# Patient Record
Sex: MALE | Race: WHITE | Employment: OTHER | URBAN - METROPOLITAN AREA
[De-identification: names, ages, dates, MRNs, and addresses within clinical notes are randomized per-mention and may not be internally consistent; named-entity substitution may affect disease eponyms.]

---

## 2017-03-08 ENCOUNTER — HOSPITAL ENCOUNTER (OUTPATIENT)
Dept: NUCLEAR MEDICINE | Facility: CLINIC | Age: 72
Setting detail: NUCLEAR MEDICINE
Discharge: HOME | End: 2017-03-08
Attending: INTERNAL MEDICINE | Admitting: INTERNAL MEDICINE
Payer: MEDICARE

## 2017-03-08 ENCOUNTER — HOSPITAL ENCOUNTER (OUTPATIENT)
Dept: CARDIOLOGY | Facility: CLINIC | Age: 72
Discharge: HOME | End: 2017-03-08
Attending: INTERNAL MEDICINE | Admitting: INTERNAL MEDICINE
Payer: MEDICARE

## 2017-03-08 DIAGNOSIS — I25.719 CORONARY ARTERY DISEASE INVOLVING AUTOLOGOUS VEIN CORONARY BYPASS GRAFT WITH ANGINA PECTORIS (H): ICD-10-CM

## 2017-03-08 PROCEDURE — 78452 HT MUSCLE IMAGE SPECT MULT: CPT

## 2017-03-08 PROCEDURE — 93306 TTE W/DOPPLER COMPLETE: CPT | Mod: 26 | Performed by: INTERNAL MEDICINE

## 2017-03-08 PROCEDURE — A9502 TC99M TETROFOSMIN: HCPCS | Performed by: INTERNAL MEDICINE

## 2017-03-08 PROCEDURE — 93018 CV STRESS TEST I&R ONLY: CPT | Performed by: INTERNAL MEDICINE

## 2017-03-08 PROCEDURE — 93016 CV STRESS TEST SUPVJ ONLY: CPT | Performed by: INTERNAL MEDICINE

## 2017-03-08 PROCEDURE — 78452 HT MUSCLE IMAGE SPECT MULT: CPT | Mod: 26 | Performed by: INTERNAL MEDICINE

## 2017-03-08 PROCEDURE — 34300033 ZZH RX 343: Performed by: INTERNAL MEDICINE

## 2017-03-08 PROCEDURE — 93017 CV STRESS TEST TRACING ONLY: CPT

## 2017-03-08 RX ADMIN — TETROFOSMIN 11 MCI.: 0.23 INJECTION, POWDER, LYOPHILIZED, FOR SOLUTION INTRAVENOUS at 09:10

## 2017-03-08 RX ADMIN — TETROFOSMIN 32.8 MCI.: 0.23 INJECTION, POWDER, LYOPHILIZED, FOR SOLUTION INTRAVENOUS at 10:40

## 2017-03-20 ENCOUNTER — OFFICE VISIT (OUTPATIENT)
Dept: CARDIOLOGY | Facility: CLINIC | Age: 72
End: 2017-03-20
Payer: COMMERCIAL

## 2017-03-20 VITALS
SYSTOLIC BLOOD PRESSURE: 117 MMHG | WEIGHT: 207 LBS | OXYGEN SATURATION: 96 % | DIASTOLIC BLOOD PRESSURE: 73 MMHG | BODY MASS INDEX: 31.02 KG/M2 | HEART RATE: 71 BPM

## 2017-03-20 DIAGNOSIS — I25.118 CORONARY ARTERY DISEASE OF NATIVE ARTERY OF NATIVE HEART WITH STABLE ANGINA PECTORIS (H): Primary | ICD-10-CM

## 2017-03-20 DIAGNOSIS — E78.5 HYPERLIPIDEMIA LDL GOAL <70: ICD-10-CM

## 2017-03-20 DIAGNOSIS — R07.89 CHEST PRESSURE: ICD-10-CM

## 2017-03-20 PROCEDURE — 99214 OFFICE O/P EST MOD 30 MIN: CPT | Performed by: NURSE PRACTITIONER

## 2017-03-20 RX ORDER — ISOSORBIDE MONONITRATE 30 MG/1
30 TABLET, EXTENDED RELEASE ORAL DAILY
Qty: 30 TABLET | Refills: 11 | Status: SHIPPED | OUTPATIENT
Start: 2017-03-20 | End: 2017-05-11

## 2017-03-20 RX ORDER — ROSUVASTATIN CALCIUM 40 MG/1
40 TABLET, COATED ORAL EVERY EVENING
Qty: 90 TABLET | Refills: 3 | Status: SHIPPED | OUTPATIENT
Start: 2017-03-20 | End: 2017-08-30

## 2017-03-20 NOTE — MR AVS SNAPSHOT
After Visit Summary   3/20/2017    Derrick Morrison    MRN: [id number]           Patient Information     Date Of Birth          1945        Visit Information        Provider Department      3/20/2017 2:20 PM Janeth Campos APRN CNP [facility] PHYSICIAN HEART AT [facility]        Today's Diagnoses     Coronary artery disease of native artery of native heart with stable angina pectoris (H)    -  1    Hyperlipidemia LDL goal <70          Care Instructions    Thank you for your [facility] visit today. Your provider has recommended the following:  Medication Changes:  INCREASE rosuvastatin to 40 mg daily as previously discussed   START Imdur 30 mg daily take in the AM   Recommendations:  Call if you have any side effects or low blood pressure after starting Imdur  Follow-up:  See Dr. Lan for cardiology follow up in 1 month.  Call [phone] two months prior to request date to schedule any future appointments.  Reminder:  1. Please bring in all current medications, over the counter supplements and vitamin bottles to your next appointment.               [facility] HEART [facility]~[address]~[place], [place]~[address]  Questions about your visit today?   Call your Cardiology Clinic RN's-Sara Rowe and/or Conchita Bhakta at [phone].          Follow-ups after your visit        Additional Services     Follow-Up with Cardiologist                 Future tests that were ordered for you today     Open Future Orders        Priority Expected Expires Ordered    Follow-Up with Cardiologist Routine 5/1/2017 3/20/2019 3/20/2017            Who to contact     If you have questions or need follow up information about today's clinic visit or your schedule please contact [facility] PHYSICIAN HEART AT [facility] directly at [phone].  Normal or non-critical lab and imaging results will be communicated to you by  MyChart, letter or phone within 4 business days after the clinic has received the results. If you do not hear from us within 7 days, please contact the clinic through [facility] or phone. If you have a critical or abnormal lab result, we will notify you by phone as soon as possible.  Submit refill requests through [facility] or call your pharmacy and they will forward the refill request to us. Please allow 3 business days for your refill to be completed.          Additional Information About Your Visit        [facility] Information     [facility] gives you secure access to your electronic health record. If you see a primary care provider, you can also send messages to your care team and make appointments. If you have questions, please call your primary care clinic.  If you do not have a primary care provider, please call [phone] and they will assist you.        Care EveryWhere ID     This is your Care EveryWhere ID. This could be used by other organizations to access your [facility] medical records  [id number]        Your Vitals Were     Pulse Pulse Oximetry BMI (Body Mass Index)             71 96% 31.02 kg/m2          Blood Pressure from Last 3 Encounters:   03/20/17 117/73   12/29/16 115/82   10/11/16 125/71    Weight from Last 3 Encounters:   03/20/17 93.9 kg (207 lb)   12/29/16 92.5 kg (204 lb)   09/28/16 91.2 kg (201 lb)                 Today's Medication Changes          These changes are accurate as of: 3/20/17  3:01 PM.  If you have any questions, ask your nurse or doctor.               Start taking these medicines.        Dose/Directions    isosorbide mononitrate 30 MG 24 hr tablet   Commonly known as:  IMDUR   Used for:  Coronary artery disease of native artery of native heart with stable angina pectoris (H)   Started by:  Janeth Campos APRN CNP        Dose:  30 mg   Take 1 tablet (30 mg) by mouth daily   Quantity:  30 tablet   Refills:  11            Where to get your medicines      These medications  were sent to [facility] - [place], [place] - [address]  [address], [facility] [address]     Phone:  [phone]     isosorbide mononitrate 30 MG 24 hr tablet    rosuvastatin 40 MG tablet                Primary Care Provider Office Phone # Fax #    Kaiden Zapata [phone]       [facility] [address]  [facility] [address]        Thank you!     Thank you for choosing [facility] PHYSICIAN HEART AT [facility]  for your care. Our goal is always to provide you with excellent care. Hearing back from our patients is one way we can continue to improve our services. Please take a few minutes to complete the written survey that you may receive in the mail after your visit with us. Thank you!             Your Updated Medication List - Protect others around you: Learn how to safely use, store and throw away your medicines at www.disposemymeds.org.          This list is accurate as of: 3/20/17  3:01 PM.  Always use your most recent med list.                   Brand Name Dispense Instructions for use    ASPIRIN NOT PRESCRIBED    INTENTIONAL    0 each    1 each continuous prn Antiplatelet medication not prescribed intentionally due to plavix       clopidogrel 75 MG tablet    PLAVIX    90 tablet    Take 1 tablet (75 mg) by mouth daily       isosorbide mononitrate 30 MG 24 hr tablet    IMDUR    30 tablet    Take 1 tablet (30 mg) by mouth daily       metoprolol 25 MG 24 hr tablet    TOPROL-XL    90 tablet    Take 1 tablet (25 mg) by mouth daily       nitroglycerin 0.4 MG sublingual tablet    NITROSTAT    25 tablet    Place 1 tablet (0.4 mg) under the tongue every 5 minutes as needed for chest pain       rosuvastatin 40 MG tablet    CRESTOR    90 tablet    Take 1 tablet (40 mg) by mouth every evening

## 2017-03-20 NOTE — PROGRESS NOTES
"HPI and Plan:   See dictation    Orders Placed This Encounter   Procedures     Lipid Profile     ALT     Follow-Up with Cardiologist       Orders Placed This Encounter   Medications     rosuvastatin (CRESTOR) 40 MG tablet     Sig: Take 1 tablet (40 mg) by mouth every evening     Dispense:  90 tablet     Refill:  3     isosorbide mononitrate (IMDUR) 30 MG 24 hr tablet     Sig: Take 1 tablet (30 mg) by mouth daily     Dispense:  30 tablet     Refill:  11       Medications Discontinued During This Encounter   Medication Reason     rosuvastatin (CRESTOR) 40 MG tablet Reorder         Encounter Diagnoses   Name Primary?     Hyperlipidemia LDL goal <70      Coronary artery disease of native artery of native heart with stable angina pectoris (H) Yes     Chest pressure        CURRENT MEDICATIONS:  Current Outpatient Prescriptions   Medication Sig Dispense Refill     rosuvastatin (CRESTOR) 40 MG tablet Take 1 tablet (40 mg) by mouth every evening 90 tablet 3     isosorbide mononitrate (IMDUR) 30 MG 24 hr tablet Take 1 tablet (30 mg) by mouth daily 30 tablet 11     clopidogrel (PLAVIX) 75 MG tablet Take 1 tablet (75 mg) by mouth daily 90 tablet 3     metoprolol (TOPROL-XL) 25 MG 24 hr tablet Take 1 tablet (25 mg) by mouth daily 90 tablet 3     nitroglycerin (NITROSTAT) 0.4 MG SL tablet Place 1 tablet (0.4 mg) under the tongue every 5 minutes as needed for chest pain 25 tablet 0     ASPIRIN NOT PRESCRIBED, INTENTIONAL, 1 each continuous prn Antiplatelet medication not prescribed intentionally due to plavix 0 each 0     [DISCONTINUED] rosuvastatin (CRESTOR) 40 MG tablet Take 1 tablet (40 mg) by mouth every evening 90 tablet 3       ALLERGIES   No Known Allergies    PAST MEDICAL HISTORY:  Past Medical History   Diagnosis Date     Actinic keratosis      Basal cell carcinoma      BPH w urinary obs/LUTS 10/10/2011     flomax in past, \"quit working\".  Avodart in past.  Tapered off.        CAD (coronary artery disease) 10/10/2011     " "-8/16/2006 s/p GERALDINE to mid RCA, s/p GERALDINE to mid LAD in [place] -10/4/2007 s/p GERALDINE to pRCA and GERALDINE to dRCA in [place]  -11/17/11 Unstable angina, s/p GERALDINE to prox LAD (jailed small diagonal)       Hyperlipidaemia      Palpitations 8/7/2013     occasional, improved on low dose beta blocker      screening 10/10/2011     2007 colonoscopy \"all clean\" in [place].  He is sure about this.   AAA ultrasound screen 3/07 normal also.  (leg and neck done then too)      Skin cancer      had mohs on L temple       PAST SURGICAL HISTORY:  No past surgical history on file.    FAMILY HISTORY:  Family History   Problem Relation Age of Onset     Coronary Artery Disease Mother        SOCIAL HISTORY:  Social History     Social History     Marital status:      Spouse name: N/A     Number of children: N/A     Years of education: N/A     Occupational History      Retired     Social History Main Topics     Smoking status: Never Smoker     Smokeless tobacco: Never Used     Alcohol use Yes      Comment: occasionally     Drug use: No     Sexual activity: Yes     Partners: Female     Other Topics Concern     Parent/Sibling W/ Cabg, Mi Or Angioplasty Before 65f 55m? No     Social History Narrative       Review of Systems:  Skin:  Negative       Eyes:  Positive for glasses    ENT:  Positive for hearing loss    Respiratory:  Negative for shortness of breath     Cardiovascular:  Negative for;chest pain;palpitations;edema;syncope or near-syncope;fatigue;lightheadedness;dizziness      Gastroenterology: Positive for heartburn    Genitourinary:  Negative      Musculoskeletal:  Positive for   left side neck and back muscles swell up occassional and causes some chest discomfort  Neurologic:  Positive for numbness or tingling of feet;memory problems    Psychiatric:  Negative      Heme/Lymph/Imm:  Negative      Endocrine:  Negative        Physical Exam:  Vitals: /73  Pulse 71  Wt 93.9 kg (207 lb)  SpO2 96%  BMI 31.02 " kg/m2    Constitutional:  cooperative, alert and oriented, well developed, well nourished, in no acute distress        Skin:  warm and dry to the touch        Head:  normocephalic        Eyes:  sclera white        ENT:  no pallor or cyanosis        Neck:  carotid pulses are full and equal bilaterally        Chest:  clear to auscultation          Cardiac: regular rhythm, normal S1/S2, no S3 or S4, apical impulse not displaced, no murmurs, gallops or rubs                  Abdomen:  abdomen soft        Vascular: pulses full and equal                                        Extremities and Back:  no deformities, clubbing, cyanosis, erythema observed;no edema              Neurological:  affect appropriate, oriented to time, person and place;no gross motor deficits              CC  No referring provider defined for this encounter.

## 2017-03-20 NOTE — LETTER
3/20/2017    Kaiden Zapata MD  [facility]   [address]  [place] [address]    RE: Derrick Morrison       Dear Colleague,    I had the pleasure of seeing Derrick Morrison in the [facility].    HPI and Plan:   See dictation    Orders Placed This Encounter   Procedures     Lipid Profile     ALT     Follow-Up with Cardiologist       Orders Placed This Encounter   Medications     rosuvastatin (CRESTOR) 40 MG tablet     Sig: Take 1 tablet (40 mg) by mouth every evening     Dispense:  90 tablet     Refill:  3     isosorbide mononitrate (IMDUR) 30 MG 24 hr tablet     Sig: Take 1 tablet (30 mg) by mouth daily     Dispense:  30 tablet     Refill:  11       Medications Discontinued During This Encounter   Medication Reason     rosuvastatin (CRESTOR) 40 MG tablet Reorder         Encounter Diagnoses   Name Primary?     Hyperlipidemia LDL goal <70      Coronary artery disease of native artery of native heart with stable angina pectoris (H) Yes     Chest pressure        CURRENT MEDICATIONS:  Current Outpatient Prescriptions   Medication Sig Dispense Refill     rosuvastatin (CRESTOR) 40 MG tablet Take 1 tablet (40 mg) by mouth every evening 90 tablet 3     isosorbide mononitrate (IMDUR) 30 MG 24 hr tablet Take 1 tablet (30 mg) by mouth daily 30 tablet 11     clopidogrel (PLAVIX) 75 MG tablet Take 1 tablet (75 mg) by mouth daily 90 tablet 3     metoprolol (TOPROL-XL) 25 MG 24 hr tablet Take 1 tablet (25 mg) by mouth daily 90 tablet 3     nitroglycerin (NITROSTAT) 0.4 MG SL tablet Place 1 tablet (0.4 mg) under the tongue every 5 minutes as needed for chest pain 25 tablet 0     ASPIRIN NOT PRESCRIBED, INTENTIONAL, 1 each continuous prn Antiplatelet medication not prescribed intentionally due to plavix 0 each 0     [DISCONTINUED] rosuvastatin (CRESTOR) 40 MG tablet Take 1 tablet (40 mg) by mouth every evening 90 tablet 3       ALLERGIES   No Known Allergies    PAST MEDICAL  "HISTORY:  Past Medical History   Diagnosis Date     Actinic keratosis      Basal cell carcinoma      BPH w urinary obs/LUTS 10/10/2011     flomax in past, \"quit working\".  Avodart in past.  Tapered off.        CAD (coronary artery disease) 10/10/2011     -8/16/2006 s/p GERALDINE to mid RCA, s/p GERALDINE to mid LAD in [place] -10/4/2007 s/p GERALDINE to pRCA and GERALDINE to dRCA in [place]  -11/17/11 Unstable angina, s/p GERALDINE to prox LAD (jailed small diagonal)       Hyperlipidaemia      Palpitations 8/7/2013     occasional, improved on low dose beta blocker      screening 10/10/2011     2007 colonoscopy \"all clean\" in [place].  He is sure about this.   AAA ultrasound screen 3/07 normal also.  (leg and neck done then too)      Skin cancer      had mohs on L temple       PAST SURGICAL HISTORY:  No past surgical history on file.    FAMILY HISTORY:  Family History   Problem Relation Age of Onset     Coronary Artery Disease Mother        SOCIAL HISTORY:  Social History     Social History     Marital status:      Spouse name: N/A     Number of children: N/A     Years of education: N/A     Occupational History      Retired     Social History Main Topics     Smoking status: Never Smoker     Smokeless tobacco: Never Used     Alcohol use Yes      Comment: occasionally     Drug use: No     Sexual activity: Yes     Partners: Female     Other Topics Concern     Parent/Sibling W/ Cabg, Mi Or Angioplasty Before 65f 55m? No     Social History Narrative       Review of Systems:  Skin:  Negative       Eyes:  Positive for glasses    ENT:  Positive for hearing loss    Respiratory:  Negative for shortness of breath     Cardiovascular:  Negative for;chest pain;palpitations;edema;syncope or near-syncope;fatigue;lightheadedness;dizziness      Gastroenterology: Positive for heartburn    Genitourinary:  Negative      Musculoskeletal:  Positive for   left side neck and back muscles swell up occassional and causes some chest " discomfort  Neurologic:  Positive for numbness or tingling of feet;memory problems    Psychiatric:  Negative      Heme/Lymph/Imm:  Negative      Endocrine:  Negative        Physical Exam:  Vitals: /73  Pulse 71  Wt 93.9 kg (207 lb)  SpO2 96%  BMI 31.02 kg/m2    Constitutional:  cooperative, alert and oriented, well developed, well nourished, in no acute distress        Skin:  warm and dry to the touch        Head:  normocephalic        Eyes:  sclera white        ENT:  no pallor or cyanosis        Neck:  carotid pulses are full and equal bilaterally        Chest:  clear to auscultation          Cardiac: regular rhythm, normal S1/S2, no S3 or S4, apical impulse not displaced, no murmurs, gallops or rubs                  Abdomen:  abdomen soft        Vascular: pulses full and equal                                        Extremities and Back:  no deformities, clubbing, cyanosis, erythema observed;no edema              Neurological:  affect appropriate, oriented to time, person and place;no gross motor deficits              CC  No referring provider defined for this encounter.                Cardiology Clinic Progress Note  Derrick Morrison MRN# [id number]   YOB: 1945 Age: 71 year old     Reason For Visit:  stress test and echocardiogram follow up    Primary Cardiologist:   Dr. Lan          History of Presenting Illness:    Derrick Morrison is a pleasant 71 year old patient with a past cardiac history significant for CAD with GERALDINE to the proximal LAD for in-stent restenosis 2011 with previous stents to the LAD and RCA, and hyperlipidemia.  Most recent exercise stress test 2014 showed fixed defects with no evidence of ischemia.  Pt was last seen by Dr. Lna on 12/29/2016 with complaints of palpitations improved with metoprolol and occasional dizziness two to three times a week lasting a few minutes.  Exercise nuclear stress test and echocardiogram were recommended along with increasing rosuvastatin to  40 mg daily.    Pt presents today for stress test and echocardiogram follow-up.  Echocardiogram 3/8/2017 showed normal LVEF, grade 1 diastolic dysfunction, no wall motion abnormalities, normal RV size and function, no significant valvular abnormalities.  Exercise nuclear stress test 3/8/2017 showed mild fixed defects in the basal inferior and inferolateral consistent with soft tissue artifact, no ischemia noted, and no significant changes since 2014.  He completed seven minutes on the Miguel protocol reaching 84% of max predicted heart rate.  His test was terminated due to chest and jaw pain and no EKG abnormalities were noted.    Since he was last seen, he has not had any further episodes of dizziness.  He did have an episode of chest pressure during his exercise nuclear stress test rating 3 out of 10.  This did resolve with rest.  He has been able to walk a half mile without any symptoms.  He has not needed to take any sublingual nitroglycerin.  In discussing his symptoms of jaw pain, he states that this is not new and he has had this ever since his angiogram in 2011.  This has never gone away.  He states that if the coronary angiogram is recommended, he would not proceed with it at this time anyway.  He is open to starting Imdur and reassessing in a month.  He states the discomfort he experienced is not anywhere near the severity when he has previously underwent angiograms.  He has not increased his rosuvastatin to 40 mg yet, as he was away on vacation.  He will be leaving again to go on vacation tomorrow until the beginning of April.  He will start Imdur and increased dose of rosuvastatin after he returns.  Patient reports no shortness of breath, PND, orthopnea, presyncope, syncope, edema, heart racing, or palpitations.    Current Cardiac Medications   Rosuvastatin 20 mg daily  Plavix 75 mg daily  Metoprolol XL 25 mg daily  Nitroglycerin p.r.n.                     Assessment and Plan:     Plan  1.  Increase  atorvastatin to 40 mg daily as previously discussed in December  2.  Start Imdur 30 mg daily  3.  Call the clinic if you have any significant headache or hypotension after starting Imdur  4.  Follow-up with Dr. Lan at the beginning of May, with lipid profile and ALT prior      1. CAD    GERALDINE to the proximal LAD for ISR in 2011 with previous stents to the LAD and RCA    C/o jaw pain which is chronic and stable and new chest pressure during stress test     Continue statin, beta blocker, plavix       2. Chest pressure    Experienced chest pressure three out of 10 during exercise nuclear stress test    Has not had any other episodes and has been able to walk half a mile without any symptoms    C/o jaw pain since his original angiogram in 2011. This has never gone away and is stable.       3. Hyperlipidemia    Last LDL 77 8/2016    Rosuvastatin previously increased to 40 mg daily in December, but patient has continued on 20 mg daily    He will start rosuvastatin 40 mg daily when he returns from vacation beginning of April         Thank you for allowing me to participate in this delightful patient's care.      This note was completed in part using Dragon voice recognition software. Although reviewed after completion, some word and grammatical errors may occur.    MARIA T Cm, CNP         Data:   All laboratory data reviewed    Thank you for allowing me to participate in the care of your patient.    Sincerely,     MARIA T Cm CNP     [facility]

## 2017-03-20 NOTE — PROGRESS NOTES
Cardiology Clinic Progress Note  Derrick Morrison MRN# [id number]   YOB: 1945 Age: 71 year old     Reason For Visit:  stress test and echocardiogram follow up    Primary Cardiologist:   Dr. Lan          History of Presenting Illness:    Derrick Morrison is a pleasant 71 year old patient with a past cardiac history significant for CAD with GERALDINE to the proximal LAD for in-stent restenosis 2011 with previous stents to the LAD and RCA, and hyperlipidemia.  Most recent exercise stress test 2014 showed fixed defects with no evidence of ischemia.  Pt was last seen by Dr. Lan on 12/29/2016 with complaints of palpitations improved with metoprolol and occasional dizziness two to three times a week lasting a few minutes.  Exercise nuclear stress test and echocardiogram were recommended along with increasing rosuvastatin to 40 mg daily.    Pt presents today for stress test and echocardiogram follow-up.  Echocardiogram 3/8/2017 showed normal LVEF, grade 1 diastolic dysfunction, no wall motion abnormalities, normal RV size and function, no significant valvular abnormalities.  Exercise nuclear stress test 3/8/2017 showed mild fixed defects in the basal inferior and inferolateral consistent with soft tissue artifact, no ischemia noted, and no significant changes since 2014.  He completed seven minutes on the Miguel protocol reaching 84% of max predicted heart rate.  His test was terminated due to chest and jaw pain and no EKG abnormalities were noted.    Since he was last seen, he has not had any further episodes of dizziness.  He did have an episode of chest pressure during his exercise nuclear stress test rating 3 out of 10.  This did resolve with rest.  He has been able to walk a half mile without any symptoms.  He has not needed to take any sublingual nitroglycerin.  In discussing his symptoms of jaw pain, he states that this is not new and he has had this ever since his angiogram in 2011.  This has never gone  away.  He states that if the coronary angiogram is recommended, he would not proceed with it at this time anyway.  He is open to starting Imdur and reassessing in a month.  He states the discomfort he experienced is not anywhere near the severity when he has previously underwent angiograms.  He has not increased his rosuvastatin to 40 mg yet, as he was away on vacation.  He will be leaving again to go on vacation tomorrow until the beginning of April.  He will start Imdur and increased dose of rosuvastatin after he returns.  Patient reports no shortness of breath, PND, orthopnea, presyncope, syncope, edema, heart racing, or palpitations.    Current Cardiac Medications   Rosuvastatin 20 mg daily  Plavix 75 mg daily  Metoprolol XL 25 mg daily  Nitroglycerin p.r.n.                     Assessment and Plan:     Plan  1.  Increase atorvastatin to 40 mg daily as previously discussed in December  2.  Start Imdur 30 mg daily  3.  Call the clinic if you have any significant headache or hypotension after starting Imdur  4.  Follow-up with Dr. Lan at the beginning of May, with lipid profile and ALT prior      1. CAD    GERALDINE to the proximal LAD for ISR in 2011 with previous stents to the LAD and RCA    C/o jaw pain which is chronic and stable and new chest pressure during stress test     Continue statin, beta blocker, plavix       2. Chest pressure    Experienced chest pressure three out of 10 during exercise nuclear stress test    Has not had any other episodes and has been able to walk half a mile without any symptoms    C/o jaw pain since his original angiogram in 2011. This has never gone away and is stable.       3. Hyperlipidemia    Last LDL 77 8/2016    Rosuvastatin previously increased to 40 mg daily in December, but patient has continued on 20 mg daily    He will start rosuvastatin 40 mg daily when he returns from vacation beginning of April         Thank you for allowing me to participate in this delightful patient's  care.      This note was completed in part using Dragon voice recognition software. Although reviewed after completion, some word and grammatical errors may occur.    MARIA T Cm, CNP           Data:   All laboratory data reviewed

## 2017-03-20 NOTE — PATIENT INSTRUCTIONS
Thank you for your [facility] visit today. Your provider has recommended the following:  Medication Changes:  INCREASE rosuvastatin to 40 mg daily as previously discussed   START Imdur 30 mg daily take in the AM   Recommendations:  Call if you have any side effects or low blood pressure after starting Imdur  Follow-up:  See Dr. Lan for cardiology follow up in 1 month.  Call [phone] two months prior to request date to schedule any future appointments.  Reminder:  1. Please bring in all current medications, over the counter supplements and vitamin bottles to your next appointment.               [facility] HE[facility]  [facility]~[address]~[place], [place]~[address]  Questions about your visit today?   Call your Cardiology Clinic RN's-Sara Rowe and/or Conchita Bhakta at [phone].

## 2017-04-03 ENCOUNTER — OFFICE VISIT (OUTPATIENT)
Dept: FAMILY MEDICINE | Facility: CLINIC | Age: 72
End: 2017-04-03
Payer: COMMERCIAL

## 2017-04-03 VITALS
SYSTOLIC BLOOD PRESSURE: 110 MMHG | TEMPERATURE: 98.1 F | BODY MASS INDEX: 30.36 KG/M2 | HEIGHT: 69 IN | WEIGHT: 205 LBS | RESPIRATION RATE: 16 BRPM | DIASTOLIC BLOOD PRESSURE: 78 MMHG | OXYGEN SATURATION: 97 % | HEART RATE: 80 BPM

## 2017-04-03 DIAGNOSIS — J06.9 UPPER RESPIRATORY TRACT INFECTION, UNSPECIFIED TYPE: Primary | ICD-10-CM

## 2017-04-03 PROCEDURE — 99213 OFFICE O/P EST LOW 20 MIN: CPT | Performed by: FAMILY MEDICINE

## 2017-04-03 RX ORDER — NITROGLYCERIN 0.4 MG/1
0.4 TABLET SUBLINGUAL EVERY 5 MIN PRN
Qty: 25 TABLET | Refills: 0 | Status: SHIPPED | OUTPATIENT
Start: 2017-04-03 | End: 2018-09-13

## 2017-04-03 NOTE — NURSING NOTE
"Chief Complaint   Patient presents with     URI     x 4 days       Initial /78 (BP Location: Right arm, Cuff Size: Adult Large)  Pulse 80  Temp 98.1  F (36.7  C) (Tympanic)  Resp 16  Ht 5' 8.5\" (1.74 m)  Wt 205 lb (93 kg)  SpO2 97%  BMI 30.72 kg/m2 Estimated body mass index is 30.72 kg/(m^2) as calculated from the following:    Height as of this encounter: 5' 8.5\" (1.74 m).    Weight as of this encounter: 205 lb (93 kg).  Medication Reconciliation: complete    Health Maintenance that is potentially due pending provider review:  Colonoscopy/FIT    Pt will schedule  appt.    Michelle Cancino CMA    "

## 2017-04-03 NOTE — PROGRESS NOTES
"  SUBJECTIVE:                                                    Derrick Morrison is a 71 year old male who presents to clinic today for the following health issues:       RESPIRATORY SYMPTOMS      Duration: 4 days    Description  nasal congestion, rhinorrhea, sore throat, cough and ear pain bilateral    Severity: moderate    Accompanying signs and symptoms: None    History (predisposing factors):  none    Precipitating or alleviating factors: None    Therapies tried and outcome:  none       S: Derrick Morrison is a 71 year old male with with cough, ST, fatigue, ear pain.  A few days.  Came back from [employer] with these sx.      No gi/gu issues    No rash, no fever    Problem list, med list, additional histories reviewed and updated, as indicated.      O:/78 (BP Location: Right arm, Cuff Size: Adult Large)  Pulse 80  Temp 98.1  F (36.7  C) (Tympanic)  Resp 16  Ht 5' 8.5\" (1.74 m)  Wt 205 lb (93 kg)  SpO2 97%  BMI 30.72 kg/m2  GEN: Alert and oriented, in no acute distress  CV: RRR, no murmur  RESP: lungs clear bilaterally, good effort  EXT: no edema or lesions noted in lower extremities  ABD: nontender.  No distention or mass appreciated.  ENT: oropharynx clear, no exudate or palate/tonsil asymmetry  Ears fine    A: uri, likely viral    P: supportive cares.  F/u if worsening.         "

## 2017-04-03 NOTE — MR AVS SNAPSHOT
After Visit Summary   4/3/2017    Derrick Morrison    MRN: [id number]           Patient Information     Date Of Birth          1945        Visit Information        Provider Department      4/3/2017 1:40 PM Kaiden Zapata MD [facility]        Today's Diagnoses     Coronary artery disease involving native coronary artery of native heart without angina pectoris           Follow-ups after your visit        Your next 10 appointments already scheduled     May 10, 2017  7:30 AM CDT   LAB with  LAB   [facility] ([facility])    [address] [facility] [id number] [phone]           Patient must bring picture ID.  Patient should be prepared to give a urine specimen  Please do not eat 10-12 hours before your appointment if you are coming in fasting for labs on lipids, cholesterol, or glucose (sugar).  Pregnant women should follow their Care Team instructions. Water with medications is okay. Do not drink coffee or other fluids.   If you have concerns about taking  your medications, please ask at office or if scheduling via Kaliki, send a message by clicking on Secure Messaging, Message Your Care Team.            May 11, 2017  3:30 PM CDT   Return Visit with Chidi Lan MD   [facility] PHYSICIAN HEART AT [facility] ([facility])    [address] [address] [phone]              Who to contact     If you have questions or need follow up information about today's clinic visit or your schedule please contact [facility] directly at [phone].  Normal or non-critical lab and imaging results will be communicated to you by MyChart, letter or phone within 4 business days after the clinic has received the results. If you do not hear from us within 7 days, please contact the clinic through MyChart or phone. If you have a critical or abnormal lab result, we will notify you by phone as  "soon as possible.  Submit refill requests through [employer] or call your pharmacy and they will forward the refill request to us. Please allow 3 business days for your refill to be completed.          Additional Information About Your Visit        [employer] Information     [employer] gives you secure access to your electronic health record. If you see a primary care provider, you can also send messages to your care team and make appointments. If you have questions, please call your primary care clinic.  If you do not have a primary care provider, please call [phone] and they will assist you.        Care EveryWhere ID     This is your Care EveryWhere ID. This could be used by other organizations to access your [facility] medical records  [id number]        Your Vitals Were     Pulse Temperature Respirations Height Pulse Oximetry BMI (Body Mass Index)    80 98.1  F (36.7  C) (Tympanic) 16 5' 8.5\" (1.74 m) 97% 30.72 kg/m2       Blood Pressure from Last 3 Encounters:   04/03/17 110/78   03/20/17 117/73   12/29/16 115/82    Weight from Last 3 Encounters:   04/03/17 205 lb (93 kg)   03/20/17 207 lb (93.9 kg)   12/29/16 204 lb (92.5 kg)              Today, you had the following     No orders found for display         Where to get your medicines      These medications were sent to [facility] [address] [address]     Phone:  [phone]     nitroglycerin 0.4 MG sublingual tablet          Primary Care Provider Office Phone # Fax #    Kaiden Zapata [phone]       [facility] [address] [facility] [address]        Thank you!     Thank you for choosing [facility]  for your care. Our goal is always to provide you with excellent care. Hearing back from our patients is one way we can continue to improve our services. Please take a few minutes to complete the written survey that you may receive in the mail " after your visit with us. Thank you!             Your Updated Medication List - Protect others around you: Learn how to safely use, store and throw away your medicines at www.disposemymeds.org.          This list is accurate as of: 4/3/17  2:15 PM.  Always use your most recent med list.                   Brand Name Dispense Instructions for use    ASPIRIN NOT PRESCRIBED    INTENTIONAL    0 each    1 each continuous prn Antiplatelet medication not prescribed intentionally due to plavix       clopidogrel 75 MG tablet    PLAVIX    90 tablet    Take 1 tablet (75 mg) by mouth daily       isosorbide mononitrate 30 MG 24 hr tablet    IMDUR    30 tablet    Take 1 tablet (30 mg) by mouth daily       metoprolol 25 MG 24 hr tablet    TOPROL-XL    90 tablet    Take 1 tablet (25 mg) by mouth daily       nitroglycerin 0.4 MG sublingual tablet    NITROSTAT    25 tablet    Place 1 tablet (0.4 mg) under the tongue every 5 minutes as needed for chest pain       rosuvastatin 40 MG tablet    CRESTOR    90 tablet    Take 1 tablet (40 mg) by mouth every evening

## 2017-04-06 ENCOUNTER — TELEPHONE (OUTPATIENT)
Dept: CARDIOLOGY | Facility: CLINIC | Age: 72
End: 2017-04-06

## 2017-04-06 NOTE — TELEPHONE ENCOUNTER
"Pt calls this RN asking why Imdur is recommended for him. I reviewed Janeth Andres's last dictation. Pt with c/o jaw pain since 2011 and recent stress test stopped 2/2 cp/jaw pain. He states he has \"learned to live with it\" and if it happens, he just stops what he is doing and it goes away. If it happens after he eats, he just lies down and rests and it goes away. I explained the action of Imdur and expected side effect of HA when first starting this medication. I encouraged him to treat his HA with OTC analgesic. He states he was told by cardiologist, Dr Rosales, to use \"naproxyn\" for pain/HA. I discussed NSAID use with Plavix will increase bleed time and recommended acetaminophen as an alternative. Encouraged pt to call us if HA persists despite analgesic tx. States understanding. Does have revisit with MD Riky pending 5/11/17 with fasting labs prior. Pt will discuss medication efficacy with MD at that time. Rosemary Rowe, RN Cardiology at [facility] April 6, 2017, 8:50 AM  "

## 2017-05-10 DIAGNOSIS — E78.5 HYPERLIPIDEMIA LDL GOAL <70: ICD-10-CM

## 2017-05-10 LAB
ALT SERPL W P-5'-P-CCNC: 43 U/L (ref 0–70)
CHOLEST SERPL-MCNC: 120 MG/DL
HDLC SERPL-MCNC: 34 MG/DL
LDLC SERPL CALC-MCNC: 70 MG/DL
NONHDLC SERPL-MCNC: 86 MG/DL
TRIGL SERPL-MCNC: 81 MG/DL

## 2017-05-10 PROCEDURE — 80061 LIPID PANEL: CPT | Performed by: NURSE PRACTITIONER

## 2017-05-10 PROCEDURE — 36415 COLL VENOUS BLD VENIPUNCTURE: CPT | Performed by: NURSE PRACTITIONER

## 2017-05-10 PROCEDURE — 84460 ALANINE AMINO (ALT) (SGPT): CPT | Performed by: NURSE PRACTITIONER

## 2017-05-11 ENCOUNTER — OFFICE VISIT (OUTPATIENT)
Dept: CARDIOLOGY | Facility: CLINIC | Age: 72
End: 2017-05-11
Attending: NURSE PRACTITIONER
Payer: COMMERCIAL

## 2017-05-11 VITALS
OXYGEN SATURATION: 96 % | HEART RATE: 78 BPM | SYSTOLIC BLOOD PRESSURE: 136 MMHG | BODY MASS INDEX: 30.72 KG/M2 | WEIGHT: 205 LBS | DIASTOLIC BLOOD PRESSURE: 76 MMHG

## 2017-05-11 DIAGNOSIS — Z13.6 SCREENING FOR ABDOMINAL AORTIC ANEURYSM: Primary | ICD-10-CM

## 2017-05-11 DIAGNOSIS — I25.118 CORONARY ARTERY DISEASE OF NATIVE ARTERY OF NATIVE HEART WITH STABLE ANGINA PECTORIS (H): ICD-10-CM

## 2017-05-11 PROCEDURE — 99214 OFFICE O/P EST MOD 30 MIN: CPT | Performed by: INTERNAL MEDICINE

## 2017-05-11 NOTE — MR AVS SNAPSHOT
After Visit Summary   5/11/2017    Derrick Morrison    MRN: [id number]           Patient Information     Date Of Birth          1945        Visit Information        Provider Department      5/11/2017 3:30 PM Chidi Lan MD [facility] PHYSICIAN HEART AT [facility]        Today's Diagnoses     Screening for abdominal aortic aneurysm    -  1    Coronary artery disease of native artery of native heart with stable angina pectoris (H)           Follow-ups after your visit        Additional Services     Follow-Up with Cardiologist                 Future tests that were ordered for you today     Open Future Orders        Priority Expected Expires Ordered    Echocardiogram Routine 5/11/2018 6/15/2018 5/11/2017    Follow-Up with Cardiologist Routine 5/11/2018 9/23/2018 5/11/2017    US Abdomen Limited Routine 5/25/2017 5/11/2018 5/11/2017            Who to contact     If you have questions or need follow up information about today's clinic visit or your schedule please contact [facility] PHYSICIAN HEART AT [facility] directly at [phone].  Normal or non-critical lab and imaging results will be communicated to you by [employer], letter or phone within 4 business days after the clinic has received the results. If you do not hear from us within 7 days, please contact the clinic through [employer] or phone. If you have a critical or abnormal lab result, we will notify you by phone as soon as possible.  Submit refill requests through [employer] or call your pharmacy and they will forward the refill request to us. Please allow 3 business days for your refill to be completed.          Additional Information About Your Visit        [employer] Information     [employer] gives you secure access to your electronic health record. If you see a primary care provider, you can also send messages to your care team and make appointments. If you have questions, please call your primary care  clinic.  If you do not have a primary care provider, please call [phone] and they will assist you.        Care EveryWhere ID     This is your Care EveryWhere ID. This could be used by other organizations to access your [facility] medical records  [id number]        Your Vitals Were     Pulse Pulse Oximetry BMI (Body Mass Index)             78 96% 30.72 kg/m2          Blood Pressure from Last 3 Encounters:   05/11/17 136/76   04/03/17 110/78   03/20/17 117/73    Weight from Last 3 Encounters:   05/11/17 93 kg (205 lb)   04/03/17 93 kg (205 lb)   03/20/17 93.9 kg (207 lb)              We Performed the Following     Follow-Up with Cardiologist          Today's Medication Changes          These changes are accurate as of: 5/11/17  3:57 PM.  If you have any questions, ask your nurse or doctor.               Stop taking these medicines if you haven't already. Please contact your care team if you have questions.     isosorbide mononitrate 30 MG 24 hr tablet   Commonly known as:  IMDUR   Stopped by:  Chidi Lan MD                    Primary Care Provider Office Phone # Fax #    Kaiden Zapata [phone]       [facility] [address] [facility] [address]        Thank you!     Thank you for choosing [facility] PHYSICIAN HEART AT [facility]  for your care. Our goal is always to provide you with excellent care. Hearing back from our patients is one way we can continue to improve our services. Please take a few minutes to complete the written survey that you may receive in the mail after your visit with us. Thank you!             Your Updated Medication List - Protect others around you: Learn how to safely use, store and throw away your medicines at www.disposemymeds.org.          This list is accurate as of: 5/11/17  3:57 PM.  Always use your most recent med list.                   Brand Name Dispense Instructions for use    ASPIRIN NOT PRESCRIBED     INTENTIONAL    0 each    1 each continuous prn Antiplatelet medication not prescribed intentionally due to plavix       clopidogrel 75 MG tablet    PLAVIX    90 tablet    Take 1 tablet (75 mg) by mouth daily       metoprolol 25 MG 24 hr tablet    TOPROL-XL    90 tablet    Take 1 tablet (25 mg) by mouth daily       nitroglycerin 0.4 MG sublingual tablet    NITROSTAT    25 tablet    Place 1 tablet (0.4 mg) under the tongue every 5 minutes as needed for chest pain       RHINOCORT ALLERGY 32 MCG/ACT spray   Generic drug:  budesonide      Spray 1 spray into both nostrils daily       rosuvastatin 40 MG tablet    CRESTOR    90 tablet    Take 1 tablet (40 mg) by mouth every evening

## 2017-05-11 NOTE — PROGRESS NOTES
"HPI and Plan:   See dictation    Orders Placed This Encounter   Procedures     US Abdomen Limited     Follow-Up with Cardiologist     Echocardiogram       Orders Placed This Encounter   Medications     budesonide (RHINOCORT ALLERGY) 32 MCG/ACT spray     Sig: Spray 1 spray into both nostrils daily       Medications Discontinued During This Encounter   Medication Reason     isosorbide mononitrate (IMDUR) 30 MG 24 hr tablet          Encounter Diagnoses   Name Primary?     Coronary artery disease of native artery of native heart with stable angina pectoris (H)      Screening for abdominal aortic aneurysm Yes       CURRENT MEDICATIONS:  Current Outpatient Prescriptions   Medication Sig Dispense Refill     budesonide (RHINOCORT ALLERGY) 32 MCG/ACT spray Spray 1 spray into both nostrils daily       nitroglycerin (NITROSTAT) 0.4 MG sublingual tablet Place 1 tablet (0.4 mg) under the tongue every 5 minutes as needed for chest pain 25 tablet 0     rosuvastatin (CRESTOR) 40 MG tablet Take 1 tablet (40 mg) by mouth every evening 90 tablet 3     clopidogrel (PLAVIX) 75 MG tablet Take 1 tablet (75 mg) by mouth daily 90 tablet 3     metoprolol (TOPROL-XL) 25 MG 24 hr tablet Take 1 tablet (25 mg) by mouth daily 90 tablet 3     ASPIRIN NOT PRESCRIBED, INTENTIONAL, 1 each continuous prn Antiplatelet medication not prescribed intentionally due to plavix 0 each 0       ALLERGIES   No Known Allergies    PAST MEDICAL HISTORY:  Past Medical History:   Diagnosis Date     Actinic keratosis      Basal cell carcinoma      BPH w urinary obs/LUTS 10/10/2011    flomax in past, \"quit working\".  Avodart in past.  Tapered off.        CAD (coronary artery disease) 10/10/2011    -8/16/2006 s/p GERALDINE to mid RCA, s/p GERALDINE to mid LAD in [place] -10/4/2007 s/p GERALDINE to pRCA and GERALDINE to dRCA in [place]  -11/17/11 Unstable angina, s/p GERALDINE to prox LAD (jailed small diagonal)       Hyperlipidaemia      Palpitations 8/7/2013    occasional, improved on " "low dose beta blocker      screening 10/10/2011    2007 colonoscopy \"all clean\" in [place].  He is sure about this.   AAA ultrasound screen 3/07 normal also.  (leg and neck done then too)      Skin cancer     had mohs on L temple       PAST SURGICAL HISTORY:  No past surgical history on file.    FAMILY HISTORY:  Family History   Problem Relation Age of Onset     Coronary Artery Disease Mother        SOCIAL HISTORY:  Social History     Social History     Marital status:      Spouse name: N/A     Number of children: N/A     Years of education: N/A     Occupational History      Retired     Social History Main Topics     Smoking status: Never Smoker     Smokeless tobacco: Never Used     Alcohol use Yes      Comment: occasionally     Drug use: No     Sexual activity: Yes     Partners: Female     Other Topics Concern     Parent/Sibling W/ Cabg, Mi Or Angioplasty Before 65f 55m? No     Social History Narrative       Review of Systems:  Skin:  Negative       Eyes:  Positive for glasses    ENT:  Positive for hearing loss    Respiratory:  Negative for shortness of breath     Cardiovascular:  Negative for;chest pain;palpitations;edema;syncope or near-syncope;fatigue;lightheadedness;dizziness      Gastroenterology: Positive for heartburn    Genitourinary:  Negative      Musculoskeletal:  Positive for   left side neck and back muscles swell up occassional and causes some chest discomfort  Neurologic:  Positive for numbness or tingling of feet;memory problems    Psychiatric:  Negative      Heme/Lymph/Imm:  Negative      Endocrine:  Negative        Physical Exam:  Vitals: /76  Pulse 78  Wt 93 kg (205 lb)  SpO2 96%  BMI 30.72 kg/m2    Constitutional:  cooperative, alert and oriented, well developed, well nourished, in no acute distress        Skin:  warm and dry to the touch        Head:  normocephalic        Eyes:  sclera white        ENT:  no pallor or cyanosis        Neck:  carotid pulses are full and " equal bilaterally        Chest:  clear to auscultation          Cardiac: regular rhythm, normal S1/S2, no S3 or S4, apical impulse not displaced, no murmurs, gallops or rubs                  Abdomen:  abdomen soft        Vascular: pulses full and equal                                        Extremities and Back:  no deformities, clubbing, cyanosis, erythema observed;no edema              Neurological:  affect appropriate, oriented to time, person and place;no gross motor deficits              CC  MARIA T Workman CNP   PHYSICIANS HEART AT [facility]  [address]  [place] [id number]

## 2017-05-11 NOTE — LETTER
5/11/2017    Kaiden Zapata MD  [facility]   [address]  [facility] [address]    RE: Derrick Morrison       Dear Colleague,    I had the pleasure of seeing Derrick Morrison in the [facility].    I had the pleasure seeing Mr. Morrison in followup at the [facility] today.  He is a very pleasant 71-year-old gentleman who I last saw in 12/2016.  Mr. Morrison has a history of coronary artery disease and is status post drug-eluting stent placement to the LAD for in-stent restenosis in 11/2011.  He previously has undergone stenting to the LAD and the right coronary artery.  His left ventricular systolic function has remained preserved.      At the time I saw him last year, he was complaining of occasional dizziness as well as generalized fatigue.  I ordered a stress nuclear perfusion study for further evaluation.  This was performed on 03/08/2017 and demonstrated mild fixed defects involving the basal inferior and inferolateral walls consistent with soft-tissue attenuation artifact.  No ischemia was identified on this study.  He did have normal left ventricular systolic function.  Of note, he did experience 5/10 chest and jaw pain during the test.  He was subsequently seen by Janeth Campos, one of our nurse practitioners, and was started on isosorbide mononitrate 30 mg daily.      Since then, he has done well and denies any chest discomfort or shortness of breath.  He actually returned from a cruise recently and was walking regularly without any difficulty.  He denies any syncope or presyncope.  He has been taking all of his medications as prescribed with the exception of Imdur, which he felt was causing some mental cloudiness.      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      PHYSICAL EXAMINATION:   GENERAL:  Alert and oriented.   VITAL SIGNS:  Blood pressure is 136/76 with a pulse of 78.  I  rechecked his blood pressure and confirmed these findings.   NECK:  Revealed no jugular venous distention or carotid bruits.   CHEST:  Clear to auscultation.   CARDIOVASCULAR:  Revealed a regular rate and rhythm, no murmurs appreciated.   ABDOMEN:  Soft, nontender.   EXTREMITIES:  Demonstrated no edema.      LABORATORY DATA:  Lipids on 05/10/2017 demonstrated total cholesterol 120, HDL 34, LDL of 70 and triglycerides of 81.     Outpatient Encounter Prescriptions as of 5/11/2017   Medication Sig Dispense Refill     budesonide (RHINOCORT ALLERGY) 32 MCG/ACT spray Spray 1 spray into both nostrils daily       nitroglycerin (NITROSTAT) 0.4 MG sublingual tablet Place 1 tablet (0.4 mg) under the tongue every 5 minutes as needed for chest pain 25 tablet 0     rosuvastatin (CRESTOR) 40 MG tablet Take 1 tablet (40 mg) by mouth every evening 90 tablet 3     clopidogrel (PLAVIX) 75 MG tablet Take 1 tablet (75 mg) by mouth daily 90 tablet 3     metoprolol (TOPROL-XL) 25 MG 24 hr tablet Take 1 tablet (25 mg) by mouth daily 90 tablet 3     ASPIRIN NOT PRESCRIBED, INTENTIONAL, 1 each continuous prn Antiplatelet medication not prescribed intentionally due to plavix 0 each 0     [DISCONTINUED] isosorbide mononitrate (IMDUR) 30 MG 24 hr tablet Take 1 tablet (30 mg) by mouth daily 30 tablet 11     No facility-administered encounter medications on file as of 5/11/2017.       IMPRESSION:   1.  Coronary artery disease, status post multiple PCIs in the past, the most recent being a drug-eluting stent placed to the proximal LAD in 2011.  Recent nuclear stress test demonstrated no evidence of ischemia.   2.  Dyslipidemia.   3.  Hypertension.      Mr. Morrison appears to be doing well overall from a cardiac standpoint.  There is no evidence of angina or congestive heart failure.  Given the fact that he is not experiencing any anginal symptoms, I think the decision to discontinue isosorbide mononitrate is reasonable.  Having said that, I asked  him to keep track of his blood pressures over the next few weeks.  If his systolic blood pressure is consistently above 120 mmHg, then I have asked him to increase his metoprolol to 25 mg in the morning and 12.5 at night.      It was a pleasure seeing him in followup today.  Assuming his clinical status remains stable, I will plan on followup in about 1 year.     Sincerely,    Chidi Lan MD     [facility]

## 2017-05-12 NOTE — PROGRESS NOTES
HISTORY OF PRESENT ILLNESS:  I had the pleasure seeing Mr. Morrison in followup at the [facility] today.  He is a very pleasant 71-year-old gentleman who I last saw in 12/2016.  Mr. Morrison has a history of coronary artery disease and is status post drug-eluting stent placement to the LAD for in-stent restenosis in 11/2011.  He previously has undergone stenting to the LAD and the right coronary artery.  His left ventricular systolic function has remained preserved.      At the time I saw him last year, he was complaining of occasional dizziness as well as generalized fatigue.  I ordered a stress nuclear perfusion study for further evaluation.  This was performed on 03/08/2017 and demonstrated mild fixed defects involving the basal inferior and inferolateral walls consistent with soft-tissue attenuation artifact.  No ischemia was identified on this study.  He did have normal left ventricular systolic function.  Of note, he did experience 5/10 chest and jaw pain during the test.  He was subsequently seen by Janeth Campos, one of our nurse practitioners, and was started on isosorbide mononitrate 30 mg daily.      Since then, he has done well and denies any chest discomfort or shortness of breath.  He actually returned from a cruise recently and was walking regularly without any difficulty.  He denies any syncope or presyncope.  He has been taking all of his medications as prescribed with the exception of Imdur, which he felt was causing some mental cloudiness.      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      PHYSICAL EXAMINATION:   GENERAL:  Alert and oriented.   VITAL SIGNS:  Blood pressure is 136/76 with a pulse of 78.  I rechecked his blood pressure and confirmed these findings.   NECK:  Revealed no jugular venous distention or carotid bruits.   CHEST:  Clear to auscultation.   CARDIOVASCULAR:  Revealed a regular rate and rhythm, no murmurs  appreciated.   ABDOMEN:  Soft, nontender.   EXTREMITIES:  Demonstrated no edema.      LABORATORY DATA:  Lipids on 05/10/2017 demonstrated total cholesterol 120, HDL 34, LDL of 70 and triglycerides of 81.      IMPRESSION:   1.  Coronary artery disease, status post multiple PCIs in the past, the most recent being a drug-eluting stent placed to the proximal LAD in .  Recent nuclear stress test demonstrated no evidence of ischemia.   2.  Dyslipidemia.   3.  Hypertension.      Mr. Lu appears to be doing well overall from a cardiac standpoint.  There is no evidence of angina or congestive heart failure.  Given the fact that he is not experiencing any anginal symptoms, I think the decision to discontinue isosorbide mononitrate is reasonable.  Having said that, I asked him to keep track of his blood pressures over the next few weeks.  If his systolic blood pressure is consistently above 120 mmHg, then I have asked him to increase his metoprolol to 25 mg in the morning and 12.5 at night.      It was a pleasure seeing him in followup today.  Assuming his clinical status remains stable, I will plan on followup in about 1 year.         RUT MCDONNELL MD             D: 2017 15:55   T: 2017 14:19   MT: SCOTT      Name:     MAIKOL LU   MRN:      [id number]        Account:      [id number]   :      1945           Service Date: 2017      Document: [id number]

## 2017-08-30 ENCOUNTER — OFFICE VISIT (OUTPATIENT)
Dept: FAMILY MEDICINE | Facility: CLINIC | Age: 72
End: 2017-08-30
Payer: COMMERCIAL

## 2017-08-30 VITALS
DIASTOLIC BLOOD PRESSURE: 82 MMHG | HEART RATE: 60 BPM | TEMPERATURE: 96.9 F | BODY MASS INDEX: 29.77 KG/M2 | RESPIRATION RATE: 16 BRPM | WEIGHT: 201 LBS | HEIGHT: 69 IN | SYSTOLIC BLOOD PRESSURE: 120 MMHG

## 2017-08-30 DIAGNOSIS — I25.10 CORONARY ARTERY DISEASE INVOLVING NATIVE HEART WITHOUT ANGINA PECTORIS, UNSPECIFIED VESSEL OR LESION TYPE: ICD-10-CM

## 2017-08-30 DIAGNOSIS — E78.5 HYPERLIPIDEMIA LDL GOAL <70: ICD-10-CM

## 2017-08-30 DIAGNOSIS — Z23 NEED FOR PROPHYLACTIC VACCINATION AND INOCULATION AGAINST INFLUENZA: ICD-10-CM

## 2017-08-30 DIAGNOSIS — Z12.11 SPECIAL SCREENING FOR MALIGNANT NEOPLASMS, COLON: ICD-10-CM

## 2017-08-30 DIAGNOSIS — I10 ESSENTIAL HYPERTENSION: ICD-10-CM

## 2017-08-30 DIAGNOSIS — Z12.5 SCREENING FOR PROSTATE CANCER: ICD-10-CM

## 2017-08-30 DIAGNOSIS — Z00.00 MEDICARE ANNUAL WELLNESS VISIT, SUBSEQUENT: Primary | ICD-10-CM

## 2017-08-30 LAB
ANION GAP SERPL CALCULATED.3IONS-SCNC: 3 MMOL/L (ref 3–14)
BUN SERPL-MCNC: 14 MG/DL (ref 7–30)
CALCIUM SERPL-MCNC: 9.6 MG/DL (ref 8.5–10.1)
CHLORIDE SERPL-SCNC: 104 MMOL/L (ref 94–109)
CO2 SERPL-SCNC: 28 MMOL/L (ref 20–32)
CREAT SERPL-MCNC: 0.91 MG/DL (ref 0.66–1.25)
GFR SERPL CREATININE-BSD FRML MDRD: 82 ML/MIN/1.7M2
GLUCOSE SERPL-MCNC: 82 MG/DL (ref 70–99)
POTASSIUM SERPL-SCNC: 4.3 MMOL/L (ref 3.4–5.3)
SODIUM SERPL-SCNC: 135 MMOL/L (ref 133–144)

## 2017-08-30 PROCEDURE — 80048 BASIC METABOLIC PNL TOTAL CA: CPT | Performed by: FAMILY MEDICINE

## 2017-08-30 PROCEDURE — 99397 PER PM REEVAL EST PAT 65+ YR: CPT | Mod: 25 | Performed by: FAMILY MEDICINE

## 2017-08-30 PROCEDURE — 36415 COLL VENOUS BLD VENIPUNCTURE: CPT | Performed by: FAMILY MEDICINE

## 2017-08-30 PROCEDURE — G0008 ADMIN INFLUENZA VIRUS VAC: HCPCS | Performed by: FAMILY MEDICINE

## 2017-08-30 PROCEDURE — 90662 IIV NO PRSV INCREASED AG IM: CPT | Performed by: FAMILY MEDICINE

## 2017-08-30 RX ORDER — ROSUVASTATIN CALCIUM 40 MG/1
40 TABLET, COATED ORAL EVERY EVENING
Qty: 90 TABLET | Refills: 3 | Status: SHIPPED | OUTPATIENT
Start: 2017-08-30 | End: 2018-09-13

## 2017-08-30 RX ORDER — METOPROLOL SUCCINATE 25 MG/1
25 TABLET, EXTENDED RELEASE ORAL DAILY
Qty: 90 TABLET | Refills: 3 | Status: SHIPPED | OUTPATIENT
Start: 2017-08-30 | End: 2017-11-30

## 2017-08-30 RX ORDER — CLOPIDOGREL BISULFATE 75 MG/1
75 TABLET ORAL DAILY
Qty: 90 TABLET | Refills: 3 | Status: SHIPPED | OUTPATIENT
Start: 2017-08-30 | End: 2018-09-03

## 2017-08-30 NOTE — PROGRESS NOTES
SUBJECTIVE:   Derrick Morrison is a 71 year old male who presents for Preventive Visit.    Are you in the first 12 months of your Medicare coverage?  No    Physical   Annual:     Getting at least 3 servings of Calcium per day::  Yes    Bi-annual eye exam::  Yes    Dental care twice a year::  Yes    Sleep apnea or symptoms of sleep apnea::  None    Diet::  Regular (no restrictions)    Frequency of exercise::  None    Taking medications regularly::  Yes    Medication side effects::  None    Additional concerns today::  YES      COGNITIVE SCREEN  1) Repeat 3 items (Banana, Sunrise, Chair)    2) Clock draw: NORMAL  3) 3 item recall: Recalls 2 objects   Results: NORMAL clock, 1-2 items recalled: COGNITIVE IMPAIRMENT LESS LIKELY    Mini-CogTM Copyright S Jud. Licensed by the author for use in [facility] Granular; reprinted with permission (snehal@[facility]). All rights reserved.          Reviewed and updated as needed this visit by clinical staffTobacco  Allergies  Med Hx  Surg Hx  Fam Hx  Soc Hx        Reviewed and updated as needed this visit by Provider        Social History   Substance Use Topics     Smoking status: Never Smoker     Smokeless tobacco: Never Used     Alcohol use Yes      Comment: occasionally       The patient does not drink >3 drinks per day nor >7 drinks per week.          CAD: stable.  No cp or sob.  plavix and crestor and low dose metoprolol  Htn: stable on low dose beta  Blocker  Hyperlipemia: statin, fills  Palpitations: controlled on low dose beta      Today's PHQ-2 Score: PHQ-2 ( 1999 Pfizer) 8/27/2017   Q1: Little interest or pleasure in doing things 0   Q2: Feeling down, depressed or hopeless 0   PHQ-2 Score 0   Q1: Little interest or pleasure in doing things Not at all   Q2: Feeling down, depressed or hopeless Not at all   PHQ-2 Score 0       Do you feel safe in your environment - Yes    Do you have a Health Care Directive?: Yes: Advance Directive has been received and  "scanned.    Current providers sharing in care for this patient include:   Patient Care Team:  Kaiden Zapata MD as PCP - General (Family Practice)      Hearing impairment: No    Ability to successfully perform activities of daily living: Yes, no assistance needed     Fall risk:         Home safety:  none identified      The following health maintenance items are reviewed in Epic and correct as of today:Health Maintenance   Topic Date Due     AORTIC ANEURYSM SCREENING (SYSTEM ASSIGNED)  10/15/2011     COLON CANCER SCREEN (SYSTEM ASSIGNED)  07/12/2016     FALL RISK ASSESSMENT  08/29/2017     INFLUENZA VACCINE (SYSTEM ASSIGNED)  09/01/2017     ADVANCE DIRECTIVE PLANNING Q5 YRS  08/14/2020     LIPID SCREEN Q5 YR MALE (SYSTEM ASSIGNED)  05/10/2022     TETANUS IMMUNIZATION (SYSTEM ASSIGNED)  08/24/2025     PNEUMOCOCCAL  Completed     HEPATITIS C SCREENING  Completed             ROS:  Gen: no unexpected wt loss or fevers.    ENT: vision ok, no hearing changes, no lumps noted in neck or mouth  CV: no chest pain or SOB, no palpitations  RESP: No wheezing or pleuritic pain no cough  GI: no nausea or vomiting, no abd pain.  No blood in stool.   : no dysuria or hematuria. No urinary retention.  No lesions on genitals noted.    MUSC: moving all extremities, no edema  ENDO: no excessive thirst or urination.    NEURO: no difficulty speaking, no focal weakness or changes in sensation.  No balance troubles.   PSYCH: mood stable,  no significant problems with anxiety  SKIN: no worrisome rashes or lesions      OBJECTIVE:   /82  Pulse 60  Temp 96.9  F (36.1  C) (Tympanic)  Resp 16  Ht 5' 8.5\" (1.74 m)  Wt 201 lb (91.2 kg)  BMI 30.12 kg/m2 Estimated body mass index is 30.12 kg/(m^2) as calculated from the following:    Height as of this encounter: 5' 8.5\" (1.74 m).    Weight as of this encounter: 201 lb (91.2 kg).  EXAM:   Gen: AOx3, no acute distress  PSYCH: Affect WNL, logical thought and coherent speech   EYES: normal " "lids, conjunctiva.  Pupils normal. No periorbital swelling.  Neck:supple without lymphadenopathy or mass.    Throat: oroparynx clear, no exudate or tonsilar/palate asymmetry  Ears: normal TMs bilaterally.   CV: RRR, no murmur  Lungs: Clear bilaterally with good effort  Abd: nontender with no mass or organomegaly  Ext: no edema, moving all extremities  Neuro: gait normal, cranial nerves 2-12 grossly intact, symmetric strength, no sensory deficits noted  Skin: no rash or suspicious lesions noted          ASSESSMENT / PLAN:   Wellness visit   CAD: stable.  No cp or sob.  plavix and crestor and low dose metoprolol  Htn: stable on low dose beta  Blocker  Hyperlipemia: statin, fills  Palpitations: controlled on low dose beta    Fills.  Labs.  Wants to do FIT test, not colonoscopy.  OK with no PSA, 72y/o, he agrees harms of screening outweigh benefits.     End of Life Planning:  Patient currently has an advanced directive: not addressed    COUNSELING:  Reviewed preventive health counseling, as reflected in patient instructions       Regular exercise       Healthy diet/nutrition    BP Screening:   Last 3 BP Readings:    BP Readings from Last 3 Encounters:   08/30/17 120/82   05/11/17 136/76   04/03/17 110/78       The following was recommended to the patient:  Re-screen BP within a year and recommended lifestyle modifications    Estimated body mass index is 30.12 kg/(m^2) as calculated from the following:    Height as of this encounter: 5' 8.5\" (1.74 m).    Weight as of this encounter: 201 lb (91.2 kg).  Weight management plan: diet/exercise   reports that he has never smoked. He has never used smokeless tobacco.        Appropriate preventive services were discussed with this patient, including applicable screening as appropriate for cardiovascular disease, diabetes, osteopenia/osteoporosis, and glaucoma.  As appropriate for age/gender, discussed screening for colorectal cancer, prostate cancer, breast cancer, and cervical " cancer. Checklist reviewing preventive services available has been given to the patient.    Reviewed patients plan of care and provided an AVS. The Basic Care Plan (routine screening as documented in Health Maintenance) for Derrick meets the Care Plan requirement. This Care Plan has been established and reviewed with the Patient.    Kaiden Zapata MD  [facility] for HPI/ROS submitted by the patient on 8/27/2017   PHQ-2 Score: 0

## 2017-08-30 NOTE — LETTER
September 1, 2017      Derrick Morrison  [address] [address]        Dear ,    We are writing to inform you of your test results.    Your test results fall within the expected range(s) or remain unchanged from previous results.  Please continue with current treatment plan.    Resulted Orders   Basic metabolic panel  (Ca, Cl, CO2, Creat, Gluc, K, Na, BUN)   Result Value Ref Range    Sodium 135 133 - 144 mmol/L    Potassium 4.3 3.4 - 5.3 mmol/L    Chloride 104 94 - 109 mmol/L    Carbon Dioxide 28 20 - 32 mmol/L    Anion Gap 3 3 - 14 mmol/L    Glucose 82 70 - 99 mg/dL    Urea Nitrogen 14 7 - 30 mg/dL    Creatinine 0.91 0.66 - 1.25 mg/dL    GFR Estimate 82 >60 mL/min/1.7m2      Comment:      Non  GFR Calc    GFR Estimate If Black >90 >60 mL/min/1.7m2      Comment:       GFR Calc    Calcium 9.6 8.5 - 10.1 mg/dL       If you have any questions or concerns, please call the clinic at the number listed above.       Sincerely,        Kaiden Zapata MD

## 2017-08-30 NOTE — MR AVS SNAPSHOT
After Visit Summary   8/30/2017    Derrick Morrison    MRN: [id number]           Patient Information     Date Of Birth          1945        Visit Information        Provider Department      8/30/2017 8:20 AM Kaiden Zapata MD [facility]        Today's Diagnoses     Hyperlipidemia LDL goal <70    -  1    Coronary artery disease involving native heart without angina pectoris, unspecified vessel or lesion type        Essential hypertension        Medicare annual wellness visit, subsequent        Special screening for malignant neoplasms, colon        Need for prophylactic vaccination and inoculation against influenza        Screening for prostate cancer           Follow-ups after your visit        Future tests that were ordered for you today     Open Future Orders        Priority Expected Expires Ordered    Fecal colorectal cancer screen (FIT) Routine 9/20/2017 11/22/2017 8/30/2017            Who to contact     If you have questions or need follow up information about today's clinic visit or your schedule please contact [facility] directly at [phone].  Normal or non-critical lab and imaging results will be communicated to you by MyChart, letter or phone within 4 business days after the clinic has received the results. If you do not hear from us within 7 days, please contact the clinic through [employer]hart or phone. If you have a critical or abnormal lab result, we will notify you by phone as soon as possible.  Submit refill requests through [employer] or call your pharmacy and they will forward the refill request to us. Please allow 3 business days for your refill to be completed.          Additional Information About Your Visit        MyChart Information     [employer] gives you secure access to your electronic health record. If you see a primary care provider, you can also send messages to your care team and make appointments. If you have questions, please call your  "primary care clinic.  If you do not have a primary care provider, please call [phone] and they will assist you.        Care EveryWhere ID     This is your Care EveryWhere ID. This could be used by other organizations to access your [facility] medical records  [id number]        Your Vitals Were     Pulse Temperature Respirations Height BMI (Body Mass Index)       60 96.9  F (36.1  C) (Tympanic) 16 5' 8.5\" (1.74 m) 30.12 kg/m2        Blood Pressure from Last 3 Encounters:   08/30/17 120/82   05/11/17 136/76   04/03/17 110/78    Weight from Last 3 Encounters:   08/30/17 201 lb (91.2 kg)   05/11/17 205 lb (93 kg)   04/03/17 205 lb (93 kg)              We Performed the Following     Basic metabolic panel  (Ca, Cl, CO2, Creat, Gluc, K, Na, BUN)     FLU VACCINE, INCREASED ANTIGEN, PRESV FREE, AGE 65+ [99508]     Vaccine Administration, Initial [34822]          Where to get your medicines      These medications were sent to [facility] [address] [address]     Phone:  [phone]     clopidogrel 75 MG tablet    metoprolol 25 MG 24 hr tablet    rosuvastatin 40 MG tablet          Primary Care Provider Office Phone # Fax #    Kaiden Zapata [phone]       [address] [place] [address]        Equal Access to Services     JAN MACHADO : Hadii sharon vazquez hadasho Soomaali, waaxda luqadaha, qaybta kaalmada bay nguyen. So [facility] [phone].    ATENCIÓN: Si habla español, tiene a villar disposición servicios gratuitos de asistencia lingüística. Llame al [phone].    We comply with applicable federal civil rights laws and [place] laws. We do not discriminate on the basis of race, color, national origin, age, disability sex, sexual orientation or gender identity.            Thank you!     Thank you for choosing [facility]  for your care. Our goal is always to provide you with " excellent care. Hearing back from our patients is one way we can continue to improve our services. Please take a few minutes to complete the written survey that you may receive in the mail after your visit with us. Thank you!             Your Updated Medication List - Protect others around you: Learn how to safely use, store and throw away your medicines at www.disposemymeds.org.          This list is accurate as of: 8/30/17 10:42 AM.  Always use your most recent med list.                   Brand Name Dispense Instructions for use Diagnosis    ASPIRIN NOT PRESCRIBED    INTENTIONAL    0 each    1 each continuous prn Antiplatelet medication not prescribed intentionally due to plavix    CAD (coronary artery disease)       clopidogrel 75 MG tablet    PLAVIX    90 tablet    Take 1 tablet (75 mg) by mouth daily    Coronary artery disease involving native heart without angina pectoris, unspecified vessel or lesion type       metoprolol 25 MG 24 hr tablet    TOPROL-XL    90 tablet    Take 1 tablet (25 mg) by mouth daily    Coronary artery disease involving native heart without angina pectoris, unspecified vessel or lesion type       nitroGLYcerin 0.4 MG sublingual tablet    NITROSTAT    25 tablet    Place 1 tablet (0.4 mg) under the tongue every 5 minutes as needed for chest pain        RHINOCORT ALLERGY 32 MCG/ACT spray   Generic drug:  budesonide      Spray 1 spray into both nostrils daily        rosuvastatin 40 MG tablet    CRESTOR    90 tablet    Take 1 tablet (40 mg) by mouth every evening    Hyperlipidemia LDL goal <70

## 2017-09-13 PROCEDURE — G0328 FECAL BLOOD SCRN IMMUNOASSAY: HCPCS | Performed by: FAMILY MEDICINE

## 2017-09-14 DIAGNOSIS — Z12.11 SPECIAL SCREENING FOR MALIGNANT NEOPLASMS, COLON: ICD-10-CM

## 2017-09-14 DIAGNOSIS — Z00.00 MEDICARE ANNUAL WELLNESS VISIT, SUBSEQUENT: ICD-10-CM

## 2017-09-14 LAB — HEMOCCULT STL QL IA: NEGATIVE

## 2017-11-23 ENCOUNTER — MYC MEDICAL ADVICE (OUTPATIENT)
Dept: FAMILY MEDICINE | Facility: CLINIC | Age: 72
End: 2017-11-23

## 2017-11-23 ENCOUNTER — HOSPITAL ENCOUNTER (EMERGENCY)
Facility: CLINIC | Age: 72
Discharge: HOME | End: 2017-11-23
Attending: EMERGENCY MEDICINE | Admitting: EMERGENCY MEDICINE
Payer: MEDICARE

## 2017-11-23 VITALS
SYSTOLIC BLOOD PRESSURE: 119 MMHG | DIASTOLIC BLOOD PRESSURE: 71 MMHG | OXYGEN SATURATION: 94 % | RESPIRATION RATE: 15 BRPM

## 2017-11-23 DIAGNOSIS — R00.2 PALPITATIONS: ICD-10-CM

## 2017-11-23 DIAGNOSIS — R07.9 CHEST PAIN, UNSPECIFIED TYPE: ICD-10-CM

## 2017-11-23 DIAGNOSIS — R07.89 OTHER CHEST PAIN: ICD-10-CM

## 2017-11-23 DIAGNOSIS — I49.3 PVC'S (PREMATURE VENTRICULAR CONTRACTIONS): ICD-10-CM

## 2017-11-23 LAB
ALBUMIN SERPL-MCNC: 3.9 G/DL (ref 3.4–5)
ALP SERPL-CCNC: 69 U/L (ref 40–150)
ALT SERPL W P-5'-P-CCNC: 45 U/L (ref 0–70)
ANION GAP SERPL CALCULATED.3IONS-SCNC: 8 MMOL/L (ref 3–14)
AST SERPL W P-5'-P-CCNC: 27 U/L (ref 0–45)
BASOPHILS # BLD AUTO: 0 10E9/L (ref 0–0.2)
BASOPHILS NFR BLD AUTO: 0.3 %
BILIRUB SERPL-MCNC: 0.3 MG/DL (ref 0.2–1.3)
BUN SERPL-MCNC: 19 MG/DL (ref 7–30)
CALCIUM SERPL-MCNC: 9.2 MG/DL (ref 8.5–10.1)
CHLORIDE SERPL-SCNC: 104 MMOL/L (ref 94–109)
CO2 SERPL-SCNC: 28 MMOL/L (ref 20–32)
CREAT SERPL-MCNC: 1.08 MG/DL (ref 0.66–1.25)
DIFFERENTIAL METHOD BLD: NORMAL
EOSINOPHIL # BLD AUTO: 0.1 10E9/L (ref 0–0.7)
EOSINOPHIL NFR BLD AUTO: 1.8 %
ERYTHROCYTE [DISTWIDTH] IN BLOOD BY AUTOMATED COUNT: 12.9 % (ref 10–15)
GFR SERPL CREATININE-BSD FRML MDRD: 67 ML/MIN/1.7M2
GLUCOSE SERPL-MCNC: 73 MG/DL (ref 70–99)
HCT VFR BLD AUTO: 48.3 % (ref 40–53)
HGB BLD-MCNC: 16.3 G/DL (ref 13.3–17.7)
IMM GRANULOCYTES # BLD: 0 10E9/L (ref 0–0.4)
IMM GRANULOCYTES NFR BLD: 0 %
LYMPHOCYTES # BLD AUTO: 2 10E9/L (ref 0.8–5.3)
LYMPHOCYTES NFR BLD AUTO: 29.8 %
MCH RBC QN AUTO: 29 PG (ref 26.5–33)
MCHC RBC AUTO-ENTMCNC: 33.7 G/DL (ref 31.5–36.5)
MCV RBC AUTO: 86 FL (ref 78–100)
MONOCYTES # BLD AUTO: 0.7 10E9/L (ref 0–1.3)
MONOCYTES NFR BLD AUTO: 10.9 %
NEUTROPHILS # BLD AUTO: 3.8 10E9/L (ref 1.6–8.3)
NEUTROPHILS NFR BLD AUTO: 57.2 %
PLATELET # BLD AUTO: 210 10E9/L (ref 150–450)
POTASSIUM SERPL-SCNC: 4 MMOL/L (ref 3.4–5.3)
PROT SERPL-MCNC: 8 G/DL (ref 6.8–8.8)
RBC # BLD AUTO: 5.63 10E12/L (ref 4.4–5.9)
SODIUM SERPL-SCNC: 140 MMOL/L (ref 133–144)
TROPONIN I SERPL-MCNC: <0.015 UG/L (ref 0–0.04)
WBC # BLD AUTO: 6.6 10E9/L (ref 4–11)

## 2017-11-23 PROCEDURE — 99284 EMERGENCY DEPT VISIT MOD MDM: CPT | Mod: 25 | Performed by: EMERGENCY MEDICINE

## 2017-11-23 PROCEDURE — 80053 COMPREHEN METABOLIC PANEL: CPT | Performed by: EMERGENCY MEDICINE

## 2017-11-23 PROCEDURE — 93005 ELECTROCARDIOGRAM TRACING: CPT

## 2017-11-23 PROCEDURE — 84484 ASSAY OF TROPONIN QUANT: CPT | Performed by: EMERGENCY MEDICINE

## 2017-11-23 PROCEDURE — 85025 COMPLETE CBC W/AUTO DIFF WBC: CPT | Performed by: EMERGENCY MEDICINE

## 2017-11-23 PROCEDURE — 93010 ELECTROCARDIOGRAM REPORT: CPT | Mod: Z6 | Performed by: EMERGENCY MEDICINE

## 2017-11-23 PROCEDURE — 99284 EMERGENCY DEPT VISIT MOD MDM: CPT

## 2017-11-23 ASSESSMENT — ENCOUNTER SYMPTOMS
EYES NEGATIVE: 1
NEUROLOGICAL NEGATIVE: 1
CONSTITUTIONAL NEGATIVE: 1
ENDOCRINE NEGATIVE: 1
HEMATOLOGIC/LYMPHATIC NEGATIVE: 1
ALLERGIC/IMMUNOLOGIC NEGATIVE: 1
CHEST TIGHTNESS: 1
PSYCHIATRIC NEGATIVE: 1
MUSCULOSKELETAL NEGATIVE: 1
GASTROINTESTINAL NEGATIVE: 1

## 2017-11-23 NOTE — ED AVS SNAPSHOT
[facility] Emergency Department    [address] [facility] [address]    Phone:  [phone]    Fax:  [phone]                                       Derrikc Morrison   MRN: [id number]    Department:  [facility] Emergency Department   Date of Visit:  11/23/2017           Patient Information     Date Of Birth          1945        Your diagnoses for this visit were:     Palpitations     PVC's (premature ventricular contractions)     Chest pain, unspecified type        You were seen by Deondre Groves MD.      Follow-up Information     Follow up with Jonas Espinoza MD.    Specialty:  Cardiology    Why:   I spoke with Dr. Espinoza today about  heerr case.  His team willl call you for a follow-up appointment    Contact information:    [address] [place] [id number] [phone]          Follow up with [facility].    Specialty:  EMERGENCY MEDICINE    Why:  If symptoms worsen, As needed-including if you develop persistent pain or discomfort or persistent episodes of palpitations.  If you also episode an episode of passing out    Contact information:    [address] [facility] [address] [phone]    Additional information:    The medical center is located at   [address] [facility] [address] (between [address] in [place], [place] north   of [place]).      Discharge References/Attachments     PREMATURE VENTRICULAR CONTRACTIONS (PVCS), UNDERSTANDING  (ENGLISH)    HEART PALPITATIONS, UNDERSTANDING (ENGLISH)      Future Appointments        Provider Department Dept Phone Center    11/29/2017 9:40 AM Vandana Reid PA-C [facility] [phone] [facility]      24 Hour Appointment Hotline       To make an appointment at any [facility], call [phone] ([phone]). If you don't have a family doctor or clinic, we will help you find one. [facility] are conveniently located to serve the needs of you and  your family.             Review of your medicines      Our records show that you are taking the medicines listed below. If these are incorrect, please call your family doctor or clinic.        Dose / Directions Last dose taken    ASPIRIN NOT PRESCRIBED   Commonly known as:  INTENTIONAL   Dose:  1 each   Quantity:  0 each        1 each continuous prn Antiplatelet medication not prescribed intentionally due to plavix   Refills:  0        clopidogrel 75 MG tablet   Commonly known as:  PLAVIX   Dose:  75 mg   Quantity:  90 tablet        Take 1 tablet (75 mg) by mouth daily   Refills:  3        metoprolol 25 MG 24 hr tablet   Commonly known as:  TOPROL-XL   Dose:  25 mg   Quantity:  90 tablet        Take 1 tablet (25 mg) by mouth daily   Refills:  3        nitroGLYcerin 0.4 MG sublingual tablet   Commonly known as:  NITROSTAT   Dose:  0.4 mg   Quantity:  25 tablet        Place 1 tablet (0.4 mg) under the tongue every 5 minutes as needed for chest pain   Refills:  0        RHINOCORT ALLERGY 32 MCG/ACT spray   Dose:  1 spray   Generic drug:  budesonide        Spray 1 spray into both nostrils daily   Refills:  0        rosuvastatin 40 MG tablet   Commonly known as:  CRESTOR   Dose:  40 mg   Quantity:  90 tablet        Take 1 tablet (40 mg) by mouth every evening   Refills:  3                Procedures and tests performed during your visit     CBC with platelets differential    Comprehensive metabolic panel    EKG 12 lead    Troponin I      Orders Needing Specimen Collection     None      Pending Results     No orders found from 11/21/2017 to 11/24/2017.            Pending Culture Results     No orders found from 11/21/2017 to 11/24/2017.            Pending Results Instructions     If you had any lab results that were not finalized at the time of your Discharge, you can call the ED Lab Result RN at [phone]. You will be contacted by this team for any positive Lab results or changes in treatment. The nurses are available  7 days a week from 10A to 6:30P.  You can leave a message 24 hours per day and they will return your call.        Test Results From Your Hospital Stay        11/23/2017  7:06 PM      Component Results     Component Value Ref Range & Units Status    Troponin I ES <0.015 0.000 - 0.045 ug/L Final    The 99th percentile for upper reference range is 0.045 ug/L.  Troponin values   in the range of 0.045 - 0.120 ug/L may be associated with risks of adverse   clinical events.           11/23/2017  7:06 PM      Component Results     Component Value Ref Range & Units Status    Sodium 140 133 - 144 mmol/L Final    Potassium 4.0 3.4 - 5.3 mmol/L Final    Chloride 104 94 - 109 mmol/L Final    Carbon Dioxide 28 20 - 32 mmol/L Final    Anion Gap 8 3 - 14 mmol/L Final    Glucose 73 70 - 99 mg/dL Final    Urea Nitrogen 19 7 - 30 mg/dL Final    Creatinine 1.08 0.66 - 1.25 mg/dL Final    GFR Estimate 67 >60 mL/min/1.7m2 Final    Non  GFR Calc    GFR Estimate If Black 82 >60 mL/min/1.7m2 Final    African American GFR Calc    Calcium 9.2 8.5 - 10.1 mg/dL Final    Bilirubin Total 0.3 0.2 - 1.3 mg/dL Final    Albumin 3.9 3.4 - 5.0 g/dL Final    Protein Total 8.0 6.8 - 8.8 g/dL Final    Alkaline Phosphatase 69 40 - 150 U/L Final    ALT 45 0 - 70 U/L Final    AST 27 0 - 45 U/L Final         11/23/2017  6:51 PM      Component Results     Component Value Ref Range & Units Status    WBC 6.6 4.0 - 11.0 10e9/L Final    RBC Count 5.63 4.4 - 5.9 10e12/L Final    Hemoglobin 16.3 13.3 - 17.7 g/dL Final    Hematocrit 48.3 40.0 - 53.0 % Final    MCV 86 78 - 100 fl Final    MCH 29.0 26.5 - 33.0 pg Final    MCHC 33.7 31.5 - 36.5 g/dL Final    RDW 12.9 10.0 - 15.0 % Final    Platelet Count 210 150 - 450 10e9/L Final    Diff Method Automated Method  Final    % Neutrophils 57.2 % Final    % Lymphocytes 29.8 % Final    % Monocytes 10.9 % Final    % Eosinophils 1.8 % Final    % Basophils 0.3 % Final    % Immature Granulocytes 0.0 % Final     Absolute Neutrophil 3.8 1.6 - 8.3 10e9/L Final    Absolute Lymphocytes 2.0 0.8 - 5.3 10e9/L Final    Absolute Monocytes 0.7 0.0 - 1.3 10e9/L Final    Absolute Eosinophils 0.1 0.0 - 0.7 10e9/L Final    Absolute Basophils 0.0 0.0 - 0.2 10e9/L Final    Abs Immature Granulocytes 0.0 0 - 0.4 10e9/L Final                Thank you for choosing [facility]       Thank you for choosing [facility] for your care. Our goal is always to provide you with excellent care. Hearing back from our patients is one way we can continue to improve our services. Please take a few minutes to complete the written survey that you may receive in the mail after you visit with us. Thank you!        [employer] Information     [employer] gives you secure access to your electronic health record. If you see a primary care provider, you can also send messages to your care team and make appointments. If you have questions, please call your primary care clinic.  If you do not have a primary care provider, please call [phone] and they will assist you.        Care EveryWhere ID     This is your Care EveryWhere ID. This could be used by other organizations to access your [facility] medical records  [id number]        Equal Access to Services     IRINEO MACHADO : Gustavo Ta, wakoryda becka, qaybta kaalmada patrick, bay martinez. So [facility] [phone].    ATENCIÓN: Si habla español, tiene a villar disposición servicios gratuitos de asistencia lingüística. Llame al [phone].    We comply with applicable federal civil rights laws and [place] laws. We do not discriminate on the basis of race, color, national origin, age, disability, sex, sexual orientation, or gender identity.            After Visit Summary       This is your record. Keep this with you and show to your community pharmacist(s) and doctor(s) at your next visit.

## 2017-11-23 NOTE — ED AVS SNAPSHOT
[facility] Emergency Department    [address] [address]    Phone:  [phone]    Fax:  [phone]                                       Derrick Morrison   MRN: [id number]    Department:  [facility] Emergency Department   Date of Visit:  11/23/2017           After Visit Summary Signature Page     I have received my discharge instructions, and my questions have been answered. I have discussed any challenges I see with this plan with the nurse or doctor.    ..........................................................................................................................................  Patient/Patient Representative Signature      ..........................................................................................................................................  Patient Representative Print Name and Relationship to Patient    ..................................................               ................................................  Date                                            Time    ..........................................................................................................................................  Reviewed by Signature/Title    ...................................................              ..............................................  Date                                                            Time

## 2017-11-24 NOTE — ED NOTES
"Pt states that he has extra heart beats feel like little sharp pains, pt calls them \"bumps\".  Pt has pain in right shoulder, does not radiate to neck/jaw.  Pt states that he came in today because frequency of extra beats was more than usual.  Pt has \"7 heart stents.\" MD at bedside.   "

## 2017-11-24 NOTE — ED PROVIDER NOTES
History     Chief Complaint   Patient presents with     Palpitations     pt has been having intermittent episodes since yesterday at 1730.  pt has hx of palpitations.  pt currently on plavix.      HPI  Derrick Morrison is a 72 year old male who presents for palpitations and intermittent episodes of left sided sharp chest discomfort. Patient reports he has 7 stents (PCI- last done 2011 at [facility]).  Patient reports he has had intermittent episodes of palpitations since his last coronary angiogram.  Over the last 24 hours he's had increasing frequency of palpitations and episode of sharp twinges of pain.  Reports pain is a sharp twinge that last less than a few seconds and resolves on its own.  He is currently on Crestor, Plavix and metoprolol.  His been compliant with his metoprolol taking 25 mg a day.  He did take an extra dose of metoprolol yesterday.  He had been seen by Dr. Lan, but has not seen cardiology for a few months.  He is followed by Dr. Kaiden Martinez.  He reports no cough he also reports some exertional fatigue or shortness of breath, lower extremity swelling and no chest pressure or heaviness or tightness.  He was concerned because he's had twinges of pain with increasing frequency of palpitations which is different from his norm and presents to the emergency department for further care.,     Problem List:    Patient Active Problem List    Diagnosis Date Noted     Gastroesophageal reflux disease without esophagitis 08/24/2015     Priority: Medium     Counseling regarding advanced directives 08/14/2015     Priority: Medium     Advance Care Planning 8/14/2015: ACP Review and Resources Provided:  Reviewed chart for advance care plan.  Derrick Morrison has no plan or code status on file however states presence of ACP document. Copy requested.  Added by Alix Cotton           Subjective tinnitus 05/01/2015     Priority: Medium     Sensorineural hearing loss, bilateral 05/01/2015     Priority: Medium  "    Screening for prostate cancer 08/21/2014     Priority: Medium     OK with USPSTF recommendations against screening.  See 8/2014 note for details.  Discussed in detail at physical in 2017 again, still OK with no screening.  (now 70 y/o)       HTN (hypertension) 08/21/2014     Priority: Medium     Palpitations 08/07/2013     Priority: Medium     occasional, improved on low dose beta blocker       Hyperlipidemia LDL goal <70 11/16/2011     Priority: Medium     crestor       CAD (coronary artery disease) 10/10/2011     Priority: Medium     -8/16/2006 s/p GERALDINE to mid RCA, s/p GERALDINE to mid LAD in [place]  -10/4/2007 s/p GERALDINE to pRCA and GERALDINE to dRCA in [place]   -11/17/11 Unstable angina, s/p GERALDINE to prox LAD (jailed small diagonal)        Hypertrophy of prostate with urinary obstruction 10/10/2011     Priority: Medium     flomax in past, \"quit working\".  Avodart in past.  Tapered off.   Update 10/14: wanted to restart flomax, stopped it, didn't think it helped.             screening 10/10/2011     Priority: Medium     2007 colonoscopy \"all clean\" in [place].  He is sure about this.    AAA ultrasound screen 3/07 normal also.  (leg and neck done then too)          Past Medical History:    Past Medical History:   Diagnosis Date     Actinic keratosis      Basal cell carcinoma      BPH w urinary obs/LUTS 10/10/2011     CAD (coronary artery disease) 10/10/2011     Hyperlipidaemia      Palpitations 8/7/2013     screening 10/10/2011     Skin cancer        Past Surgical History:    No past surgical history on file.    Family History:    Family History   Problem Relation Age of Onset     Coronary Artery Disease Mother        Social History:  Marital Status:   [2]  Social History   Substance Use Topics     Smoking status: Never Smoker     Smokeless tobacco: Never Used     Alcohol use Yes      Comment: occasionally        Medications:      clopidogrel (PLAVIX) 75 MG tablet   metoprolol (TOPROL-XL) 25 MG 24 " hr tablet   rosuvastatin (CRESTOR) 40 MG tablet   budesonide (RHINOCORT ALLERGY) 32 MCG/ACT spray   nitroglycerin (NITROSTAT) 0.4 MG sublingual tablet   ASPIRIN NOT PRESCRIBED, INTENTIONAL,         Review of Systems   Constitutional: Negative.    HENT: Negative.    Eyes: Negative.    Respiratory: Positive for chest tightness.    Cardiovascular: Positive for chest pain.   Gastrointestinal: Negative.    Endocrine: Negative.    Genitourinary: Negative.    Musculoskeletal: Negative.    Skin: Negative.    Allergic/Immunologic: Negative.    Neurological: Negative.    Hematological: Negative.    Psychiatric/Behavioral: Negative.        Physical Exam   BP: 154/67  Heart Rate: 67  Resp: 16  SpO2: 97 %      Physical Exam   Constitutional: He is oriented to person, place, and time. He appears well-developed and well-nourished. No distress.   HENT:   Head: Normocephalic and atraumatic.   Eyes: Conjunctivae and EOM are normal. Pupils are equal, round, and reactive to light. Right eye exhibits no discharge. Left eye exhibits no discharge. No scleral icterus.   Neck: Normal range of motion. Neck supple. No JVD present. No tracheal deviation present. No thyromegaly present.   Cardiovascular: Normal rate and regular rhythm.  Exam reveals no gallop and no friction rub.    No murmur heard.  Pulmonary/Chest: Effort normal and breath sounds normal. No stridor. No respiratory distress. He has no wheezes. He has no rales. He exhibits no tenderness.   Abdominal: Soft. Bowel sounds are normal.   Lymphadenopathy:     He has no cervical adenopathy.   Neurological: He is alert and oriented to person, place, and time.   Skin: No rash noted. He is not diaphoretic. No erythema. No pallor.   Psychiatric: He has a normal mood and affect. His behavior is normal. Judgment and thought content normal.       ED Course     ED Course     Procedures               EKG Interpretation:      Interpreted by Deondre Groves  Time reviewed:18:30  Symptoms  at time of EKG: Palpitations and chest discomfort.   Rhythm: normal sinus   Rate: Normal  Axis: Normal  Ectopy: none  Conduction: normal  ST Segments/ T Waves: Non-specific ST-T wave changes  Q Waves: nonspecific  Comparison to prior: Unchanged from 12/29/16    Clinical Impression: no acute changes          Critical Care time:  none               ED medications: none    ED labs and imaging:  Results for orders placed or performed during the hospital encounter of 11/23/17 (from the past 24 hour(s))   Troponin I   Result Value Ref Range    Troponin I ES <0.015 0.000 - 0.045 ug/L   Comprehensive metabolic panel   Result Value Ref Range    Sodium 140 133 - 144 mmol/L    Potassium 4.0 3.4 - 5.3 mmol/L    Chloride 104 94 - 109 mmol/L    Carbon Dioxide 28 20 - 32 mmol/L    Anion Gap 8 3 - 14 mmol/L    Glucose 73 70 - 99 mg/dL    Urea Nitrogen 19 7 - 30 mg/dL    Creatinine 1.08 0.66 - 1.25 mg/dL    GFR Estimate 67 >60 mL/min/1.7m2    GFR Estimate If Black 82 >60 mL/min/1.7m2    Calcium 9.2 8.5 - 10.1 mg/dL    Bilirubin Total 0.3 0.2 - 1.3 mg/dL    Albumin 3.9 3.4 - 5.0 g/dL    Protein Total 8.0 6.8 - 8.8 g/dL    Alkaline Phosphatase 69 40 - 150 U/L    ALT 45 0 - 70 U/L    AST 27 0 - 45 U/L   CBC with platelets differential   Result Value Ref Range    WBC 6.6 4.0 - 11.0 10e9/L    RBC Count 5.63 4.4 - 5.9 10e12/L    Hemoglobin 16.3 13.3 - 17.7 g/dL    Hematocrit 48.3 40.0 - 53.0 %    MCV 86 78 - 100 fl    MCH 29.0 26.5 - 33.0 pg    MCHC 33.7 31.5 - 36.5 g/dL    RDW 12.9 10.0 - 15.0 %    Platelet Count 210 150 - 450 10e9/L    Diff Method Automated Method     % Neutrophils 57.2 %    % Lymphocytes 29.8 %    % Monocytes 10.9 %    % Eosinophils 1.8 %    % Basophils 0.3 %    % Immature Granulocytes 0.0 %    Absolute Neutrophil 3.8 1.6 - 8.3 10e9/L    Absolute Lymphocytes 2.0 0.8 - 5.3 10e9/L    Absolute Monocytes 0.7 0.0 - 1.3 10e9/L    Absolute Eosinophils 0.1 0.0 - 0.7 10e9/L    Absolute Basophils 0.0 0.0 - 0.2 10e9/L    Abs  Immature Granulocytes 0.0 0 - 0.4 10e9/L         ED Vitals:  Vitals:    11/23/17 1845 11/23/17 1900 11/23/17 1915 11/23/17 1930   BP: 117/65 122/65 119/69 119/71   Resp: 12 15  15   SpO2: 95% 94%  94%        Prior or recent diagnostic imaging and work-up:    Echocardiogram(TTE- 3/8/2017)    Interpretation Summary     All chambers are normal in structure and function  All Valves are normal  Normal adult cardiac echo  Compared to the last eche done in 11/11 there have been no significant  changes.  Assessments & Plan (with Medical Decision Making)   Clinical impression: 72-year-old male who presented for evaluation for palpitations and intermittent episodes of sharp twinge of pain or discomfort.  Prior history of palpitations with Holter monitoring.  History of coronary disease with PCI.  Indwelling 7 stents.  Last angiogram was in 2011 at [facility].  No lower extremity swelling, no  exertional dyspnea or PND.  No productive cough.  No chest pressure or heaviness.  Arrive due to concern for increasing frequency of palpitations with chest tightness.  On my exam he is in no acute distress.  Normal cardiac exam, normal lung exam.  GCS 15.  in sinus rhythm on telemetry.  Arrival EKG in the ED shows normal sinus rhythm without acute ischemia with nonspecific T-wave changes but no acute change from comparison from 12/29/2016.       ED.course and Plan:  I reviewed his medical record including his recent cardiac evaluation and assessment.  Patient underwent an echocardiogram last in March 2017 which did not show any acute abnormality.  Please see detailed interpretation and reported above.  His future order for an echo from May 2017 which has not been completed we discussed that patient may be due for another angiogram since last Center comes in 2011.  He was monitored on telemetry and labs were obtained.  Patient reports has been compliant with his Crestor Plavix and metoprolol.  He took a total 50 mg yesterday and  took his regular dose of 25 mg today.     Telemetry monitoring patient was noted to have unifocal PVCs that was nonsustained and patient admits that these are the palpitations he has a resultant chest pain and discomfort.  He has normal labs.  His arrival troponins within normal limits.  I reviewed his history and his symptoms given his underlying history of coronary disease with 7 stents and last angiogram in 2011 with cardiology [facility].  I spoke with Dr NORAH Espinoza- on-call cardiologist at 7.10PM.  We discussed that his symptoms may be related to residual ischemia from his underlying coronary disease however no indication for emergent angiogram at this time.  His PVCs may be managed with increase in his dose of metoprolol from 25 mg a day to 50 mg a day.  Dr. Espinoza graciously agreed to have the team call the patient to schedule an outpatient follow-up including consideration of stress testing versus other follow-up care.  Consultation was reviewed with the patient and his wife.  Patient is encouraged to consider increasing his metoprolol from 25 mg a day to 50 mg a day.  We discussed reasons to return to the ED for care including syncope, exertional chest pain or pressure, persistent or prolonged palpitations or any other new or worrisome symptoms.  The patient expressed understanding.         Disclaimer: This note consists of symbols derived from keyboarding, dictation and/or voice recognition software. As a result, there may be errors in the script that have gone undetected. Please consider this when interpreting information found in this chart.  I have reviewed the nursing notes.    I have reviewed the findings, diagnosis, plan and need for follow up with the patient.       New Prescriptions    No medications on file       Final diagnoses:   Palpitations   PVC's (premature ventricular contractions)   Chest pain, unspecified type       11/23/2017   [facility] EMERGENCY DEPARTMENT     [place]  Deondre Garcia MD  11/23/17 2010

## 2017-11-24 NOTE — ED NOTES
Discharge instructions reviewed in detail.  Pt and wife verbalized understanding.  No further questions or concerns.

## 2017-11-29 ENCOUNTER — OFFICE VISIT (OUTPATIENT)
Dept: DERMATOLOGY | Facility: CLINIC | Age: 72
End: 2017-11-29
Payer: COMMERCIAL

## 2017-11-29 ENCOUNTER — TELEPHONE (OUTPATIENT)
Dept: DERMATOLOGY | Facility: CLINIC | Age: 72
End: 2017-11-29

## 2017-11-29 VITALS — SYSTOLIC BLOOD PRESSURE: 128 MMHG | DIASTOLIC BLOOD PRESSURE: 72 MMHG | OXYGEN SATURATION: 95 % | HEART RATE: 57 BPM

## 2017-11-29 DIAGNOSIS — L91.8 INFLAMED SKIN TAG: ICD-10-CM

## 2017-11-29 DIAGNOSIS — C44.612 BASAL CELL CARCINOMA OF RIGHT SHOULDER: Primary | ICD-10-CM

## 2017-11-29 DIAGNOSIS — D18.01 CHERRY ANGIOMA: ICD-10-CM

## 2017-11-29 DIAGNOSIS — L57.0 AK (ACTINIC KERATOSIS): ICD-10-CM

## 2017-11-29 DIAGNOSIS — L82.1 SEBORRHEIC KERATOSIS: ICD-10-CM

## 2017-11-29 DIAGNOSIS — D48.5 NEOPLASM OF UNCERTAIN BEHAVIOR OF SKIN: Primary | ICD-10-CM

## 2017-11-29 DIAGNOSIS — L81.4 LENTIGO: ICD-10-CM

## 2017-11-29 DIAGNOSIS — Z85.828 HISTORY OF BASAL CELL CANCER: ICD-10-CM

## 2017-11-29 DIAGNOSIS — L73.8 SEBACEOUS HYPERPLASIA: ICD-10-CM

## 2017-11-29 PROCEDURE — 11100 HC DESTRUCT PREMALIGNANT LESION, 2-14: CPT | Mod: 59 | Performed by: PHYSICIAN ASSISTANT

## 2017-11-29 PROCEDURE — 11200 RMVL SKIN TAGS UP TO&INC 15: CPT | Mod: 59 | Performed by: PHYSICIAN ASSISTANT

## 2017-11-29 PROCEDURE — 99213 OFFICE O/P EST LOW 20 MIN: CPT | Mod: 25 | Performed by: PHYSICIAN ASSISTANT

## 2017-11-29 PROCEDURE — 17000 DESTRUCT PREMALG LESION: CPT | Mod: 51 | Performed by: PHYSICIAN ASSISTANT

## 2017-11-29 PROCEDURE — 88331 PATH CONSLTJ SURG 1 BLK 1SPC: CPT | Performed by: DERMATOLOGY

## 2017-11-29 NOTE — LETTER
[facility]  [address] [address]  Phone: [phone]    November 29, 2017    Derrick Morrison                                                                                                                 [address] [address]            Dear Mr. Morrison,    You are scheduled for Mohs Surgery on Wednesday March 7 th at 8:00 am.    Please check in at Dermatology Clinic.   (2nd Floor, last  Clinic on right up staircase or elevator -past OB/GYN clinic)    You don't need to arrive more than 5-10 minutes prior to your appointment time.     Be sure to eat a good breakfast and bathe and wash your hair prior to Surgery.    If you are taking any anti-coagulants that are prescribed by your Doctor (such as Coumadin/warfarin, Plavix, Aspirin, Ibuprofen), please continue taking them.     However, If you are taking anti-coagulants over the counter without  a Doctor's order for a Medical condition, please discontinue them 10 days prior to Surgery.      Please wear loose comfortable clothing as it could possibly be 4-6 hours until your surgery is completed depending upon how many layers of tissue need to be removed.     Wi-fi access is available.     Thank you,      Og Canales MD/ Holley Toth RN

## 2017-11-29 NOTE — MR AVS SNAPSHOT
After Visit Summary   11/29/2017    Derrick Morrison    MRN: [id number]           Patient Information     Date Of Birth          1945        Visit Information        Provider Department      11/29/2017 9:40 AM Vandana Frausto PA-C [facility]        Care Instructions          Wound Care Instructions     FOR SUPERFICIAL WOUNDS     [facility] [phone]    [facility] [phone]  Right posterior shoulder (Biopsy)                       AFTER 24 HOURS YOU SHOULD REMOVE THE BANDAGE AND BEGIN DAILY DRESSING CHANGES AS FOLLOWS:     1) Remove Dressing.     2) Clean and dry the area with tap water using a Q-tip or sterile gauze pad.     3) Apply Vaseline, Aquaphor, Polysporin ointment or Bacitracin ointment over entire wound.  Do NOT use Neosporin ointment.     4) Cover the wound with a band-aid, or a sterile non-stick gauze pad and micropore paper tape      REPEAT THESE INSTRUCTIONS AT LEAST ONCE A DAY UNTIL THE WOUND HAS COMPLETELY HEALED.    It is an old wives tale that a wound heals better when it is exposed to air and allowed to dry out. The wound will heal faster with a better cosmetic result if it is kept moist with ointment and covered with a bandage.    **Do not let the wound dry out.**      Supplies Needed:      *Cotton tipped applicators (Q-tips)    *Polysporin Ointment or Bacitracin Ointment (NOT NEOSPORIN)    *Band-aids or non-stick gauze pads and micropore paper tape.      PATIENT INFORMATION:    During the healing process you will notice a number of changes. All wounds develop a small halo of redness surrounding the wound.  This means healing is occurring. Severe itching with extensive redness usually indicates sensitivity to the ointment or bandage tape used to dress the wound.  You should call our office if this develops.      Swelling  and/or discoloration around your surgical site is common, particularly when performed around the  eye.    All wounds normally drain.  The larger the wound the more drainage there will be.  After 7-10 days, you will notice the wound beginning to shrink and new skin will begin to grow.  The wound is healed when you can see skin has formed over the entire area.  A healed wound has a healthy, shiny look to the surface and is red to dark pink in color to normalize.  Wounds may take approximately 4-6 weeks to heal.  Larger wounds may take 6-8 weeks.  After the wound is healed you may discontinue dressing changes.    You may experience a sensation of tightness as your wound heals. This is normal and will gradually subside.    Your healed wound may be sensitive to temperature changes. This sensitivity improves with time, but if you re having a lot of discomfort, try to avoid temperature extremes.    Patients frequently experience itching after their wound appears to have healed because of the continue healing under the skin.  Plain Vaseline will help relieve the itching.        POSSIBLE COMPLICATIONS    BLEEDIN. Leave the bandage in place.  2. Use tightly rolled up gauze or a cloth to apply direct pressure over the bandage for 30  minutes.  3. Reapply pressure for an additional 30 minutes if necessary  4. Use additional gauze and tape to maintain pressure once the bleeding has stopped.    WOUND CARE INSTRUCTIONS   FOR CRYOSURGERY   This area treated with liquid nitrogen will form a blister. You do not need to bandage the area until after the blister forms and breaks (which may be a few days). When the blister breaks, begin daily dressing changes as follows:   1) Clean and dry the area with tap water using clean Q-tip or sterile gauze pad.   2) Apply Polysporin ointment or Bacitracin ointment over entire wound. Do NOT use Neosporin ointment.   3) Cover the wound with a band-aid or sterile non-stick gauze pad and micropore paper tape.   REPEAT THESE INSTRUCTIONS AT LEAST ONCE A DAY UNTIL THE WOUND HAS COMPLETELY  HEALED.   It is an old wives tale that a wound heals better when it is exposed to air and allowed to dry out. The wound will heal faster with a better cosmetic result if it is kept moist with ointment and covered with a bandage.   Do not let the wound dry out.   IMPORTANT INFORMATION ON REVERSE SIDE   Supplies Needed:   *Cotton tipped applicators (Q-tips)   *Polysporin ointment or Bacitracin ointment (NOT NEOSPORIN)   *Band-aids, or non stick gauze pads and micropore paper tape   PATIENT INFORMATION   During the healing process you will notice a number of changes. All wounds develop a small halo of redness surrounding the wound. This means healing is occurring. Severe itching with extensive redness usually indicates sensitivity to the ointment or bandage tape used to dress the wound. You should call our office if this develops.   Swelling and/or discoloration around your surgical site is common, particularly when performed around the eye.   All wounds normally drain. The larger the wound the more drainage there will be. After 7-10 days, you will notice the wound beginning to shrink and new skin will begin to grow. The wound is healed when you can see skin has formed over the entire area. A healed wound has a healthy, shiny look to the surface and is red to dark pink in color to normalize. Wounds may take approximately 4-6 weeks to heal. Larger wounds may take 6-8 weeks. After the wound is healed you may discontinue dressing changes.   You may experience a sensation of tightness as your wound heals. This is normal and will gradually subside.   Your healed wound may be sensitive to temperature changes. This sensitivity improves with time, but if you re having a lot of discomfort, try to avoid temperature extremes.   Patients frequently experience itching after their wound appears to have healed because of the continue healing under the skin. Plain Vaseline will help relieve the itching.                   Follow-ups  after your visit        Your next 10 appointments already scheduled     Nov 30, 2017 11:30 AM CST   Return Visit with MARIA T Workman CNP   [facility] ([facility])    [address] [address] [phone]              Who to contact     If you have questions or need follow up information about today's clinic visit or your schedule please contact [facility] directly at [phone].  Normal or non-critical lab and imaging results will be communicated to you by NetMinderhart, letter or phone within 4 business days after the clinic has received the results. If you do not hear from us within 7 days, please contact the clinic through VirtuOzt or phone. If you have a critical or abnormal lab result, we will notify you by phone as soon as possible.  Submit refill requests through PlusFourSix or call your pharmacy and they will forward the refill request to us. Please allow 3 business days for your refill to be completed.          Additional Information About Your Visit        NetMinderhart Information     PlusFourSix gives you secure access to your electronic health record. If you see a primary care provider, you can also send messages to your care team and make appointments. If you have questions, please call your primary care clinic.  If you do not have a primary care provider, please call [phone] and they will assist you.        Care EveryWhere ID     This is your Care EveryWhere ID. This could be used by other organizations to access your [place] medical records  [id number]        Your Vitals Were     Pulse Pulse Oximetry                57 95%           Blood Pressure from Last 3 Encounters:   11/29/17 128/72   11/23/17 119/71   08/30/17 120/82    Weight from Last 3 Encounters:   08/30/17 91.2 kg (201 lb)   05/11/17 93 kg (205 lb)   04/03/17 93 kg (205 lb)              Today, you had the following     No orders found for display        Primary Care Provider Office Phone # Fax #    Kaiden Zapata [phone]       [address] [place] [id number]        Equal Access to Services     IRINEO MACHADO : Gustavo sharon vazquez kayla Ta, wakoryda luqadaha, qaybta kaalmada patrick, bay carcamo lamarkchristiano martinez. So [facility] [phone].    ATENCIÓN: Si habla español, tiene a villar disposición servicios gratuitos de asistencia lingüística. Llame al [phone].    We comply with applicable federal civil rights laws and [place] laws. We do not discriminate on the basis of race, color, national origin, age, disability, sex, sexual orientation, or gender identity.            Thank you!     Thank you for choosing [facility]  for your care. Our goal is always to provide you with excellent care. Hearing back from our patients is one way we can continue to improve our services. Please take a few minutes to complete the written survey that you may receive in the mail after your visit with us. Thank you!             Your Updated Medication List - Protect others around you: Learn how to safely use, store and throw away your medicines at www.disposemymeds.org.          This list is accurate as of: 11/29/17 10:21 AM.  Always use your most recent med list.                   Brand Name Dispense Instructions for use Diagnosis    ASPIRIN NOT PRESCRIBED    INTENTIONAL    0 each    1 each continuous prn Antiplatelet medication not prescribed intentionally due to plavix    CAD (coronary artery disease)       clopidogrel 75 MG tablet    PLAVIX    90 tablet    Take 1 tablet (75 mg) by mouth daily    Coronary artery disease involving native heart without angina pectoris, unspecified vessel or lesion type       metoprolol 25 MG 24 hr tablet    TOPROL-XL    90 tablet    Take 1 tablet (25 mg) by mouth daily    Coronary artery disease involving native heart without angina pectoris, unspecified vessel or lesion type       nitroGLYcerin  0.4 MG sublingual tablet    NITROSTAT    25 tablet    Place 1 tablet (0.4 mg) under the tongue every 5 minutes as needed for chest pain        RHINOCORT ALLERGY 32 MCG/ACT spray   Generic drug:  budesonide      Spray 1 spray into both nostrils daily        rosuvastatin 40 MG tablet    CRESTOR    90 tablet    Take 1 tablet (40 mg) by mouth every evening    Hyperlipidemia LDL goal <70

## 2017-11-29 NOTE — TELEPHONE ENCOUNTER
----- Message from Og Canales MD sent at 11/29/2017 10:43 AM CST -----  r shoulder basal cell carcinoma schedule excision

## 2017-11-29 NOTE — PROGRESS NOTES
R shouldeR:Orthokeratosis of epidermis with a proliferation of nests of basaloid cells, with peripheral palisading and a haphazard arrangement in the center extending into the dermis, forming nodules.  The tumor cells have hyperchromatic nuclei. Poor cytoplasm and intercellular bridging.        r shoulder basal cell carcinoma schedule excision

## 2017-11-29 NOTE — PATIENT INSTRUCTIONS
Wound Care Instructions     FOR SUPERFICIAL WOUNDS     [facility] [phone]    [facility] [phone]  Right posterior shoulder (Biopsy)                       AFTER 24 HOURS YOU SHOULD REMOVE THE BANDAGE AND BEGIN DAILY DRESSING CHANGES AS FOLLOWS:     1) Remove Dressing.     2) Clean and dry the area with tap water using a Q-tip or sterile gauze pad.     3) Apply Vaseline, Aquaphor, Polysporin ointment or Bacitracin ointment over entire wound.  Do NOT use Neosporin ointment.     4) Cover the wound with a band-aid, or a sterile non-stick gauze pad and micropore paper tape      REPEAT THESE INSTRUCTIONS AT LEAST ONCE A DAY UNTIL THE WOUND HAS COMPLETELY HEALED.    It is an old wives tale that a wound heals better when it is exposed to air and allowed to dry out. The wound will heal faster with a better cosmetic result if it is kept moist with ointment and covered with a bandage.    **Do not let the wound dry out.**      Supplies Needed:      *Cotton tipped applicators (Q-tips)    *Polysporin Ointment or Bacitracin Ointment (NOT NEOSPORIN)    *Band-aids or non-stick gauze pads and micropore paper tape.      PATIENT INFORMATION:    During the healing process you will notice a number of changes. All wounds develop a small halo of redness surrounding the wound.  This means healing is occurring. Severe itching with extensive redness usually indicates sensitivity to the ointment or bandage tape used to dress the wound.  You should call our office if this develops.      Swelling  and/or discoloration around your surgical site is common, particularly when performed around the eye.    All wounds normally drain.  The larger the wound the more drainage there will be.  After 7-10 days, you will notice the wound beginning to shrink and new skin will begin to grow.  The wound is healed when you can see skin has formed over the entire area.  A healed wound has a healthy, shiny look to the  surface and is red to dark pink in color to normalize.  Wounds may take approximately 4-6 weeks to heal.  Larger wounds may take 6-8 weeks.  After the wound is healed you may discontinue dressing changes.    You may experience a sensation of tightness as your wound heals. This is normal and will gradually subside.    Your healed wound may be sensitive to temperature changes. This sensitivity improves with time, but if you re having a lot of discomfort, try to avoid temperature extremes.    Patients frequently experience itching after their wound appears to have healed because of the continue healing under the skin.  Plain Vaseline will help relieve the itching.        POSSIBLE COMPLICATIONS    BLEEDIN. Leave the bandage in place.  2. Use tightly rolled up gauze or a cloth to apply direct pressure over the bandage for 30  minutes.  3. Reapply pressure for an additional 30 minutes if necessary  4. Use additional gauze and tape to maintain pressure once the bleeding has stopped.    WOUND CARE INSTRUCTIONS   FOR CRYOSURGERY   This area treated with liquid nitrogen will form a blister. You do not need to bandage the area until after the blister forms and breaks (which may be a few days). When the blister breaks, begin daily dressing changes as follows:   1) Clean and dry the area with tap water using clean Q-tip or sterile gauze pad.   2) Apply Polysporin ointment or Bacitracin ointment over entire wound. Do NOT use Neosporin ointment.   3) Cover the wound with a band-aid or sterile non-stick gauze pad and micropore paper tape.   REPEAT THESE INSTRUCTIONS AT LEAST ONCE A DAY UNTIL THE WOUND HAS COMPLETELY HEALED.   It is an old wives tale that a wound heals better when it is exposed to air and allowed to dry out. The wound will heal faster with a better cosmetic result if it is kept moist with ointment and covered with a bandage.   Do not let the wound dry out.   IMPORTANT INFORMATION ON REVERSE SIDE   Supplies  Needed:   *Cotton tipped applicators (Q-tips)   *Polysporin ointment or Bacitracin ointment (NOT NEOSPORIN)   *Band-aids, or non stick gauze pads and micropore paper tape   PATIENT INFORMATION   During the healing process you will notice a number of changes. All wounds develop a small halo of redness surrounding the wound. This means healing is occurring. Severe itching with extensive redness usually indicates sensitivity to the ointment or bandage tape used to dress the wound. You should call our office if this develops.   Swelling and/or discoloration around your surgical site is common, particularly when performed around the eye.   All wounds normally drain. The larger the wound the more drainage there will be. After 7-10 days, you will notice the wound beginning to shrink and new skin will begin to grow. The wound is healed when you can see skin has formed over the entire area. A healed wound has a healthy, shiny look to the surface and is red to dark pink in color to normalize. Wounds may take approximately 4-6 weeks to heal. Larger wounds may take 6-8 weeks. After the wound is healed you may discontinue dressing changes.   You may experience a sensation of tightness as your wound heals. This is normal and will gradually subside.   Your healed wound may be sensitive to temperature changes. This sensitivity improves with time, but if you re having a lot of discomfort, try to avoid temperature extremes.   Patients frequently experience itching after their wound appears to have healed because of the continue healing under the skin. Plain Vaseline will help relieve the itching.

## 2017-11-29 NOTE — NURSING NOTE
"Initial /72  Pulse 57  SpO2 95% Estimated body mass index is 30.12 kg/(m^2) as calculated from the following:    Height as of 8/30/17: 1.74 m (5' 8.5\").    Weight as of 8/30/17: 91.2 kg (201 lb). .    Haven Chan LPN    "

## 2017-11-29 NOTE — LETTER
"    11/29/2017         RE: Derrick Morrison  [address] [address]        Dear Colleague,    Thank you for referring your patient, Derrick Morrison, to the [facility]. Please see a copy of my visit note below.    Derrick Morrison is a 72 year old year old male patient here today for skin check. He has had a history of BCC on back. Patient has no other skin complaints today.  Remainder of the HPI, Meds, PMH, Allergies, FH, and SH was reviewed in chart.    Pertinent Hx:   History of BCC on back  Past Medical History:   Diagnosis Date     Actinic keratosis      Basal cell carcinoma      BPH w urinary obs/LUTS 10/10/2011    flomax in past, \"quit working\".  Avodart in past.  Tapered off.        CAD (coronary artery disease) 10/10/2011    -8/16/2006 s/p GERALDINE to mid RCA, s/p GERALDINE to mid LAD in [place] -10/4/2007 s/p GERALDINE to pRCA and GERALDINE to dRCA in [place]  -11/17/11 Unstable angina, s/p GERALDINE to prox LAD (jailed small diagonal)       Hyperlipidaemia      Palpitations 8/7/2013    occasional, improved on low dose beta blocker      screening 10/10/2011    2007 colonoscopy \"all clean\" in [place].  He is sure about this.   AAA ultrasound screen 3/07 normal also.  (leg and neck done then too)      Skin cancer     had mohs on L temple       History reviewed. No pertinent surgical history.     Family History   Problem Relation Age of Onset     Coronary Artery Disease Mother        Social History     Social History     Marital status:      Spouse name: N/A     Number of children: N/A     Years of education: N/A     Occupational History      Retired     Social History Main Topics     Smoking status: Never Smoker     Smokeless tobacco: Never Used     Alcohol use Yes      Comment: occasionally     Drug use: No     Sexual activity: Yes     Partners: Female     Other Topics Concern     Parent/Sibling W/ Cabg, Mi Or Angioplasty Before 65f 55m? No     Social History Narrative "       Outpatient Encounter Prescriptions as of 11/29/2017   Medication Sig Dispense Refill     clopidogrel (PLAVIX) 75 MG tablet Take 1 tablet (75 mg) by mouth daily 90 tablet 3     rosuvastatin (CRESTOR) 40 MG tablet Take 1 tablet (40 mg) by mouth every evening 90 tablet 3     [DISCONTINUED] metoprolol (TOPROL-XL) 25 MG 24 hr tablet Take 1 tablet (25 mg) by mouth daily (Patient taking differently: Take 25 mg by mouth daily ) 90 tablet 3     budesonide (RHINOCORT ALLERGY) 32 MCG/ACT spray Spray 1 spray into both nostrils daily       nitroglycerin (NITROSTAT) 0.4 MG sublingual tablet Place 1 tablet (0.4 mg) under the tongue every 5 minutes as needed for chest pain 25 tablet 0     ASPIRIN NOT PRESCRIBED, INTENTIONAL, 1 each continuous prn Antiplatelet medication not prescribed intentionally due to plavix (Patient not taking: Reported on 11/30/2017) 0 each 0     No facility-administered encounter medications on file as of 11/29/2017.              Review Of Systems  Skin: As above  Eyes: negative  Ears/Nose/Throat: negative  Respiratory: No shortness of breath, dyspnea on exertion, cough, or hemoptysis  Cardiovascular: negative  Gastrointestinal: negative  Genitourinary: negative  Musculoskeletal: negative  Neurologic: negative  Psychiatric: negative  Hematologic/Lymphatic/Immunologic: negative  Endocrine: negative      O:   NAD, WDWN, Alert & Oriented, Mood & Affect wnl, Vitals stable   Here today alone   /72  Pulse 57  SpO2 95%   General appearance normal   Vitals stable   Alert, oriented and in no acute distress      0.3 cm pink pearly papule on right posterior shoulder    Brown stuck on papules, brown macules on torso and extremities   Yellow lobulated papule on face  Red papules on torso   Well healed scar on back      The remainder of the detailed exam was unremarkable; the following areas were examined:  scalp/hair, conjunctiva/lids, face, neck, lips/teeth, oral mucosa/gingiva, chest, back, abdomen,  buttocks, digits/nails, RUE, LUE, RLE, LLE.      Eyes: Conjunctivae/lids:Normal     ENT: Lips, buccal mucosa, tongue: normal    MSK:Normal    Cardiovascular: peripheral edema none    Pulm: Breathing Normal    Lymph Nodes: No Head and Neck Lymphadenopathy     Neuro/Psych: Orientation:Normal; Mood/Affect:Normal  A/P:  1. R/O BCC on right posterior shoulder  TANGENTIAL BIOPSY IN HOUSE:  After consent, anesthesia with LEC and prep, tangential excision performed.  No complications and routine wound care.    2. History of BCC on back  No evidence of recurrence.   3. Seborrheic keratosis, lentigo, cherry angioma, sebaceous hyperplasia.  BENIGN LESIONS DISCUSSED WITH PATIENT:  I discussed the specifics of tumor, prognosis, and genetics of benign lesions.  I explained that treatment of these lesions would be purely cosmetic and not medically neccessary.  I discussed with patient different removal options including excision, cautery and /or laser.      Nature and genetics of benign skin lesions dicussed with patient.  Signs and Symptoms of skin cancer discussed with patient.  ABCDEs of melanoma reviewed with patient.  Patient encouraged to perform monthly skin exams.  UV precautions reviewed with patient.  Skin care regimen reviewed with patient: Eliminate harsh soaps, i.e. Dial, zest, irsih spring; Mild soaps such as Cetaphil or Dove sensitive skin, avoid hot or cold showers, aggressive use of emollients including vanicream, cetaphil or cerave discussed with patient.    Risks of non-melanoma skin cancer discussed with patient   Return to clinic pending biopsy results.       Again, thank you for allowing me to participate in the care of your patient.        Sincerely,        Vandana Reid PA-C

## 2017-11-29 NOTE — MR AVS SNAPSHOT
After Visit Summary   11/29/2017    Derrick Morrison    MRN: [id number]           Patient Information     Date Of Birth          1945        Visit Information        Provider Department      11/29/2017 10:30 AM Og Canales MD [facility]        Today's Diagnoses     Basal cell carcinoma of right shoulder    -  1       Follow-ups after your visit        Your next 10 appointments already scheduled     Nov 30, 2017 11:30 AM CST   Return Visit with MARIA T Workman CNP   [facility] ([facility])    [address] [id number] [phone]              Who to contact     If you have questions or need follow up information about today's clinic visit or your schedule please contact [facility] directly at [phone].  Normal or non-critical lab and imaging results will be communicated to you by MyChart, letter or phone within 4 business days after the clinic has received the results. If you do not hear from us within 7 days, please contact the clinic through MyChart or phone. If you have a critical or abnormal lab result, we will notify you by phone as soon as possible.  Submit refill requests through [employer] or call your pharmacy and they will forward the refill request to us. Please allow 3 business days for your refill to be completed.          Additional Information About Your Visit        MyChart Information     [employer] gives you secure access to your electronic health record. If you see a primary care provider, you can also send messages to your care team and make appointments. If you have questions, please call your primary care clinic.  If you do not have a primary care provider, please call [phone] and they will assist you.        Care EveryWhere ID     This is your Care EveryWhere ID. This could be used by other organizations to access your [facility]  records  [id number]         Blood Pressure from Last 3 Encounters:   11/29/17 128/72   11/23/17 119/71   08/30/17 120/82    Weight from Last 3 Encounters:   08/30/17 91.2 kg (201 lb)   05/11/17 93 kg (205 lb)   04/03/17 93 kg (205 lb)              We Performed the Following     CL FROZEN SECTION FIRST SPEC        Primary Care Provider Office Phone # Fax #    Kaiden Zapata [phone] [address] [facility] [address]        Equal Access to Services     [facility]: Hadii aad ku hadasho Soomaali, waaxda luqadaha, qaybta kaalmada adeegyada, bay jonas adetracy bauer . So [facility] [phone].    ATENCIÓN: Si habla español, tiene a villar disposición servicios gratuitos de asistencia lingüística. [facility] [phone].    We comply with applicable federal civil rights laws and [place] laws. We do not discriminate on the basis of race, color, national origin, age, disability, sex, sexual orientation, or gender identity.            Thank you!     Thank you for choosing [facility]  for your care. Our goal is always to provide you with excellent care. Hearing back from our patients is one way we can continue to improve our services. Please take a few minutes to complete the written survey that you may receive in the mail after your visit with us. Thank you!             Your Updated Medication List - Protect others around you: Learn how to safely use, store and throw away your medicines at www.disposemymeds.org.          This list is accurate as of: 11/29/17 10:43 AM.  Always use your most recent med list.                   Brand Name Dispense Instructions for use Diagnosis    ASPIRIN NOT PRESCRIBED    INTENTIONAL    0 each    1 each continuous prn Antiplatelet medication not prescribed intentionally due to plavix    CAD (coronary artery disease)       clopidogrel 75 MG tablet    PLAVIX    90 tablet    Take 1 tablet (75 mg) by mouth daily    Coronary artery  disease involving native heart without angina pectoris, unspecified vessel or lesion type       metoprolol 25 MG 24 hr tablet    TOPROL-XL    90 tablet    Take 1 tablet (25 mg) by mouth daily    Coronary artery disease involving native heart without angina pectoris, unspecified vessel or lesion type       nitroGLYcerin 0.4 MG sublingual tablet    NITROSTAT    25 tablet    Place 1 tablet (0.4 mg) under the tongue every 5 minutes as needed for chest pain        RHINOCORT ALLERGY 32 MCG/ACT spray   Generic drug:  budesonide      Spray 1 spray into both nostrils daily        rosuvastatin 40 MG tablet    CRESTOR    90 tablet    Take 1 tablet (40 mg) by mouth every evening    Hyperlipidemia LDL goal <70

## 2017-11-29 NOTE — LETTER
11/29/2017         RE: Derrick Morrison  [address] [facility] [address]        Dear Colleague,    Thank you for referring your patient, Derrick Morrison, to the [facility]. Please see a copy of my visit note below.    R shouldeR:Orthokeratosis of epidermis with a proliferation of nests of basaloid cells, with peripheral palisading and a haphazard arrangement in the center extending into the dermis, forming nodules.  The tumor cells have hyperchromatic nuclei. Poor cytoplasm and intercellular bridging.        r shoulder basal cell carcinoma schedule excision     Again, thank you for allowing me to participate in the care of your patient.        Sincerely,        Og Canales MD

## 2017-11-29 NOTE — TELEPHONE ENCOUNTER
I spoke to Derrick today and explained that he was found to have a skin cancer called Basal cell carcinoma. Dr Canales has recommended a MOHS surgery. He says that he is familiar with Mohs surgery. He is transferred to be scheduled. Sonia HADLEY RN

## 2017-11-30 ENCOUNTER — OFFICE VISIT (OUTPATIENT)
Dept: CARDIOLOGY | Facility: CLINIC | Age: 72
End: 2017-11-30
Payer: COMMERCIAL

## 2017-11-30 VITALS
OXYGEN SATURATION: 95 % | SYSTOLIC BLOOD PRESSURE: 115 MMHG | WEIGHT: 201 LBS | DIASTOLIC BLOOD PRESSURE: 73 MMHG | HEART RATE: 57 BPM | BODY MASS INDEX: 30.12 KG/M2

## 2017-11-30 DIAGNOSIS — E78.5 HYPERLIPIDEMIA LDL GOAL <70: ICD-10-CM

## 2017-11-30 DIAGNOSIS — R00.2 PALPITATIONS: Primary | ICD-10-CM

## 2017-11-30 DIAGNOSIS — I25.10 CORONARY ARTERY DISEASE INVOLVING NATIVE CORONARY ARTERY OF NATIVE HEART WITHOUT ANGINA PECTORIS: ICD-10-CM

## 2017-11-30 DIAGNOSIS — I49.3 PVC'S (PREMATURE VENTRICULAR CONTRACTIONS): ICD-10-CM

## 2017-11-30 DIAGNOSIS — I25.10 CORONARY ARTERY DISEASE INVOLVING NATIVE HEART WITHOUT ANGINA PECTORIS, UNSPECIFIED VESSEL OR LESION TYPE: ICD-10-CM

## 2017-11-30 PROCEDURE — 99214 OFFICE O/P EST MOD 30 MIN: CPT | Mod: 24 | Performed by: NURSE PRACTITIONER

## 2017-11-30 RX ORDER — METOPROLOL SUCCINATE 25 MG/1
25 TABLET, EXTENDED RELEASE ORAL 2 TIMES DAILY
Qty: 180 TABLET | Refills: 3 | Status: SHIPPED | OUTPATIENT
Start: 2017-11-30 | End: 2018-09-13

## 2017-11-30 RX ORDER — METOPROLOL SUCCINATE 25 MG/1
25 TABLET, EXTENDED RELEASE ORAL DAILY
Qty: 90 TABLET | Refills: 3 | Status: SHIPPED | OUTPATIENT
Start: 2017-11-30 | End: 2017-11-30

## 2017-11-30 NOTE — LETTER
"11/30/2017    Kaiden Zapata MD  [address]  [facility] [address]      RE: Derrick Morrison       Dear Colleague,    I had the pleasure of seeing Derrick Morrison in the [facility].    Cardiology Clinic Progress Note  Derrick Morrison MRN# [id number]   YOB: 1945 Age: 71 year old     Reason For Visit:  ED follow-up palpitations    Primary Cardiologist:   Dr. Lan          History of Presenting Illness:    Derrick Morrison is a pleasant 71 year old patient with a past cardiac history significant for CAD with GERALDINE to the proximal LAD for in-stent restenosis 2011 with previous stents to the LAD and RCA, palpitations improved after starting beta blocker, and hyperlipidemia.      Patient was last seen by Dr. Lan on 5/11/2017.  It was noted that the patient previously had complaints of chest and jaw pain with no ischemia found on stress testing 3/2017.  He was previously started on Imdur.  He then had side effects of \"mental cloudiness\" while being on Imdur and stopped this medication.  On follow-up with Dr. Lan he had no further anginal symptoms and had been doing well.    Since then, he was seen in the ED on 11/23/2017 for intermittent palpitations with sharp chest pain twinges which correlated with PVCs on telemetry.  His EKG showed nonspecific T-wave changes but no acute ischemic changes and troponin was negative.  It was recommended that he increase his metoprolol to 50 mg daily for palpitations.    Pt presents today for ED follow-up of palpitations.  He has increased his metoprolol to 25 mg twice a day without any side effects.  His blood pressure today is 115/73 and his heart rate is 57.  He has continued to feel palpitations which he describes as a shock, throughout the day on a daily basis.  He does report that the feeling has been dulled after increasing the metoprolol.  On exam today he does not currently have any early beats of his pulse.  Since he has " continued with a significant amount of PVCs, I recommended a 24-hour Holter monitor to assess the PVC burden.  The patient will be leaving to [facility] in January for two months we will plan for him to see Dr. Lan when he returns.  Patient states he has been working on decreasing his caffeine intake.  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  Patient reports no CP, shortness of breath, PND, orthopnea, presyncope, syncope, edema, heart racing.      Current Cardiac Medications   Rosuvastatin 40 mg daily  Plavix 75 mg daily  Metoprolol XL 25 mg b.i.d.  Nitroglycerin p.r.n.                     Assessment and Plan:     Plan  1.   Continue current medications  2.  24-hour Holter monitor to assess PVC burden, we will call with these results  3.  Follow-up with Dr. Lan in [place]       1. CAD    GERALDINE to the proximal LAD for ISR in 2011 with previous stents to the LAD and RCA    Stress test 3/2017 with no ischemia or infarct    C/o jaw pain which is chronic and stable, unchanged since angiogram in 2011    Continue statin, beta blocker, plavix       2. Palpitations    Intermittent palpitations with sharp chest pain twinges which correlated with PVCs on telemetry, dulled after increasing BB      History of palpitations improved after starting beta blocker    Continue metoprolol    May consider repeating stress test to see if PVCs are due to progressive CAD      3. Hyperlipidemia    Last LDL 70 5/2017    Continue rosuvastatin 40 mg daily        Thank you for allowing me to participate in this delightful patient's care.      This note was completed in part using Dragon voice recognition software. Although reviewed after completion, some word and grammatical errors may occur.    Janeth Campos, APRN, CNP           Data:   All laboratory data reviewed      HPI and Plan:   See dictation    Orders  Placed This Encounter   Procedures     Follow-Up with Cardiologist     Holter Monitor 24 hour - Adult       Orders Placed This Encounter   Medications     DISCONTD: metoprolol (TOPROL-XL) 25 MG 24 hr tablet     Sig: Take 1 tablet (25 mg) by mouth daily     Dispense:  90 tablet     Refill:  3     metoprolol (TOPROL-XL) 25 MG 24 hr tablet     Sig: Take 1 tablet (25 mg) by mouth 2 times daily     Dispense:  180 tablet     Refill:  3     New sig, Pt will call when he needs filled       Medications Discontinued During This Encounter   Medication Reason     metoprolol (TOPROL-XL) 25 MG 24 hr tablet Reorder     metoprolol (TOPROL-XL) 25 MG 24 hr tablet Reorder         Encounter Diagnoses   Name Primary?     Coronary artery disease involving native coronary artery of native heart without angina pectoris      Palpitations Yes     PVC's (premature ventricular contractions)      Hyperlipidemia LDL goal <70      Coronary artery disease involving native heart without angina pectoris, unspecified vessel or lesion type        CURRENT MEDICATIONS:  Current Outpatient Prescriptions   Medication Sig Dispense Refill     metoprolol (TOPROL-XL) 25 MG 24 hr tablet Take 1 tablet (25 mg) by mouth 2 times daily 180 tablet 3     clopidogrel (PLAVIX) 75 MG tablet Take 1 tablet (75 mg) by mouth daily 90 tablet 3     rosuvastatin (CRESTOR) 40 MG tablet Take 1 tablet (40 mg) by mouth every evening 90 tablet 3     budesonide (RHINOCORT ALLERGY) 32 MCG/ACT spray Spray 1 spray into both nostrils daily       nitroglycerin (NITROSTAT) 0.4 MG sublingual tablet Place 1 tablet (0.4 mg) under the tongue every 5 minutes as needed for chest pain 25 tablet 0     [DISCONTINUED] metoprolol (TOPROL-XL) 25 MG 24 hr tablet Take 1 tablet (25 mg) by mouth daily 90 tablet 3     [DISCONTINUED] metoprolol (TOPROL-XL) 25 MG 24 hr tablet Take 1 tablet (25 mg) by mouth daily (Patient taking differently: Take 25 mg by mouth daily ) 90 tablet 3     ASPIRIN NOT PRESCRIBED,  "INTENTIONAL, 1 each continuous prn Antiplatelet medication not prescribed intentionally due to plavix (Patient not taking: Reported on 11/30/2017) 0 each 0       ALLERGIES   No Known Allergies    PAST MEDICAL HISTORY:  Past Medical History:   Diagnosis Date     Actinic keratosis      Basal cell carcinoma      BPH w urinary obs/LUTS 10/10/2011    flomax in past, \"quit working\".  Avodart in past.  Tapered off.        CAD (coronary artery disease) 10/10/2011    -8/16/2006 s/p GERALDINE to mid RCA, s/p GERALDINE to mid LAD in [place] -10/4/2007 s/p GERALDINE to pRCA and GERALDINE to dRCA in [place]  -11/17/11 Unstable angina, s/p GERALDINE to prox LAD (jailed small diagonal)       Hyperlipidaemia      Palpitations 8/7/2013    occasional, improved on low dose beta blocker      screening 10/10/2011    2007 colonoscopy \"all clean\" in [place].  He is sure about this.   AAA ultrasound screen 3/07 normal also.  (leg and neck done then too)      Skin cancer     had mohs on L temple       PAST SURGICAL HISTORY:  No past surgical history on file.    FAMILY HISTORY:  Family History   Problem Relation Age of Onset     Coronary Artery Disease Mother        SOCIAL HISTORY:  Social History     Social History     Marital status:      Spouse name: N/A     Number of children: N/A     Years of education: N/A     Occupational History      Retired     Social History Main Topics     Smoking status: Never Smoker     Smokeless tobacco: Never Used     Alcohol use Yes      Comment: occasionally     Drug use: No     Sexual activity: Yes     Partners: Female     Other Topics Concern     Parent/Sibling W/ Cabg, Mi Or Angioplasty Before 65f 55m? No     Social History Narrative       Review of Systems:  Skin:  Negative       Eyes:  Positive for glasses    ENT:  Positive for hearing loss    Respiratory:  Negative       Cardiovascular:  Negative Positive for;palpitations    Gastroenterology: Positive for heartburn    Genitourinary:  Negative    "   Musculoskeletal:  Positive for muscular weakness    Neurologic:  Positive for numbness or tingling of feet;memory problems    Psychiatric:  Negative      Heme/Lymph/Imm:  Negative      Endocrine:  Negative        Physical Exam:  Vitals: /73 (BP Location: Right arm, Patient Position: Sitting, Cuff Size: Adult Regular)  Pulse 57  Wt 91.2 kg (201 lb)  SpO2 95%  BMI 30.12 kg/m2    Constitutional:  cooperative, alert and oriented, well developed, well nourished, in no acute distress        Skin:  warm and dry to the touch          Head:  normocephalic        Eyes:  sclera white        Lymph:      ENT:  no pallor or cyanosis        Neck:  carotid pulses are full and equal bilaterally        Respiratory:  clear to auscultation         Cardiac: regular rhythm, normal S1/S2, no S3 or S4, apical impulse not displaced, no murmurs, gallops or rubs                pulses full and equal                                        GI:  abdomen soft        Extremities and Muscular Skeletal:  no deformities, clubbing, cyanosis, erythema observed;no edema              Neurological:  affect appropriate        Psych:  Alert and Oriented x 3      Thank you for allowing me to participate in the care of your patient.    Sincerely,     MARIA T Vyas [facility]

## 2017-11-30 NOTE — MR AVS SNAPSHOT
After Visit Summary   11/30/2017    Derrick Morrison    MRN: [id number]           Patient Information     Date Of Birth          1945        Visit Information        Provider Department      11/30/2017 10:00 AM Janeth Campos APRN CNP [facility]        Today's Diagnoses     Palpitations    -  1    Coronary artery disease involving native coronary artery of native heart without angina pectoris        PVC's (premature ventricular contractions)        Hyperlipidemia LDL goal <70        Coronary artery disease involving native heart without angina pectoris, unspecified vessel or lesion type          Care Instructions    Thank you for your [facility] visit today. Your provider has recommended the following:  Medication Changes:  No changes   Recommendations:  1. We will call with heart monitor results   Follow-up:  See Dr. Lan for cardiology follow up in March.  Call [phone] two months prior to request date to schedule any future appointments.  Reminder:  1. Please bring in all current medications, over the counter supplements and vitamin bottles to your next appointment.               [facility]~[address]~[place], [place]~[address]  Questions about your visit today?   Call your Cardiology Clinic RN's-Sara Rowe and/or Conchita Bhakta at [phone].            Follow-ups after your visit        Your next 10 appointments already scheduled     Mar 07, 2018  8:00 AM CST   MOHS with Og Canales MD   [facility] ([facility]    [address] [id number] [phone]              Future tests that were ordered for you today     Open Future Orders        Priority Expected Expires Ordered    Holter Monitor 24 hour - Adult Routine 12/7/2017 11/30/2018 11/30/2017            Who to contact     If you have questions or need  follow up information about today's clinic visit or your schedule please contact [facility] directly at [phone].  Normal or non-critical lab and imaging results will be communicated to you by MyChart, letter or phone within 4 business days after the clinic has received the results. If you do not hear from us within 7 days, please contact the clinic through "[employer] or phone. If you have a critical or abnormal lab result, we will notify you by phone as soon as possible.  Submit refill requests through [employer] or call your pharmacy and they will forward the refill request to us. Please allow 3 business days for your refill to be completed.          Additional Information About Your Visit        "[employer] Information     [employer] gives you secure access to your electronic health record. If you see a primary care provider, you can also send messages to your care team and make appointments. If you have questions, please call your primary care clinic.  If you do not have a primary care provider, please call [phone] and they will assist you.        Care EveryWhere ID     This is your Care EveryWhere ID. This could be used by other organizations to access your [place] medical records  [id number]        Your Vitals Were     Pulse Pulse Oximetry BMI (Body Mass Index)             57 95% 30.12 kg/m2          Blood Pressure from Last 3 Encounters:   11/30/17 115/73   11/29/17 128/72   11/23/17 119/71    Weight from Last 3 Encounters:   11/30/17 91.2 kg (201 lb)   08/30/17 91.2 kg (201 lb)   05/11/17 93 kg (205 lb)                 Today's Medication Changes          These changes are accurate as of: 11/30/17 10:10 AM.  If you have any questions, ask your nurse or doctor.               Start taking these medicines.        Dose/Directions    metoprolol 25 MG 24 hr tablet   Commonly known as:  TOPROL-XL   Used for:  Coronary artery disease involving native heart without angina  pectoris, unspecified vessel or lesion type   Started by:  Janeth Campos APRN CNP        Dose:  25 mg   Take 1 tablet (25 mg) by mouth 2 times daily   Quantity:  180 tablet   Refills:  3            Where to get your medicines      These medications were sent to [facility] [place] - [address]  [facility] [address] [facility] [address]     Phone:  [phone]     metoprolol 25 MG 24 hr tablet                Primary Care Provider Office Phone # Fax #    Kaiden Zapata [phone]       [address]  [facility] [address]        Equal Access to Services     : Hadii aad ku hadasho Soelzaali, waaxda luqadaha, qaybta kaalmada adeegyada, bay keller haymarleny bauer . So [facility] [phone].    ATENCIÓN: Si habla español, tiene a villar disposición servicios gratuitos de asistencia lingüística. Llame al [phone].    We comply with applicable federal civil rights laws and [place] laws. We do not discriminate on the basis of race, color, national origin, age, disability, sex, sexual orientation, or gender identity.            Thank you!     Thank you for choosing [facility]  for your care. Our goal is always to provide you with excellent care. Hearing back from our patients is one way we can continue to improve our services. Please take a few minutes to complete the written survey that you may receive in the mail after your visit with us. Thank you!             Your Updated Medication List - Protect others around you: Learn how to safely use, store and throw away your medicines at www.disposemymeds.org.          This list is accurate as of: 11/30/17 10:10 AM.  Always use your most recent med list.                   Brand Name Dispense Instructions for use Diagnosis    ASPIRIN NOT PRESCRIBED    INTENTIONAL    0 each    1 each continuous prn Antiplatelet medication not prescribed intentionally due to  plavix    CAD (coronary artery disease)       clopidogrel 75 MG tablet    PLAVIX    90 tablet    Take 1 tablet (75 mg) by mouth daily    Coronary artery disease involving native heart without angina pectoris, unspecified vessel or lesion type       metoprolol 25 MG 24 hr tablet    TOPROL-XL    180 tablet    Take 1 tablet (25 mg) by mouth 2 times daily    Coronary artery disease involving native heart without angina pectoris, unspecified vessel or lesion type       nitroGLYcerin 0.4 MG sublingual tablet    NITROSTAT    25 tablet    Place 1 tablet (0.4 mg) under the tongue every 5 minutes as needed for chest pain        RHINOCORT ALLERGY 32 MCG/ACT spray   Generic drug:  budesonide      Spray 1 spray into both nostrils daily        rosuvastatin 40 MG tablet    CRESTOR    90 tablet    Take 1 tablet (40 mg) by mouth every evening    Hyperlipidemia LDL goal <70

## 2017-11-30 NOTE — PROGRESS NOTES
"Cardiology Clinic Progress Note  Derrick Morrison MRN# [id number]   YOB: 1945 Age: 71 year old     Reason For Visit:  ED follow-up palpitations    Primary Cardiologist:   Dr. Lan          History of Presenting Illness:    Derrick Morrison is a pleasant 71 year old patient with a past cardiac history significant for CAD with GERALDINE to the proximal LAD for in-stent restenosis 2011 with previous stents to the LAD and RCA, palpitations improved after starting beta blocker, and hyperlipidemia.      Patient was last seen by Dr. Lan on 5/11/2017.  It was noted that the patient previously had complaints of chest and jaw pain with no ischemia found on stress testing 3/2017.  He was previously started on Imdur.  He then had side effects of \"mental cloudiness\" while being on Imdur and stopped this medication.  On follow-up with Dr. Lan he had no further anginal symptoms and had been doing well.    Since then, he was seen in the ED on 11/23/2017 for intermittent palpitations with sharp chest pain twinges which correlated with PVCs on telemetry.  His EKG showed nonspecific T-wave changes but no acute ischemic changes and troponin was negative.  It was recommended that he increase his metoprolol to 50 mg daily for palpitations.    Pt presents today for ED follow-up of palpitations.  He has increased his metoprolol to 25 mg twice a day without any side effects.  His blood pressure today is 115/73 and his heart rate is 57.  He has continued to feel palpitations which he describes as a shock, throughout the day on a daily basis.  He does report that the feeling has been dulled after increasing the metoprolol.  On exam today he does not currently have any early beats of his pulse.  Since he has continued with a significant amount of PVCs, I recommended a 24-hour Holter monitor to assess the PVC burden.  [place] we will plan for him to see Dr. Lan when he returns.  " Patient states he has been working on decreasing his caffeine intake.  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  Patient reports no CP, shortness of breath, PND, orthopnea, presyncope, syncope, edema, heart racing.      Current Cardiac Medications   Rosuvastatin 40 mg daily  Plavix 75 mg daily  Metoprolol XL 25 mg b.i.d.  Nitroglycerin p.r.n.                     Assessment and Plan:     Plan  1.   Continue current medications  2.  24-hour Holter monitor to assess PVC burden, we will call with these results  3.  Follow-up with Dr. Lan in March when [place] from [place]       1. CAD    GERALDINE to the proximal LAD for ISR in 2011 with previous stents to the LAD and RCA    Stress test 3/2017 with no ischemia or infarct    C/o jaw pain which is chronic and stable, unchanged since angiogram in 2011    Continue statin, beta blocker, plavix       2. Palpitations    Intermittent palpitations with sharp chest pain twinges which correlated with PVCs on telemetry, dulled after increasing BB      History of palpitations improved after starting beta blocker    Continue metoprolol    May consider repeating stress test to see if PVCs are due to progressive CAD      3. Hyperlipidemia    Last LDL 70 5/2017    Continue rosuvastatin 40 mg daily        Thank you for allowing me to participate in this delightful patient's care.      This note was completed in part using Dragon voice recognition software. Although reviewed after completion, some word and grammatical errors may occur.    Janeth Campos, APRN, CNP           Data:   All laboratory data reviewed

## 2017-11-30 NOTE — PATIENT INSTRUCTIONS
Thank you for your [facility] visit today. Your provider has recommended the following:  Medication Changes:  No changes   Recommendations:  1. We will call with heart monitor results   Follow-up:  See Dr. Lan for cardiology follow up in March.  Call [phone] two months prior to request date to schedule any future appointments.  Reminder:  1. Please bring in all current medications, over the counter supplements and vitamin bottles to your next appointment.               [facility]  [facility]~[address]~[place], [place]~[address]  Questions about your visit today?   Call your Cardiology Clinic RN's-Sara Rowe and/or Conchita Bhakta at [phone].

## 2017-11-30 NOTE — PROGRESS NOTES
HPI and Plan:   See dictation    Orders Placed This Encounter   Procedures     Follow-Up with Cardiologist     Holter Monitor 24 hour - Adult       Orders Placed This Encounter   Medications     DISCONTD: metoprolol (TOPROL-XL) 25 MG 24 hr tablet     Sig: Take 1 tablet (25 mg) by mouth daily     Dispense:  90 tablet     Refill:  3     metoprolol (TOPROL-XL) 25 MG 24 hr tablet     Sig: Take 1 tablet (25 mg) by mouth 2 times daily     Dispense:  180 tablet     Refill:  3     New sig, Pt will call when he needs filled       Medications Discontinued During This Encounter   Medication Reason     metoprolol (TOPROL-XL) 25 MG 24 hr tablet Reorder     metoprolol (TOPROL-XL) 25 MG 24 hr tablet Reorder         Encounter Diagnoses   Name Primary?     Coronary artery disease involving native coronary artery of native heart without angina pectoris      Palpitations Yes     PVC's (premature ventricular contractions)      Hyperlipidemia LDL goal <70      Coronary artery disease involving native heart without angina pectoris, unspecified vessel or lesion type        CURRENT MEDICATIONS:  Current Outpatient Prescriptions   Medication Sig Dispense Refill     metoprolol (TOPROL-XL) 25 MG 24 hr tablet Take 1 tablet (25 mg) by mouth 2 times daily 180 tablet 3     clopidogrel (PLAVIX) 75 MG tablet Take 1 tablet (75 mg) by mouth daily 90 tablet 3     rosuvastatin (CRESTOR) 40 MG tablet Take 1 tablet (40 mg) by mouth every evening 90 tablet 3     budesonide (RHINOCORT ALLERGY) 32 MCG/ACT spray Spray 1 spray into both nostrils daily       nitroglycerin (NITROSTAT) 0.4 MG sublingual tablet Place 1 tablet (0.4 mg) under the tongue every 5 minutes as needed for chest pain 25 tablet 0     [DISCONTINUED] metoprolol (TOPROL-XL) 25 MG 24 hr tablet Take 1 tablet (25 mg) by mouth daily 90 tablet 3     [DISCONTINUED] metoprolol (TOPROL-XL) 25 MG 24 hr tablet Take 1 tablet (25 mg) by mouth daily (Patient taking differently: Take 25 mg by mouth daily )  "90 tablet 3     ASPIRIN NOT PRESCRIBED, INTENTIONAL, 1 each continuous prn Antiplatelet medication not prescribed intentionally due to plavix (Patient not taking: Reported on 11/30/2017) 0 each 0       ALLERGIES   No Known Allergies    PAST MEDICAL HISTORY:  Past Medical History:   Diagnosis Date     Actinic keratosis      Basal cell carcinoma      BPH w urinary obs/LUTS 10/10/2011    flomax in past, \"quit working\".  Avodart in past.  Tapered off.        CAD (coronary artery disease) 10/10/2011    -8/16/2006 s/p GERALDINE to mid RCA, s/p GERALDINE to mid LAD in [place] -10/4/2007 s/p GERALDINE to pRCA and GERALDINE to dRCA in [place]  -11/17/11 Unstable angina, s/p GERALDINE to prox LAD (jailed small diagonal)       Hyperlipidaemia      Palpitations 8/7/2013    occasional, improved on low dose beta blocker      screening 10/10/2011    2007 colonoscopy \"all clean\" in [place].  He is sure about this.   AAA ultrasound screen 3/07 normal also.  (leg and neck done then too)      Skin cancer     had mohs on L temple       PAST SURGICAL HISTORY:  No past surgical history on file.    FAMILY HISTORY:  Family History   Problem Relation Age of Onset     Coronary Artery Disease Mother        SOCIAL HISTORY:  Social History     Social History     Marital status:      Spouse name: N/A     Number of children: N/A     Years of education: N/A     Occupational History      Retired     Social History Main Topics     Smoking status: Never Smoker     Smokeless tobacco: Never Used     Alcohol use Yes      Comment: occasionally     Drug use: No     Sexual activity: Yes     Partners: Female     Other Topics Concern     Parent/Sibling W/ Cabg, Mi Or Angioplasty Before 65f 55m? No     Social History Narrative       Review of Systems:  Skin:  Negative       Eyes:  Positive for glasses    ENT:  Positive for hearing loss    Respiratory:  Negative       Cardiovascular:  Negative Positive for;palpitations    Gastroenterology: Positive for heartburn "    Genitourinary:  Negative      Musculoskeletal:  Positive for muscular weakness    Neurologic:  Positive for numbness or tingling of feet;memory problems    Psychiatric:  Negative      Heme/Lymph/Imm:  Negative      Endocrine:  Negative        Physical Exam:  Vitals: /73 (BP Location: Right arm, Patient Position: Sitting, Cuff Size: Adult Regular)  Pulse 57  Wt 91.2 kg (201 lb)  SpO2 95%  BMI 30.12 kg/m2    Constitutional:  cooperative, alert and oriented, well developed, well nourished, in no acute distress        Skin:  warm and dry to the touch          Head:  normocephalic        Eyes:  sclera white        Lymph:      ENT:  no pallor or cyanosis        Neck:  carotid pulses are full and equal bilaterally        Respiratory:  clear to auscultation         Cardiac: regular rhythm, normal S1/S2, no S3 or S4, apical impulse not displaced, no murmurs, gallops or rubs                pulses full and equal                                        GI:  abdomen soft        Extremities and Muscular Skeletal:  no deformities, clubbing, cyanosis, erythema observed;no edema              Neurological:  affect appropriate        Psych:  Alert and Oriented x 3        CC  No referring provider defined for this encounter.

## 2017-12-04 NOTE — PROGRESS NOTES
"Derrick Morrison is a 72 year old year old male patient here today for skin check. He has had a history of BCC on back. He notes that he has a few irritated skin tags on axilla.  Patient has no other skin complaints today.  Remainder of the HPI, Meds, PMH, Allergies, FH, and SH was reviewed in chart.    Pertinent Hx:   History of BCC on back  Past Medical History:   Diagnosis Date     Actinic keratosis      Basal cell carcinoma      BPH w urinary obs/LUTS 10/10/2011    flomax in past, \"quit working\".  Avodart in past.  Tapered off.        CAD (coronary artery disease) 10/10/2011    -8/16/2006 s/p GERALDINE to mid RCA, s/p GERALDINE to mid LAD in [place] -10/4/2007 s/p GERALDINE to pRCA and GERALDINE to dRCA in [place]  -11/17/11 Unstable angina, s/p GERALDINE to prox LAD (jailed small diagonal)       Hyperlipidaemia      Palpitations 8/7/2013    occasional, improved on low dose beta blocker      screening 10/10/2011    2007 colonoscopy \"all clean\" in [place].  He is sure about this.   AAA ultrasound screen 3/07 normal also.  (leg and neck done then too)      Skin cancer     had mohs on L temple       History reviewed. No pertinent surgical history.     Family History   Problem Relation Age of Onset     Coronary Artery Disease Mother        Social History     Social History     Marital status:      Spouse name: N/A     Number of children: N/A     Years of education: N/A     Occupational History      Retired     Social History Main Topics     Smoking status: Never Smoker     Smokeless tobacco: Never Used     Alcohol use Yes      Comment: occasionally     Drug use: No     Sexual activity: Yes     Partners: Female     Other Topics Concern     Parent/Sibling W/ Cabg, Mi Or Angioplasty Before 65f 55m? No     Social History Narrative       Outpatient Encounter Prescriptions as of 11/29/2017   Medication Sig Dispense Refill     clopidogrel (PLAVIX) 75 MG tablet Take 1 tablet (75 mg) by mouth daily 90 tablet 3     rosuvastatin " (CRESTOR) 40 MG tablet Take 1 tablet (40 mg) by mouth every evening 90 tablet 3     [DISCONTINUED] metoprolol (TOPROL-XL) 25 MG 24 hr tablet Take 1 tablet (25 mg) by mouth daily (Patient taking differently: Take 25 mg by mouth daily ) 90 tablet 3     budesonide (RHINOCORT ALLERGY) 32 MCG/ACT spray Spray 1 spray into both nostrils daily       nitroglycerin (NITROSTAT) 0.4 MG sublingual tablet Place 1 tablet (0.4 mg) under the tongue every 5 minutes as needed for chest pain 25 tablet 0     ASPIRIN NOT PRESCRIBED, INTENTIONAL, 1 each continuous prn Antiplatelet medication not prescribed intentionally due to plavix (Patient not taking: Reported on 11/30/2017) 0 each 0     No facility-administered encounter medications on file as of 11/29/2017.              Review Of Systems  Skin: As above  Eyes: negative  Ears/Nose/Throat: negative  Respiratory: No shortness of breath, dyspnea on exertion, cough, or hemoptysis  Cardiovascular: negative  Gastrointestinal: negative  Genitourinary: negative  Musculoskeletal: negative  Neurologic: negative  Psychiatric: negative  Hematologic/Lymphatic/Immunologic: negative  Endocrine: negative      O:   NAD, WDWN, Alert & Oriented, Mood & Affect wnl, Vitals stable   Here today alone   /72  Pulse 57  SpO2 95%   General appearance normal   Vitals stable   Alert, oriented and in no acute distress      0.3 cm pink pearly papule on right posterior shoulder    Brown stuck on papules, brown macules on torso and extremities   Yellow lobulated papule on face  Red papules on torso   Well healed scar on back   Flesh colored pedunculated papules on axilla x 5  Pink gritty papule on left cheek, mid forehead, left temple    The remainder of the detailed exam was unremarkable; the following areas were examined:  scalp/hair, conjunctiva/lids, face, neck, lips/teeth, oral mucosa/gingiva, chest, back, abdomen, buttocks, digits/nails, RUE, LUE, RLE, LLE.      Eyes: Conjunctivae/lids:Normal     ENT:  Lips, buccal mucosa, tongue: normal    MSK:Normal    Cardiovascular: peripheral edema none    Pulm: Breathing Normal    Lymph Nodes: No Head and Neck Lymphadenopathy     Neuro/Psych: Orientation:Normal; Mood/Affect:Normal  A/P:  1. R/O BCC on right posterior shoulder  TANGENTIAL BIOPSY IN HOUSE:  After consent, anesthesia with LEC and prep, tangential excision performed.  No complications and routine wound care.    2. History of BCC on back  No evidence of recurrence.   3. Inflamed skin tags on  1 on left axilla, 4 on right axilla   After consent, tangential excision performe  No complications and routine wound care.   4. Actinic Keratoses on left cheek, mid forehead, left temple x 3  LN2:  Treated with LN2 for 5s for 1-2 cycles. Warned risks of blistering, pain, pigment change, scarring, and incomplete resolution.  Advised patient to return if lesions do not completely resolve.  Wound care sheet given.  5. Seborrheic keratosis, lentigo, cherry angioma, sebaceous hyperplasia.  BENIGN LESIONS DISCUSSED WITH PATIENT:  I discussed the specifics of tumor, prognosis, and genetics of benign lesions.  I explained that treatment of these lesions would be purely cosmetic and not medically neccessary.  I discussed with patient different removal options including excision, cautery and /or laser.      Nature and genetics of benign skin lesions dicussed with patient.  Signs and Symptoms of skin cancer discussed with patient.  ABCDEs of melanoma reviewed with patient.  Patient encouraged to perform monthly skin exams.  UV precautions reviewed with patient.  Skin care regimen reviewed with patient: Eliminate harsh soaps, i.e. Dial, zest, irsih spring; Mild soaps such as Cetaphil or Dove sensitive skin, avoid hot or cold showers, aggressive use of emollients including vanicream, cetaphil or cerave discussed with patient.    Risks of non-melanoma skin cancer discussed with patient   Return to clinic pending biopsy results.

## 2017-12-06 ENCOUNTER — HOSPITAL ENCOUNTER (OUTPATIENT)
Dept: CARDIOLOGY | Facility: CLINIC | Age: 72
Discharge: HOME | End: 2017-12-06
Attending: NURSE PRACTITIONER | Admitting: NURSE PRACTITIONER
Payer: MEDICARE

## 2017-12-06 DIAGNOSIS — R00.2 PALPITATIONS: ICD-10-CM

## 2017-12-06 DIAGNOSIS — I49.3 PVC'S (PREMATURE VENTRICULAR CONTRACTIONS): ICD-10-CM

## 2017-12-06 PROCEDURE — 93225 XTRNL ECG REC<48 HRS REC: CPT

## 2017-12-06 PROCEDURE — 93227 XTRNL ECG REC<48 HR R&I: CPT | Performed by: INTERNAL MEDICINE

## 2017-12-15 ENCOUNTER — OFFICE VISIT (OUTPATIENT)
Dept: FAMILY MEDICINE | Facility: CLINIC | Age: 72
End: 2017-12-15
Payer: COMMERCIAL

## 2017-12-15 ENCOUNTER — TELEPHONE (OUTPATIENT)
Dept: FAMILY MEDICINE | Facility: CLINIC | Age: 72
End: 2017-12-15

## 2017-12-15 VITALS
OXYGEN SATURATION: 95 % | BODY MASS INDEX: 29.64 KG/M2 | TEMPERATURE: 97.6 F | WEIGHT: 197.8 LBS | DIASTOLIC BLOOD PRESSURE: 68 MMHG | RESPIRATION RATE: 18 BRPM | SYSTOLIC BLOOD PRESSURE: 126 MMHG | HEART RATE: 72 BPM

## 2017-12-15 DIAGNOSIS — H61.23 BILATERAL IMPACTED CERUMEN: ICD-10-CM

## 2017-12-15 DIAGNOSIS — J20.8 ACUTE BRONCHITIS DUE TO OTHER SPECIFIED ORGANISMS: Primary | ICD-10-CM

## 2017-12-15 PROCEDURE — 69209 REMOVE IMPACTED EAR WAX UNI: CPT | Mod: 50 | Performed by: NURSE PRACTITIONER

## 2017-12-15 PROCEDURE — 99213 OFFICE O/P EST LOW 20 MIN: CPT | Mod: 25 | Performed by: NURSE PRACTITIONER

## 2017-12-15 RX ORDER — AZITHROMYCIN 250 MG/1
TABLET, FILM COATED ORAL
Qty: 6 TABLET | Refills: 0 | Status: SHIPPED | OUTPATIENT
Start: 2017-12-15 | End: 2018-04-17

## 2017-12-15 NOTE — TELEPHONE ENCOUNTER
Patient had discomfort with ear irrigation today. Patient still has was in ears. Please ask patient if he would like to use Debrox at home and come in for ear irrigation nurse only appointment in future or if he would like a referral to ENT to have wax suctioned. Per Jackie Still RNC NP

## 2017-12-15 NOTE — TELEPHONE ENCOUNTER
Pt notified. Appointment made. He will use Debrox over the weekend. States at this time his ear is better. Xin Smith RN

## 2017-12-15 NOTE — MR AVS SNAPSHOT
After Visit Summary   12/15/2017    Derrick Morrison    MRN: [id number]           Patient Information     Date Of Birth          1945        Visit Information        Provider Department      12/15/2017 10:20 AM Rafaela Still APRN CNP [facility]        Today's Diagnoses     Acute bronchitis due to other specified organisms    -  1      Care Instructions    Complete full course of antibiotics even if you start to feel better    Increase your fluids and rest and eat a well balanced diet.    Would be good to humidify the air in your home, especially in the bedroom.     Saline nasal spray can be used several times a day to moisten the passage ways.          Follow-ups after your visit        Your next 10 appointments already scheduled     Mar 07, 2018  8:00 AM CST   [facility] with [facility] Canales MD   [facility]    [address] [address]   [phone]              Who to contact     If you have questions or need follow up information about today's clinic visit or your schedule please contact [facility] directly at [phone].  Normal or non-critical lab and imaging results will be communicated to you by [employer], letter or phone within 4 business days after the clinic has received the results. If you do not hear from us within 7 days, please contact the clinic through [employer] or phone. If you have a critical or abnormal lab result, we will notify you by phone as soon as possible.  Submit refill requests through [employer] or call your pharmacy and they will forward the refill request to us. Please allow 3 business days for your refill to be completed.          Additional Information About Your Visit        [employer] Information     [employer] gives you secure access to your electronic health record. If you see a primary care provider, you can also send messages to your care team and make appointments.  If you have questions, please call your primary care clinic.  If you do not have a primary care provider, please call [phone] and they will assist you.        Care EveryWhere ID     This is your Care EveryWhere ID. This could be used by other organizations to access your [facility] medical records  [id number]        Your Vitals Were     Pulse Temperature Respirations Pulse Oximetry BMI (Body Mass Index)       72 97.6  F (36.4  C) (Tympanic) 18 95% 29.64 kg/m2        Blood Pressure from Last 3 Encounters:   12/15/17 126/68   11/30/17 115/73   11/29/17 128/72    Weight from Last 3 Encounters:   12/15/17 197 lb 12.8 oz (89.7 kg)   11/30/17 201 lb (91.2 kg)   08/30/17 201 lb (91.2 kg)              Today, you had the following     No orders found for display       Primary Care Provider Office Phone # Fax #    Kaiden Zapata [phone]       [address] [place] [address]        Equal Access to Services     [facility]: Hadii aad ku hadasho Soomaali, waaxda luqadaha, qaybta kaalmada adeegyada, bay bauer . So [facility] [phone].    ATENCIÓN: Si habla español, tiene a villar disposición servicios gratuitos de asistencia lingüística. Llame al [phone].    We comply with applicable federal civil rights laws and [place] laws. We do not discriminate on the basis of race, color, national origin, age, disability, sex, sexual orientation, or gender identity.            Thank you!     Thank you for choosing [facility]  for your care. Our goal is always to provide you with excellent care. Hearing back from our patients is one way we can continue to improve our services. Please take a few minutes to complete the written survey that you may receive in the mail after your visit with us. Thank you!             Your Updated Medication List - Protect others around you: Learn how to safely use, store and throw away your medicines at  www.disposemymeds.org.          This list is accurate as of: 12/15/17 10:54 AM.  Always use your most recent med list.                   Brand Name Dispense Instructions for use Diagnosis    ASPIRIN NOT PRESCRIBED    INTENTIONAL    0 each    1 each continuous prn Antiplatelet medication not prescribed intentionally due to plavix    CAD (coronary artery disease)       clopidogrel 75 MG tablet    PLAVIX    90 tablet    Take 1 tablet (75 mg) by mouth daily    Coronary artery disease involving native heart without angina pectoris, unspecified vessel or lesion type       metoprolol 25 MG 24 hr tablet    TOPROL-XL    180 tablet    Take 1 tablet (25 mg) by mouth 2 times daily    Coronary artery disease involving native heart without angina pectoris, unspecified vessel or lesion type       nitroGLYcerin 0.4 MG sublingual tablet    NITROSTAT    25 tablet    Place 1 tablet (0.4 mg) under the tongue every 5 minutes as needed for chest pain        RHINOCORT ALLERGY 32 MCG/ACT spray   Generic drug:  budesonide      Spray 1 spray into both nostrils daily        rosuvastatin 40 MG tablet    CRESTOR    90 tablet    Take 1 tablet (40 mg) by mouth every evening    Hyperlipidemia LDL goal <70

## 2017-12-15 NOTE — PATIENT INSTRUCTIONS
Complete full course of antibiotics even if you start to feel better    Increase your fluids and rest and eat a well balanced diet.    Would be good to humidify the air in your home, especially in the bedroom.     Saline nasal spray can be used several times a day to moisten the passage ways.

## 2017-12-15 NOTE — PROGRESS NOTES
SUBJECTIVE:   Derrick Morrison is a 72 year old male who presents to clinic today for the following health issues:      RESPIRATORY SYMPTOMS      Duration: 9 days    Description  sore throat, facial pain/pressure, post nasal drainage, cough and coughing out colored chunky sputum    Severity: moderate    Accompanying signs and symptoms: slightl y worse symptoms    History (predisposing factors):  none    Precipitating or alleviating factors: None    Therapies tried and outcome:  oral decongestant guaifenesin tussin, decongestant was some help    Patient holding Rhinocort while taking OTC decongestants    Low grade fever, bad cough diane night. Felt a bit better.  Pressure behind ears  Occasionally produces yellow green sputum.   Clear rhinorrhea  No fever    Cerumen impaction bilateral ears  Ears fullness.  He has had issues with cerumen impaction in his required irrigations in the past.    Problem list and histories reviewed & adjusted, as indicated.  Additional history: as documented    Patient Active Problem List   Diagnosis     CAD (coronary artery disease)     Hypertrophy of prostate with urinary obstruction     screening     Hyperlipidemia LDL goal <70     Palpitations     Screening for prostate cancer     HTN (hypertension)     Subjective tinnitus     Sensorineural hearing loss, bilateral     Counseling regarding advanced directives     Gastroesophageal reflux disease without esophagitis     History reviewed. No pertinent surgical history.    Social History   Substance Use Topics     Smoking status: Never Smoker     Smokeless tobacco: Never Used     Alcohol use Yes      Comment: occasionally     Family History   Problem Relation Age of Onset     Coronary Artery Disease Mother              Reviewed and updated as needed this visit by clinical staffTobacco  Allergies  Meds  Problems  Med Hx  Surg Hx  Fam Hx  Soc Hx        Reviewed and updated as needed this visit by Provider  Allergies  Meds  Problems          ROS:  Constitutional, HEENT, cardiovascular, pulmonary, gi and gu systems are negative, except as otherwise noted.      OBJECTIVE:   /68  Pulse 72  Temp 97.6  F (36.4  C) (Tympanic)  Resp 18  Wt 197 lb 12.8 oz (89.7 kg)  SpO2 95%  BMI 29.64 kg/m2  Body mass index is 29.64 kg/(m^2).  GENERAL: healthy, alert and no distress  HENT: normal cephalic/atraumatic, nose and mouth without ulcers or lesions, oropharynx clear, oral mucous membranes moist and Ear exam showing wax occlusion in the both ear.The patients ear(s) were irrigated using a elephant ear wash with hydrogen peroxide and tap water with a mild amount of wax extracted.  Patient tolerated procedure with moderate difficulty and the ear wash discontinued due to patients discomfort  NECK: no adenopathy, no asymmetry, masses, or scars and thyroid normal to palpation  RESP: lungs clear to auscultation - no rales, rhonchi or wheezes and bronchial breath sounds.  Harsh productive cough noted  CV: regular rate and rhythm, normal S1 S2, no S3 or S4, no murmur, click or rub, no peripheral edema and peripheral pulses strong  MS: no gross musculoskeletal defects noted, no edema    Diagnostic Test Results:  none     ASSESSMENT/PLAN:     ASSESSMENT:  1. Acute bronchitis due to other specified organisms    2. Impacted cerumen        PLAN:  Orders Placed This Encounter     REMOVE IMPACTED CERUMEN     azithromycin (ZITHROMAX) 250 MG tablet       Patient Instructions   Complete full course of antibiotics even if you start to feel better    Increase your fluids and rest and eat a well balanced diet.    Would be good to humidify the air in your home, especially in the bedroom.     Saline nasal spray can be used several times a day to moisten the passage ways.      Rafaela Still, NP, APRN CNP  [facility][note 3]  Bronchial breath sounds

## 2017-12-15 NOTE — NURSING NOTE
"Chief Complaint   Patient presents with     URI       Initial There were no vitals taken for this visit. Estimated body mass index is 30.12 kg/(m^2) as calculated from the following:    Height as of 8/30/17: 5' 8.5\" (1.74 m).    Weight as of 11/30/17: 201 lb (91.2 kg).  Medication Reconciliation: complete    Health Maintenance that is potentially due pending provider review:  Colonoscopy/FIT    Will defer to primary care provider.    Is there anyone who you would like to be able to receive your results? No  If yes have patient fill out MARIANNE    "

## 2017-12-18 ENCOUNTER — ALLIED HEALTH/NURSE VISIT (OUTPATIENT)
Dept: FAMILY MEDICINE | Facility: CLINIC | Age: 72
End: 2017-12-18
Payer: COMMERCIAL

## 2017-12-18 DIAGNOSIS — H61.23 IMPACTED CERUMEN OF BOTH EARS: Primary | ICD-10-CM

## 2017-12-18 PROCEDURE — 99207 ZZC NO CHARGE NURSE ONLY: CPT

## 2017-12-18 NOTE — NURSING NOTE
Patient is here for an ear flush per Jackie Still NP after seeing her in office last week. Patient had bilateral cerumen impaction, visualized by myself and Xin Smith RN. Ears were bilaterally irrigated with warm water and hydrogen peroxide until clear. Patient tolerated procedure well and voiced no further concerns.  [place] CMA ([facility])

## 2017-12-18 NOTE — MR AVS SNAPSHOT
After Visit Summary   12/18/2017    eDrrick Morrison    MRN: [id number]           Patient Information     Date Of Birth          1945        Visit Information        Provider Department      12/18/2017 11:00 AM [facility]        Today's Diagnoses     Impacted cerumen of both ears    -  1       Follow-ups after your visit        Your next 10 appointments already scheduled     Mar 07, 2018  8:00 AM CST   [facility] with Og Canales MD   [facility]    [address] [address] [phone]              Who to contact     If you have questions or need follow up information about today's clinic visit or your schedule please contact [facility] directly at [phone].  Normal or non-critical lab and imaging results will be communicated to you by MyChart, letter or phone within 4 business days after the clinic has received the results. If you do not hear from us within 7 days, please contact the clinic through MyChart or phone. If you have a critical or abnormal lab result, we will notify you by phone as soon as possible.  Submit refill requests through [employer] or call your pharmacy and they will forward the refill request to us. Please allow 3 business days for your refill to be completed.          Additional Information About Your Visit        MyChart Information     [employer] gives you secure access to your electronic health record. If you see a primary care provider, you can also send messages to your care team and make appointments. If you have questions, please call your primary care clinic.  If you do not have a primary care provider, please call [phone] and they will assist you.        Care EveryWhere ID     This is your Care EveryWhere ID. This could be used by other organizations to access your [facility] medical records  [id number]         Blood Pressure from Last 3  Encounters:   12/15/17 126/68   11/30/17 115/73   11/29/17 128/72    Weight from Last 3 Encounters:   12/15/17 197 lb 12.8 oz (89.7 kg)   11/30/17 201 lb (91.2 kg)   08/30/17 201 lb (91.2 kg)              Today, you had the following     No orders found for display       Primary Care Provider Office Phone # Fax #    Kaiden Zapata [phone]       [address] [place] [id number]        Equal Access to Services     [facility]: Hadii aad ku hadasho Soomaali, waaxda luqadaha, qaybta kaalmada adeegyada, bay bauer . So [facility] [phone].    ATENCIÓN: Si habla español, tiene a villar disposición servicios gratuitos de asistencia lingüística. Llame al [phone].    We comply with applicable federal civil rights laws and [place] laws. We do not discriminate on the basis of race, color, national origin, age, disability, sex, sexual orientation, or gender identity.            Thank you!     Thank you for choosing [facility]  for your care. Our goal is always to provide you with excellent care. Hearing back from our patients is one way we can continue to improve our services. Please take a few minutes to complete the written survey that you may receive in the mail after your visit with us. Thank you!             Your Updated Medication List - Protect others around you: Learn how to safely use, store and throw away your medicines at www.disposemymeds.org.          This list is accurate as of: 12/18/17 11:34 AM.  Always use your most recent med list.                   Brand Name Dispense Instructions for use Diagnosis    ASPIRIN NOT PRESCRIBED    INTENTIONAL    0 each    1 each continuous prn Antiplatelet medication not prescribed intentionally due to plavix    CAD (coronary artery disease)       azithromycin 250 MG tablet    ZITHROMAX    6 tablet    Two tablets first day, then one tablet daily for four days.    Acute bronchitis due to other  specified organisms       clopidogrel 75 MG tablet    PLAVIX    90 tablet    Take 1 tablet (75 mg) by mouth daily    Coronary artery disease involving native heart without angina pectoris, unspecified vessel or lesion type       metoprolol 25 MG 24 hr tablet    TOPROL-XL    180 tablet    Take 1 tablet (25 mg) by mouth 2 times daily    Coronary artery disease involving native heart without angina pectoris, unspecified vessel or lesion type       nitroGLYcerin 0.4 MG sublingual tablet    NITROSTAT    25 tablet    Place 1 tablet (0.4 mg) under the tongue every 5 minutes as needed for chest pain        RHINOCORT ALLERGY 32 MCG/ACT spray   Generic drug:  budesonide      Spray 1 spray into both nostrils daily        rosuvastatin 40 MG tablet    CRESTOR    90 tablet    Take 1 tablet (40 mg) by mouth every evening    Hyperlipidemia LDL goal <70

## 2018-03-07 ENCOUNTER — OFFICE VISIT (OUTPATIENT)
Dept: DERMATOLOGY | Facility: CLINIC | Age: 73
End: 2018-03-07
Payer: COMMERCIAL

## 2018-03-07 VITALS
WEIGHT: 200 LBS | HEART RATE: 64 BPM | DIASTOLIC BLOOD PRESSURE: 77 MMHG | SYSTOLIC BLOOD PRESSURE: 149 MMHG | BODY MASS INDEX: 29.97 KG/M2

## 2018-03-07 DIAGNOSIS — C44.612 BASAL CELL CARCINOMA OF RIGHT SHOULDER: Primary | ICD-10-CM

## 2018-03-07 PROCEDURE — 11602 EXC TR-EXT MAL+MARG 1.1-2 CM: CPT | Performed by: DERMATOLOGY

## 2018-03-07 PROCEDURE — 88331 PATH CONSLTJ SURG 1 BLK 1SPC: CPT | Performed by: DERMATOLOGY

## 2018-03-07 NOTE — PROGRESS NOTES
"Derrick Morrison is a 72 year old year old male patient here today for evaluation and managment of basal cell carcinoma on right post shoulder.  Associated symptoms: none.  Patient has no other skin complaints today.  Remainder of the HPI, Meds, PMH, Allergies, FH, and SH was reviewed in chart.      Past Medical History:   Diagnosis Date     Actinic keratosis      Basal cell carcinoma      BPH w urinary obs/LUTS 10/10/2011    flomax in past, \"quit working\".  Avodart in past.  Tapered off.        CAD (coronary artery disease) 10/10/2011    -8/16/2006 s/p GERALDINE to mid RCA, s/p GERALDINE to mid LAD in [place] -10/4/2007 s/p GERALDINE to pRCA and GERALDINE to dRCA in [place]  -11/17/11 Unstable angina, s/p GERALDINE to prox LAD (jailed small diagonal)       Hyperlipidaemia      Palpitations 8/7/2013    occasional, improved on low dose beta blocker      screening 10/10/2011    2007 colonoscopy \"all clean\" in [place].  He is sure about this.   AAA ultrasound screen 3/07 normal also.  (leg and neck done then too)      Skin cancer     had mohs on L temple       No past surgical history on file.     Family History   Problem Relation Age of Onset     Coronary Artery Disease Mother        Social History     Social History     Marital status:      Spouse name: N/A     Number of children: N/A     Years of education: N/A     Occupational History      Retired     Social History Main Topics     Smoking status: Never Smoker     Smokeless tobacco: Never Used     Alcohol use Yes      Comment: occasionally     Drug use: No     Sexual activity: Yes     Partners: Female     Other Topics Concern     Parent/Sibling W/ Cabg, Mi Or Angioplasty Before 65f 55m? No     Social History Narrative       Outpatient Encounter Prescriptions as of 3/7/2018   Medication Sig Dispense Refill     azithromycin (ZITHROMAX) 250 MG tablet Two tablets first day, then one tablet daily for four days. 6 tablet 0     metoprolol (TOPROL-XL) 25 MG 24 hr tablet Take " 1 tablet (25 mg) by mouth 2 times daily 180 tablet 3     clopidogrel (PLAVIX) 75 MG tablet Take 1 tablet (75 mg) by mouth daily 90 tablet 3     rosuvastatin (CRESTOR) 40 MG tablet Take 1 tablet (40 mg) by mouth every evening 90 tablet 3     budesonide (RHINOCORT ALLERGY) 32 MCG/ACT spray Spray 1 spray into both nostrils daily       nitroglycerin (NITROSTAT) 0.4 MG sublingual tablet Place 1 tablet (0.4 mg) under the tongue every 5 minutes as needed for chest pain 25 tablet 0     ASPIRIN NOT PRESCRIBED, INTENTIONAL, 1 each continuous prn Antiplatelet medication not prescribed intentionally due to plavix 0 each 0     No facility-administered encounter medications on file as of 3/7/2018.              Review Of Systems  Skin: As above  Eyes: negative  Ears/Nose/Throat: negative  Respiratory: No shortness of breath, dyspnea on exertion, cough, or hemoptysis  Cardiovascular: negative  Gastrointestinal: negative  Genitourinary: negative  Musculoskeletal: negative  Neurologic: negative  Psychiatric: negative  Hematologic/Lymphatic/Immunologic: negative  Endocrine: negative      O:   NAD, WDWN, Alert & Oriented, Mood & Affect wnl, Vitals stable   Here today alone   /77  Pulse 64  Wt 90.7 kg (200 lb)  BMI 29.97 kg/m2   General appearance normal   Vitals stable   Alert, oriented and in no acute distress     R post shoulder 6mm red scaly papule       Eyes: Conjunctivae/lids:Normal     ENT: Lips, buccal mucosa, tongue: normal    MSK:Normal    Cardiovascular: peripheral edema none    Pulm: Breathing Normal    Neuro/Psych: Orientation:Normal; Mood/Affect:Normal    MICRO:  r post shoulder:Unremarkable epidermis with parallel bundles of cellular collagen within the superficial dermis.  No concerning areas for malignancy.     A/P:  1. R post shoulder basal cell carcinoma   EXCISION OF BASAL CELL CARCINOMA, Margins confirmed with FROZEN SECTIONS AND Second intent: After thorough discussion of PGACAC, consent obtained,  anesthesia and prep, the margins of the lesion were identified and an elliptical incision was made encompassing the lesion with 4mm margin. The incisions were made through the skin and down to and including the superficial dermis.  The lesion was removed en bloc and submitted for frozen section pathologic review. Clear margins obtained (1.1cm).    REPAIR SECOND INTENT: We discussed the options for wound management in full with the patient including risks/benefits/possible outcomes. Decision made to allow the wound to heal by second intention. EBL minimal; complications none; wound care routine.  The patient was discharged in good condition and will return in one month or prn for wound evaluation.       BENIGN LESIONS DISCUSSED WITH PATIENT:  I discussed the specifics of tumor, prognosis, and genetics of benign lesions.  I explained that treatment of these lesions would be purely cosmetic and not medically neccessary.  I discussed with patient different removal options including excision, cautery and /or laser.      Nature and genetics of benign skin lesions dicussed with patient.  Signs and Symptoms of skin cancer discussed with patient.  Patient encouraged to perform monthly skin exams.  UV precautions reviewed with patient.  Skin care regimen reviewed with patient: Eliminate harsh soaps, i.e. Dial, zest, irsih spring; Mild soaps such as Cetaphil or Dove sensitive skin, avoid hot or cold showers, aggressive use of emollients including vanicream, cetaphil or cerave discussed with patient.    Risks of non-melanoma skin cancer discussed with patient   Return to clinic 6 months

## 2018-03-07 NOTE — PATIENT INSTRUCTIONS
Open Wound Care     for ___right shoulder___        ? No strenuous activity for 48 hours    ? Take Tylenol as needed for discomfort.                                                .         ? Do not drink alcoholic beverages for 48 hours.    ? Keep the pressure bandage in place for 24 hours. If the bandage becomes blood tinged or loose, reinforce it with gauze and tape.        (Refer to the reverse side of this page for management of bleeding).    ? Remove bandage in 24 hours and begin wound care as follows:     1. Clean area with tap water using a Q tip or gauze pad, (shower / bathe normally)  2. Dry wound with Q tip or gauze pad  3. Apply Aquaphor, Vaseline, Polysporin or Bacitracin Ointment with a Q tip    Do NOT use Neosporin Ointment *  4. Cover the wound with a band-aid or nonstick gauze pad and paper tape.  5. Repeat wound care once a day until wound is completely healed.    It is an old wives tale that a wound heals better when it is exposed to air and allowed to dry out. The wound will heal faster with a better cosmetic result if it is kept moist with ointment and covered with a bandage.  Do not let the wound dry out.      Supplies Needed:                Qtips or gauze pads                Polysporin or Bacitracin Ointment                Bandaids or nonstick gauze pads and paper tape    Wound care kits and brown paper tape are available for purchase at   the pharmacy.    BLEEDIN. Use tightly rolled up gauze or cloth to apply direct pressure over the bandage for 20   minutes.  2. Reapply pressure for an additional 20 minutes if necessary  3. Call the office or go to the nearest emergency room if pressure fails to stop the bleeding.  4. Use additional gauze and tape to maintain pressure once the bleeding has stopped.  5. Begin wound care 24 hours after surgery as directed.                  WOUND HEALING    1. One week after surgery a pink / red halo will form around the outside of the wound.   This is  new skin.  2. The center of the wound will appear yellowish white and produce some drainage.  3. The pink halo will slowly migrate in toward the center of the wound until the wound is covered with new shiny pink skin.  4. There will be no more drainage when the wound is completely healed.  5. It will take six months to one year for the redness to fade.  6. The scar may be itchy, tight and sensitive to extreme temperatures for a year after the surgery.  7. Massaging the area several times a day for several minutes after the wound is completely healed will help the scar soften and normalize faster. Begin massage only after healing is complete.      In case of emergency call: Dr Canales: [phone]     [facility]: [phone]    [facility]: [phone]

## 2018-03-07 NOTE — LETTER
"    3/7/2018         RE: Derrick Morrison  [address] [address]        Dear Colleague,    Thank you for referring your patient, Derrick Morrison, to the [facility]. Please see a copy of my visit note below.    Derrick Morrison is a 72 year old year old male patient here today for evaluation and managment of basal cell carcinoma on right post shoulder.  Associated symptoms: none.  Patient has no other skin complaints today.  Remainder of the HPI, Meds, PMH, Allergies, FH, and SH was reviewed in chart.      Past Medical History:   Diagnosis Date     Actinic keratosis      Basal cell carcinoma      BPH w urinary obs/LUTS 10/10/2011    flomax in past, \"quit working\".  Avodart in past.  Tapered off.        CAD (coronary artery disease) 10/10/2011    -8/16/2006 s/p GERALDINE to mid RCA, s/p GERALDINE to mid LAD in [place] -10/4/2007 s/p GERALDINE to pRCA and GERALDINE to dRCA in [place]  -11/17/11 Unstable angina, s/p GERALDINE to prox LAD (jailed small diagonal)       Hyperlipidaemia      Palpitations 8/7/2013    occasional, improved on low dose beta blocker      screening 10/10/2011    2007 colonoscopy \"all clean\" in [place].  He is sure about this.   AAA ultrasound screen 3/07 normal also.  (leg and neck done then too)      Skin cancer     had mohs on L temple       No past surgical history on file.     Family History   Problem Relation Age of Onset     Coronary Artery Disease Mother        Social History     Social History     Marital status:      Spouse name: N/A     Number of children: N/A     Years of education: N/A     Occupational History      Retired     Social History Main Topics     Smoking status: Never Smoker     Smokeless tobacco: Never Used     Alcohol use Yes      Comment: occasionally     Drug use: No     Sexual activity: Yes     Partners: Female     Other Topics Concern     Parent/Sibling W/ Cabg, Mi Or Angioplasty Before 65f 55m? No     Social History Narrative "       Outpatient Encounter Prescriptions as of 3/7/2018   Medication Sig Dispense Refill     azithromycin (ZITHROMAX) 250 MG tablet Two tablets first day, then one tablet daily for four days. 6 tablet 0     metoprolol (TOPROL-XL) 25 MG 24 hr tablet Take 1 tablet (25 mg) by mouth 2 times daily 180 tablet 3     clopidogrel (PLAVIX) 75 MG tablet Take 1 tablet (75 mg) by mouth daily 90 tablet 3     rosuvastatin (CRESTOR) 40 MG tablet Take 1 tablet (40 mg) by mouth every evening 90 tablet 3     budesonide (RHINOCORT ALLERGY) 32 MCG/ACT spray Spray 1 spray into both nostrils daily       nitroglycerin (NITROSTAT) 0.4 MG sublingual tablet Place 1 tablet (0.4 mg) under the tongue every 5 minutes as needed for chest pain 25 tablet 0     ASPIRIN NOT PRESCRIBED, INTENTIONAL, 1 each continuous prn Antiplatelet medication not prescribed intentionally due to plavix 0 each 0     No facility-administered encounter medications on file as of 3/7/2018.              Review Of Systems  Skin: As above  Eyes: negative  Ears/Nose/Throat: negative  Respiratory: No shortness of breath, dyspnea on exertion, cough, or hemoptysis  Cardiovascular: negative  Gastrointestinal: negative  Genitourinary: negative  Musculoskeletal: negative  Neurologic: negative  Psychiatric: negative  Hematologic/Lymphatic/Immunologic: negative  Endocrine: negative      O:   NAD, WDWN, Alert & Oriented, Mood & Affect wnl, Vitals stable   Here today alone   /77  Pulse 64  Wt 90.7 kg (200 lb)  BMI 29.97 kg/m2   General appearance normal   Vitals stable   Alert, oriented and in no acute distress     R post shoulder 6mm red scaly papule       Eyes: Conjunctivae/lids:Normal     ENT: Lips, buccal mucosa, tongue: normal    MSK:Normal    Cardiovascular: peripheral edema none    Pulm: Breathing Normal    Neuro/Psych: Orientation:Normal; Mood/Affect:Normal    MICRO:  r post shoulder:Unremarkable epidermis with parallel bundles of cellular collagen within the  superficial dermis.  No concerning areas for malignancy.     A/P:  1. R post shoulder basal cell carcinoma   EXCISION OF BASAL CELL CARCINOMA, Margins confirmed with FROZEN SECTIONS AND Second intent: After thorough discussion of PGACAC, consent obtained, anesthesia and prep, the margins of the lesion were identified and an elliptical incision was made encompassing the lesion with 4mm margin. The incisions were made through the skin and down to and including the superficial dermis.  The lesion was removed en bloc and submitted for frozen section pathologic review. Clear margins obtained (1.1cm).    REPAIR SECOND INTENT: We discussed the options for wound management in full with the patient including risks/benefits/possible outcomes. Decision made to allow the wound to heal by second intention. EBL minimal; complications none; wound care routine.  The patient was discharged in good condition and will return in one month or prn for wound evaluation.       BENIGN LESIONS DISCUSSED WITH PATIENT:  I discussed the specifics of tumor, prognosis, and genetics of benign lesions.  I explained that treatment of these lesions would be purely cosmetic and not medically neccessary.  I discussed with patient different removal options including excision, cautery and /or laser.      Nature and genetics of benign skin lesions dicussed with patient.  Signs and Symptoms of skin cancer discussed with patient.  Patient encouraged to perform monthly skin exams.  UV precautions reviewed with patient.  Skin care regimen reviewed with patient: Eliminate harsh soaps, i.e. Dial, zest, irsih spring; Mild soaps such as Cetaphil or Dove sensitive skin, avoid hot or cold showers, aggressive use of emollients including vanicream, cetaphil or cerave discussed with patient.    Risks of non-melanoma skin cancer discussed with patient   Return to clinic 6 months      Again, thank you for allowing me to participate in the care of your patient.         Sincerely,        Og Canales MD

## 2018-03-07 NOTE — MR AVS SNAPSHOT
After Visit Summary   3/7/2018    Derrick Morrison    MRN: [id number]           Patient Information     Date Of Birth          1945        Visit Information        Provider Department      3/7/2018 8:00 AM Og Canales MD [facility]        Today's Diagnoses     Basal cell carcinoma of right shoulder    -  1      Care Instructions    Open Wound Care     for ___right shoulder___        ? No strenuous activity for 48 hours    ? Take Tylenol as needed for discomfort.                                                .         ? Do not drink alcoholic beverages for 48 hours.    ? Keep the pressure bandage in place for 24 hours. If the bandage becomes blood tinged or loose, reinforce it with gauze and tape.        (Refer to the reverse side of this page for management of bleeding).    ? Remove bandage in 24 hours and begin wound care as follows:     1. Clean area with tap water using a Q tip or gauze pad, (shower / bathe normally)  2. Dry wound with Q tip or gauze pad  3. Apply Aquaphor, Vaseline, Polysporin or Bacitracin Ointment with a Q tip    Do NOT use Neosporin Ointment *  4. Cover the wound with a band-aid or nonstick gauze pad and paper tape.  5. Repeat wound care once a day until wound is completely healed.    It is an old wives tale that a wound heals better when it is exposed to air and allowed to dry out. The wound will heal faster with a better cosmetic result if it is kept moist with ointment and covered with a bandage.  Do not let the wound dry out.      Supplies Needed:                Qtips or gauze pads                Polysporin or Bacitracin Ointment                Bandaids or nonstick gauze pads and paper tape    Wound care kits and brown paper tape are available for purchase at   the pharmacy.    BLEEDIN. Use tightly rolled up gauze or cloth to apply direct pressure over the bandage for 20   minutes.  2. Reapply pressure for an additional 20 minutes if  necessary  3. Call the office or go to the nearest emergency room if pressure fails to stop the bleeding.  4. Use additional gauze and tape to maintain pressure once the bleeding has stopped.  5. Begin wound care 24 hours after surgery as directed.                  WOUND HEALING    1. One week after surgery a pink / red halo will form around the outside of the wound.   This is new skin.  2. The center of the wound will appear yellowish white and produce some drainage.  3. The pink halo will slowly migrate in toward the center of the wound until the wound is covered with new shiny pink skin.  4. There will be no more drainage when the wound is completely healed.  5. It will take six months to one year for the redness to fade.  6. The scar may be itchy, tight and sensitive to extreme temperatures for a year after the surgery.  7. Massaging the area several times a day for several minutes after the wound is completely healed will help the scar soften and normalize faster. Begin massage only after healing is complete.      In case of emergency call: Dr Canales: [phone]     [facility]: [phone]    [facility]: [phone]            Follow-ups after your visit        Your next 10 appointments already scheduled     2018  2:30 PM CDT   Return Visit with Chidi Lan MD   [facility]    [address] [address] [phone]              Who to contact     If you have questions or need follow up information about today's clinic visit or your schedule please contact [facility] directly at [phone].  Normal or non-critical lab and imaging results will be communicated to you by MyChart, letter or phone within 4 business days after the clinic has received the results. If you do not hear from us within 7 days, please contact the clinic through MyChart or phone. If you have a critical or  abnormal lab result, we will notify you by phone as soon as possible.  Submit refill requests through "[employer] or call your pharmacy and they will forward the refill request to us. Please allow 3 business days for your refill to be completed.          Additional Information About Your Visit        [employer] Information     "[employer] gives you secure access to your electronic health record. If you see a primary care provider, you can also send messages to your care team and make appointments. If you have questions, please call your primary care clinic.  If you do not have a primary care provider, please call [phone] and they will assist you.        Care EveryWhere ID     This is your Care EveryWhere ID. This could be used by other organizations to access your [place] medical records  [id number]        Your Vitals Were     Pulse BMI (Body Mass Index)                64 29.97 kg/m2           Blood Pressure from Last 3 Encounters:   03/07/18 149/77   12/15/17 126/68   11/30/17 115/73    Weight from Last 3 Encounters:   03/07/18 90.7 kg (200 lb)   12/15/17 89.7 kg (197 lb 12.8 oz)   11/30/17 91.2 kg (201 lb)              We Performed the Following     68063 - EXC MALIG SKIN LESION TRUNK/ARM/LEG 1.1-2.0 CM     CL FROZEN SECTION FIRST SPEC        Primary Care Provider Office Phone # Fax #    Kaiden Zapata [phone]       [address] [facility] [address]        Equal Access to Services     [facility] : Hadii aad ku hadasho Soelzaali, waaxda luqadaha, qaybta kaalmada adeegyada, bay martinez. So [facility] [phone].    ATENCIÓN: Si habla español, tiene a villar disposición servicios gratuitos de asistencia lingüística. Llame al [phone].    We comply with applicable federal civil rights laws and [place] laws. We do not discriminate on the basis of race, color, national origin, age, disability, sex, sexual orientation, or gender identity.            Thank you!      Thank you for choosing [facility]  for your care. Our goal is always to provide you with excellent care. Hearing back from our patients is one way we can continue to improve our services. Please take a few minutes to complete the written survey that you may receive in the mail after your visit with us. Thank you!             Your Updated Medication List - Protect others around you: Learn how to safely use, store and throw away your medicines at www.disposemymeds.org.          This list is accurate as of 3/7/18  8:54 AM.  Always use your most recent med list.                   Brand Name Dispense Instructions for use Diagnosis    ASPIRIN NOT PRESCRIBED    INTENTIONAL    0 each    1 each continuous prn Antiplatelet medication not prescribed intentionally due to plavix    CAD (coronary artery disease)       azithromycin 250 MG tablet    ZITHROMAX    6 tablet    Two tablets first day, then one tablet daily for four days.    Acute bronchitis due to other specified organisms       clopidogrel 75 MG tablet    PLAVIX    90 tablet    Take 1 tablet (75 mg) by mouth daily    Coronary artery disease involving native heart without angina pectoris, unspecified vessel or lesion type       metoprolol succinate 25 MG 24 hr tablet    TOPROL-XL    180 tablet    Take 1 tablet (25 mg) by mouth 2 times daily    Coronary artery disease involving native heart without angina pectoris, unspecified vessel or lesion type       nitroGLYcerin 0.4 MG sublingual tablet    NITROSTAT    25 tablet    Place 1 tablet (0.4 mg) under the tongue every 5 minutes as needed for chest pain        RHINOCORT ALLERGY 32 MCG/ACT spray   Generic drug:  budesonide      Spray 1 spray into both nostrils daily        rosuvastatin 40 MG tablet    CRESTOR    90 tablet    Take 1 tablet (40 mg) by mouth every evening    Hyperlipidemia LDL goal <70

## 2018-03-07 NOTE — NURSING NOTE
"Initial /77  Pulse 64  Wt 90.7 kg (200 lb)  BMI 29.97 kg/m2 Estimated body mass index is 29.97 kg/(m^2) as calculated from the following:    Height as of 8/30/17: 1.74 m (5' 8.5\").    Weight as of this encounter: 90.7 kg (200 lb). .      "

## 2018-04-13 ENCOUNTER — HOSPITAL ENCOUNTER (OUTPATIENT)
Dept: CARDIOLOGY | Facility: CLINIC | Age: 73
Discharge: HOME | End: 2018-04-13
Attending: INTERNAL MEDICINE | Admitting: INTERNAL MEDICINE
Payer: MEDICARE

## 2018-04-13 DIAGNOSIS — I25.118 CORONARY ARTERY DISEASE OF NATIVE ARTERY OF NATIVE HEART WITH STABLE ANGINA PECTORIS (H): ICD-10-CM

## 2018-04-13 PROCEDURE — 93306 TTE W/DOPPLER COMPLETE: CPT | Mod: 26 | Performed by: INTERNAL MEDICINE

## 2018-04-13 PROCEDURE — 93306 TTE W/DOPPLER COMPLETE: CPT

## 2018-04-17 ENCOUNTER — OFFICE VISIT (OUTPATIENT)
Dept: AUDIOLOGY | Facility: CLINIC | Age: 73
End: 2018-04-17
Payer: COMMERCIAL

## 2018-04-17 ENCOUNTER — OFFICE VISIT (OUTPATIENT)
Dept: CARDIOLOGY | Facility: CLINIC | Age: 73
End: 2018-04-17
Attending: NURSE PRACTITIONER
Payer: COMMERCIAL

## 2018-04-17 VITALS
OXYGEN SATURATION: 94 % | DIASTOLIC BLOOD PRESSURE: 72 MMHG | WEIGHT: 204 LBS | HEART RATE: 70 BPM | SYSTOLIC BLOOD PRESSURE: 115 MMHG | BODY MASS INDEX: 30.57 KG/M2

## 2018-04-17 DIAGNOSIS — H90.3 SENSORINEURAL HEARING LOSS, BILATERAL: Primary | ICD-10-CM

## 2018-04-17 DIAGNOSIS — R00.2 PALPITATIONS: ICD-10-CM

## 2018-04-17 DIAGNOSIS — I25.719 CORONARY ARTERY DISEASE INVOLVING AUTOLOGOUS VEIN CORONARY BYPASS GRAFT WITH ANGINA PECTORIS (H): ICD-10-CM

## 2018-04-17 DIAGNOSIS — I49.3 PVC'S (PREMATURE VENTRICULAR CONTRACTIONS): ICD-10-CM

## 2018-04-17 PROCEDURE — 99207 ZZC NO CHARGE LOS: CPT | Performed by: AUDIOLOGIST

## 2018-04-17 PROCEDURE — V5299 HEARING SERVICE: HCPCS | Performed by: AUDIOLOGIST

## 2018-04-17 PROCEDURE — 99213 OFFICE O/P EST LOW 20 MIN: CPT | Performed by: INTERNAL MEDICINE

## 2018-04-17 NOTE — LETTER
"4/17/2018    Kaiden Zapata MD  [address]  [facility] [address]    RE: Derrick Morrison       Dear Colleague,    I had the pleasure of seeing Derrick Morrison in the [facility].    HPI and Plan:   See dictation    Orders Placed This Encounter   Procedures     NM Exercise stress test (nuc card)     US abdominal aorta limited     Lipid Profile       No orders of the defined types were placed in this encounter.      Medications Discontinued During This Encounter   Medication Reason     azithromycin (ZITHROMAX) 250 MG tablet      budesonide (RHINOCORT ALLERGY) 32 MCG/ACT spray          Encounter Diagnoses   Name Primary?     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)      Palpitations      PVC's (premature ventricular contractions)        CURRENT MEDICATIONS:  Current Outpatient Prescriptions   Medication Sig Dispense Refill     metoprolol (TOPROL-XL) 25 MG 24 hr tablet Take 1 tablet (25 mg) by mouth 2 times daily 180 tablet 3     clopidogrel (PLAVIX) 75 MG tablet Take 1 tablet (75 mg) by mouth daily 90 tablet 3     rosuvastatin (CRESTOR) 40 MG tablet Take 1 tablet (40 mg) by mouth every evening 90 tablet 3     nitroglycerin (NITROSTAT) 0.4 MG sublingual tablet Place 1 tablet (0.4 mg) under the tongue every 5 minutes as needed for chest pain 25 tablet 0     ASPIRIN NOT PRESCRIBED, INTENTIONAL, 1 each continuous prn Antiplatelet medication not prescribed intentionally due to plavix 0 each 0       ALLERGIES   No Known Allergies    PAST MEDICAL HISTORY:  Past Medical History:   Diagnosis Date     Actinic keratosis      Basal cell carcinoma      BPH w urinary obs/LUTS 10/10/2011    flomax in past, \"quit working\".  Avodart in past.  Tapered off.        CAD (coronary artery disease) 10/10/2011    -8/16/2006 s/p GERALDINE to mid RCA, s/p GERALDINE to mid LAD in [place] -10/4/2007 s/p GERALDINE to pRCA and GERALDINE to dRCA in [place]  " "-11/17/11 Unstable angina, s/p GERALDINE to prox LAD (jailed small diagonal)       Hyperlipidaemia      Palpitations 8/7/2013    occasional, improved on low dose beta blocker      screening 10/10/2011    2007 colonoscopy \"all clean\" in [place].  He is sure about this.   AAA ultrasound screen 3/07 normal also.  (leg and neck done then too)      Skin cancer     had mohs on L temple       PAST SURGICAL HISTORY:  No past surgical history on file.    FAMILY HISTORY:  Family History   Problem Relation Age of Onset     Coronary Artery Disease Mother        SOCIAL HISTORY:  Social History     Social History     Marital status:      Spouse name: N/A     Number of children: N/A     Years of education: N/A     Occupational History      Retired     Social History Main Topics     Smoking status: Never Smoker     Smokeless tobacco: Never Used     Alcohol use Yes      Comment: occasionally     Drug use: No     Sexual activity: Yes     Partners: Female     Other Topics Concern     Parent/Sibling W/ Cabg, Mi Or Angioplasty Before 65f 55m? No     Social History Narrative       Review of Systems:  Skin:  Negative       Eyes:  Positive for glasses    ENT:  Positive for hearing loss wears hearing aids  Respiratory:  Negative for shortness of breath;cough     Cardiovascular:  Negative for;palpitations;chest pain;edema;syncope or near-syncope;fatigue lightheadedness;dizziness;Positive for    Gastroenterology: Positive for heartburn    Genitourinary:  Negative      Musculoskeletal:  Positive for muscular weakness left side neck and back muscles swell up occassional and causes some chest discomfort  Neurologic:  Positive for numbness or tingling of feet;memory problems    Psychiatric:  Negative      Heme/Lymph/Imm:  Negative      Endocrine:  Negative        Physical Exam:  Vitals: /72  Pulse 70  Wt 92.5 kg (204 lb)  SpO2 94%  BMI 30.57 kg/m2    Constitutional:  cooperative, alert and oriented, well developed, well " nourished, in no acute distress        Skin:  warm and dry to the touch          Head:  normocephalic        Eyes:  sclera white        Lymph:      ENT:  no pallor or cyanosis        Neck:  carotid pulses are full and equal bilaterally        Respiratory:  clear to auscultation         Cardiac: regular rhythm, normal S1/S2, no S3 or S4, apical impulse not displaced, no murmurs, gallops or rubs                pulses full and equal                                        GI:  abdomen soft        Extremities and Muscular Skeletal:  no deformities, clubbing, cyanosis, erythema observed;no edema              Neurological:  affect appropriate        Psych:  Alert and Oriented x 3        CC  MARIA T Workman CNP  [address]  [place] [address]                Thank you for allowing me to participate in the care of your patient.      Sincerely,     Chidi Lan MD     [facility]    cc:   MARIA T Workman CNP  [address]  [place] [address]

## 2018-04-17 NOTE — PROGRESS NOTES
"HPI and Plan:   See dictation    Orders Placed This Encounter   Procedures     NM Exercise stress test (nuc card)     US abdominal aorta limited     Lipid Profile       No orders of the defined types were placed in this encounter.      Medications Discontinued During This Encounter   Medication Reason     azithromycin (ZITHROMAX) 250 MG tablet      budesonide (RHINOCORT ALLERGY) 32 MCG/ACT spray          Encounter Diagnoses   Name Primary?     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)      Palpitations      PVC's (premature ventricular contractions)        CURRENT MEDICATIONS:  Current Outpatient Prescriptions   Medication Sig Dispense Refill     metoprolol (TOPROL-XL) 25 MG 24 hr tablet Take 1 tablet (25 mg) by mouth 2 times daily 180 tablet 3     clopidogrel (PLAVIX) 75 MG tablet Take 1 tablet (75 mg) by mouth daily 90 tablet 3     rosuvastatin (CRESTOR) 40 MG tablet Take 1 tablet (40 mg) by mouth every evening 90 tablet 3     nitroglycerin (NITROSTAT) 0.4 MG sublingual tablet Place 1 tablet (0.4 mg) under the tongue every 5 minutes as needed for chest pain 25 tablet 0     ASPIRIN NOT PRESCRIBED, INTENTIONAL, 1 each continuous prn Antiplatelet medication not prescribed intentionally due to plavix 0 each 0       ALLERGIES   No Known Allergies    PAST MEDICAL HISTORY:  Past Medical History:   Diagnosis Date     Actinic keratosis      Basal cell carcinoma      BPH w urinary obs/LUTS 10/10/2011    flomax in past, \"quit working\".  Avodart in past.  Tapered off.        CAD (coronary artery disease) 10/10/2011    -8/16/2006 s/p GERALDINE to mid RCA, s/p GERALDINE to mid LAD in [place] -10/4/2007 s/p GERALDINE to pRCA and GERALDINE to dRCA in [place]  -11/17/11 Unstable angina, s/p GERALDINE to prox LAD (jailed small diagonal)       Hyperlipidaemia      Palpitations 8/7/2013    occasional, improved on low dose beta blocker      screening 10/10/2011    2007 colonoscopy \"all clean\" in " [place].  He is sure about this.   AAA ultrasound screen 3/07 normal also.  (leg and neck done then too)      Skin cancer     had mohs on L temple       PAST SURGICAL HISTORY:  No past surgical history on file.    FAMILY HISTORY:  Family History   Problem Relation Age of Onset     Coronary Artery Disease Mother        SOCIAL HISTORY:  Social History     Social History     Marital status:      Spouse name: N/A     Number of children: N/A     Years of education: N/A     Occupational History      Retired     Social History Main Topics     Smoking status: Never Smoker     Smokeless tobacco: Never Used     Alcohol use Yes      Comment: occasionally     Drug use: No     Sexual activity: Yes     Partners: Female     Other Topics Concern     Parent/Sibling W/ Cabg, Mi Or Angioplasty Before 65f 55m? No     Social History Narrative       Review of Systems:  Skin:  Negative       Eyes:  Positive for glasses    ENT:  Positive for hearing loss wears hearing aids  Respiratory:  Negative for shortness of breath;cough     Cardiovascular:  Negative for;palpitations;chest pain;edema;syncope or near-syncope;fatigue lightheadedness;dizziness;Positive for    Gastroenterology: Positive for heartburn    Genitourinary:  Negative      Musculoskeletal:  Positive for muscular weakness left side neck and back muscles swell up occassional and causes some chest discomfort  Neurologic:  Positive for numbness or tingling of feet;memory problems    Psychiatric:  Negative      Heme/Lymph/Imm:  Negative      Endocrine:  Negative        Physical Exam:  Vitals: /72  Pulse 70  Wt 92.5 kg (204 lb)  SpO2 94%  BMI 30.57 kg/m2    Constitutional:  cooperative, alert and oriented, well developed, well nourished, in no acute distress        Skin:  warm and dry to the touch          Head:  normocephalic        Eyes:  sclera white        Lymph:      ENT:  no pallor or cyanosis        Neck:  carotid pulses are full and equal bilaterally         Respiratory:  clear to auscultation         Cardiac: regular rhythm, normal S1/S2, no S3 or S4, apical impulse not displaced, no murmurs, gallops or rubs                pulses full and equal                                        GI:  abdomen soft        Extremities and Muscular Skeletal:  no deformities, clubbing, cyanosis, erythema observed;no edema              Neurological:  affect appropriate        Psych:  Alert and Oriented x 3        CC  Janeth Campos, MARIA T CNP  [address]  [place] [address]

## 2018-04-17 NOTE — MR AVS SNAPSHOT
After Visit Summary   4/17/2018    Derrick Morrison    MRN: [id number]           Patient Information     Date Of Birth          1945        Visit Information        Provider Department      4/17/2018 1:30 PM Blanca Santos AuD [facility]        Today's Diagnoses     Sensorineural hearing loss, bilateral    -  1       Follow-ups after your visit        Your next 10 appointments already scheduled     Apr 17, 2018  2:30 PM CDT   Return Visit with Chidi Lan MD   [facility] ([facility])    [address] [id number] [phone]              Who to contact     If you have questions or need follow up information about today's clinic visit or your schedule please contact [facility] directly at [phone].  Normal or non-critical lab and imaging results will be communicated to you by MyChart, letter or phone within 4 business days after the clinic has received the results. If you do not hear from us within 7 days, please contact the clinic through MyChart or phone. If you have a critical or abnormal lab result, we will notify you by phone as soon as possible.  Submit refill requests through [employer] or call your pharmacy and they will forward the refill request to us. Please allow 3 business days for your refill to be completed.          Additional Information About Your Visit        MyChart Information     [employer] gives you secure access to your electronic health record. If you see a primary care provider, you can also send messages to your care team and make appointments. If you have questions, please call your primary care clinic.  If you do not have a primary care provider, please call [phone] and they will assist you.        Care EveryWhere ID     This is your Care EveryWhere ID. This could be used by other organizations to access your [place] medical records  [id number]         Blood  Pressure from Last 3 Encounters:   03/07/18 149/77   12/15/17 126/68   11/30/17 115/73    Weight from Last 3 Encounters:   03/07/18 200 lb (90.7 kg)   12/15/17 197 lb 12.8 oz (89.7 kg)   11/30/17 201 lb (91.2 kg)              We Performed the Following     HEARING AID CHECK/NO CHARGE        Primary Care Provider Office Phone # Fax #    Kaiden Zapata [phone] [address] [place] [address]        Equal Access to Services     : Hadii aad ku hadasho Soomaali, waaxda luqadaha, qaybta kaalmada adeegyada, bay bauer . So [facility] [phone].    ATENCIÓN: Si habla español, tiene a villar disposición servicios gratuitos de asistencia lingüística. [facility] [phone].    We comply with applicable federal civil rights laws and [place] laws. We do not discriminate on the basis of race, color, national origin, age, disability, sex, sexual orientation, or gender identity.            Thank you!     Thank you for choosing [facility]  for your care. Our goal is always to provide you with excellent care. Hearing back from our patients is one way we can continue to improve our services. Please take a few minutes to complete the written survey that you may receive in the mail after your visit with us. Thank you!             Your Updated Medication List - Protect others around you: Learn how to safely use, store and throw away your medicines at www.disposemymeds.org.          This list is accurate as of 4/17/18  2:03 PM.  Always use your most recent med list.                   Brand Name Dispense Instructions for use Diagnosis    ASPIRIN NOT PRESCRIBED    INTENTIONAL    0 each    1 each continuous prn Antiplatelet medication not prescribed intentionally due to plavix    CAD (coronary artery disease)       azithromycin 250 MG tablet    ZITHROMAX    6 tablet    Two tablets first day, then one tablet daily for four days.    Acute bronchitis due  to other specified organisms       clopidogrel 75 MG tablet    PLAVIX    90 tablet    Take 1 tablet (75 mg) by mouth daily    Coronary artery disease involving native heart without angina pectoris, unspecified vessel or lesion type       metoprolol succinate 25 MG 24 hr tablet    TOPROL-XL    180 tablet    Take 1 tablet (25 mg) by mouth 2 times daily    Coronary artery disease involving native heart without angina pectoris, unspecified vessel or lesion type       nitroGLYcerin 0.4 MG sublingual tablet    NITROSTAT    25 tablet    Place 1 tablet (0.4 mg) under the tongue every 5 minutes as needed for chest pain        RHINOCORT ALLERGY 32 MCG/ACT spray   Generic drug:  budesonide      Spray 1 spray into both nostrils daily        rosuvastatin 40 MG tablet    CRESTOR    90 tablet    Take 1 tablet (40 mg) by mouth every evening    Hyperlipidemia LDL goal <70

## 2018-04-17 NOTE — MR AVS SNAPSHOT
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 Also, tell your doctor about any allergies you may have. Wear comfortable clothes and leave your valuables at home.  Adults: No eating or drinking for 8 hours before the exam. You may take medicine with a small sip of water.  Children: - Children 6+ years: No food or drink for 6 hours before exam. - Children 1-5 years: No food or drink for 4 hours before exam. - Infants, breast-fed: may have breast milk up to 2 hours before exam. - Infants, formula: may have bottle until 4 hours before exam.  Please call the Imaging Department at your exam site with any questions.            Apr 23, 2018  1:00 PM CDT   (Arrive by 12:45 PM)   NM MPI TREADMILL with WYNM2, WY STRESS TEST   [facility] ([facility])    [address] [address]   [phone]           For a ONE day exam: Allow 3-4 hours for test. For a TWO day exam: Allow  minutes PER day for test.  On the day of your resting scan: Please stop eating 3 hours before the test. You may drink water or juice.  On the day  of your stress test: Stop all caffeine 12 hours before the test. This includes coffee, tea, soda pop, chocolate and certain medicines (such as Anacin, Excedrin and NoDoz). Also avoid decaf coffee and tea, as these contain small amounts of caffeine.  Stop eating 3 hours before the test. You may drink water.  You may need to stop some medicines before the test. Follow your doctor s orders. - If you take a beta blocker: Do not take your beta-blocker on the day before your test, unless specifically told to by your doctor. And do not take it on the day of your test. Bring it with you to take after the test. - If you take Aggrenox or dipyridamole (Persantine, Permole), stop taking it 48 hours before your test. - If you take Viagra, Cialis or Levitra, stop taking it 48 hours before your test. - If you take theophylline or aminophylline, stop taking it 12 hours before your test.  Do not take nitrates on the day of your test. Do not wear your Nitro-Patch.  Please wear a loose two-piece outfit. If you will have an exercise test, bring rubber-soled walking shoes.  When you arrive, please tell us if you have a pacemaker or ICD (implantable defibrillator).  Please call your Imaging Department at your exam site with any questions.              Future tests that were ordered for you today     Open Future Orders        Priority Expected Expires Ordered    US abdominal aorta limited Routine 4/17/2018 4/17/2019 4/17/2018    Lipid Profile Routine 4/17/2018 4/17/2019 4/17/2018    NM Exercise stress test (nuc card) Routine 4/24/2018 4/17/2019 4/17/2018            Who to contact     If you have questions or need follow up information about today's clinic visit or your schedule please contact [facility] directly at [phone].  Normal or non-critical lab and imaging results will be communicated to you by MyChart, letter or phone within 4 business days after the clinic has received the results. If  you do not hear from us within 7 days, please contact the clinic through [employer] or phone. If you have a critical or abnormal lab result, we will notify you by phone as soon as possible.  Submit refill requests through [employer] or call your pharmacy and they will forward the refill request to us. Please allow 3 business days for your refill to be completed.          Additional Information About Your Visit        [employer] Information     [employer] gives you secure access to your electronic health record. If you see a primary care provider, you can also send messages to your care team and make appointments. If you have questions, please call your primary care clinic.  If you do not have a primary care provider, please call [phone] and they will assist you.        Care EveryWhere ID     This is your Care EveryWhere ID. This could be used by other organizations to access your [place] medical records  [id number]        Your Vitals Were     Pulse Pulse Oximetry BMI (Body Mass Index)             70 94% 30.57 kg/m2          Blood Pressure from Last 3 Encounters:   04/17/18 115/72   03/07/18 149/77   12/15/17 126/68    Weight from Last 3 Encounters:   04/17/18 92.5 kg (204 lb)   03/07/18 90.7 kg (200 lb)   12/15/17 89.7 kg (197 lb 12.8 oz)              We Performed the Following     Follow-Up with Cardiologist     Follow-Up with Cardiologist          Today's Medication Changes          These changes are accurate as of 4/17/18  3:12 PM.  If you have any questions, ask your nurse or doctor.               Stop taking these medicines if you haven't already. Please contact your care team if you have questions.     azithromycin 250 MG tablet   Commonly known as:  ZITHROMAX   Stopped by:  Chidi Lan MD           RHINOCORT ALLERGY 32 MCG/ACT spray   Generic drug:  budesonide   Stopped by:  Chidi Lan MD                    Primary Care Provider Office Phone # Fax #    Kaiden Zapata [phone]        [address]  [facility] [address]        Equal Access to Services     MAGNOJAN CARTER : Hadii sharon ku haddeliao Soelzaali, waaxda luqadaha, qaybta kagaldinoda rodríguezbonnieleena, bay keller rosemariechristiano motleymiguelitogregory martinez. So [facility] [phone].    ATENCIÓN: Si habla español, tiene a villar disposición servicios gratuitos de asistencia lingüística. Llame al [phone].    We comply with applicable federal civil rights laws and [place] laws. We do not discriminate on the basis of race, color, national origin, age, disability, sex, sexual orientation, or gender identity.            Thank you!     Thank you for choosing [facility]  for your care. Our goal is always to provide you with excellent care. Hearing back from our patients is one way we can continue to improve our services. Please take a few minutes to complete the written survey that you may receive in the mail after your visit with us. Thank you!             Your Updated Medication List - Protect others around you: Learn how to safely use, store and throw away your medicines at www.disposemymeds.org.          This list is accurate as of 4/17/18  3:12 PM.  Always use your most recent med list.                   Brand Name Dispense Instructions for use Diagnosis    ASPIRIN NOT PRESCRIBED    INTENTIONAL    0 each    1 each continuous prn Antiplatelet medication not prescribed intentionally due to plavix    CAD (coronary artery disease)       clopidogrel 75 MG tablet    PLAVIX    90 tablet    Take 1 tablet (75 mg) by mouth daily    Coronary artery disease involving native heart without angina pectoris, unspecified vessel or lesion type       metoprolol succinate 25 MG 24 hr tablet    TOPROL-XL    180 tablet    Take 1 tablet (25 mg) by mouth 2 times daily    Coronary artery disease involving native heart without angina pectoris, unspecified vessel or lesion type       nitroGLYcerin 0.4 MG sublingual tablet    NITROSTAT    25  tablet    Place 1 tablet (0.4 mg) under the tongue every 5 minutes as needed for chest pain        rosuvastatin 40 MG tablet    CRESTOR    90 tablet    Take 1 tablet (40 mg) by mouth every evening    Hyperlipidemia LDL goal <70

## 2018-04-17 NOTE — PROGRESS NOTES
Service Date: 04/17/2018      HISTORY OF PRESENT ILLNESS:  I had a pleasure seeing Mr. Morrison in followup at the [facility] today.  He is a very pleasant 72-year-old gentleman who I last saw in 05/2017.  He has a history of coronary artery disease and is status post drug-eluting stent placement to the LAD for in-stent restenosis in 2011.  He previously underwent stenting to the LAD and the right coronary artery.  His left ventricular systolic function is known to be normal.      At his last office visit, he was doing well overall from a cardiovascular standpoint.  He had undergone a stress nuclear perfusion study in March that had demonstrated no significant ischemia, although he did have some jaw discomfort with exertion.  At that time he was started on isosorbide mononitrate, but he has subsequently discontinued this.      He was seen in the Emergency Department in late November of last year with complaints of palpitations.  On Holter monitoring he was seen to have frequent PVCs, and his metoprolol dose was increased.  This has actually led to improvement in his symptoms.      Today, he feels well.  He complains of occasional jaw and chest discomfort when he exerts himself soon after eating but apart from that denies any exertional chest discomfort or dyspnea.  He actually exercises 3 times a week without any limitations.  He denies any syncope or presyncope.  He has taken all his medications as prescribed.      PHYSICAL EXAMINATION:  Physical exam is dictated below.      IMPRESSION:   1.  Coronary artery disease, status post multiple PCIs in the past, the most recent being drug-eluting stent placement to proximal LAD in 2011.   2.  Dyslipidemia.   3.  Hypertension.   4.  Frequent PVCs.      Mr. Morrison appears to be doing well overall from a cardiovascular standpoint, but I think it would be reasonable to risk stratify him further given his frequent PVCs and occasional jaw discomfort with a  repeat exercise nuclear stress test.  Assuming this is within normal limits, I think that annual followup would be reasonable.      I have ordered a lipid panel for today as well as a limited abdominal ultrasound to rule out an abdominal aneurysm.      It was a pleasure seeing him in followup.         RUT MCDONNELL MD             D: 2018   T: 2018   MT: SCOTT      Name:     MAIKOL LU   MRN:      [id number]        Account:      [id number]   :      1945           Service Date: 2018      Document: [id number]

## 2018-04-17 NOTE — LETTER
4/17/2018      Kaiden Zapata MD  [address] [facility] [address]      RE: Derrick Morrison       Dear Colleague,    I had the pleasure of seeing Derrick Morrison in the [facility].    Service Date: 04/17/2018      HISTORY OF PRESENT ILLNESS:  I had a pleasure seeing Mr. Morrison in followup at the [facility] today.  He is a very pleasant 72-year-old gentleman who I last saw in 05/2017.  He has a history of coronary artery disease and is status post drug-eluting stent placement to the LAD for in-stent restenosis in 2011.  He previously underwent stenting to the LAD and the right coronary artery.  His left ventricular systolic function is known to be normal.      At his last office visit, he was doing well overall from a cardiovascular standpoint.  He had undergone a stress nuclear perfusion study in March that had demonstrated no significant ischemia, although he did have some jaw discomfort with exertion.  At that time he was started on isosorbide mononitrate, but he has subsequently discontinued this.      He was seen in the Emergency Department in late November of last year with complaints of palpitations.  On Holter monitoring he was seen to have frequent PVCs, and his metoprolol dose was increased.  This has actually led to improvement in his symptoms.      Today, he feels well.  He complains of occasional jaw and chest discomfort when he exerts himself soon after eating but apart from that denies any exertional chest discomfort or dyspnea.  He actually exercises 3 times a week without any limitations.  He denies any syncope or presyncope.  He has taken all his medications as prescribed.      PHYSICAL EXAMINATION:  Physical exam is dictated below.      IMPRESSION:   1.  Coronary artery disease, status post multiple PCIs in the past, the most recent being drug-eluting stent placement to proximal LAD in 2011.   2.  Dyslipidemia.   3.  Hypertension.    4.  Frequent PVCs.      Mr. Lu appears to be doing well overall from a cardiovascular standpoint, but I think it would be reasonable to risk stratify him further given his frequent PVCs and occasional jaw discomfort with a repeat exercise nuclear stress test.  Assuming this is within normal limits, I think that annual followup would be reasonable.      I have ordered a lipid panel for today as well as a limited abdominal ultrasound to rule out an abdominal aneurysm.      It was a pleasure seeing him in followup.         CHIDI MCDONNELL MD             D: 2018   T: 2018   MT: SCOTT      Name:     MAIKOL LU   MRN:      [id number]        Account:      [id number]   :      1945           Service Date: 2018      Document: [id number]         Outpatient Encounter Prescriptions as of 2018   Medication Sig Dispense Refill     ASPIRIN NOT PRESCRIBED, INTENTIONAL, 1 each continuous prn Antiplatelet medication not prescribed intentionally due to plavix 0 each 0     clopidogrel (PLAVIX) 75 MG tablet Take 1 tablet (75 mg) by mouth daily 90 tablet 3     metoprolol (TOPROL-XL) 25 MG 24 hr tablet Take 1 tablet (25 mg) by mouth 2 times daily 180 tablet 3     nitroglycerin (NITROSTAT) 0.4 MG sublingual tablet Place 1 tablet (0.4 mg) under the tongue every 5 minutes as needed for chest pain 25 tablet 0     rosuvastatin (CRESTOR) 40 MG tablet Take 1 tablet (40 mg) by mouth every evening 90 tablet 3     [DISCONTINUED] azithromycin (ZITHROMAX) 250 MG tablet Two tablets first day, then one tablet daily for four days. 6 tablet 0     [DISCONTINUED] budesonide (RHINOCORT ALLERGY) 32 MCG/ACT spray Spray 1 spray into both nostrils daily       No facility-administered encounter medications on file as of 2018.        Again, thank you for allowing me to participate in the care of your patient.      Sincerely,    Chidi Mcdonnell MD     [facility]

## 2018-04-17 NOTE — PROGRESS NOTES
72 year old male comes in for an in-office repair of his Phonak Bolero V70-P hearing aids that were fit 4/13/2016.  Patient reports the slim tubing is broken on the right hearing aid.    Replaced #2 slim tubing with medium closed domes bilaterally. Verified hearing aid functionality.      An otoscopic examination was done and revealed clear external auditory canals bilaterally.     Discussed need for current audiogram and hearing aid reprogramming and verification. Patient will schedule as desires.     See chart in the hearing aid room.     Blanca WONG-MIGUEL, #[id number]

## 2018-04-18 DIAGNOSIS — R00.2 PALPITATIONS: ICD-10-CM

## 2018-04-18 DIAGNOSIS — I49.3 PVC'S (PREMATURE VENTRICULAR CONTRACTIONS): ICD-10-CM

## 2018-04-18 DIAGNOSIS — I25.719 CORONARY ARTERY DISEASE INVOLVING AUTOLOGOUS VEIN CORONARY BYPASS GRAFT WITH ANGINA PECTORIS (H): ICD-10-CM

## 2018-04-18 LAB
CHOLEST SERPL-MCNC: 109 MG/DL
HDLC SERPL-MCNC: 35 MG/DL
LDLC SERPL CALC-MCNC: 58 MG/DL
NONHDLC SERPL-MCNC: 74 MG/DL
TRIGL SERPL-MCNC: 79 MG/DL

## 2018-04-18 PROCEDURE — 80061 LIPID PANEL: CPT | Performed by: INTERNAL MEDICINE

## 2018-04-18 PROCEDURE — 36415 COLL VENOUS BLD VENIPUNCTURE: CPT | Performed by: INTERNAL MEDICINE

## 2018-04-23 ENCOUNTER — HOSPITAL ENCOUNTER (OUTPATIENT)
Dept: ULTRASOUND IMAGING | Facility: CLINIC | Age: 73
Discharge: HOME | End: 2018-04-23
Attending: INTERNAL MEDICINE | Admitting: INTERNAL MEDICINE
Payer: MEDICARE

## 2018-04-23 ENCOUNTER — HOSPITAL ENCOUNTER (OUTPATIENT)
Dept: NUCLEAR MEDICINE | Facility: CLINIC | Age: 73
Setting detail: NUCLEAR MEDICINE
Discharge: HOME | End: 2018-04-23
Attending: INTERNAL MEDICINE | Admitting: INTERNAL MEDICINE
Payer: MEDICARE

## 2018-04-23 DIAGNOSIS — I25.719 CORONARY ARTERY DISEASE INVOLVING AUTOLOGOUS VEIN CORONARY BYPASS GRAFT WITH ANGINA PECTORIS (H): ICD-10-CM

## 2018-04-23 DIAGNOSIS — I49.3 PVC'S (PREMATURE VENTRICULAR CONTRACTIONS): ICD-10-CM

## 2018-04-23 DIAGNOSIS — R00.2 PALPITATIONS: ICD-10-CM

## 2018-04-23 PROCEDURE — 93016 CV STRESS TEST SUPVJ ONLY: CPT | Performed by: FAMILY MEDICINE

## 2018-04-23 PROCEDURE — 78452 HT MUSCLE IMAGE SPECT MULT: CPT | Mod: 26 | Performed by: INTERNAL MEDICINE

## 2018-04-23 PROCEDURE — 93018 CV STRESS TEST I&R ONLY: CPT | Performed by: INTERNAL MEDICINE

## 2018-04-23 PROCEDURE — 34300033 ZZH RX 343: Performed by: INTERNAL MEDICINE

## 2018-04-23 PROCEDURE — 93017 CV STRESS TEST TRACING ONLY: CPT

## 2018-04-23 PROCEDURE — A9502 TC99M TETROFOSMIN: HCPCS | Performed by: INTERNAL MEDICINE

## 2018-04-23 PROCEDURE — 78452 HT MUSCLE IMAGE SPECT MULT: CPT

## 2018-04-23 PROCEDURE — 76775 US EXAM ABDO BACK WALL LIM: CPT

## 2018-04-23 RX ADMIN — TETROFOSMIN 9.8 MCI.: 1.38 INJECTION, POWDER, LYOPHILIZED, FOR SOLUTION INTRAVENOUS at 12:45

## 2018-04-24 DIAGNOSIS — I25.10 CAD (CORONARY ARTERY DISEASE): Primary | ICD-10-CM

## 2018-05-10 ENCOUNTER — HOSPITAL ENCOUNTER (EMERGENCY)
Facility: CLINIC | Age: 73
Discharge: HOME | End: 2018-05-10
Attending: EMERGENCY MEDICINE | Admitting: EMERGENCY MEDICINE
Payer: MEDICARE

## 2018-05-10 VITALS
WEIGHT: 200 LBS | DIASTOLIC BLOOD PRESSURE: 66 MMHG | SYSTOLIC BLOOD PRESSURE: 123 MMHG | OXYGEN SATURATION: 93 % | RESPIRATION RATE: 20 BRPM | TEMPERATURE: 97.8 F | BODY MASS INDEX: 29.97 KG/M2

## 2018-05-10 DIAGNOSIS — R33.9 URINARY RETENTION: ICD-10-CM

## 2018-05-10 LAB
ALBUMIN UR-MCNC: NEGATIVE MG/DL
APPEARANCE UR: CLEAR
BILIRUB UR QL STRIP: NEGATIVE
COLOR UR AUTO: ABNORMAL
GLUCOSE UR STRIP-MCNC: NEGATIVE MG/DL
HGB UR QL STRIP: ABNORMAL
KETONES UR STRIP-MCNC: NEGATIVE MG/DL
LEUKOCYTE ESTERASE UR QL STRIP: NEGATIVE
NITRATE UR QL: NEGATIVE
PH UR STRIP: 6 PH (ref 5–7)
RBC #/AREA URNS AUTO: 0 /HPF (ref 0–2)
SOURCE: ABNORMAL
SP GR UR STRIP: 1 (ref 1–1.03)
UROBILINOGEN UR STRIP-MCNC: 0 MG/DL (ref 0–2)
WBC #/AREA URNS AUTO: <1 /HPF (ref 0–5)

## 2018-05-10 PROCEDURE — 99284 EMERGENCY DEPT VISIT MOD MDM: CPT | Mod: Z6 | Performed by: EMERGENCY MEDICINE

## 2018-05-10 PROCEDURE — 81001 URINALYSIS AUTO W/SCOPE: CPT | Performed by: EMERGENCY MEDICINE

## 2018-05-10 PROCEDURE — 99284 EMERGENCY DEPT VISIT MOD MDM: CPT | Performed by: EMERGENCY MEDICINE

## 2018-05-10 PROCEDURE — 51702 INSERT TEMP BLADDER CATH: CPT | Performed by: EMERGENCY MEDICINE

## 2018-05-10 NOTE — ED NOTES
Pt and wife given education with demonstration on how to change bae from standard bag to leg bag. Pt instructed on hygiene measures. Pt feels comfortable with discharge plan. Take home supplies provided.

## 2018-05-10 NOTE — ED AVS SNAPSHOT
[facility] Emergency Department    [address] [address]    Phone:  [phone]    Fax:  [phone]                                       Derrick Morrison   MRN: [id number]    Department:  [facility] Emergency Department   Date of Visit:  5/10/2018           Patient Information     Date Of Birth          1945        Your diagnoses for this visit were:     Urinary retention        You were seen by Dereje Neal MD.      Follow-up Information     Follow up with [facility].    Specialty:  Urology    Contact information:    [address] [place] [address] [phone]    Additional information:    The medical center is located at   [address] (between [address] and   [address] in [place], [place] miles north   of [place]).        Discharge Instructions       Use the catheter over the next 3 days, return in 3 days to get the catheter removed.  When laying in bed, take the bag off of your leg and hitting it over side of the bed.  Return to the emergency department sooner for fever, repeated vomiting, abdominal pain, or other concerns.    24 Hour Appointment Hotline       To make an appointment at any [place] clinic, call [phone] ([phone]). If you don't have a family doctor or clinic, we will help you find one. [facility] are conveniently located to serve the needs of you and your family.          ED Discharge Orders     UROLOGY ADULT REFERRAL       Your provider has referred you to: FM[facility]: [facility] [place] [phone]   [email or web address]    Please be aware that coverage of these services is subject to the terms and limitations of your health insurance plan.  Call member services at your health plan with any benefit or coverage questions.      Please bring the following with you to your appointment:    (1) Any  X-Rays, CTs or MRIs which have been performed.  Contact the facility where they were done to arrange for  prior to your scheduled appointment.    (2) List of current medications  (3) This referral request   (4) Any documents/labs given to you for this referral                     Review of your medicines      Our records show that you are taking the medicines listed below. If these are incorrect, please call your family doctor or clinic.        Dose / Directions Last dose taken    ASPIRIN NOT PRESCRIBED   Commonly known as:  INTENTIONAL   Dose:  1 each   Quantity:  0 each        1 each continuous prn Antiplatelet medication not prescribed intentionally due to plavix   Refills:  0        clopidogrel 75 MG tablet   Commonly known as:  PLAVIX   Dose:  75 mg   Quantity:  90 tablet        Take 1 tablet (75 mg) by mouth daily   Refills:  3        metoprolol succinate 25 MG 24 hr tablet   Commonly known as:  TOPROL-XL   Dose:  25 mg   Quantity:  180 tablet        Take 1 tablet (25 mg) by mouth 2 times daily   Refills:  3        nitroGLYcerin 0.4 MG sublingual tablet   Commonly known as:  NITROSTAT   Dose:  0.4 mg   Quantity:  25 tablet        Place 1 tablet (0.4 mg) under the tongue every 5 minutes as needed for chest pain   Refills:  0        rosuvastatin 40 MG tablet   Commonly known as:  CRESTOR   Dose:  40 mg   Quantity:  90 tablet        Take 1 tablet (40 mg) by mouth every evening   Refills:  3                Procedures and tests performed during your visit     Continue indwelling urinary catheter (Cruz)    UA reflex to Microscopic      Orders Needing Specimen Collection     None      Pending Results     No orders found from 5/8/2018 to 5/11/2018.            Pending Culture Results     No orders found from 5/8/2018 to 5/11/2018.            Pending Results Instructions     If you had any lab results that were not finalized at the time of your Discharge, you can call the ED Lab Result RN at [phone]. You  will be contacted by this team for any positive Lab results or changes in treatment. The nurses are available 7 days a week from 10A to 6:30P.  You can leave a message 24 hours per day and they will return your call.        Test Results From Your Hospital Stay        5/10/2018  3:36 AM      Component Results     Component Value Ref Range & Units Status    Color Urine Straw  Final    Appearance Urine Clear  Final    Glucose Urine Negative NEG^Negative mg/dL Final    Bilirubin Urine Negative NEG^Negative Final    Ketones Urine Negative NEG^Negative mg/dL Final    Specific Gravity Urine 1.004 1.003 - 1.035 Final    Blood Urine Moderate (A) NEG^Negative Final    pH Urine 6.0 5.0 - 7.0 pH Final    Protein Albumin Urine Negative NEG^Negative mg/dL Final    Urobilinogen mg/dL 0.0 0.0 - 2.0 mg/dL Final    Nitrite Urine Negative NEG^Negative Final    Leukocyte Esterase Urine Negative NEG^Negative Final    Source Midstream Urine  Final    RBC Urine 0 0 - 2 /HPF Final    WBC Urine <1 0 - 5 /HPF Final                Thank you for choosing [facility]       Thank you for choosing [facility] for your care. Our goal is always to provide you with excellent care. Hearing back from our patients is one way we can continue to improve our services. Please take a few minutes to complete the written survey that you may receive in the mail after you visit with us. Thank you!        [employer] Information     [employer] gives you secure access to your electronic health record. If you see a primary care provider, you can also send messages to your care team and make appointments. If you have questions, please call your primary care clinic.  If you do not have a primary care provider, please call [phone] and they will assist you.        Care EveryWhere ID     This is your Care EveryWhere ID. This could be used by other organizations to access your [facility] medical records  [id number]        Equal Access to Services     IRINEO MACHADO AH: Gustavo vazquez  kayla Ta, christy jovel, rakesh nguyen, bay martinez. So [facility] [phone].    ATENCIÓN: Si habla español, tiene a villar disposición servicios gratuitos de asistencia lingüística. Llame al [phone].    We comply with applicable federal civil rights laws and [place] laws. We do not discriminate on the basis of race, color, national origin, age, disability, sex, sexual orientation, or gender identity.            After Visit Summary       This is your record. Keep this with you and show to your community pharmacist(s) and doctor(s) at your next visit.

## 2018-05-10 NOTE — ED AVS SNAPSHOT
[facility] Emergency Department    [address] [address]    Phone:  [phone]    Fax:  [phone]                                       Derrick Morrison   MRN: [id number]    Department:  [facility] Emergency Department   Date of Visit:  5/10/2018           After Visit Summary Signature Page     I have received my discharge instructions, and my questions have been answered. I have discussed any challenges I see with this plan with the nurse or doctor.    ..........................................................................................................................................  Patient/Patient Representative Signature      ..........................................................................................................................................  Patient Representative Print Name and Relationship to Patient    ..................................................               ................................................  Date                                            Time    ..........................................................................................................................................  Reviewed by Signature/Title    ...................................................              ..............................................  Date                                                            Time

## 2018-05-10 NOTE — DISCHARGE INSTRUCTIONS
Use the catheter over the next 3 days, return in 3 days to get the catheter removed.  When laying in bed, take the bag off of your leg and hitting it over side of the bed.  Return to the emergency department sooner for fever, repeated vomiting, abdominal pain, or other concerns.

## 2018-05-10 NOTE — ED PROVIDER NOTES
History     Chief Complaint   Patient presents with     Urinary Retention     Last urinating at 2000     HPI  Derrick Morrison is a 72 year old male who presents for urinary retention.  History obtained from the patient and his wife.  The patient says that he has not been able to urinate for the past 6 or 7 hours.  He feels very uncomfortable, pain in his lower abdomen that feels like bloating, rated as severe.  He says he feels like he has to urinate but cannot.  No dysuria or urinary frequency prior to this.  He has not noticed any blood in his urine.  He is on ropinirole due to prior coronary artery disease with stenting.  He denies fever, chills, chest pain, shortness of breath, vomiting, diarrhea, rash.    Problem List:    Patient Active Problem List    Diagnosis Date Noted     Gastroesophageal reflux disease without esophagitis 08/24/2015     Priority: Medium     Counseling regarding advanced directives 08/14/2015     Priority: Medium     Advance Care Planning 8/14/2015: ACP Review and Resources Provided:  Reviewed chart for advance care plan.  Derrick Morrison has no plan or code status on file however states presence of ACP document. Copy requested.  Added by Alix Cotton           Subjective tinnitus 05/01/2015     Priority: Medium     Sensorineural hearing loss, bilateral 05/01/2015     Priority: Medium     Screening for prostate cancer 08/21/2014     Priority: Medium     OK with USPSTF recommendations against screening.  See 8/2014 note for details.  Discussed in detail at physical in 2017 again, still OK with no screening.  (now 70 y/o)       HTN (hypertension) 08/21/2014     Priority: Medium     Palpitations 08/07/2013     Priority: Medium     occasional, improved on low dose beta blocker       Hyperlipidemia LDL goal <70 11/16/2011     Priority: Medium     crestor       CAD (coronary artery disease) 10/10/2011     Priority: Medium     -8/16/2006 s/p GERALDINE to mid RCA, s/p GERALDINE to mid LAD in [place]  -10/4/2007 s/p GERALDINE to pRCA and GERALDINE to dRCA in [place]   -11/17/11 Unstable angina, s/p GERALDINE to prox LAD (jailed small diagonal)        Hypertrophy of prostate with urinary obstruction 10/10/2011     Priority: Medium     flomax in past, \"quit working\".  Avodart in past.  Tapered off.   Update 10/14: wanted to restart flomax, stopped it, didn't think it helped.             screening 10/10/2011     Priority: Medium     2007 colonoscopy \"all clean\" in [place].  He is sure about this.    AAA ultrasound screen 3/07 normal also.  (leg and neck done then too)          Past Medical History:    Past Medical History:   Diagnosis Date     Actinic keratosis      Basal cell carcinoma      BPH w urinary obs/LUTS 10/10/2011     CAD (coronary artery disease) 10/10/2011     Hyperlipidaemia      Palpitations 8/7/2013     screening 10/10/2011     Skin cancer        Past Surgical History:    No past surgical history on file.    Family History:    Family History   Problem Relation Age of Onset     Coronary Artery Disease Mother        Social History:  Marital Status:   [2]  Social History   Substance Use Topics     Smoking status: Never Smoker     Smokeless tobacco: Never Used     Alcohol use Yes      Comment: occasionally        Medications:      ASPIRIN NOT PRESCRIBED, INTENTIONAL,   clopidogrel (PLAVIX) 75 MG tablet   metoprolol (TOPROL-XL) 25 MG 24 hr tablet   nitroglycerin (NITROSTAT) 0.4 MG sublingual tablet   rosuvastatin (CRESTOR) 40 MG tablet         Review of Systems  Pertinent positives and negatives listed in the HPI, all other systems reviewed and are negative.    Physical Exam   BP: 138/78  Heart Rate: 77  Temp: 97.8  F (36.6  C)  Resp: 20  Weight: 90.7 kg (200 lb)  SpO2: 97 %      Physical Exam   Constitutional: He is oriented to person, place, and time. He appears well-developed and well-nourished. He appears distressed.   HENT:   Head: Normocephalic and atraumatic.   Right Ear: External ear " normal.   Left Ear: External ear normal.   Nose: Nose normal.   Eyes: Conjunctivae are normal. No scleral icterus.   Neck: Normal range of motion.   Cardiovascular: Normal rate and regular rhythm.    Pulmonary/Chest: Effort normal. No stridor. No respiratory distress.   Abdominal: Soft. He exhibits distension. There is tenderness in the suprapubic area.   Genitourinary: Right testis shows no mass, no swelling and no tenderness. Left testis shows no mass, no swelling and no tenderness. No penile tenderness.   Neurological: He is alert and oriented to person, place, and time.   Skin: Skin is warm and dry. He is not diaphoretic.   Psychiatric: He has a normal mood and affect. His behavior is normal.   Nursing note and vitals reviewed.      ED Course     ED Course     Procedures               Critical Care time:  none               Results for orders placed or performed during the hospital encounter of 05/10/18 (from the past 24 hour(s))   UA reflex to Microscopic   Result Value Ref Range    Color Urine Straw     Appearance Urine Clear     Glucose Urine Negative NEG^Negative mg/dL    Bilirubin Urine Negative NEG^Negative    Ketones Urine Negative NEG^Negative mg/dL    Specific Gravity Urine 1.004 1.003 - 1.035    Blood Urine Moderate (A) NEG^Negative    pH Urine 6.0 5.0 - 7.0 pH    Protein Albumin Urine Negative NEG^Negative mg/dL    Urobilinogen mg/dL 0.0 0.0 - 2.0 mg/dL    Nitrite Urine Negative NEG^Negative    Leukocyte Esterase Urine Negative NEG^Negative    Source Midstream Urine     RBC Urine 0 0 - 2 /HPF    WBC Urine <1 0 - 5 /HPF       Medications - No data to display    Assessments & Plan (with Medical Decision Making)   72-year-old male presents for urinary retention and abdominal pain.  Temperature is 36.6 C, blood pressure 138/78, heart rate 77, SPO2 97% on room air.  Cruz catheter placed with larger output.  On recheck afterwards the patient is feeling much better, pain has completely resolved, recheck of  his abdomen is benign and not concerning for an acute surgical process such as small bowel obstruction, diverticulitis, or appendicitis.  At this point he is safe to discharge home.  Urinalysis is negative for signs of infection.  I have offered him tamsulosin but he declined stating he has been on this before and it did not help.  He is discharged with instructions to follow-up in 3 days for Cruz catheter removal, return sooner for abdominal pain, vomiting, fevers.  Follow-up in urology clinic.  The patient is in agreement with this plan.    I have reviewed the nursing notes.    I have reviewed the findings, diagnosis, plan and need for follow up with the patient.       New Prescriptions    No medications on file       Final diagnoses:   Urinary retention       5/10/2018   [facility] EMERGENCY DEPARTMENT     Dereje Neal MD  05/10/18 0406

## 2018-05-12 ENCOUNTER — HOSPITAL ENCOUNTER (EMERGENCY)
Facility: CLINIC | Age: 73
Discharge: HOME | End: 2018-05-12
Attending: EMERGENCY MEDICINE | Admitting: EMERGENCY MEDICINE
Payer: MEDICARE

## 2018-05-12 VITALS
TEMPERATURE: 98.2 F | DIASTOLIC BLOOD PRESSURE: 84 MMHG | RESPIRATION RATE: 10 BRPM | OXYGEN SATURATION: 97 % | SYSTOLIC BLOOD PRESSURE: 136 MMHG

## 2018-05-12 DIAGNOSIS — R33.9 URINARY RETENTION: ICD-10-CM

## 2018-05-12 PROCEDURE — 99282 EMERGENCY DEPT VISIT SF MDM: CPT | Performed by: EMERGENCY MEDICINE

## 2018-05-12 PROCEDURE — 99283 EMERGENCY DEPT VISIT LOW MDM: CPT | Mod: Z6 | Performed by: EMERGENCY MEDICINE

## 2018-05-12 RX ORDER — DUTASTERIDE 0.5 MG/1
0.5 CAPSULE, LIQUID FILLED ORAL DAILY
Qty: 30 CAPSULE | Refills: 0 | Status: SHIPPED | OUTPATIENT
Start: 2018-05-12 | End: 2018-05-21

## 2018-05-12 NOTE — DISCHARGE INSTRUCTIONS
Urinary Retention (Male)  Urinary retention is the medical term for difficulty or inability to pass urine, even though your bladder is full.  Causes  The most common cause of urinary retention in men is the bladder outlet being blocked. This can be due to an enlarged prostate gland or a bladder infection. Certain medicines can also cause this problem. This condition is more likely to occur as men get older.    Symptoms  Common symptoms of urinary retention include:    Pain (not experienced by everyone)    Frequent urination    Feeling that the bladder is still full after urinating    Incontinence (not being able to control the release of urine)    Swollen abdomen  Treatment  This condition is treated by inserting a tube (catheter) into the bladder to drain the urine. This provides immediate relief. The catheter may need to stay in place for a few days. The catheter has a balloon on the tip, which is inflated after insertion. This prevents the catheter from falling out.  Home care    If you were given antibiotics, take them until they are used up, or your healthcare provider tells you to stop. It is important to finish the antibiotics even though you feel better. This is to make sure your infection has cleared.    If a catheter was left in place, it is important to keep bacteria from getting into the collection bag. Don't disconnect the catheter from the collection bag.    Use a leg band to secure the drainage tube, so it does not pull on the catheter. Drain the collection bag when it becomes full using the drain spout at the bottom of the bag.    Don't pull on or try to remove your catheter. This will injure your urethra. The catheter must be removed by a healthcare provider.  Follow-up care  Follow up with your healthcare provider, or as advised.  If a catheter was left in place, it can usually be removed within 3 to 7 days. Some conditions require the catheter to stay in longer. Your healthcare provider will  tell you when to return to have the catheter removed.  When to seek medical advice  Call your healthcare provider right away if any of these occur:    Fever of 100.4 F (38 C) or higher, or as directed by your healthcare provider    Bladder or lower-abdominal pain or fullness    Abdominal swelling, nausea, vomiting, or back pain    Blood or urine leakage around the catheter    Bloody urine coming from the catheter (if a new symptom)    Weakness, dizziness, or fainting    Confusion or change in usual level of alertness    If a catheter was left in place, return if:  ? Catheter falls out  ? Catheter stops draining for 6 hours  Date Last Reviewed: 10/1/2017    9274-5343 [employer]. [address] [place] [address], [place] [address]. All rights reserved. This information is not intended as a substitute for professional medical care. Always follow your healthcare professional's instructions.      Follow up urology, start Avodart today, drink plenty fluids, return here for fever greater than 100.4, recurrent urinary retention, vomiting, profuse bleeding or any other concern.

## 2018-05-12 NOTE — ED NOTES
Pt had small urine output, approx 10-20cc.  Blood tinged urine.  Pt given 3rd cup of water.  MD updated on patient status.

## 2018-05-12 NOTE — ED AVS SNAPSHOT
[facility] Emergency Department    [address] [address]    Phone:  [phone]    Fax:  [phone]                                       Derrick Morrison   MRN: [id number]    Department:  [facility] Emergency Department   Date of Visit:  5/12/2018           After Visit Summary Signature Page     I have received my discharge instructions, and my questions have been answered. I have discussed any challenges I see with this plan with the nurse or doctor.    ..........................................................................................................................................  Patient/Patient Representative Signature      ..........................................................................................................................................  Patient Representative Print Name and Relationship to Patient    ..................................................               ................................................  Date                                            Time    ..........................................................................................................................................  Reviewed by Signature/Title    ...................................................              ..............................................  Date                                                            Time

## 2018-05-12 NOTE — ED PROVIDER NOTES
History     Chief Complaint   Patient presents with     Catheter Problem     needs catheter removed, told to return here, urology appt not for a month     HPI  Derrick Morrison is a 72 year old male with history of hypertrophy of the prostate, hyperlipemia, hypertension, and coronary artery disease who presents to the ED to get a catheter removed. The patient was seen in the ED two days ago on 5/10/18 where a bae catheter was put in with instructions to follow-up in three days for removal. The catheter was originally put in due to urine retention. The patient reports he has a history of hypertrophy of prostate with urinary obstruction. The patient is not on any medications for his prostate. The patient reports urology had never done any procedures on his prostate.    Patient Active Problem List   Diagnosis     CAD (coronary artery disease)     Hypertrophy of prostate with urinary obstruction     screening     Hyperlipidemia LDL goal <70     Palpitations     Screening for prostate cancer     HTN (hypertension)     Subjective tinnitus     Sensorineural hearing loss, bilateral     Counseling regarding advanced directives     Gastroesophageal reflux disease without esophagitis     Current Outpatient Prescriptions   Medication Sig Dispense Refill     dutasteride (AVODART) 0.5 MG capsule Take 1 capsule (0.5 mg) by mouth daily 30 capsule 0     ASPIRIN NOT PRESCRIBED, INTENTIONAL, 1 each continuous prn Antiplatelet medication not prescribed intentionally due to plavix 0 each 0     clopidogrel (PLAVIX) 75 MG tablet Take 1 tablet (75 mg) by mouth daily 90 tablet 3     metoprolol (TOPROL-XL) 25 MG 24 hr tablet Take 1 tablet (25 mg) by mouth 2 times daily 180 tablet 3     nitroglycerin (NITROSTAT) 0.4 MG sublingual tablet Place 1 tablet (0.4 mg) under the tongue every 5 minutes as needed for chest pain 25 tablet 0     rosuvastatin (CRESTOR) 40 MG tablet Take 1 tablet (40 mg) by mouth every evening 90 tablet 3     No Known  "Allergies    Problem List:    Patient Active Problem List    Diagnosis Date Noted     Gastroesophageal reflux disease without esophagitis 08/24/2015     Priority: Medium     Counseling regarding advanced directives 08/14/2015     Priority: Medium     Advance Care Planning 8/14/2015: ACP Review and Resources Provided:  Reviewed chart for advance care plan.  Derrick Morrison has no plan or code status on file however states presence of ACP document. Copy requested.  Added by Alix Cotton           Subjective tinnitus 05/01/2015     Priority: Medium     Sensorineural hearing loss, bilateral 05/01/2015     Priority: Medium     Screening for prostate cancer 08/21/2014     Priority: Medium     OK with USPSTF recommendations against screening.  See 8/2014 note for details.  Discussed in detail at physical in 2017 again, still OK with no screening.  (now 70 y/o)       HTN (hypertension) 08/21/2014     Priority: Medium     Palpitations 08/07/2013     Priority: Medium     occasional, improved on low dose beta blocker       Hyperlipidemia LDL goal <70 11/16/2011     Priority: Medium     crestor       CAD (coronary artery disease) 10/10/2011     Priority: Medium     -8/16/2006 s/p GERALDINE to mid RCA, s/p GERALDINE to mid LAD in [place]  -10/4/2007 s/p GERALDINE to pRCA and GERALDINE to dRCA in [place]   -11/17/11 Unstable angina, s/p GERALDINE to prox LAD (jailed small diagonal)        Hypertrophy of prostate with urinary obstruction 10/10/2011     Priority: Medium     flomax in past, \"quit working\".  Avodart in past.  Tapered off.   Update 10/14: wanted to restart flomax, stopped it, didn't think it helped.             screening 10/10/2011     Priority: Medium     2007 colonoscopy \"all clean\" in [place].  He is sure about this.    AAA ultrasound screen 3/07 normal also.  (leg and neck done then too)          Past Medical History:    Past Medical History:   Diagnosis Date     Actinic keratosis      Basal cell carcinoma      BPH w " urinary obs/LUTS 10/10/2011     CAD (coronary artery disease) 10/10/2011     Hyperlipidaemia      Palpitations 8/7/2013     screening 10/10/2011     Skin cancer        Past Surgical History:    No past surgical history on file.    Family History:    Family History   Problem Relation Age of Onset     Coronary Artery Disease Mother        Social History:  Marital Status:   [2]  Social History   Substance Use Topics     Smoking status: Never Smoker     Smokeless tobacco: Never Used     Alcohol use Yes      Comment: occasionally        Medications:      dutasteride (AVODART) 0.5 MG capsule   ASPIRIN NOT PRESCRIBED, INTENTIONAL,   clopidogrel (PLAVIX) 75 MG tablet   metoprolol (TOPROL-XL) 25 MG 24 hr tablet   nitroglycerin (NITROSTAT) 0.4 MG sublingual tablet   rosuvastatin (CRESTOR) 40 MG tablet         Review of Systems   All other systems are reviewed and are negative.    Physical Exam   BP: 136/84  Heart Rate: 67  Temp: 98.2  F (36.8  C)  Resp: 10  SpO2: 97 %      Physical Exam  Nontoxic, alert and oriented  Abdomen soft, nontender, no masses  External genitalia normal, indwelling bae, testes and scrotum unremarkable.  ED Course     ED Course     Procedures               Critical Care time:  none     Results for orders placed or performed during the hospital encounter of 05/10/18   UA reflex to Microscopic   Result Value Ref Range    Color Urine Straw     Appearance Urine Clear     Glucose Urine Negative NEG^Negative mg/dL    Bilirubin Urine Negative NEG^Negative    Ketones Urine Negative NEG^Negative mg/dL    Specific Gravity Urine 1.004 1.003 - 1.035    Blood Urine Moderate (A) NEG^Negative    pH Urine 6.0 5.0 - 7.0 pH    Protein Albumin Urine Negative NEG^Negative mg/dL    Urobilinogen mg/dL 0.0 0.0 - 2.0 mg/dL    Nitrite Urine Negative NEG^Negative    Leukocyte Esterase Urine Negative NEG^Negative    Source Midstream Urine     RBC Urine 0 0 - 2 /HPF    WBC Urine <1 0 - 5 /HPF                 No results  found for this or any previous visit (from the past 24 hour(s)).    Medications - No data to display     1:47 PM Patient Assessed.     Assessments & Plan (with Medical Decision Making)  Urinary retention, bae placed 2 days ago. Here for removal.  Discussed follow-up with urology, restart Avodart 0.5 mg daily.  Return here for recurrent retention.  Drink plenty of clear fluids.     I have reviewed the nursing notes.    I have reviewed the findings, diagnosis, plan and need for follow up with the patient.       Discharge Medication List as of 5/12/2018  3:00 PM      START taking these medications    Details   dutasteride (AVODART) 0.5 MG capsule Take 1 capsule (0.5 mg) by mouth daily, Disp-30 capsule, R-0, E-Prescribe             Final diagnoses:   Urinary retention     This document serves as a record of the services and decisions personally performed and made by Jose Spencer MD. It was created on HIS/HER behalf by   Ольга Maya, a trained medical scribe. The creation of this document is based the provider's statements to the medical scribe.  Ольга Maya 1:47 PM 5/12/2018    Provider:   The information in this document, created by the medical scribe for me, accurately reflects the services I personally performed and the decisions made by me. I have reviewed and approved this document for accuracy prior to leaving the patient care area.  Jose Spencer MD 1:47 PM 5/12/2018 5/12/2018   [facility] EMERGENCY DEPARTMENT     Jose Spencer MD  05/13/18 [id number]

## 2018-05-12 NOTE — ED AVS SNAPSHOT
[facility] Emergency Department    [address] [facility] [address] [address]    Phone:  [phone]    Fax:  [phone]                                       Derrick Morrison   MRN: [id number]    Department:  [facility] Emergency Department   Date of Visit:  5/12/2018           Patient Information     Date Of Birth          1945        Your diagnoses for this visit were:     Urinary retention        You were seen by Jose Spencer MD.      Follow-up Information     Follow up with [facility].    Specialty:  Urology    Contact information:    [facility]  [facility] [address] [phone]    Additional information:    The medical center is located at   [address] (between [address] in [place], [place] north   of [place]).        Discharge Instructions         Urinary Retention (Male)  Urinary retention is the medical term for difficulty or inability to pass urine, even though your bladder is full.  Causes  The most common cause of urinary retention in men is the bladder outlet being blocked. This can be due to an enlarged prostate gland or a bladder infection. Certain medicines can also cause this problem. This condition is more likely to occur as men get older.    Symptoms  Common symptoms of urinary retention include:    Pain (not experienced by everyone)    Frequent urination    Feeling that the bladder is still full after urinating    Incontinence (not being able to control the release of urine)    Swollen abdomen  Treatment  This condition is treated by inserting a tube (catheter) into the bladder to drain the urine. This provides immediate relief. The catheter may need to stay in place for a few days. The catheter has a balloon on the tip, which is inflated after insertion. This prevents the catheter from falling out.  Home care    If you were given antibiotics, take them until they are used up, or your healthcare provider  tells you to stop. It is important to finish the antibiotics even though you feel better. This is to make sure your infection has cleared.    If a catheter was left in place, it is important to keep bacteria from getting into the collection bag. Don't disconnect the catheter from the collection bag.    Use a leg band to secure the drainage tube, so it does not pull on the catheter. Drain the collection bag when it becomes full using the drain spout at the bottom of the bag.    Don't pull on or try to remove your catheter. This will injure your urethra. The catheter must be removed by a healthcare provider.  Follow-up care  Follow up with your healthcare provider, or as advised.  If a catheter was left in place, it can usually be removed within 3 to 7 days. Some conditions require the catheter to stay in longer. Your healthcare provider will tell you when to return to have the catheter removed.  When to seek medical advice  Call your healthcare provider right away if any of these occur:    Fever of 100.4 F (38 C) or higher, or as directed by your healthcare provider    Bladder or lower-abdominal pain or fullness    Abdominal swelling, nausea, vomiting, or back pain    Blood or urine leakage around the catheter    Bloody urine coming from the catheter (if a new symptom)    Weakness, dizziness, or fainting    Confusion or change in usual level of alertness    If a catheter was left in place, return if:  ? Catheter falls out  ? Catheter stops draining for 6 hours  Date Last Reviewed: 10/1/2017    1207-8479 [employer]. [address] [place] [address]. All rights reserved. This information is not intended as a substitute for professional medical care. Always follow your healthcare professional's instructions.      Follow up urology, start Avodart today, drink plenty fluids, return here for fever greater than 100.4, recurrent urinary retention, vomiting, profuse bleeding or any other  concern.    Your next 10 appointments already scheduled     Jun 18, 2018  9:00 AM CDT   New Visit with ANNALEE Braden MD   [facility] ([facility]    [address] [facility] [address] [phone]              24 Hour Appointment Hotline       To make an appointment at any [facility], call [phone] ([phone]). If you don't have a family doctor or clinic, we will help you find one. [facility] are conveniently located to serve the needs of you and your family.             Review of your medicines      START taking        Dose / Directions Last dose taken    dutasteride 0.5 MG capsule   Commonly known as:  AVODART   Dose:  0.5 mg   Quantity:  30 capsule        Take 1 capsule (0.5 mg) by mouth daily   Refills:  0          Our records show that you are taking the medicines listed below. If these are incorrect, please call your family doctor or clinic.        Dose / Directions Last dose taken    ASPIRIN NOT PRESCRIBED   Commonly known as:  INTENTIONAL   Dose:  1 each   Quantity:  0 each        1 each continuous prn Antiplatelet medication not prescribed intentionally due to plavix   Refills:  0        clopidogrel 75 MG tablet   Commonly known as:  PLAVIX   Dose:  75 mg   Quantity:  90 tablet        Take 1 tablet (75 mg) by mouth daily   Refills:  3        metoprolol succinate 25 MG 24 hr tablet   Commonly known as:  TOPROL-XL   Dose:  25 mg   Quantity:  180 tablet        Take 1 tablet (25 mg) by mouth 2 times daily   Refills:  3        nitroGLYcerin 0.4 MG sublingual tablet   Commonly known as:  NITROSTAT   Dose:  0.4 mg   Quantity:  25 tablet        Place 1 tablet (0.4 mg) under the tongue every 5 minutes as needed for chest pain   Refills:  0        rosuvastatin 40 MG tablet   Commonly known as:  CRESTOR   Dose:  40 mg   Quantity:  90 tablet        Take 1 tablet (40 mg) by mouth every evening   Refills:  3                Prescriptions were sent or printed  at these locations (1 Prescription)                   [facility] [place] - [address]   [address], [place] [address]    Telephone:  [phone]   Fax:  [phone]   Hours:                  E-Prescribed (1 of 1)         dutasteride (AVODART) 0.5 MG capsule                Orders Needing Specimen Collection     None      Pending Results     No orders found from 5/10/2018 to 5/13/2018.            Pending Culture Results     No orders found from 5/10/2018 to 5/13/2018.            Pending Results Instructions     If you had any lab results that were not finalized at the time of your Discharge, you can call the ED Lab Result RN at [phone]. You will be contacted by this team for any positive Lab results or changes in treatment. The nurses are available 7 days a week from 10A to 6:30P.  You can leave a message 24 hours per day and they will return your call.        Test Results From Your Hospital Stay               Thank you for choosing [facility]       Thank you for choosing [facility] for your care. Our goal is always to provide you with excellent care. Hearing back from our patients is one way we can continue to improve our services. Please take a few minutes to complete the written survey that you may receive in the mail after you visit with us. Thank you!        Geel[employer] Information     [employer] gives you secure access to your electronic health record. If you see a primary care provider, you can also send messages to your care team and make appointments. If you have questions, please call your primary care clinic.  If you do not have a primary care provider, please call [phone] and they will assist you.        Care EveryWhere ID     This is your Care EveryWhere ID. This could be used by other organizations to access your [facility] medical records  [id number]        Equal Access to Services     IRINEO MACHADO AH: christy Maldonado qaybta kaalmada  bay nguyen ah. So [facility] [phone].    ATENCIÓN: Si habla español, tiene a villar disposición servicios gratuitos de asistencia lingüística. Llame al [phone].    We comply with applicable federal civil rights laws and [place] laws. We do not discriminate on the basis of race, color, national origin, age, disability, sex, sexual orientation, or gender identity.            After Visit Summary       This is your record. Keep this with you and show to your community pharmacist(s) and doctor(s) at your next visit.

## 2018-05-12 NOTE — ED NOTES
"Pt presents with need for urethral catheter removal.  Pt seen Thursday AM for urinary retention, bae catheter placed.  Per Dr. Neal, pt to have catheter removed in three days.  Pt denies any abd pain.  States urine in bag was \"more red than usual.\"  Prior to Thursday, pt had no hx of urinary retention.  Pt A&Ox4.  Pt in no distress.    "

## 2018-05-12 NOTE — ED NOTES
Catheter removed per MD ASHLEY.  RN gave patient 2 large glasses of water.  Urinal at bedside.  Will cont to monitor.

## 2018-05-16 ENCOUNTER — RADIANT APPOINTMENT (OUTPATIENT)
Dept: GENERAL RADIOLOGY | Facility: CLINIC | Age: 73
End: 2018-05-16
Attending: FAMILY MEDICINE
Payer: COMMERCIAL

## 2018-05-16 ENCOUNTER — OFFICE VISIT (OUTPATIENT)
Dept: FAMILY MEDICINE | Facility: CLINIC | Age: 73
End: 2018-05-16
Payer: COMMERCIAL

## 2018-05-16 VITALS
SYSTOLIC BLOOD PRESSURE: 134 MMHG | DIASTOLIC BLOOD PRESSURE: 64 MMHG | TEMPERATURE: 98.1 F | OXYGEN SATURATION: 97 % | BODY MASS INDEX: 30 KG/M2 | HEART RATE: 67 BPM | WEIGHT: 200.2 LBS | RESPIRATION RATE: 18 BRPM

## 2018-05-16 DIAGNOSIS — R06.02 SOB (SHORTNESS OF BREATH): ICD-10-CM

## 2018-05-16 DIAGNOSIS — R06.02 SOB (SHORTNESS OF BREATH): Primary | ICD-10-CM

## 2018-05-16 PROCEDURE — 99214 OFFICE O/P EST MOD 30 MIN: CPT | Performed by: FAMILY MEDICINE

## 2018-05-16 PROCEDURE — 71046 X-RAY EXAM CHEST 2 VIEWS: CPT | Mod: FY

## 2018-05-16 ASSESSMENT — PAIN SCALES - GENERAL: PAINLEVEL: NO PAIN (0)

## 2018-05-16 NOTE — NURSING NOTE
"Chief Complaint   Patient presents with     Respiratory Problems       Initial There were no vitals taken for this visit. Estimated body mass index is 29.97 kg/(m^2) as calculated from the following:    Height as of 8/30/17: 5' 8.5\" (1.74 m).    Weight as of 5/10/18: 200 lb (90.7 kg).      Health Maintenance that is potentially due pending provider review:  Colonoscopy/FIT    Gave pt phone number/pended order to schedule mammo and/or colonoscopy(or FIT)        "

## 2018-05-16 NOTE — MR AVS SNAPSHOT
After Visit Summary   5/16/2018    Derrick Morrison    MRN: [id number]           Patient Information     Date Of Birth          1945        Visit Information        Provider Department      5/16/2018 10:20 AM Kaiden Zapata MD [facility]        Today's Diagnoses     SOB (shortness of breath)    -  1       Follow-ups after your visit        Your next 10 appointments already scheduled     Jun 18, 2018  9:00 AM CDT   New Visit with ANNALEE Braden MD   [facility]    [address] [address]   [phone]              Who to contact     If you have questions or need follow up information about today's clinic visit or your schedule please contact [facility] directly at [phone].  Normal or non-critical lab and imaging results will be communicated to you by MyChart, letter or phone within 4 business days after the clinic has received the results. If you do not hear from us within 7 days, please contact the clinic through MyChart or phone. If you have a critical or abnormal lab result, we will notify you by phone as soon as possible.  Submit refill requests through Droplr or call your pharmacy and they will forward the refill request to us. Please allow 3 business days for your refill to be completed.          Additional Information About Your Visit        MyChart Information     Droplr gives you secure access to your electronic health record. If you see a primary care provider, you can also send messages to your care team and make appointments. If you have questions, please call your primary care clinic.  If you do not have a primary care provider, please call [phone] and they will assist you.        Care EveryWhere ID     This is your Care EveryWhere ID. This could be used by other organizations to access your [facility] [facility] medical records  [id number]        Your Vitals Were     Pulse Temperature  Respirations Pulse Oximetry BMI (Body Mass Index)       67 98.1  F (36.7  C) (Tympanic) 18 97% 30 kg/m2        Blood Pressure from Last 3 Encounters:   05/16/18 134/64   05/12/18 136/84   05/10/18 123/66    Weight from Last 3 Encounters:   05/16/18 200 lb 3.2 oz (90.8 kg)   05/10/18 200 lb (90.7 kg)   04/17/18 204 lb (92.5 kg)               Primary Care Provider Office Phone # Fax #    Kaiden Zapata [phone]       [address] [facility] [address]        Equal Access to Services     IRINEO MACHADO : Hadii sharon ramirezo Somaxwell, waaxda luqadaha, qaybta kaalmada adetracyyada, bay martinez. So [facility] [phone].    ATENCIÓN: Si habla español, tiene a villar disposición servicios gratuitos de asistencia lingüística. Llame al [phone].    We comply with applicable federal civil rights laws and [place] laws. We do not discriminate on the basis of race, color, national origin, age, disability, sex, sexual orientation, or gender identity.            Thank you!     Thank you for choosing [facility]  for your care. Our goal is always to provide you with excellent care. Hearing back from our patients is one way we can continue to improve our services. Please take a few minutes to complete the written survey that you may receive in the mail after your visit with us. Thank you!             Your Updated Medication List - Protect others around you: Learn how to safely use, store and throw away your medicines at www.disposemymeds.org.          This list is accurate as of 5/16/18 11:25 AM.  Always use your most recent med list.                   Brand Name Dispense Instructions for use Diagnosis    ASPIRIN NOT PRESCRIBED    INTENTIONAL    0 each    1 each continuous prn Antiplatelet medication not prescribed intentionally due to plavix    CAD (coronary artery disease)       clopidogrel 75 MG tablet    PLAVIX    90 tablet    Take 1 tablet (75 mg) by mouth  daily    Coronary artery disease involving native heart without angina pectoris, unspecified vessel or lesion type       dutasteride 0.5 MG capsule    AVODART    30 capsule    Take 1 capsule (0.5 mg) by mouth daily        metoprolol succinate 25 MG 24 hr tablet    TOPROL-XL    180 tablet    Take 1 tablet (25 mg) by mouth 2 times daily    Coronary artery disease involving native heart without angina pectoris, unspecified vessel or lesion type       nitroGLYcerin 0.4 MG sublingual tablet    NITROSTAT    25 tablet    Place 1 tablet (0.4 mg) under the tongue every 5 minutes as needed for chest pain        rosuvastatin 40 MG tablet    CRESTOR    90 tablet    Take 1 tablet (40 mg) by mouth every evening    Hyperlipidemia LDL goal <70

## 2018-05-16 NOTE — PROGRESS NOTES
SUBJECTIVE:   Derrick Morrison is a 72 year old male who presents to clinic today for the following health issues:    Chief Complaint   Patient presents with     Respiratory Problems     Ear Problem     has wax in ear       RESPIRATORY SYMPTOMS      Duration: 1 year, 3 episodes    Description  Shortness of breath, 1` year ago SOB while pushing fertilizer, 1 month ago while doing stress test was SOB and they couldn't get heart rate up past 120, yesterday while working outside moving garden tractor SOB again    Severity: moderate    Accompanying signs and symptoms: None    History (predisposing factors):  none    Precipitating or alleviating factors: None    Therapies tried and outcome:  Rests and then feels better      S: Derrick Morrison is a 72 year old male who feels more short of breath at times.  Says if he really exerts himself.     Walking for 30 minutes, fine.  Going up stairs, fine.  Not really repeatable, has happened 3x in last year.      Stress test last month fine, so he's not worried about his heart    Has trouble sorting out normal aging with signs that something could be wrong.     No hx asthma, no significant allergy hx.    No wheezing.      Problem list, med list, additional histories reviewed and updated, as indicated.      O:/64  Pulse 67  Temp 98.1  F (36.7  C) (Tympanic)  Resp 18  Wt 200 lb 3.2 oz (90.8 kg)  SpO2 97%  BMI 30 kg/m2   GEN: Alert and oriented, in no acute distress  CV: RRR, no murmur  RESP: lungs clear bilaterally, good effort  EXT: no edema or lesions noted in lower extremities    Cxr: independently visualized, normal.      A: shortness of breath    P: I reassured him that his stress test, stairs and treadmill work, make it unlikely something serious is going on.  Likely deconditioning, getting older, etc.  He is reassured by stress test, xray, and wants to see how things go over the summer.  Offered a spirometry to further reassure him, he says he doesn't want to do  it now, but may consider if things happening more frequently with his breathing.  Will see him back for physical this fall, reassess then is what he wants to do.   I think it's a reasonable plan    TT 25 min, more than 1/2 that time counseling on shortness of breath, testing done, testing options, discussing normal aging, and follow up plans.

## 2018-05-21 ENCOUNTER — MYC MEDICAL ADVICE (OUTPATIENT)
Dept: FAMILY MEDICINE | Facility: CLINIC | Age: 73
End: 2018-05-21

## 2018-05-21 DIAGNOSIS — N40.1 HYPERTROPHY OF PROSTATE WITH URINARY OBSTRUCTION: Primary | ICD-10-CM

## 2018-05-21 DIAGNOSIS — N13.8 HYPERTROPHY OF PROSTATE WITH URINARY OBSTRUCTION: Primary | ICD-10-CM

## 2018-05-21 RX ORDER — DUTASTERIDE 0.5 MG/1
0.5 CAPSULE, LIQUID FILLED ORAL DAILY
Qty: 30 CAPSULE | Refills: 0 | Status: SHIPPED | OUTPATIENT
Start: 2018-05-21 | End: 2018-07-05

## 2018-06-18 ENCOUNTER — OFFICE VISIT (OUTPATIENT)
Dept: UROLOGY | Facility: CLINIC | Age: 73
End: 2018-06-18
Payer: COMMERCIAL

## 2018-06-18 VITALS
HEIGHT: 69 IN | OXYGEN SATURATION: 97 % | HEART RATE: 65 BPM | WEIGHT: 200 LBS | DIASTOLIC BLOOD PRESSURE: 71 MMHG | BODY MASS INDEX: 29.62 KG/M2 | SYSTOLIC BLOOD PRESSURE: 116 MMHG | RESPIRATION RATE: 12 BRPM | TEMPERATURE: 98.1 F

## 2018-06-18 DIAGNOSIS — R35.1 NOCTURIA: Primary | ICD-10-CM

## 2018-06-18 PROCEDURE — 99204 OFFICE O/P NEW MOD 45 MIN: CPT | Mod: 25 | Performed by: UROLOGY

## 2018-06-18 PROCEDURE — 51798 US URINE CAPACITY MEASURE: CPT | Performed by: UROLOGY

## 2018-06-18 RX ORDER — OXYBUTYNIN CHLORIDE 5 MG/1
5 TABLET ORAL 3 TIMES DAILY
Qty: 30 TABLET | Refills: 1 | Status: SHIPPED | OUTPATIENT
Start: 2018-06-18 | End: 2018-07-16

## 2018-06-18 NOTE — PROGRESS NOTES
Appointment source: New Patient  Patient name: Derrick Morrison  Urology Staff: César Braden MD    Subjective: This is a 72 year old year old male complaining of voiding sx    Objective:  Many year history of BPH symptoms treated with both alpha blocking agents and 5 alpha reductase inhibitors. Also briefly treated with gel form of anticholinergic with no memorable effect.    Since 2011 has been off both class of agents.    Primarily troubled by nocturia. Went into urinary retention last month which was treated by placement of an indwelling catheter for 2 days. Now voiding has returned to his usual pattern with 5-7 voids during the day and 3-5 episodes of nocturia.    Dutasteride has been restarted since the episode of retention.    Post void residual today 19 cc.      Was enuretic as a child as was his son and grandchildren.    No recent PSA but reportedly low normal in the past.    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    Plan:  Continue the dutasteride and start HS 5 mg oxybutynin immediate release.    Return for follow up in one month.    Total time 45 minutes, counseling 30 minutes discussing voiding dysfunction and nocturia.

## 2018-06-18 NOTE — MR AVS SNAPSHOT
After Visit Summary   6/18/2018    Derrick Morrison    MRN: [id number]           Patient Information     Date Of Birth          1945        Visit Information        Provider Department      6/18/2018 9:00 AM ANNALEE Braden MD [facility]        Today's Diagnoses     Nocturia    -  1      Care Instructions    If you have questions or concerns on any instructions given to you by your provider today or if you need to schedule an appointment, you can reach us at [phone].                         Follow-ups after your visit        Your next 10 appointments already scheduled     Jul 16, 2018  8:45 AM CDT   Return Visit with ANNALEE Braden MD   [facility] ([facility]    [address] [facility] [address] [phone]              Who to contact     If you have questions or need follow up information about today's clinic visit or your schedule please contact [facility] directly at [phone].  Normal or non-critical lab and imaging results will be communicated to you by Ne[employer] Space[employer], letter or phone within 4 business days after the clinic has received the results. If you do not hear from us within 7 days, please contact the clinic through allyvet or phone. If you have a critical or abnormal lab result, we will notify you by phone as soon as possible.  Submit refill requests through Memvu or call your pharmacy and they will forward the refill request to us. Please allow 3 business days for your refill to be completed.          Additional Information About Your Visit        MyChart Information     Memvu gives you secure access to your electronic health record. If you see a primary care provider, you can also send messages to your care team and make appointments. If you have questions, please call your primary care clinic.  If you do not have a primary care provider, please call [phone] and they will assist you.        Care  "EveryWhere ID     This is your Care EveryWhere ID. This could be used by other organizations to access your [facility] medical records  [id number]        Your Vitals Were     Pulse Temperature Respirations Height Pulse Oximetry BMI (Body Mass Index)    65 98.1  F (36.7  C) 12 1.753 m (5' 9\") 97% 29.53 kg/m2       Blood Pressure from Last 3 Encounters:   06/18/18 116/71   05/16/18 134/64   05/12/18 136/84    Weight from Last 3 Encounters:   06/18/18 90.7 kg (200 lb)   05/16/18 90.8 kg (200 lb 3.2 oz)   05/10/18 90.7 kg (200 lb)              We Performed the Following     MEASURE POST-VOID RESIDUAL URINE/BLADDER CAPACITY, US NON-IMAGING          Today's Medication Changes          These changes are accurate as of 6/18/18  9:55 AM.  If you have any questions, ask your nurse or doctor.               Start taking these medicines.        Dose/Directions    oxybutynin 5 MG tablet   Commonly known as:  DITROPAN   Used for:  Nocturia   Started by:  ANNALEE Braden MD        Dose:  5 mg   Take 1 tablet (5 mg) by mouth 3 times daily   Quantity:  30 tablet   Refills:  1            Where to get your medicines      These medications were sent to [facility] [address] [address]     Phone:  [phone]     oxybutynin 5 MG tablet                Primary Care Provider Office Phone # Fax #    Kaiden Zapata [phone]       [address] [place] [address]        Equal Access to Services     [facility] : Hadii sharon vazquez hadasho Somaxwell, waaxda luqadaha, qaybta kaalmada patrick, bay martinez. So [facility] [phone].    ATENCIÓN: Si habla español, tiene a villar disposición servicios gratuitos de asistencia lingüística. Llame al [phone].    We comply with applicable federal civil rights laws and [place] laws. We do not discriminate on the basis of race, color, national origin, age, disability, sex, sexual " orientation, or gender identity.            Thank you!     Thank you for choosing [facility]  for your care. Our goal is always to provide you with excellent care. Hearing back from our patients is one way we can continue to improve our services. Please take a few minutes to complete the written survey that you may receive in the mail after your visit with us. Thank you!             Your Updated Medication List - Protect others around you: Learn how to safely use, store and throw away your medicines at www.disposemymeds.org.          This list is accurate as of 6/18/18  9:55 AM.  Always use your most recent med list.                   Brand Name Dispense Instructions for use Diagnosis    ASPIRIN NOT PRESCRIBED    INTENTIONAL    0 each    1 each continuous prn Antiplatelet medication not prescribed intentionally due to plavix    CAD (coronary artery disease)       clopidogrel 75 MG tablet    PLAVIX    90 tablet    Take 1 tablet (75 mg) by mouth daily    Coronary artery disease involving native heart without angina pectoris, unspecified vessel or lesion type       dutasteride 0.5 MG capsule    AVODART    30 capsule    Take 1 capsule (0.5 mg) by mouth daily    Hypertrophy of prostate with urinary obstruction       metoprolol succinate 25 MG 24 hr tablet    TOPROL-XL    180 tablet    Take 1 tablet (25 mg) by mouth 2 times daily    Coronary artery disease involving native heart without angina pectoris, unspecified vessel or lesion type       nitroGLYcerin 0.4 MG sublingual tablet    NITROSTAT    25 tablet    Place 1 tablet (0.4 mg) under the tongue every 5 minutes as needed for chest pain        oxybutynin 5 MG tablet    DITROPAN    30 tablet    Take 1 tablet (5 mg) by mouth 3 times daily    Nocturia       rosuvastatin 40 MG tablet    CRESTOR    90 tablet    Take 1 tablet (40 mg) by mouth every evening    Hyperlipidemia LDL goal <70

## 2018-06-18 NOTE — PATIENT INSTRUCTIONS
If you have questions or concerns on any instructions given to you by your provider today or if you need to schedule an appointment, you can reach us at [phone].

## 2018-06-18 NOTE — NURSING NOTE
"Chief Complaint   Patient presents with     Urinary Retention     seen in the ED 5/10/18       Initial /71 (BP Location: Right arm, Patient Position: Chair, Cuff Size: Adult Regular)  Pulse 65  Temp 98.1  F (36.7  C)  Resp 12  Ht 1.753 m (5' 9\")  Wt 90.7 kg (200 lb)  SpO2 97%  BMI 29.53 kg/m2 Estimated body mass index is 29.53 kg/(m^2) as calculated from the following:    Height as of this encounter: 1.753 m (5' 9\").    Weight as of this encounter: 90.7 kg (200 lb).  BP completed using cuff size: regular  Medications and allergies reviewed.    Active order to obtain bladder scan? Yes   Name of ordering provider:  Dr Braden  Bladder scan preformed post void NO  Bladder scan reveled 19ML  Provider notified?  Yes    Madie ARNOLD MA          "

## 2018-07-16 ENCOUNTER — OFFICE VISIT (OUTPATIENT)
Dept: UROLOGY | Facility: CLINIC | Age: 73
End: 2018-07-16
Payer: COMMERCIAL

## 2018-07-16 VITALS
TEMPERATURE: 97.8 F | SYSTOLIC BLOOD PRESSURE: 111 MMHG | HEART RATE: 74 BPM | RESPIRATION RATE: 16 BRPM | DIASTOLIC BLOOD PRESSURE: 69 MMHG

## 2018-07-16 DIAGNOSIS — N13.8 HYPERTROPHY OF PROSTATE WITH URINARY OBSTRUCTION: ICD-10-CM

## 2018-07-16 DIAGNOSIS — N32.0 BLADDER OUTLET OBSTRUCTION: Primary | ICD-10-CM

## 2018-07-16 DIAGNOSIS — N40.1 HYPERTROPHY OF PROSTATE WITH URINARY OBSTRUCTION: ICD-10-CM

## 2018-07-16 LAB
ALBUMIN UR-MCNC: NEGATIVE MG/DL
APPEARANCE UR: CLEAR
BILIRUB UR QL STRIP: NEGATIVE
COLOR UR AUTO: YELLOW
GLUCOSE UR STRIP-MCNC: NEGATIVE MG/DL
HGB UR QL STRIP: NEGATIVE
KETONES UR STRIP-MCNC: NEGATIVE MG/DL
LEUKOCYTE ESTERASE UR QL STRIP: NEGATIVE
NITRATE UR QL: NEGATIVE
PH UR STRIP: 6 PH (ref 5–7)
RBC #/AREA URNS AUTO: NORMAL /HPF
SOURCE: NORMAL
SP GR UR STRIP: 1.01 (ref 1–1.03)
UROBILINOGEN UR STRIP-ACNC: 0.2 EU/DL (ref 0.2–1)
WBC #/AREA URNS AUTO: NORMAL /HPF

## 2018-07-16 PROCEDURE — 99214 OFFICE O/P EST MOD 30 MIN: CPT | Mod: 25 | Performed by: UROLOGY

## 2018-07-16 PROCEDURE — 51798 US URINE CAPACITY MEASURE: CPT | Performed by: UROLOGY

## 2018-07-16 PROCEDURE — 87086 URINE CULTURE/COLONY COUNT: CPT | Performed by: UROLOGY

## 2018-07-16 PROCEDURE — 81001 URINALYSIS AUTO W/SCOPE: CPT | Performed by: UROLOGY

## 2018-07-16 RX ORDER — DUTASTERIDE 0.5 MG/1
1 CAPSULE, LIQUID FILLED ORAL DAILY
Qty: 90 CAPSULE | Refills: 3 | Status: SHIPPED | OUTPATIENT
Start: 2018-07-16 | End: 2019-09-18

## 2018-07-16 NOTE — NURSING NOTE
"Chief Complaint   Patient presents with     RECHECK     Nocturia, Retention Epidodes     Medication Problem     Pt stopped taking oxybutynin and has just limited fluids after dinner        Initial /69 (BP Location: Right arm, Patient Position: Chair, Cuff Size: Adult Regular)  Pulse 74  Temp 97.8  F (36.6  C) (Tympanic)  Resp 16 Estimated body mass index is 29.53 kg/(m^2) as calculated from the following:    Height as of 6/18/18: 1.753 m (5' 9\").    Weight as of 6/18/18: 90.7 kg (200 lb).  BP completed using cuff size: regular  Medications and allergies reviewed.      Hoda MEDINA CMA     "

## 2018-07-16 NOTE — NURSING NOTE
Active order to obtain bladder scan? Yes   Name of ordering provider:  Asiya   Bladder scan preformed post void Yes.  Bladder scan reveled 61ML  Provider notified?  Yes    Hoda MEDINA CMA

## 2018-07-16 NOTE — PROGRESS NOTES
Appointment source: Established Patient  Patient name: Derrick Morrison  Urology Staff: César Braden MD    Subjective: This is a 72 year old year old male returning for follow up of nocturia.    Objective:  Stopped taking the oxybutynin and no change was noted.    Continues taking dutasteride with some voiding improvements but has only been taking it since May of this year. Had previously been on dutasteride years ago but it was discontinued for lack of efficacy after a relatively brief trial.    Voiding seems to be improving somewhat with the restarting of the dutasteride but also noting mild breast tenderness probably attributable to the medication.    Assessment:  Improved urination while taking dutasteride and insignificant impact of oxybutynin prompting discontinuation.    Some hormonal side effect from the dutasteride.    Plan:  Continue dutasteride but reduce dose to every other day. Stop oxybutynin. Return for follow up in 6 months.    Total time 25 minutes, counseling 20 minutes discussing bladder outlet obstruction.

## 2018-07-16 NOTE — MR AVS SNAPSHOT
After Visit Summary   7/16/2018    Derrick Morrison    MRN: [id number]           Patient Information     Date Of Birth          1945        Visit Information        Provider Department      7/16/2018 8:45 AM ANNALEE Braden MD [facility]        Today's Diagnoses     Bladder outlet obstruction    -  1    Hypertrophy of prostate with urinary obstruction          Care Instructions    Per Physician's instructions            Follow-ups after your visit        Your next 10 appointments already scheduled     Jul 17, 2018  3:40 PM CDT   Return Visit with Vandana Frausto PA-C   [facility] ([facility])    [address] [address] [phone]              Who to contact     If you have questions or need follow up information about today's clinic visit or your schedule please contact [facility] directly at [phone].  Normal or non-critical lab and imaging results will be communicated to you by MyChart, letter or phone within 4 business days after the clinic has received the results. If you do not hear from us within 7 days, please contact the clinic through [employer] or phone. If you have a critical or abnormal lab result, we will notify you by phone as soon as possible.  Submit refill requests through [employer] or call your pharmacy and they will forward the refill request to us. Please allow 3 business days for your refill to be completed.          Additional Information About Your Visit        MyChart Information     [employer] gives you secure access to your electronic health record. If you see a primary care provider, you can also send messages to your care team and make appointments. If you have questions, please call your primary care clinic.  If you do not have a primary care provider, please call [phone] and they will assist you.        Care EveryWhere ID     This is your Care EveryWhere ID. This could be used  by other organizations to access your [place] medical records  [id number]        Your Vitals Were     Pulse Temperature Respirations             74 97.8  F (36.6  C) (Tympanic) 16          Blood Pressure from Last 3 Encounters:   07/16/18 111/69   06/18/18 116/71   05/16/18 134/64    Weight from Last 3 Encounters:   06/18/18 90.7 kg (200 lb)   05/16/18 90.8 kg (200 lb 3.2 oz)   05/10/18 90.7 kg (200 lb)              We Performed the Following     MEASURE POST-VOID RESIDUAL URINE/BLADDER CAPACITY, US NON-IMAGING     UA with Microscopic     Urine Culture Aerobic Bacterial          Today's Medication Changes          These changes are accurate as of 7/16/18 12:37 PM.  If you have any questions, ask your nurse or doctor.               These medicines have changed or have updated prescriptions.        Dose/Directions    dutasteride 0.5 MG capsule   Commonly known as:  AVODART   This may have changed:  See the new instructions.   Used for:  Hypertrophy of prostate with urinary obstruction   Changed by:  ANNALEE Braden MD        Dose:  1 capsule   Take 1 capsule (0.5 mg) by mouth daily   Quantity:  90 capsule   Refills:  3         Stop taking these medicines if you haven't already. Please contact your care team if you have questions.     oxybutynin 5 MG tablet   Commonly known as:  DITROPAN   Stopped by:  ANNALEE Braden MD                Where to get your medicines      These medications were sent to [facility] [address] [address]     Phone:  [phone]     dutasteride 0.5 MG capsule                Primary Care Provider Office Phone # Fax #    Kaiden Zapata [phone]       [address] [place] [address]        Equal Access to Services     JAN MACHADO AH: Gustavo garza Somaxwell, waaxda luqadaha, qaybta kaalmada patrick, bay martinez. So [facility] [phone].    ATENCIÓN: Huy vieira  español, tiene a villar disposición servicios gratuitos de asistencia lingüística. Daniel quinonez [phone].    We comply with applicable federal civil rights laws and [place] laws. We do not discriminate on the basis of race, color, national origin, age, disability, sex, sexual orientation, or gender identity.            Thank you!     Thank you for choosing [facility]  for your care. Our goal is always to provide you with excellent care. Hearing back from our patients is one way we can continue to improve our services. Please take a few minutes to complete the written survey that you may receive in the mail after your visit with us. Thank you!             Your Updated Medication List - Protect others around you: Learn how to safely use, store and throw away your medicines at www.disposemymeds.org.          This list is accurate as of 7/16/18 12:37 PM.  Always use your most recent med list.                   Brand Name Dispense Instructions for use Diagnosis    ASPIRIN NOT PRESCRIBED    INTENTIONAL    0 each    1 each continuous prn Antiplatelet medication not prescribed intentionally due to plavix    CAD (coronary artery disease)       clopidogrel 75 MG tablet    PLAVIX    90 tablet    Take 1 tablet (75 mg) by mouth daily    Coronary artery disease involving native heart without angina pectoris, unspecified vessel or lesion type       dutasteride 0.5 MG capsule    AVODART    90 capsule    Take 1 capsule (0.5 mg) by mouth daily    Hypertrophy of prostate with urinary obstruction       metoprolol succinate 25 MG 24 hr tablet    TOPROL-XL    180 tablet    Take 1 tablet (25 mg) by mouth 2 times daily    Coronary artery disease involving native heart without angina pectoris, unspecified vessel or lesion type       nitroGLYcerin 0.4 MG sublingual tablet    NITROSTAT    25 tablet    Place 1 tablet (0.4 mg) under the tongue every 5 minutes as needed for chest pain        rosuvastatin 40 MG tablet    CRESTOR    90  tablet    Take 1 tablet (40 mg) by mouth every evening    Hyperlipidemia LDL goal <70

## 2018-07-17 ENCOUNTER — OFFICE VISIT (OUTPATIENT)
Dept: DERMATOLOGY | Facility: CLINIC | Age: 73
End: 2018-07-17
Payer: COMMERCIAL

## 2018-07-17 VITALS — DIASTOLIC BLOOD PRESSURE: 74 MMHG | HEART RATE: 69 BPM | OXYGEN SATURATION: 98 % | SYSTOLIC BLOOD PRESSURE: 122 MMHG

## 2018-07-17 DIAGNOSIS — Z85.828 HISTORY OF BASAL CELL CANCER: ICD-10-CM

## 2018-07-17 DIAGNOSIS — L73.8 SEBACEOUS HYPERPLASIA: ICD-10-CM

## 2018-07-17 DIAGNOSIS — L82.1 SEBORRHEIC KERATOSIS: Primary | ICD-10-CM

## 2018-07-17 DIAGNOSIS — L81.4 LENTIGO: ICD-10-CM

## 2018-07-17 DIAGNOSIS — L57.0 AK (ACTINIC KERATOSIS): ICD-10-CM

## 2018-07-17 DIAGNOSIS — D18.01 CHERRY ANGIOMA: ICD-10-CM

## 2018-07-17 LAB
BACTERIA SPEC CULT: NO GROWTH
Lab: NORMAL
SPECIMEN SOURCE: NORMAL

## 2018-07-17 PROCEDURE — 17000 DESTRUCT PREMALG LESION: CPT | Performed by: PHYSICIAN ASSISTANT

## 2018-07-17 PROCEDURE — 99213 OFFICE O/P EST LOW 20 MIN: CPT | Mod: 25 | Performed by: PHYSICIAN ASSISTANT

## 2018-07-17 NOTE — MR AVS SNAPSHOT
After Visit Summary   7/17/2018    Derrick Morrison    MRN: [id number]           Patient Information     Date Of Birth          1945        Visit Information        Provider Department      7/17/2018 3:40 PM Vandana Frausto PA-C [facility]        Care Instructions    WOUND CARE INSTRUCTIONS   FOR CRYOSURGERY   This area treated with liquid nitrogen will form a blister. You do not need to bandage the area until after the blister forms and breaks (which may be a few days). When the blister breaks, begin daily dressing changes as follows:   1) Clean and dry the area with tap water using clean Q-tip or sterile gauze pad.   2) Apply Polysporin ointment or Bacitracin ointment over entire wound. Do NOT use Neosporin ointment.   3) Cover the wound with a band-aid or sterile non-stick gauze pad and micropore paper tape.   REPEAT THESE INSTRUCTIONS AT LEAST ONCE A DAY UNTIL THE WOUND HAS COMPLETELY HEALED.   It is an old wives tale that a wound heals better when it is exposed to air and allowed to dry out. The wound will heal faster with a better cosmetic result if it is kept moist with ointment and covered with a bandage.   Do not let the wound dry out.   IMPORTANT INFORMATION ON REVERSE SIDE   Supplies Needed:   *Cotton tipped applicators (Q-tips)   *Polysporin ointment or Bacitracin ointment (NOT NEOSPORIN)   *Band-aids, or non stick gauze pads and micropore paper tape   PATIENT INFORMATION   During the healing process you will notice a number of changes. All wounds develop a small halo of redness surrounding the wound. This means healing is occurring. Severe itching with extensive redness usually indicates sensitivity to the ointment or bandage tape used to dress the wound. You should call our office if this develops.   Swelling and/or discoloration around your surgical site is common, particularly when performed around the eye.   All wounds normally drain. The larger the wound  the more drainage there will be. After 7-10 days, you will notice the wound beginning to shrink and new skin will begin to grow. The wound is healed when you can see skin has formed over the entire area. A healed wound has a healthy, shiny look to the surface and is red to dark pink in color to normalize. Wounds may take approximately 4-6 weeks to heal. Larger wounds may take 6-8 weeks. After the wound is healed you may discontinue dressing changes.   You may experience a sensation of tightness as your wound heals. This is normal and will gradually subside.   Your healed wound may be sensitive to temperature changes. This sensitivity improves with time, but if you re having a lot of discomfort, try to avoid temperature extremes.   Patients frequently experience itching after their wound appears to have healed because of the continue healing under the skin. Plain Vaseline will help relieve the itching.                 Follow-ups after your visit        Who to contact     If you have questions or need follow up information about today's clinic visit or your schedule please contact [facility] directly at [phone].  Normal or non-critical lab and imaging results will be communicated to you by [employer], letter or phone within 4 business days after the clinic has received the results. If you do not hear from us within 7 days, please contact the clinic through [employer] or phone. If you have a critical or abnormal lab result, we will notify you by phone as soon as possible.  Submit refill requests through [employer] or call your pharmacy and they will forward the refill request to us. Please allow 3 business days for your refill to be completed.          Additional Information About Your Visit        [employer] Information     [employer] gives you secure access to your electronic health record. If you see a primary care provider, you can also send messages to your care team and make appointments. If you have  questions, please call your primary care clinic.  If you do not have a primary care provider, please call [phone] and they will assist you.        Care EveryWhere ID     This is your Care EveryWhere ID. This could be used by other organizations to access your [facility] medical records  [id number]        Your Vitals Were     Pulse Pulse Oximetry                69 98%           Blood Pressure from Last 3 Encounters:   07/17/18 122/74   07/16/18 111/69   06/18/18 116/71    Weight from Last 3 Encounters:   06/18/18 90.7 kg (200 lb)   05/16/18 90.8 kg (200 lb 3.2 oz)   05/10/18 90.7 kg (200 lb)              Today, you had the following     No orders found for display       Primary Care Provider Office Phone # Fax #    Kaiden Zapata [phone] [address] [place] [address]        Equal Access to Services     JAN [facility] : Hadii sharon ku hadasho Somaxwell, waaxda luqadaha, qaybta kaalmada adeegyada, bay bauer . So [facility] [phone].    ATENCIÓN: Si habla español, tiene a villar disposición servicios gratuitos de asistencia lingüística. Llame al [phone].    We comply with applicable federal civil rights laws and [place] laws. We do not discriminate on the basis of race, color, national origin, age, disability, sex, sexual orientation, or gender identity.            Thank you!     Thank you for choosing [facility]  for your care. Our goal is always to provide you with excellent care. Hearing back from our patients is one way we can continue to improve our services. Please take a few minutes to complete the written survey that you may receive in the mail after your visit with us. Thank you!             Your Updated Medication List - Protect others around you: Learn how to safely use, store and throw away your medicines at www.disposemymeds.org.          This list is accurate as of 7/17/18  3:47 PM.  Always use your most recent med  list.                   Brand Name Dispense Instructions for use Diagnosis    ASPIRIN NOT PRESCRIBED    INTENTIONAL    0 each    1 each continuous prn Antiplatelet medication not prescribed intentionally due to plavix    CAD (coronary artery disease)       clopidogrel 75 MG tablet    PLAVIX    90 tablet    Take 1 tablet (75 mg) by mouth daily    Coronary artery disease involving native heart without angina pectoris, unspecified vessel or lesion type       dutasteride 0.5 MG capsule    AVODART    90 capsule    Take 1 capsule (0.5 mg) by mouth daily    Hypertrophy of prostate with urinary obstruction       metoprolol succinate 25 MG 24 hr tablet    TOPROL-XL    180 tablet    Take 1 tablet (25 mg) by mouth 2 times daily    Coronary artery disease involving native heart without angina pectoris, unspecified vessel or lesion type       nitroGLYcerin 0.4 MG sublingual tablet    NITROSTAT    25 tablet    Place 1 tablet (0.4 mg) under the tongue every 5 minutes as needed for chest pain        rosuvastatin 40 MG tablet    CRESTOR    90 tablet    Take 1 tablet (40 mg) by mouth every evening    Hyperlipidemia LDL goal <70

## 2018-07-17 NOTE — PROGRESS NOTES
"Derrick Morrison is a 72 year old year old male patient here today for 6 months skin check.  Patient had BCC removed from right posterior shoulder about 6 months ago. He reports a scaly are on left temple. He denies any pain or bleeding. Patient has no other skin complaints today.  Remainder of the HPI, Meds, PMH, Allergies, FH, and SH was reviewed in chart.    Pertinent Hx:  History of BCC  Past Medical History:   Diagnosis Date     Actinic keratosis      Basal cell carcinoma      BPH w urinary obs/LUTS 10/10/2011    flomax in past, \"quit working\".  Avodart in past.  Tapered off.        CAD (coronary artery disease) 10/10/2011    -8/16/2006 s/p GERALDINE to mid RCA, s/p GERALDINE to mid LAD in [place] -10/4/2007 s/p GERALDINE to pRCA and GERALDINE to dRCA in [place]  -11/17/11 Unstable angina, s/p GERALDINE to prox LAD (jailed small diagonal)       Hyperlipidaemia      Palpitations 8/7/2013    occasional, improved on low dose beta blocker      screening 10/10/2011    2007 colonoscopy \"all clean\" in [place].  He is sure about this.   AAA ultrasound screen 3/07 normal also.  (leg and neck done then too)      Skin cancer     had mohs on L temple       History reviewed. No pertinent surgical history.     Family History   Problem Relation Age of Onset     Coronary Artery Disease Mother        Social History     Social History     Marital status:      Spouse name: N/A     Number of children: N/A     Years of education: N/A     Occupational History      Retired     Social History Main Topics     Smoking status: Never Smoker     Smokeless tobacco: Never Used     Alcohol use Yes      Comment: occasionally     Drug use: No     Sexual activity: Yes     Partners: Female     Other Topics Concern     Parent/Sibling W/ Cabg, Mi Or Angioplasty Before 65f 55m? No     Social History Narrative       Outpatient Encounter Prescriptions as of 7/17/2018   Medication Sig Dispense Refill     clopidogrel (PLAVIX) 75 MG tablet Take 1 tablet " (75 mg) by mouth daily 90 tablet 3     dutasteride (AVODART) 0.5 MG capsule Take 1 capsule (0.5 mg) by mouth daily 90 capsule 3     metoprolol (TOPROL-XL) 25 MG 24 hr tablet Take 1 tablet (25 mg) by mouth 2 times daily 180 tablet 3     rosuvastatin (CRESTOR) 40 MG tablet Take 1 tablet (40 mg) by mouth every evening 90 tablet 3     ASPIRIN NOT PRESCRIBED, INTENTIONAL, 1 each continuous prn Antiplatelet medication not prescribed intentionally due to plavix 0 each 0     nitroglycerin (NITROSTAT) 0.4 MG sublingual tablet Place 1 tablet (0.4 mg) under the tongue every 5 minutes as needed for chest pain (Patient not taking: Reported on 7/17/2018) 25 tablet 0     No facility-administered encounter medications on file as of 7/17/2018.              Review Of Systems  Skin: As above  Eyes: negative  Ears/Nose/Throat: negative  Respiratory: No shortness of breath, dyspnea on exertion, cough, or hemoptysis  Cardiovascular: negative  Gastrointestinal: negative  Genitourinary: negative  Musculoskeletal: negative  Neurologic: negative  Psychiatric: negative  Hematologic/Lymphatic/Immunologic: negative  Endocrine: negative      O:   NAD, WDWN, Alert & Oriented, Mood & Affect wnl, Vitals stable   Here today alone   /74  Pulse 69  SpO2 98%   General appearance normal   Vitals stable   Alert, oriented and in no acute distress       Brown stuck on papules, brown macules on face, torso and extremities   Yellow lobulated papule on face  Red papules on torso   Well healed scar on back and right posterior shoulder               Pink gritty papule on left temple                     The remainder of the detailed exam was unremarkable; the following areas were examined:  scalp/hair, conjunctiva/lids, face, neck, lips/teeth, oral mucosa/gingiva, chest, back, abdomen, buttocks, digits/nails, RUE, LUE, RLE, LLE.      Eyes: Conjunctivae/lids:Normal     ENT: Lips: normal    MSK:Normal    Cardiovascular: peripheral edema none    Pulm:  Breathing Normal    Neuro/Psych: Orientation:Normal; Mood/Affect:Normal  A/P:  1. Actinic Keratosis on left temple x 1  LN2:  Treated with LN2 for 5s for 1-2 cycles. Warned risks of blistering, pain, pigment change, scarring, and incomplete resolution.  Advised patient to return if lesions do not completely resolve.  Wound care sheet given.  2. History of BCC  No evidence of recurrence.   3. Seborrheic keratosis, lentigo, cherry angioma, sebaceous hyperplasia  BENIGN LESIONS DISCUSSED WITH PATIENT:  I discussed the specifics of tumor, prognosis, and genetics of benign lesions.  I explained that treatment of these lesions would be purely cosmetic and not medically neccessary.  I discussed with patient different removal options including excision, cautery and /or laser.      Nature and genetics of benign skin lesions dicussed with patient.  Signs and Symptoms of skin cancer discussed with patient.  ABCDEs of melanoma reviewed with patient.  Patient encouraged to perform monthly skin exams.  UV precautions reviewed with patient.  Risks of non-melanoma skin cancer discussed with patient   Return to clinic one year or sooner if needed.

## 2018-07-17 NOTE — LETTER
"    7/17/2018         RE: Derrick Morrison  [address] [facility] [address]        Dear Colleague,    Thank you for referring your patient, Derrick Morrison, to the [facility]. Please see a copy of my visit note below.    Derrick Morrison is a 72 year old year old male patient here today for 6 months skin check.  Patient had BCC removed from right posterior shoulder about 6 months ago. He reports a scaly are on left temple. He denies any pain or bleeding. Patient has no other skin complaints today.  Remainder of the HPI, Meds, PMH, Allergies, FH, and SH was reviewed in chart.    Pertinent Hx:  History of BCC  Past Medical History:   Diagnosis Date     Actinic keratosis      Basal cell carcinoma      BPH w urinary obs/LUTS 10/10/2011    flomax in past, \"quit working\".  Avodart in past.  Tapered off.        CAD (coronary artery disease) 10/10/2011    -8/16/2006 s/p GERALDINE to mid RCA, s/p GERALDINE to mid LAD in [place] -10/4/2007 s/p GERALDINE to pRCA and GERALDINE to dRCA in [place]  -11/17/11 Unstable angina, s/p GERALDINE to prox LAD (jailed small diagonal)       Hyperlipidaemia      Palpitations 8/7/2013    occasional, improved on low dose beta blocker      screening 10/10/2011    2007 colonoscopy \"all clean\" in [place].  He is sure about this.   AAA ultrasound screen 3/07 normal also.  (leg and neck done then too)      Skin cancer     had mohs on L temple       History reviewed. No pertinent surgical history.     Family History   Problem Relation Age of Onset     Coronary Artery Disease Mother        Social History     Social History     Marital status:      Spouse name: N/A     Number of children: N/A     Years of education: N/A     Occupational History      Retired     Social History Main Topics     Smoking status: Never Smoker     Smokeless tobacco: Never Used     Alcohol use Yes      Comment: occasionally     Drug use: No     Sexual activity: Yes     Partners: Female     Other " Topics Concern     Parent/Sibling W/ Cabg, Mi Or Angioplasty Before 65f 55m? No     Social History Narrative       Outpatient Encounter Prescriptions as of 7/17/2018   Medication Sig Dispense Refill     clopidogrel (PLAVIX) 75 MG tablet Take 1 tablet (75 mg) by mouth daily 90 tablet 3     dutasteride (AVODART) 0.5 MG capsule Take 1 capsule (0.5 mg) by mouth daily 90 capsule 3     metoprolol (TOPROL-XL) 25 MG 24 hr tablet Take 1 tablet (25 mg) by mouth 2 times daily 180 tablet 3     rosuvastatin (CRESTOR) 40 MG tablet Take 1 tablet (40 mg) by mouth every evening 90 tablet 3     ASPIRIN NOT PRESCRIBED, INTENTIONAL, 1 each continuous prn Antiplatelet medication not prescribed intentionally due to plavix 0 each 0     nitroglycerin (NITROSTAT) 0.4 MG sublingual tablet Place 1 tablet (0.4 mg) under the tongue every 5 minutes as needed for chest pain (Patient not taking: Reported on 7/17/2018) 25 tablet 0     No facility-administered encounter medications on file as of 7/17/2018.              Review Of Systems  Skin: As above  Eyes: negative  Ears/Nose/Throat: negative  Respiratory: No shortness of breath, dyspnea on exertion, cough, or hemoptysis  Cardiovascular: negative  Gastrointestinal: negative  Genitourinary: negative  Musculoskeletal: negative  Neurologic: negative  Psychiatric: negative  Hematologic/Lymphatic/Immunologic: negative  Endocrine: negative      O:   NAD, WDWN, Alert & Oriented, Mood & Affect wnl, Vitals stable   Here today alone   /74  Pulse 69  SpO2 98%   General appearance normal   Vitals stable   Alert, oriented and in no acute distress       Brown stuck on papules, brown macules on face, torso and extremities   Yellow lobulated papule on face  Red papules on torso   Well healed scar on back  and right posterior shoulder               Pink gritty papule on left temple                     The remainder of the detailed exam was unremarkable; the following areas were examined:  scalp/hair,  conjunctiva/lids, face, neck, lips/teeth, oral mucosa/gingiva, chest, back, abdomen, buttocks, digits/nails, RUE, LUE, RLE, LLE.      Eyes: Conjunctivae/lids:Normal     ENT: Lips: normal    MSK:Normal    Cardiovascular: peripheral edema none    Pulm: Breathing Normal    Neuro/Psych: Orientation:Normal; Mood/Affect:Normal  A/P:  1. Actinic Keratosis on left temple x 1  LN2:  Treated with LN2 for 5s for 1-2 cycles. Warned risks of blistering, pain, pigment change, scarring, and incomplete resolution.  Advised patient to return if lesions do not completely resolve.  Wound care sheet given.  2. History of BCC  No evidence of recurrence.   3. Seborrheic keratosis, lentigo, cherry angioma, sebaceous hyperplasia  BENIGN LESIONS DISCUSSED WITH PATIENT:  I discussed the specifics of tumor, prognosis, and genetics of benign lesions.  I explained that treatment of these lesions would be purely cosmetic and not medically neccessary.  I discussed with patient different removal options including excision, cautery and /or laser.      Nature and genetics of benign skin lesions dicussed with patient.  Signs and Symptoms of skin cancer discussed with patient.  ABCDEs of melanoma reviewed with patient.  Patient encouraged to perform monthly skin exams.  UV precautions reviewed with patient.  Risks of non-melanoma skin cancer discussed with patient   Return to clinic one year or sooner if needed.       Again, thank you for allowing me to participate in the care of your patient.        Sincerely,        Vandana Reid PA-C

## 2018-09-03 DIAGNOSIS — I25.10 CORONARY ARTERY DISEASE INVOLVING NATIVE HEART WITHOUT ANGINA PECTORIS, UNSPECIFIED VESSEL OR LESION TYPE: ICD-10-CM

## 2018-09-04 RX ORDER — CLOPIDOGREL BISULFATE 75 MG/1
TABLET ORAL
Qty: 90 TABLET | Refills: 0 | Status: SHIPPED | OUTPATIENT
Start: 2018-09-04 | End: 2018-09-13

## 2018-09-04 NOTE — TELEPHONE ENCOUNTER
"Requested Prescriptions   Pending Prescriptions Disp Refills     clopidogrel (PLAVIX) 75 MG tablet [Pharmacy Med Name: CLOPIDOGREL BISULFATE 75MG TABS] 90 tablet 3     Sig: TAKE ONE TABLET BY MOUTH EVERY DAY    Plavix Passed    9/3/2018  7:15 AM       Passed - No active PPI on record unless is Protonix       Passed - Normal HGB on file in past 12 months    Recent Labs   Lab Test  11/23/17   1828   HGB  16.3              Passed - Normal Platelets on file in past 12 months    Recent Labs   Lab Test  11/23/17   1828   PLT  210              Passed - Recent (12 mo) or future (30 days) visit within the authorizing provider's specialty    Patient had office visit in the last 12 months or has a visit in the next 30 days with authorizing provider or within the authorizing provider's specialty.  See \"Patient Info\" tab in inbasket, or \"Choose Columns\" in Meds & Orders section of the refill encounter.           Passed - Patient is age 18 or older        Last Written Prescription Date:  8/30/17  Last Fill Quantity: 90,  # refills: 3   Last office visit: 5/16/2018 with prescribing provider:     Future Office Visit:      "

## 2018-09-13 ENCOUNTER — OFFICE VISIT (OUTPATIENT)
Dept: FAMILY MEDICINE | Facility: CLINIC | Age: 73
End: 2018-09-13
Payer: COMMERCIAL

## 2018-09-13 VITALS
WEIGHT: 198.6 LBS | BODY MASS INDEX: 29.41 KG/M2 | HEART RATE: 60 BPM | SYSTOLIC BLOOD PRESSURE: 112 MMHG | RESPIRATION RATE: 16 BRPM | HEIGHT: 69 IN | TEMPERATURE: 96.9 F | DIASTOLIC BLOOD PRESSURE: 66 MMHG

## 2018-09-13 DIAGNOSIS — H90.3 SENSORINEURAL HEARING LOSS, BILATERAL: ICD-10-CM

## 2018-09-13 DIAGNOSIS — I25.10 CORONARY ARTERY DISEASE INVOLVING NATIVE HEART WITHOUT ANGINA PECTORIS, UNSPECIFIED VESSEL OR LESION TYPE: ICD-10-CM

## 2018-09-13 DIAGNOSIS — Z00.00 MEDICARE ANNUAL WELLNESS VISIT, SUBSEQUENT: Primary | ICD-10-CM

## 2018-09-13 DIAGNOSIS — E78.5 HYPERLIPIDEMIA LDL GOAL <70: ICD-10-CM

## 2018-09-13 DIAGNOSIS — Z23 NEED FOR PROPHYLACTIC VACCINATION AND INOCULATION AGAINST INFLUENZA: ICD-10-CM

## 2018-09-13 DIAGNOSIS — N13.8 HYPERTROPHY OF PROSTATE WITH URINARY OBSTRUCTION: ICD-10-CM

## 2018-09-13 DIAGNOSIS — N40.1 HYPERTROPHY OF PROSTATE WITH URINARY OBSTRUCTION: ICD-10-CM

## 2018-09-13 DIAGNOSIS — Z12.11 SPECIAL SCREENING FOR MALIGNANT NEOPLASMS, COLON: ICD-10-CM

## 2018-09-13 LAB
ANION GAP SERPL CALCULATED.3IONS-SCNC: 4 MMOL/L (ref 3–14)
BUN SERPL-MCNC: 13 MG/DL (ref 7–30)
CALCIUM SERPL-MCNC: 8.7 MG/DL (ref 8.5–10.1)
CHLORIDE SERPL-SCNC: 104 MMOL/L (ref 94–109)
CO2 SERPL-SCNC: 28 MMOL/L (ref 20–32)
CREAT SERPL-MCNC: 0.87 MG/DL (ref 0.66–1.25)
GFR SERPL CREATININE-BSD FRML MDRD: 86 ML/MIN/1.7M2
GLUCOSE SERPL-MCNC: 87 MG/DL (ref 70–99)
POTASSIUM SERPL-SCNC: 4.2 MMOL/L (ref 3.4–5.3)
SODIUM SERPL-SCNC: 136 MMOL/L (ref 133–144)

## 2018-09-13 PROCEDURE — 80048 BASIC METABOLIC PNL TOTAL CA: CPT | Performed by: FAMILY MEDICINE

## 2018-09-13 PROCEDURE — G0008 ADMIN INFLUENZA VIRUS VAC: HCPCS | Performed by: FAMILY MEDICINE

## 2018-09-13 PROCEDURE — 99397 PER PM REEVAL EST PAT 65+ YR: CPT | Mod: 25 | Performed by: FAMILY MEDICINE

## 2018-09-13 PROCEDURE — 36415 COLL VENOUS BLD VENIPUNCTURE: CPT | Performed by: FAMILY MEDICINE

## 2018-09-13 PROCEDURE — 90662 IIV NO PRSV INCREASED AG IM: CPT | Performed by: FAMILY MEDICINE

## 2018-09-13 RX ORDER — ROSUVASTATIN CALCIUM 40 MG/1
40 TABLET, COATED ORAL EVERY EVENING
Qty: 90 TABLET | Refills: 3 | Status: SHIPPED | OUTPATIENT
Start: 2018-09-13 | End: 2019-09-18

## 2018-09-13 RX ORDER — METOPROLOL SUCCINATE 25 MG/1
25 TABLET, EXTENDED RELEASE ORAL 2 TIMES DAILY
Qty: 180 TABLET | Refills: 3 | Status: SHIPPED | OUTPATIENT
Start: 2018-09-13 | End: 2019-09-18

## 2018-09-13 RX ORDER — CLOPIDOGREL BISULFATE 75 MG/1
75 TABLET ORAL DAILY
Qty: 90 TABLET | Refills: 3 | Status: SHIPPED | OUTPATIENT
Start: 2018-09-13 | End: 2019-09-18

## 2018-09-13 RX ORDER — ROSUVASTATIN CALCIUM 40 MG/1
40 TABLET, COATED ORAL EVERY EVENING
Qty: 90 TABLET | Refills: 3 | Status: CANCELLED | OUTPATIENT
Start: 2018-09-13

## 2018-09-13 RX ORDER — NITROGLYCERIN 0.4 MG/1
0.4 TABLET SUBLINGUAL EVERY 5 MIN PRN
Qty: 25 TABLET | Refills: 0 | Status: SHIPPED | OUTPATIENT
Start: 2018-09-13 | End: 2019-04-15

## 2018-09-13 ASSESSMENT — ACTIVITIES OF DAILY LIVING (ADL)
CURRENT_FUNCTION: NO ASSISTANCE NEEDED
I_NEED_ASSISTANCE_FOR_THE_FOLLOWING_DAILY_ACTIVITIES:: NO ASSISTANCE IS NEEDED

## 2018-09-13 ASSESSMENT — PAIN SCALES - GENERAL: PAINLEVEL: NO PAIN (0)

## 2018-09-13 NOTE — PROGRESS NOTES
SUBJECTIVE:   Derrick Morrison is a 72 year old male who presents for Preventive Visit.    Are you in the first 12 months of your Medicare coverage?  No    Physical   Annual:     Getting at least 3 servings of Calcium per day:  Yes    Bi-annual eye exam:  Yes    Dental care twice a year:  Yes    Sleep apnea or symptoms of sleep apnea:  Daytime drowsiness    Diet:  Regular (no restrictions)    Frequency of exercise:  2-3 days/week    Duration of exercise:  15-30 minutes    Taking medications regularly:  Yes    Medication side effects:  None    Additional concerns today:  YES    Ability to successfully perform activities of daily living: no assistance needed    Home Safety:  Lack of grab bars in the bathroom    Hearing Impairment: difficulty following a conversation in a noisy restaurant or crowded room and find that men's voices are easier to understand than woman's    1.) Referral for Hearing test  2.) Hip pain with sciatica    CAd: stable, no Cp or sob.  Needs fills on plavix, other CAD meds  Htn: stable on meds  Hyperlipidemia: crestor refills  BpH: avodart.  Stable    No cp or sob.  Some L hip/back pain, improving.  No injury.  Feeling good.  Willing to do FIT test, doesn't want colonosocpy.            COGNITIVE SCREEN  1) Repeat 3 items (Leader, Season, Table)    2) Clock draw: NORMAL  3) 3 item recall: Recalls 2 objects   Results: NORMAL clock, 1-2 items recalled: COGNITIVE IMPAIRMENT LESS LIKELY    Mini-CogTM Copyright S Jud. Licensed by the author for use in [facility]; reprinted with permission (snehal@.[email or web address]). All rights reserved.        Reviewed and updated as needed this visit by clinical staff         Reviewed and updated as needed this visit by Provider        Social History   Substance Use Topics     Smoking status: Never Smoker     Smokeless tobacco: Never Used     Alcohol use Yes      Comment: occasionally       Alcohol Use 9/13/2018   If you drink alcohol do you typically have greater  than 3 drinks per day OR greater than 7 drinks per week? No           Today's PHQ-2 Score:   PHQ-2 ( 1999 Pf[employer]) 9/13/2018   Q1: Little interest or pleasure in doing things 0   Q2: Feeling down, depressed or hopeless 0   PHQ-2 Score 0   Q1: Little interest or pleasure in doing things Not at all   Q2: Feeling down, depressed or hopeless Not at all   PHQ-2 Score 0       Do you feel safe in your environment - Yes    Do you have a Health Care Directive?: Yes: Patient states has Advance Directive and will bring in a copy to clinic.    Current providers sharing in care for this patient include:   Patient Care Team:  Kaiden Zapata MD as PCP - General (Family Practice)    The following health maintenance items are reviewed in Epic and correct as of today:  Health Maintenance   Topic Date Due     COLON CANCER SCREEN (SYSTEM ASSIGNED)  07/12/2016     INFLUENZA VACCINE (1) 09/01/2018     FALL RISK ASSESSMENT  08/30/2018     PHQ-2 Q1 YR  05/16/2019     ADVANCE DIRECTIVE PLANNING Q5 YRS  08/14/2020     LIPID SCREEN Q5 YR MALE (SYSTEM ASSIGNED)  04/18/2023     TETANUS IMMUNIZATION (SYSTEM ASSIGNED)  08/24/2025     PNEUMOCOCCAL  Completed     AORTIC ANEURYSM SCREENING (SYSTEM ASSIGNED)  Completed     HEPATITIS C SCREENING  Completed             Review of Systems  Gen: no unexpected wt loss or fevers.    ENT: vision ok, no hearing changes, no lumps noted in neck or mouth  CV: no chest pain or SOB, no palpitations  RESP: No wheezing or pleuritic pain no cough  GI: no nausea or vomiting, no abd pain.  No blood in stool.   : no dysuria or hematuria. No urinary retention.  No lesions on genitals noted.    MUSC: moving all extremities, no edema  ENDO: no excessive thirst or urination.    NEURO: no difficulty speaking, no focal weakness or changes in sensation.  No balance troubles.   PSYCH: mood stable,  no significant problems with anxiety  SKIN: no worrisome rashes or lesions      OBJECTIVE:   /66 (BP Location: Right arm,  "Patient Position: Chair, Cuff Size: Adult Regular)  Pulse 60  Temp 96.9  F (36.1  C) (Tympanic)  Resp 16  Ht 5' 9\" (1.753 m)  Wt 198 lb 9.6 oz (90.1 kg)  BMI 29.33 kg/m2 Estimated body mass index is 29.53 kg/(m^2) as calculated from the following:    Height as of 6/18/18: 5' 9\" (1.753 m).    Weight as of 6/18/18: 200 lb (90.7 kg).  Physical Exam  Gen: alert and oriented, in no acute distress, affect within normal limits  Neck: supple with no masses or nodes  Throat: oropharynx clear, no exudate or tonsillar/palate asymmetry.    CV: RRR, no murmur  Lungs: clear bilaterally with good effort  Abd: nontender, no mass  Ext: no edema or lesions   Neuro: moving all extremities, gait normal, no focal deficts noted      ASSESSMENT / PLAN:   Wellness visit  CAd: stable, no Cp or sob.  Needs fills on plavix, other CAD meds  Htn: stable on meds  Hyperlipidemia: crestor refills  BpH: avodart.  Stable    Fills.  Labs.  Flu shot, fit test.  Doing well.  Stay active.    F/u one year.      End of Life Planning:  Patient currently has an advanced directive: Yes.  Practitioner is supportive of decision.    COUNSELING:  Reviewed preventive health counseling, as reflected in patient instructions       Regular exercise       Healthy diet/nutrition    BP Readings from Last 1 Encounters:   07/17/18 122/74     Estimated body mass index is 29.53 kg/(m^2) as calculated from the following:    Height as of 6/18/18: 5' 9\" (1.753 m).    Weight as of 6/18/18: 200 lb (90.7 kg).           reports that he has never smoked. He has never used smokeless tobacco.      Appropriate preventive services were discussed with this patient, including applicable screening as appropriate for cardiovascular disease, diabetes, osteopenia/osteoporosis, and glaucoma.  As appropriate for age/gender, discussed screening for colorectal cancer, prostate cancer, breast cancer, and cervical cancer. Checklist reviewing preventive services available has been given to " the patient.    Reviewed patients plan of care and provided an AVS. The Basic Care Plan (routine screening as documented in Health Maintenance) for Derrick meets the Care Plan requirement. This Care Plan has been established and reviewed with the Patient.        Kaiden Zapata MD  [facility]    Injectable Influenza Immunization Documentation    1.  Is the person to be vaccinated sick today?   No    2. Does the person to be vaccinated have an allergy to a component   of the vaccine?   No  Egg Allergy Algorithm Link    3. Has the person to be vaccinated ever had a serious reaction   to influenza vaccine in the past?   No    4. Has the person to be vaccinated ever had Guillain-Barré syndrome?   No    Form completed by Lisbeth Reed MA  8:34 AM 9/13/2018

## 2018-09-13 NOTE — MR AVS SNAPSHOT
After Visit Summary   9/13/2018    Derrick Morrison    MRN: [id number]           Patient Information     Date Of Birth          1945        Visit Information        Provider Department      9/13/2018 8:20 AM Kaiden Zapata MD [facility]        Today's Diagnoses     Need for prophylactic vaccination and inoculation against influenza    -  1    Hyperlipidemia LDL goal <70        Coronary artery disease involving native heart without angina pectoris, unspecified vessel or lesion type        Special screening for malignant neoplasms, colon        Sensorineural hearing loss, bilateral          Care Instructions      Preventive Health Recommendations:       Male Ages 65 and over    Yearly exam:             See your health care provider every year in order to  o   Review health changes.   o   Discuss preventive care.    o   Review your medicines if your doctor has prescribed any.    Talk with your health care provider about whether you should have a test to screen for prostate cancer (PSA).    Every 3 years, have a diabetes test (fasting glucose). If you are at risk for diabetes, you should have this test more often.    Every 5 years, have a cholesterol test. Have this test more often if you are at risk for high cholesterol or heart disease.     Every 10 years, have a colonoscopy. Or, have a yearly FIT test (stool test). These exams will check for colon cancer.    Talk to with your health care provider about screening for Abdominal Aortic Aneurysm if you have a family history of AAA or have a history of smoking.  Shots:     Get a flu shot each year.     Get a tetanus shot every 10 years.     Talk to your doctor about your pneumonia vaccines. There are now two you should receive - Pneumovax (PPSV 23) and Prevnar (PCV 13).    Talk to your pharmacist about a shingles vaccine.     Talk to your doctor about the hepatitis B vaccine.  Nutrition:     Eat at least 5 servings of fruits and  vegetables each day.     Eat whole-grain bread, whole-wheat pasta and brown rice instead of white grains and rice.     Get adequate Calcium and Vitamin D.   Lifestyle    Exercise for at least 150 minutes a week (30 minutes a day, 5 days a week). This will help you control your weight and prevent disease.     Limit alcohol to one drink per day.     No smoking.     Wear sunscreen to prevent skin cancer.     See your dentist every six months for an exam and cleaning.     See your eye doctor every 1 to 2 years to screen for conditions such as glaucoma, macular degeneration and cataracts.          Follow-ups after your visit        Additional Services     AUDIOLOGY ADULT REFERRAL       Your provider has referred you to: [facility] [phone]   [email or web address]    Specialty Testing:  Follow up9 for hearing aids                  Future tests that were ordered for you today     Open Future Orders        Priority Expected Expires Ordered    Fecal colorectal cancer screen (FIT) Routine 10/4/2018 12/6/2018 9/13/2018            Who to contact     If you have questions or need follow up information about today's clinic visit or your schedule please contact [facility] directly at [phone].  Normal or non-critical lab and imaging results will be communicated to you by MyChart, letter or phone within 4 business days after the clinic has received the results. If you do not hear from us within 7 days, please contact the clinic through MyChart or phone. If you have a critical or abnormal lab result, we will notify you by phone as soon as possible.  Submit refill requests through [employer] or call your pharmacy and they will forward the refill request to us. Please allow 3 business days for your refill to be completed.          Additional Information About Your Visit        [employer] Information     [employer] gives you secure access to your electronic  "health record. If you see a primary care provider, you can also send messages to your care team and make appointments. If you have questions, please call your primary care clinic.  If you do not have a primary care provider, please call [phone] and they will assist you.        Care EveryWhere ID     This is your Care EveryWhere ID. This could be used by other organizations to access your [place] medical records  [id number]        Your Vitals Were     Pulse Temperature Respirations Height BMI (Body Mass Index)       60 96.9  F (36.1  C) (Tympanic) 16 5' 9\" (1.753 m) 29.33 kg/m2        Blood Pressure from Last 3 Encounters:   09/13/18 112/66   07/17/18 122/74   07/16/18 111/69    Weight from Last 3 Encounters:   09/13/18 198 lb 9.6 oz (90.1 kg)   06/18/18 200 lb (90.7 kg)   05/16/18 200 lb 3.2 oz (90.8 kg)              We Performed the Following     AUDIOLOGY ADULT REFERRAL     Basic metabolic panel  (Ca, Cl, CO2, Creat, Gluc, K, Na, BUN)     FLU VACCINE, INCREASED ANTIGEN, PRESV FREE, AGE 65+ [58041]     Vaccine Administration, Initial [40032]          Today's Medication Changes          These changes are accurate as of 9/13/18  8:51 AM.  If you have any questions, ask your nurse or doctor.               These medicines have changed or have updated prescriptions.        Dose/Directions    clopidogrel 75 MG tablet   Commonly known as:  PLAVIX   This may have changed:  See the new instructions.   Used for:  Coronary artery disease involving native heart without angina pectoris, unspecified vessel or lesion type   Changed by:  Kaiden Zapata MD        Dose:  75 mg   Take 1 tablet (75 mg) by mouth daily   Quantity:  90 tablet   Refills:  3            Where to get your medicines      These medications were sent to [facility] [address] [address]     Phone:  [phone]     clopidogrel 75 MG tablet    metoprolol succinate 25 MG 24 hr tablet    " nitroGLYcerin 0.4 MG sublingual tablet    rosuvastatin 40 MG tablet                Primary Care Provider Office Phone # Fax #    Kaiden Zapata [phone] [address] [place] [id number]        Equal Access to Services     MAGNOJAN CARTER : Hadii aad ku haddeliao Soomaali, waaxda luqadaha, qaybta kaalmada adeegyada, waxgeraldo idiin haylokin adetracy carcamo lamarkchristiano martinez. So [facility] [phone].    ATENCIÓN: Si habla español, tiene a villar disposición servicios gratuitos de asistencia lingüística. Llame al [phone].    We comply with applicable federal civil rights laws and [place] laws. We do not discriminate on the basis of race, color, national origin, age, disability, sex, sexual orientation, or gender identity.            Thank you!     Thank you for choosing [facility]  for your care. Our goal is always to provide you with excellent care. Hearing back from our patients is one way we can continue to improve our services. Please take a few minutes to complete the written survey that you may receive in the mail after your visit with us. Thank you!             Your Updated Medication List - Protect others around you: Learn how to safely use, store and throw away your medicines at www.disposemymeds.org.          This list is accurate as of 9/13/18  8:51 AM.  Always use your most recent med list.                   Brand Name Dispense Instructions for use Diagnosis    ASPIRIN NOT PRESCRIBED    INTENTIONAL    0 each    1 each continuous prn Antiplatelet medication not prescribed intentionally due to plavix    CAD (coronary artery disease)       clopidogrel 75 MG tablet    PLAVIX    90 tablet    Take 1 tablet (75 mg) by mouth daily    Coronary artery disease involving native heart without angina pectoris, unspecified vessel or lesion type       dutasteride 0.5 MG capsule    AVODART    90 capsule    Take 1 capsule (0.5 mg) by mouth daily    Hypertrophy of prostate with urinary  obstruction       metoprolol succinate 25 MG 24 hr tablet    TOPROL-XL    180 tablet    Take 1 tablet (25 mg) by mouth 2 times daily    Coronary artery disease involving native heart without angina pectoris, unspecified vessel or lesion type       nitroGLYcerin 0.4 MG sublingual tablet    NITROSTAT    25 tablet    Place 1 tablet (0.4 mg) under the tongue every 5 minutes as needed for chest pain    Coronary artery disease involving native heart without angina pectoris, unspecified vessel or lesion type       rosuvastatin 40 MG tablet    CRESTOR    90 tablet    Take 1 tablet (40 mg) by mouth every evening    Hyperlipidemia LDL goal <70

## 2018-09-13 NOTE — NURSING NOTE
"Chief Complaint   Patient presents with     Physical       Initial /66 (BP Location: Right arm, Patient Position: Chair, Cuff Size: Adult Regular)  Pulse 60  Temp 96.9  F (36.1  C) (Tympanic)  Resp 16  Ht 5' 9\" (1.753 m)  Wt 198 lb 9.6 oz (90.1 kg)  BMI 29.33 kg/m2 Estimated body mass index is 29.33 kg/(m^2) as calculated from the following:    Height as of this encounter: 5' 9\" (1.753 m).    Weight as of this encounter: 198 lb 9.6 oz (90.1 kg).    Patient presents to the clinic using No DME    Health Maintenance that is potentially due pending provider review:  Colonoscopy/FIT - Would rather do FIT test    Lisbeth Reed MA  8:25 AM 9/13/2018  .        "

## 2018-09-25 PROCEDURE — G0328 FECAL BLOOD SCRN IMMUNOASSAY: HCPCS | Performed by: FAMILY MEDICINE

## 2018-09-28 DIAGNOSIS — Z12.11 SPECIAL SCREENING FOR MALIGNANT NEOPLASMS, COLON: ICD-10-CM

## 2018-09-29 LAB — HEMOCCULT STL QL IA: NEGATIVE

## 2018-10-24 ENCOUNTER — OFFICE VISIT (OUTPATIENT)
Dept: AUDIOLOGY | Facility: CLINIC | Age: 73
End: 2018-10-24
Payer: COMMERCIAL

## 2018-10-24 DIAGNOSIS — H90.3 SENSORINEURAL HEARING LOSS, BILATERAL: Primary | ICD-10-CM

## 2018-10-24 PROCEDURE — 92567 TYMPANOMETRY: CPT | Performed by: AUDIOLOGIST

## 2018-10-24 PROCEDURE — 99207 ZZC NO CHARGE LOS: CPT | Performed by: AUDIOLOGIST

## 2018-10-24 PROCEDURE — 92557 COMPREHENSIVE HEARING TEST: CPT | Performed by: AUDIOLOGIST

## 2018-10-24 NOTE — PROGRESS NOTES
AUDIOLOGY REPORT    SUBJECTIVE:  Derrick Morrison is a 73 year old male who [facility], referred by Dr. Zapata.  The patient has been seen previously in this clinic for assessment and results indicated a mild to severe sensorineural hearing loss bilaterally. The patient is currently wearing Phonak Bolero V70-P hearing aids that were fit 4/13/2016. The patient reports he is having greater difficulty hearing his family and friends. The patient denies bilateral otalgia and bilateral drainage.     OBJECTIVE:  Otoscopic exam indicates ears are clear of cerumen bilaterally     Pure Tone Thresholds assessed using conventional audiometry with good  reliability from 250-8000 Hz bilaterally using circumaural headphones     RIGHT:  borderline-normal, mild, moderate and severe sensorineural hearing loss    LEFT:    borderline-normal, mild, moderate and severe sensorineural hearing loss    Tympanogram:    RIGHT: normal eardrum mobility    LEFT:   normal eardrum mobility    Speech Reception Threshold:    RIGHT: 25 dB HL    LEFT:   30 dB HL  Word Recognition Score:     RIGHT: 88% at 65 dB HL using W22 recorded word list.    LEFT:   88% at 70 dB HL using W22 recorded word list.      ASSESSMENT:   Borderline normal sloping to mild to severe sensorineural hearing loss bilaterally.     Compared to patient's previous audiogram dated 3/15/2016, hearing has remained stable bilaterally.  Today s results were discussed with the patient in detail.     PLAN: It is recommended that the patient return to clinic for a hearing aid recheck and verification. Patient was counseled regarding hearing loss and impact on communication.    Please call this clinic with questions regarding these results or recommendations.        Blanca Santos M.A. MARVIN-AAA  Clinical audiologist [place] # [id number]  10/24/2018

## 2018-10-24 NOTE — MR AVS SNAPSHOT
After Visit Summary   10/24/2018    Derrick Morrison    MRN: [id number]           Patient Information     Date Of Birth          1945        Visit Information        Provider Department      10/24/2018 9:00 AM Blanca Santos AuD [facility]        Today's Diagnoses     Sensorineural hearing loss, bilateral    -  1       Follow-ups after your visit        Your next 10 appointments already scheduled     Nov 07, 2018  8:30 AM CST   Return Visit with Beatrice Rosas   [facility] ([facility])    [address] [address]   [phone]              Who to contact     If you have questions or need follow up information about today's clinic visit or your schedule please contact [facility] directly at [phone].  Normal or non-critical lab and imaging results will be communicated to you by Slicehart, letter or phone within 4 business days after the clinic has received the results. If you do not hear from us within 7 days, please contact the clinic through Slicehart or phone. If you have a critical or abnormal lab result, we will notify you by phone as soon as possible.  Submit refill requests through RedDrummer or call your pharmacy and they will forward the refill request to us. Please allow 3 business days for your refill to be completed.          Additional Information About Your Visit        MyChart Information     RedDrummer gives you secure access to your electronic health record. If you see a primary care provider, you can also send messages to your care team and make appointments. If you have questions, please call your primary care clinic.  If you do not have a primary care provider, please call [phone] and they will assist you.        Care EveryWhere ID     This is your Care EveryWhere ID. This could be used by other organizations to access your [employer] medical records  [id number]         Blood Pressure from Last 3  Encounters:   09/13/18 112/66   07/17/18 122/74   07/16/18 111/69    Weight from Last 3 Encounters:   09/13/18 198 lb 9.6 oz (90.1 kg)   06/18/18 200 lb (90.7 kg)   05/16/18 200 lb 3.2 oz (90.8 kg)              We Performed the Following     AUDIOGRAM/TYMPANOGRAM - INTERFACE     COMPREHENSIVE HEARING TEST     TYMPANOMETRY        Primary Care Provider Office Phone # Fax #    Kaiden Zapata [phone] [address] [place] [address]        Equal Access to Services     [facility]: Hadii aad ku hadasho Soomaali, waaxda luqadaha, qaybta kaalmada adeegyada, bay bauer . So [facility] [phone].    ATENCIÓN: Si habla español, tiene a villar disposición servicios gratuitos de asistencia lingüística. [facility] [phone].    We comply with applicable federal civil rights laws and [place] laws. We do not discriminate on the basis of race, color, national origin, age, disability, sex, sexual orientation, or gender identity.            Thank you!     Thank you for choosing [facility]  for your care. Our goal is always to provide you with excellent care. Hearing back from our patients is one way we can continue to improve our services. Please take a few minutes to complete the written survey that you may receive in the mail after your visit with us. Thank you!             Your Updated Medication List - Protect others around you: Learn how to safely use, store and throw away your medicines at www.disposemymeds.org.          This list is accurate as of 10/24/18  9:39 AM.  Always use your most recent med list.                   Brand Name Dispense Instructions for use Diagnosis    ASPIRIN NOT PRESCRIBED    INTENTIONAL    0 each    1 each continuous prn Antiplatelet medication not prescribed intentionally due to plavix    CAD (coronary artery disease)       clopidogrel 75 MG tablet    PLAVIX    90 tablet    Take 1 tablet (75 mg) by mouth daily     Coronary artery disease involving native heart without angina pectoris, unspecified vessel or lesion type       dutasteride 0.5 MG capsule    AVODART    90 capsule    Take 1 capsule (0.5 mg) by mouth daily    Hypertrophy of prostate with urinary obstruction       metoprolol succinate 25 MG 24 hr tablet    TOPROL-XL    180 tablet    Take 1 tablet (25 mg) by mouth 2 times daily    Coronary artery disease involving native heart without angina pectoris, unspecified vessel or lesion type       nitroGLYcerin 0.4 MG sublingual tablet    NITROSTAT    25 tablet    Place 1 tablet (0.4 mg) under the tongue every 5 minutes as needed for chest pain    Coronary artery disease involving native heart without angina pectoris, unspecified vessel or lesion type       rosuvastatin 40 MG tablet    CRESTOR    90 tablet    Take 1 tablet (40 mg) by mouth every evening    Hyperlipidemia LDL goal <70

## 2018-11-07 ENCOUNTER — OFFICE VISIT (OUTPATIENT)
Dept: AUDIOLOGY | Facility: CLINIC | Age: 73
End: 2018-11-07
Payer: COMMERCIAL

## 2018-11-07 DIAGNOSIS — H90.3 SENSORINEURAL HEARING LOSS, BILATERAL: Primary | ICD-10-CM

## 2018-11-07 PROCEDURE — V5299 HEARING SERVICE: HCPCS | Performed by: AUDIOLOGIST

## 2018-11-07 PROCEDURE — 99207 ZZC NO CHARGE LOS: CPT | Performed by: AUDIOLOGIST

## 2018-11-07 NOTE — MR AVS SNAPSHOT
After Visit Summary   11/7/2018    Derrick Morrison    MRN: [id number]           Patient Information     Date Of Birth          1945        Visit Information        Provider Department      11/7/2018 8:30 AM Blanca Santos [facility]D [facility]        Today's Diagnoses     Sensorineural hearing loss, bilateral    -  1       Follow-ups after your visit        Who to contact     If you have questions or need follow up information about today's clinic visit or your schedule please contact [facility] directly at [phone].  Normal or non-critical lab and imaging results will be communicated to you by Black Oceanhart, letter or phone within 4 business days after the clinic has received the results. If you do not hear from us within 7 days, please contact the clinic through Theravasct or phone. If you have a critical or abnormal lab result, we will notify you by phone as soon as possible.  Submit refill requests through Interviewstreet or call your pharmacy and they will forward the refill request to us. Please allow 3 business days for your refill to be completed.          Additional Information About Your Visit        MyChart Information     Interviewstreet gives you secure access to your electronic health record. If you see a primary care provider, you can also send messages to your care team and make appointments. If you have questions, please call your primary care clinic.  If you do not have a primary care provider, please call [phone] and they will assist you.        Care EveryWhere ID     This is your Care EveryWhere ID. This could be used by other organizations to access your [place] medical records  [id number]         Blood Pressure from Last 3 Encounters:   09/13/18 112/66   07/17/18 122/74   07/16/18 111/69    Weight from Last 3 Encounters:   09/13/18 198 lb 9.6 oz (90.1 kg)   06/18/18 200 lb (90.7 kg)   05/16/18 200 lb 3.2 oz (90.8 kg)              We Performed the Following      HEARING AID CHECK/NO CHARGE        Primary Care Provider Office Phone # Fax #    Kaiden Zapata [phone]       [address] [place] [address]        Equal Access to Services     IRINEO MACHADO : Hadsusie sharon ku jameso Soelzaali, waaxda luqadaha, qaybta kaalmada adeegyada, bay cohenmarleny martinez. So [facility] [phone].    ATENCIÓN: Si habla español, tiene a villar disposición servicios gratuitos de asistencia lingüística. Llame al [phone].    We comply with applicable federal civil rights laws and [place] laws. We do not discriminate on the basis of race, color, national origin, age, disability, sex, sexual orientation, or gender identity.            Thank you!     Thank you for choosing [facility]  for your care. Our goal is always to provide you with excellent care. Hearing back from our patients is one way we can continue to improve our services. Please take a few minutes to complete the written survey that you may receive in the mail after your visit with us. Thank you!             Your Updated Medication List - Protect others around you: Learn how to safely use, store and throw away your medicines at www.disposemymeds.org.          This list is accurate as of 11/7/18 10:49 AM.  Always use your most recent med list.                   Brand Name Dispense Instructions for use Diagnosis    ASPIRIN NOT PRESCRIBED    INTENTIONAL    0 each    1 each continuous prn Antiplatelet medication not prescribed intentionally due to plavix    CAD (coronary artery disease)       clopidogrel 75 MG tablet    PLAVIX    90 tablet    Take 1 tablet (75 mg) by mouth daily    Coronary artery disease involving native heart without angina pectoris, unspecified vessel or lesion type       dutasteride 0.5 MG capsule    AVODART    90 capsule    Take 1 capsule (0.5 mg) by mouth daily    Hypertrophy of prostate with urinary obstruction       metoprolol succinate 25 MG 24 hr tablet     TOPROL-XL    180 tablet    Take 1 tablet (25 mg) by mouth 2 times daily    Coronary artery disease involving native heart without angina pectoris, unspecified vessel or lesion type       nitroGLYcerin 0.4 MG sublingual tablet    NITROSTAT    25 tablet    Place 1 tablet (0.4 mg) under the tongue every 5 minutes as needed for chest pain    Coronary artery disease involving native heart without angina pectoris, unspecified vessel or lesion type       rosuvastatin 40 MG tablet    CRESTOR    90 tablet    Take 1 tablet (40 mg) by mouth every evening    Hyperlipidemia LDL goal <70

## 2018-11-07 NOTE — PROGRESS NOTES
[facility]         SUBJECTIVE:  Derrick Morrison, 73 year old male comes in for a hearing aid recheck. He is currently using Phonak Bolero V70-P hearing aids that were fit 4/13/2016. A hearing evaluation was done 10/24/2018 and revealed a melissa sloping to severe sensorineural hearing loss bilaterally. Patient feels like he is not hearing as well with his hearing aids.     OBJECTIVE:  Verified hearing aid setting to NAL-NL2 targets for soft and average speech levels.Verified hearing aid functionality, including the VC function.       ASSESSMENT/PLAN:    Discussed realistic hearing aid benefits and limitations. Reviewed hearing aid care and use. Discussed warranty expiration 6/27/2019.    See chart in the hearing aid room.     Blanca WONG-MIGUEL, #[id number]

## 2019-04-15 DIAGNOSIS — I25.10 CORONARY ARTERY DISEASE INVOLVING NATIVE HEART WITHOUT ANGINA PECTORIS, UNSPECIFIED VESSEL OR LESION TYPE: ICD-10-CM

## 2019-04-15 RX ORDER — NITROGLYCERIN 0.4 MG/1
TABLET SUBLINGUAL
Qty: 25 TABLET | Refills: 1 | Status: SHIPPED | OUTPATIENT
Start: 2019-04-15 | End: 2019-08-27

## 2019-04-15 NOTE — TELEPHONE ENCOUNTER
"Requested Prescriptions   Pending Prescriptions Disp Refills     nitroGLYcerin (NITROSTAT) 0.4 MG sublingual tablet [Pharmacy Med Name: NITROGLYCERIN 0.4MG SUBL] 25 tablet 0     Sig: PLACE 1 TABLET UNDER THE TONGUE AT THE ONSET OF CHEST PAIN. MAY REPEAT EVERY 5 MINUTES AS NEEDED FOR A TOTAL OF 3 DOSES.       Nitrates Failed - 4/15/2019 10:16 AM        Failed - Sublingual nitro order needs review     If refill exceeds 1 bottle per month, please forward request to provider.           Passed - Blood pressure under 140/90 in past 12 months     BP Readings from Last 3 Encounters:   09/13/18 112/66   07/17/18 122/74   07/16/18 111/69                 Passed - Pt is not on erectile dysfunction medications        Passed - Recent (12 mo) or future (30 days) visit within the authorizing provider's specialty     Patient had office visit in the last 12 months or has a visit in the next 30 days with authorizing provider or within the authorizing provider's specialty.  See \"Patient Info\" tab in inbasket, or \"Choose Columns\" in Meds & Orders section of the refill encounter.              Passed - Medication is active on med list        Passed - Patient is age 18 or older      nitroGLYcerin (NITROSTAT) 0.4 MG sublingual tablet  Last Written Prescription Date:  09/13/2018  Last Fill Quantity: 25 tablet,  # refills: 0   Last office visit: 9/13/2018 with prescribing provider:  DEEDEE Zapata   Future Office Visit:      Suzanne King RT (R) (M)      "

## 2019-05-01 ENCOUNTER — OFFICE VISIT (OUTPATIENT)
Dept: FAMILY MEDICINE | Facility: CLINIC | Age: 74
End: 2019-05-01
Payer: COMMERCIAL

## 2019-05-01 VITALS
WEIGHT: 202 LBS | OXYGEN SATURATION: 95 % | BODY MASS INDEX: 29.92 KG/M2 | DIASTOLIC BLOOD PRESSURE: 62 MMHG | TEMPERATURE: 97.8 F | HEART RATE: 58 BPM | SYSTOLIC BLOOD PRESSURE: 108 MMHG | RESPIRATION RATE: 18 BRPM | HEIGHT: 69 IN

## 2019-05-01 DIAGNOSIS — R06.02 SHORTNESS OF BREATH: Primary | ICD-10-CM

## 2019-05-01 PROCEDURE — 99214 OFFICE O/P EST MOD 30 MIN: CPT | Performed by: FAMILY MEDICINE

## 2019-05-01 RX ORDER — DUTASTERIDE 0.5 MG/1
1 CAPSULE, LIQUID FILLED ORAL DAILY
Qty: 90 CAPSULE | Refills: 3 | Status: CANCELLED | OUTPATIENT
Start: 2019-05-01

## 2019-05-01 ASSESSMENT — MIFFLIN-ST. JEOR: SCORE: 1656.65

## 2019-05-01 NOTE — NURSING NOTE
"Chief Complaint   Patient presents with     Breathing Problem       Initial /62 (BP Location: Right arm, Patient Position: Chair, Cuff Size: Adult Regular)   Pulse 58   Temp 97.8  F (36.6  C) (Tympanic)   Resp 18   Ht 1.753 m (5' 9\")   Wt 91.6 kg (202 lb)   SpO2 95%   BMI 29.83 kg/m   Estimated body mass index is 29.83 kg/m  as calculated from the following:    Height as of this encounter: 1.753 m (5' 9\").    Weight as of this encounter: 91.6 kg (202 lb).    Patient presents to the clinic using No DME    Health Maintenance that is potentially due pending provider review:  none    Lisbeth Reed MA  10:32 AM 5/1/2019  .        "

## 2019-05-01 NOTE — PROGRESS NOTES
"  SUBJECTIVE:   Derrick Morrison is a 73 year old male who presents to clinic today for the following   health issues:      1.) Breathing concerns  - having SOB when walking up and down stairs  - no issues when walking on a level surface.   - Currently walks on Treadmill 1 mile every day and doesn't seem to have any issues     S: Derrick Morrison is a 73 year old male with shortness of breath last few years.  Mentioned to cardiology a year ago, they did stress test, nuclear perfusion, it was OK.      No real changes since then, still notices in certain situations  .  Fine on treadmill for 30 min.      Stairs sometimes, pushing fertilizer .      No smoking hx.  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  Cbc has been fine    BP and pulse run a bit low, but he has PVCs when not on toprol 25mg bid, so he won't cut back on that.    No other BP meds    Problem list, med list, additional histories reviewed and updated, as indicated.      O:/62 (BP Location: Right arm, Patient Position: Chair, Cuff Size: Adult Regular)   Pulse 58   Temp 97.8  F (36.6  C) (Tympanic)   Resp 18   Ht 1.753 m (5' 9\")   Wt 91.6 kg (202 lb)   SpO2 95%   BMI 29.83 kg/m    GEN: Alert and oriented, in no acute distress  CV: RRR, no murmur  RESP: lungs clear bilaterally, good effort  EXT: no edema or lesions noted in lower extremities  Gait normal    A: shortness  Of breath, nos    P: I think this is likely deconditioning/age.  Nonetheless, we will check spirometry as next step to make sure lung function is adequate.  Long talk on expectations with aging, etc.   Reassured by stress test a year ago for these sx.     TT 25 min, more than 1/2 that time counseling on breathing, spirometry, testing, going over older records with him.   "

## 2019-05-08 ENCOUNTER — HOSPITAL ENCOUNTER (OUTPATIENT)
Dept: RESPIRATORY THERAPY | Facility: CLINIC | Age: 74
Discharge: HOME | End: 2019-05-08
Attending: INTERNAL MEDICINE | Admitting: INTERNAL MEDICINE
Payer: MEDICARE

## 2019-05-08 DIAGNOSIS — R06.02 SHORTNESS OF BREATH: ICD-10-CM

## 2019-05-08 PROCEDURE — 94060 EVALUATION OF WHEEZING: CPT | Mod: 26 | Performed by: INTERNAL MEDICINE

## 2019-05-08 PROCEDURE — 94010 BREATHING CAPACITY TEST: CPT

## 2019-05-08 PROCEDURE — 25000125 ZZHC RX 250: Performed by: FAMILY MEDICINE

## 2019-05-08 RX ORDER — ALBUTEROL SULFATE 0.83 MG/ML
2.5 SOLUTION RESPIRATORY (INHALATION) ONCE
Status: COMPLETED | OUTPATIENT
Start: 2019-05-08 | End: 2019-05-08

## 2019-05-08 RX ADMIN — ALBUTEROL SULFATE 2.5 MG: 2.5 SOLUTION RESPIRATORY (INHALATION) at 09:30

## 2019-05-16 LAB
EXPTIME-PRE: 8.45 SEC
FEF2575-%PRED-PRE: 62 %
FEF2575-PRE: 1.39 L/SEC
FEF2575-PRED: 2.25 L/SEC
FEFMAX-%PRED-PRE: 76 %
FEFMAX-PRE: 5.91 L/SEC
FEFMAX-PRED: 7.71 L/SEC
FEV1-%PRED-PRE: 89 %
FEV1-PRE: 2.63 L
FEV1FEV6-PRE: 72 %
FEV1FEV6-PRED: 77 %
FEV1FVC-PRE: 70 %
FEV1FVC-PRED: 76 %
FIFMAX-PRE: 1.24 L/SEC
FVC-%PRED-PRE: 97 %
FVC-PRE: 3.79 L
FVC-PRED: 3.89 L

## 2019-08-05 NOTE — PROGRESS NOTES
Cardiology Clinic Progress Note  Derrick Morrison MRN# [id number]   YOB: 1945 Age: 71 year old     Reason For Visit: Annual follow-up   Primary Cardiologist:   Dr. Lan          History of Presenting Illness:    Derrick Morrison is a pleasant 71 year old patient with a past cardiac history significant for CAD, HTN, palpitations improved after starting beta blocker, and hyperlipidemia.      He previously underwent stenting to the LAD and RCA and had ISR with GERALDINE to the LAD in 2011.   In 2017 he had a negative stress test but did have jaw discomfort with exertion.  He was started on Imdur but this was subsequently discontinued due to side effects of fogginess.    He was seen in the ED on 11/23/2017 for intermittent palpitations with sharp chest pain twinges which correlated with PVCs on telemetry. His metoprolol was increased to 50 mg daily for palpitations which improved his symptoms.     Patient was last seen by Dr. Lan in April 2018.  He continued with occasional jaw and chest discomfort when he exerts himself soon after eating, but otherwise denied any anginal symptoms.  He exercises three times per week without any limitations. Repeat stress test was recommended along with abdominal ultrasound to rule out aneurysm.    He was seen by PCP in May 2019 for shortness of breath with stairs and had no complaints while walking on flat surface.  PFTs were stopped due to lightheadedness.    Pt presents today for annual follow-up. Exercise nuclear stress test 4/23/2018 showed normal perfusion with normal LVEF.   Abdominal ultrasound 4/23/2018 showed no evidence for AAA. Lipid profile 4/18/2018 showing total cholesterol 109 HDL 35 LDL 58 and triglycerides 79.  These results were reviewed with him today.    Since he was last seen, he has not had any jaw pain.  He has been working on decreasing his caffeine.  Unfortunately, his wife had two strokes this past May so he has not been getting any routine exercise.   They have started going for short walks.  He denies any further shortness of breath.  He was trialed on inhalers which he says did not help.  Blood pressure today is well-controlled and he has no significant palpitations. His been trying to work less to help decrease his stress. Patient reports no CP, shortness of breath, PND, orthopnea, presyncope, syncope, edema, heart racing.      Current Cardiac Medications   Rosuvastatin 40 mg daily  Plavix 75 mg daily  Metoprolol XL 25 mg b.i.d.  Nitroglycerin p.r.n.                     Assessment and Plan:     Plan  1.   Follow-up with Dr. Lan in one year      1. CAD    GERALDINE to the proximal LAD for ISR in 2011 with previous stents to the LAD and RCA    Stress test 4/2018 with no ischemia or infarct    No angina (prior angina jaw pain)    Continue statin, beta blocker, plavix       2. Palpitations    Intermittent palpitations with sharp chest pain twinges which correlated with PVCs on telemetry     History of palpitations improved after starting beta blocker    Continue metoprolol      3. Hyperlipidemia    Last LDL 58 4/2018     Continue rosuvastatin 40 mg daily       4.    Hypertension     Controlled     continue metoprolol         Thank you for allowing me to participate in this delightful patient's care.      This note was completed in part using Dragon voice recognition software. Although reviewed after completion, some word and grammatical errors may occur.    MARIA T Cm, CNP           Data:   All laboratory data reviewed      HPI and Plan:   See dictation    Orders Placed This Encounter   Procedures     Follow-Up with Cardiologist       No orders of the defined types were placed in this encounter.      There are no discontinued medications.      Encounter Diagnoses   Name Primary?     Coronary artery disease involving native coronary artery of native heart without angina pectoris Yes     Palpitations      Hyperlipidemia LDL goal <70      Essential  "hypertension        CURRENT MEDICATIONS:  Current Outpatient Medications   Medication Sig Dispense Refill     ASPIRIN NOT PRESCRIBED, INTENTIONAL, 1 each continuous prn Antiplatelet medication not prescribed intentionally due to plavix 0 each 0     clopidogrel (PLAVIX) 75 MG tablet Take 1 tablet (75 mg) by mouth daily 90 tablet 3     dutasteride (AVODART) 0.5 MG capsule Take 1 capsule (0.5 mg) by mouth daily 90 capsule 3     metoprolol succinate (TOPROL-XL) 25 MG 24 hr tablet Take 1 tablet (25 mg) by mouth 2 times daily 180 tablet 3     nitroGLYcerin (NITROSTAT) 0.4 MG sublingual tablet PLACE 1 TABLET UNDER THE TONGUE AT THE ONSET OF CHEST PAIN. MAY REPEAT EVERY 5 MINUTES AS NEEDED FOR A TOTAL OF 3 DOSES. 25 tablet 1     rosuvastatin (CRESTOR) 40 MG tablet Take 1 tablet (40 mg) by mouth every evening 90 tablet 3       ALLERGIES   No Known Allergies    PAST MEDICAL HISTORY:  Past Medical History:   Diagnosis Date     Actinic keratosis      Basal cell carcinoma      BPH w urinary obs/LUTS 10/10/2011    flomax in past, \"quit working\".  Avodart in past.  Tapered off.        CAD (coronary artery disease) 10/10/2011    -8/16/2006 s/p GERALDINE to mid RCA, s/p GERALDINE to mid LAD in [place] -10/4/2007 s/p GERALDINE to pRCA and GERALDINE to dRCA in [place]  -11/17/11 Unstable angina, s/p GERALDINE to prox LAD (jailed small diagonal)       Hyperlipidaemia      Palpitations 8/7/2013    occasional, improved on low dose beta blocker      screening 10/10/2011    2007 colonoscopy \"all clean\" in [place].  He is sure about this.   AAA ultrasound screen 3/07 normal also.  (leg and neck done then too)      Skin cancer     had mohs on L temple       PAST SURGICAL HISTORY:  No past surgical history on file.    FAMILY HISTORY:  Family History   Problem Relation Age of Onset     Coronary Artery Disease Mother        SOCIAL HISTORY:  Social History     Socioeconomic History     Marital status:      Spouse name: None     Number of children: " None     Years of education: None     Highest education level: None   Occupational History     Employer: RETIRED   Social Needs     Financial resource strain: None     Food insecurity:     Worry: None     Inability: None     Transportation needs:     Medical: None     Non-medical: None   Tobacco Use     Smoking status: Never Smoker     Smokeless tobacco: Never Used   Substance and Sexual Activity     Alcohol use: Yes     Comment: occasionally     Drug use: No     Sexual activity: Yes     Partners: Female   Lifestyle     Physical activity:     Days per week: None     Minutes per session: None     Stress: None   Relationships     Social connections:     Talks on phone: None     Gets together: None     Attends Sikh service: None     Active member of club or organization: None     Attends meetings of clubs or organizations: None     Relationship status: None     Intimate partner violence:     Fear of current or ex partner: None     Emotionally abused: None     Physically abused: None     Forced sexual activity: None   Other Topics Concern     Parent/sibling w/ CABG, MI or angioplasty before 65F 55M? No   Social History Narrative     None       Review of Systems:  Skin:  Negative       Eyes:  Positive for glasses    ENT:  Positive for hearing loss    Respiratory:  Negative for shortness of breath;cough     Cardiovascular:    Positive for;palpitations;lightheadedness;dizziness    Gastroenterology: Positive for heartburn    Genitourinary:  Positive for urinary frequency;urgency    Musculoskeletal:  Negative      Neurologic:  Positive for numbness or tingling of feet;memory problems    Psychiatric:  Positive for depression;excessive stress    Heme/Lymph/Imm:  Negative      Endocrine:  Negative        Physical Exam:  Vitals: /83 (BP Location: Right arm, Patient Position: Sitting, Cuff Size: Adult Regular)   Pulse 60   Wt 90.7 kg (200 lb)   SpO2 96%   BMI 29.53 kg/m      Constitutional:  cooperative, alert and  oriented, well developed, well nourished, in no acute distress        Skin:  warm and dry to the touch          Head:  normocephalic        Eyes:  sclera white        Lymph:      ENT:  no pallor or cyanosis        Neck:  no stiffness        Respiratory:  clear to auscultation;normal symmetry         Cardiac: regular rhythm;normal S1 and S2                pulses full and equal                                        GI:  abdomen soft        Extremities and Muscular Skeletal:  no edema              Neurological:  affect appropriate        Psych:  Alert and Oriented x 3        CC  Alonsoal Mike Lan MD  [address]  [place] [id number]

## 2019-08-07 ENCOUNTER — OFFICE VISIT (OUTPATIENT)
Dept: CARDIOLOGY | Facility: CLINIC | Age: 74
End: 2019-08-07
Payer: COMMERCIAL

## 2019-08-07 VITALS
DIASTOLIC BLOOD PRESSURE: 83 MMHG | OXYGEN SATURATION: 96 % | SYSTOLIC BLOOD PRESSURE: 124 MMHG | BODY MASS INDEX: 29.53 KG/M2 | WEIGHT: 200 LBS | HEART RATE: 60 BPM

## 2019-08-07 DIAGNOSIS — R00.2 PALPITATIONS: ICD-10-CM

## 2019-08-07 DIAGNOSIS — I10 ESSENTIAL HYPERTENSION: ICD-10-CM

## 2019-08-07 DIAGNOSIS — E78.5 HYPERLIPIDEMIA LDL GOAL <70: ICD-10-CM

## 2019-08-07 DIAGNOSIS — I25.10 CORONARY ARTERY DISEASE INVOLVING NATIVE CORONARY ARTERY OF NATIVE HEART WITHOUT ANGINA PECTORIS: Primary | ICD-10-CM

## 2019-08-07 PROCEDURE — 99214 OFFICE O/P EST MOD 30 MIN: CPT | Performed by: NURSE PRACTITIONER

## 2019-08-07 NOTE — PATIENT INSTRUCTIONS
"[facility]  [facility]~[address]~[place], [place]~[address]  Thank you for your [facility] visit today. If you have questions regarding your visit, please contact your cardiology RN's, Conchita Bhakta, at [phone]. Your provider has recommended the following:  Medication Changes:  No changes   Recommendations:  1. Call with any questions   Follow-up:  See Dr. Lan for cardiology follow up at [facility]: August 2020  Call in May, to schedule the appointment.  To schedule a future appointment, we kindly ask that you call cardiology scheduling at [phone] three months prior to requested revisit date.      [facility] cardiology clinic is staffed with \"Advance Practice Providers\". These are our cardiology Physician Assistants and Nurse Practitioners.   Please call cardiology scheduling if you feel you need clinical evaluation with them at any time for any cardiac reason.   Reminder:  For your safety, we ask that you bring in your current medication(s) or an updated list of your medications with you to EACH office visit. Include the medication name, dose of pill on bottle and how you are taking it. Include over-the-counter medications or supplements. Your provider will review this at each visit and plan your care based on your current information.   ~~~~~~~~~~~~~~~~~~~~~~~~~~~~~~~~~~~~~~~  \"[facility]\" campus telephone numbers for reference:  Cardiology Scheduling~[phone]  Diagnostic Imaging Scheduling~[phone]  Lab Scheduling~[phone]  Anticoagulation Clinic~[phone]  Cardiac Rehabilitation~[phone]  [facility] Clinic RN's~[phone] (at [facility])  Cardiology Clinic RN's~[phone] (Conchita Bhakta RN)  ~~~~~~~~~~~~~~~~~~~~~~~~~~~~~~~~~~~~~~~~    "

## 2019-08-07 NOTE — LETTER
8/7/2019    Kaiden Zapata MD  [address] [place] [id number]    RE: Derrick Morrison       Dear Colleague,    I had the pleasure of seeing Derrick Morrison in the [facility].    Cardiology Clinic Progress Note  Derrick Morrison MRN# [id number]   YOB: 1945 Age: 71 year old     Reason For Visit: Annual follow-up   Primary Cardiologist:   Dr. Lan          History of Presenting Illness:    Derrick Morrison is a pleasant 71 year old patient with a past cardiac history significant for CAD, HTN, palpitations improved after starting beta blocker, and hyperlipidemia.      He previously underwent stenting to the LAD and RCA and had ISR with GERALDINE to the LAD in 2011.   In 2017 he had a negative stress test but did have jaw discomfort with exertion.  He was started on Imdur but this was subsequently discontinued due to side effects of fogginess.    He was seen in the ED on 11/23/2017 for intermittent palpitations with sharp chest pain twinges which correlated with PVCs on telemetry. His metoprolol was increased to 50 mg daily for palpitations which improved his symptoms.     Patient was last seen by Dr. Lan in April 2018.  He continued with occasional jaw and chest discomfort when he exerts himself soon after eating, but otherwise denied any anginal symptoms.  He exercises three times per week without any limitations. Repeat stress test was recommended along with abdominal ultrasound to rule out aneurysm.    He was seen by PCP in May 2019 for shortness of breath with stairs and had no complaints while walking on flat surface.  PFTs were stopped due to lightheadedness.    Pt presents today for annual follow-up. Exercise nuclear stress test 4/23/2018 showed normal perfusion with normal LVEF.   Abdominal ultrasound 4/23/2018 showed no evidence for AAA. Lipid profile 4/18/2018 showing total cholesterol 109 HDL 35 LDL 58 and triglycerides 79.  These results were reviewed with him  today.    Since he was last seen, he has not had any jaw pain.  He has been working on decreasing his caffeine.  Unfortunately, his wife had two strokes this past May so he has not been getting any routine exercise.  They have started going for short walks.  He denies any further shortness of breath.  He was trialed on inhalers which he says did not help.  Blood pressure today is well-controlled and he has no significant palpitations. His been trying to work less to help decrease his stress. Patient reports no CP, shortness of breath, PND, orthopnea, presyncope, syncope, edema, heart racing.      Current Cardiac Medications   Rosuvastatin 40 mg daily  Plavix 75 mg daily  Metoprolol XL 25 mg b.i.d.  Nitroglycerin p.r.n.                     Assessment and Plan:     Plan  1.   Follow-up with Dr. Lan in one year      1. CAD    GERALDINE to the proximal LAD for ISR in 2011 with previous stents to the LAD and RCA    Stress test 4/2018 with no ischemia or infarct    No angina (prior angina jaw pain)    Continue statin, beta blocker, plavix       2. Palpitations    Intermittent palpitations with sharp chest pain twinges which correlated with PVCs on telemetry     History of palpitations improved after starting beta blocker    Continue metoprolol      3. Hyperlipidemia    Last LDL 58 4/2018     Continue rosuvastatin 40 mg daily       4.    Hypertension     Controlled     continue metoprolol         Thank you for allowing me to participate in this delightful patient's care.      This note was completed in part using Dragon voice recognition software. Although reviewed after completion, some word and grammatical errors may occur.    Janeth Campos, MARIA T, CNP           Data:   All laboratory data reviewed      HPI and Plan:   See dictation    Orders Placed This Encounter   Procedures     Follow-Up with Cardiologist       No orders of the defined types were placed in this encounter.      There are no discontinued  "medications.      Encounter Diagnoses   Name Primary?     Coronary artery disease involving native coronary artery of native heart without angina pectoris Yes     Palpitations      Hyperlipidemia LDL goal <70      Essential hypertension        CURRENT MEDICATIONS:  Current Outpatient Medications   Medication Sig Dispense Refill     ASPIRIN NOT PRESCRIBED, INTENTIONAL, 1 each continuous prn Antiplatelet medication not prescribed intentionally due to plavix 0 each 0     clopidogrel (PLAVIX) 75 MG tablet Take 1 tablet (75 mg) by mouth daily 90 tablet 3     dutasteride (AVODART) 0.5 MG capsule Take 1 capsule (0.5 mg) by mouth daily 90 capsule 3     metoprolol succinate (TOPROL-XL) 25 MG 24 hr tablet Take 1 tablet (25 mg) by mouth 2 times daily 180 tablet 3     nitroGLYcerin (NITROSTAT) 0.4 MG sublingual tablet PLACE 1 TABLET UNDER THE TONGUE AT THE ONSET OF CHEST PAIN. MAY REPEAT EVERY 5 MINUTES AS NEEDED FOR A TOTAL OF 3 DOSES. 25 tablet 1     rosuvastatin (CRESTOR) 40 MG tablet Take 1 tablet (40 mg) by mouth every evening 90 tablet 3       ALLERGIES   No Known Allergies    PAST MEDICAL HISTORY:  Past Medical History:   Diagnosis Date     Actinic keratosis      Basal cell carcinoma      BPH w urinary obs/LUTS 10/10/2011    flomax in past, \"quit working\".  Avodart in past.  Tapered off.        CAD (coronary artery disease) 10/10/2011    -8/16/2006 s/p GERALDINE to mid RCA, s/p GERALDINE to mid LAD in [place] -10/4/2007 s/p GERALDINE to pRCA and GERALDINE to dRCA in [place]  -11/17/11 Unstable angina, s/p GERALDINE to prox LAD (jailed small diagonal)       Hyperlipidaemia      Palpitations 8/7/2013    occasional, improved on low dose beta blocker      screening 10/10/2011    2007 colonoscopy \"all clean\" in [place].  He is sure about this.   AAA ultrasound screen 3/07 normal also.  (leg and neck done then too)      Skin cancer     had mohs on L temple       PAST SURGICAL HISTORY:  No past surgical history on file.    FAMILY " HISTORY:  Family History   Problem Relation Age of Onset     Coronary Artery Disease Mother        SOCIAL HISTORY:  Social History     Socioeconomic History     Marital status:      Spouse name: None     Number of children: None     Years of education: None     Highest education level: None   Occupational History     Employer: RETIRED   Social Needs     Financial resource strain: None     Food insecurity:     Worry: None     Inability: None     Transportation needs:     Medical: None     Non-medical: None   Tobacco Use     Smoking status: Never Smoker     Smokeless tobacco: Never Used   Substance and Sexual Activity     Alcohol use: Yes     Comment: occasionally     Drug use: No     Sexual activity: Yes     Partners: Female   Lifestyle     Physical activity:     Days per week: None     Minutes per session: None     Stress: None   Relationships     Social connections:     Talks on phone: None     Gets together: None     Attends Religion service: None     Active member of club or organization: None     Attends meetings of clubs or organizations: None     Relationship status: None     Intimate partner violence:     Fear of current or ex partner: None     Emotionally abused: None     Physically abused: None     Forced sexual activity: None   Other Topics Concern     Parent/sibling w/ CABG, MI or angioplasty before 65F 55M? No   Social History Narrative     None       Review of Systems:  Skin:  Negative       Eyes:  Positive for glasses    ENT:  Positive for hearing loss    Respiratory:  Negative for shortness of breath;cough     Cardiovascular:    Positive for;palpitations;lightheadedness;dizziness    Gastroenterology: Positive for heartburn    Genitourinary:  Positive for urinary frequency;urgency    Musculoskeletal:  Negative      Neurologic:  Positive for numbness or tingling of feet;memory problems    Psychiatric:  Positive for depression;excessive stress    Heme/Lymph/Imm:  Negative      Endocrine:   Negative        Physical Exam:  Vitals: /83 (BP Location: Right arm, Patient Position: Sitting, Cuff Size: Adult Regular)   Pulse 60   Wt 90.7 kg (200 lb)   SpO2 96%   BMI 29.53 kg/m       Constitutional:  cooperative, alert and oriented, well developed, well nourished, in no acute distress        Skin:  warm and dry to the touch          Head:  normocephalic        Eyes:  sclera white        Lymph:      ENT:  no pallor or cyanosis        Neck:  no stiffness        Respiratory:  clear to auscultation;normal symmetry         Cardiac: regular rhythm;normal S1 and S2                pulses full and equal                                        GI:  abdomen soft        Extremities and Muscular Skeletal:  no edema              Neurological:  affect appropriate        Psych:  Alert and Oriented x 3          Thank you for allowing me to participate in the care of your patient.    Sincerely,     MARIA T Vyas [facility]

## 2019-08-27 DIAGNOSIS — I25.10 CORONARY ARTERY DISEASE INVOLVING NATIVE HEART WITHOUT ANGINA PECTORIS, UNSPECIFIED VESSEL OR LESION TYPE: ICD-10-CM

## 2019-08-27 NOTE — TELEPHONE ENCOUNTER
"Routing refill request to provider for review/approval because:  Called [facility] and spoke with Dilcia to see if pt still has a refill on this?  \"He apparently put it through the wash again.\"     Taylor GARCIA RN      "

## 2019-08-27 NOTE — TELEPHONE ENCOUNTER
"Requested Prescriptions   Pending Prescriptions Disp Refills     nitroGLYcerin (NITROSTAT) 0.4 MG sublingual tablet [Pharmacy Med Name: NITROGLYCERIN 0.4MG SUBL] 25 tablet 1     Sig: PLACE 1 TABLET UNDER THE TONGUE AT THE ONSET OF CHEST PAIN. MAY REPEAT EVERY 5 MINUTES AS NEEDED FOR A TOTAL OF 3 DOSES.       Nitrates Failed - 8/27/2019  2:31 PM        Failed - Sublingual nitro order needs review     If refill exceeds 1 bottle per month, please forward request to provider.           Passed - Blood pressure under 140/90 in past 12 months     BP Readings from Last 3 Encounters:   08/07/19 124/83   05/01/19 108/62   09/13/18 112/66                 Passed - Pt is not on erectile dysfunction medications        Passed - Recent (12 mo) or future (30 days) visit within the authorizing provider's specialty     Patient had office visit in the last 12 months or has a visit in the next 30 days with authorizing provider or within the authorizing provider's specialty.  See \"Patient Info\" tab in inbasket, or \"Choose Columns\" in Meds & Orders section of the refill encounter.              Passed - Medication is active on med list        Passed - Patient is age 18 or older        Last Written Prescription Date:  4/15/19  Last Fill Quantity: 25,  # refills: 1   Last office visit: 5/1/2019 with prescribing provider:  NICK   Future Office Visit:   Next 5 appointments (look out 90 days)    Sep 18, 2019  8:20 AM CDT  PHYSICAL with Kaiden Zapata MD  [facility] [address] [place] [id number] [phone]   Oct 01, 2019  9:20 AM CDT  Return Visit with Vandana Frausto PA-C  [facility] ([facility]) [address] [facility] [address] [phone]           "

## 2019-08-28 RX ORDER — NITROGLYCERIN 0.4 MG/1
TABLET SUBLINGUAL
Qty: 25 TABLET | Refills: 1 | Status: SHIPPED | OUTPATIENT
Start: 2019-08-28 | End: 2023-10-17

## 2019-09-16 ASSESSMENT — ACTIVITIES OF DAILY LIVING (ADL): CURRENT_FUNCTION: NO ASSISTANCE NEEDED

## 2019-09-18 ENCOUNTER — OFFICE VISIT (OUTPATIENT)
Dept: FAMILY MEDICINE | Facility: CLINIC | Age: 74
End: 2019-09-18
Payer: COMMERCIAL

## 2019-09-18 VITALS
WEIGHT: 203 LBS | TEMPERATURE: 98.2 F | BODY MASS INDEX: 29.98 KG/M2 | RESPIRATION RATE: 18 BRPM | DIASTOLIC BLOOD PRESSURE: 72 MMHG | HEART RATE: 67 BPM | OXYGEN SATURATION: 96 % | SYSTOLIC BLOOD PRESSURE: 106 MMHG

## 2019-09-18 DIAGNOSIS — I25.10 CORONARY ARTERY DISEASE INVOLVING NATIVE HEART WITHOUT ANGINA PECTORIS, UNSPECIFIED VESSEL OR LESION TYPE: ICD-10-CM

## 2019-09-18 DIAGNOSIS — Z00.00 MEDICARE ANNUAL WELLNESS VISIT, SUBSEQUENT: Primary | ICD-10-CM

## 2019-09-18 DIAGNOSIS — E78.5 HYPERLIPIDEMIA LDL GOAL <70: ICD-10-CM

## 2019-09-18 DIAGNOSIS — N40.1 HYPERTROPHY OF PROSTATE WITH URINARY OBSTRUCTION: ICD-10-CM

## 2019-09-18 DIAGNOSIS — I10 ESSENTIAL HYPERTENSION: ICD-10-CM

## 2019-09-18 DIAGNOSIS — N13.8 HYPERTROPHY OF PROSTATE WITH URINARY OBSTRUCTION: ICD-10-CM

## 2019-09-18 LAB
ANION GAP SERPL CALCULATED.3IONS-SCNC: 3 MMOL/L (ref 3–14)
BUN SERPL-MCNC: 9 MG/DL (ref 7–30)
CALCIUM SERPL-MCNC: 9.2 MG/DL (ref 8.5–10.1)
CHLORIDE SERPL-SCNC: 106 MMOL/L (ref 94–109)
CHOLEST SERPL-MCNC: 151 MG/DL
CO2 SERPL-SCNC: 28 MMOL/L (ref 20–32)
CREAT SERPL-MCNC: 0.78 MG/DL (ref 0.66–1.25)
GFR SERPL CREATININE-BSD FRML MDRD: 90 ML/MIN/{1.73_M2}
GLUCOSE SERPL-MCNC: 89 MG/DL (ref 70–99)
HDLC SERPL-MCNC: 44 MG/DL
LDLC SERPL CALC-MCNC: 87 MG/DL
NONHDLC SERPL-MCNC: 107 MG/DL
POTASSIUM SERPL-SCNC: 4.2 MMOL/L (ref 3.4–5.3)
SODIUM SERPL-SCNC: 137 MMOL/L (ref 133–144)
TRIGL SERPL-MCNC: 98 MG/DL

## 2019-09-18 PROCEDURE — 36415 COLL VENOUS BLD VENIPUNCTURE: CPT | Performed by: FAMILY MEDICINE

## 2019-09-18 PROCEDURE — 80061 LIPID PANEL: CPT | Performed by: FAMILY MEDICINE

## 2019-09-18 PROCEDURE — G0008 ADMIN INFLUENZA VIRUS VAC: HCPCS | Performed by: FAMILY MEDICINE

## 2019-09-18 PROCEDURE — 99397 PER PM REEVAL EST PAT 65+ YR: CPT | Mod: 25 | Performed by: FAMILY MEDICINE

## 2019-09-18 PROCEDURE — 80048 BASIC METABOLIC PNL TOTAL CA: CPT | Performed by: FAMILY MEDICINE

## 2019-09-18 PROCEDURE — 90662 IIV NO PRSV INCREASED AG IM: CPT | Performed by: FAMILY MEDICINE

## 2019-09-18 RX ORDER — CLOPIDOGREL BISULFATE 75 MG/1
75 TABLET ORAL DAILY
Qty: 90 TABLET | Refills: 3 | Status: SHIPPED | OUTPATIENT
Start: 2019-09-18 | End: 2020-09-21

## 2019-09-18 RX ORDER — DUTASTERIDE 0.5 MG/1
CAPSULE, LIQUID FILLED ORAL
Qty: 45 CAPSULE | Refills: 3 | Status: SHIPPED | OUTPATIENT
Start: 2019-09-18 | End: 2020-09-21

## 2019-09-18 RX ORDER — ROSUVASTATIN CALCIUM 40 MG/1
40 TABLET, COATED ORAL EVERY EVENING
Qty: 90 TABLET | Refills: 3 | Status: SHIPPED | OUTPATIENT
Start: 2019-09-18 | End: 2020-09-21

## 2019-09-18 RX ORDER — METOPROLOL SUCCINATE 25 MG/1
25 TABLET, EXTENDED RELEASE ORAL 2 TIMES DAILY
Qty: 180 TABLET | Refills: 3 | Status: SHIPPED | OUTPATIENT
Start: 2019-09-18 | End: 2020-09-21

## 2019-09-18 ASSESSMENT — ACTIVITIES OF DAILY LIVING (ADL): CURRENT_FUNCTION: NO ASSISTANCE NEEDED

## 2019-09-18 NOTE — PROGRESS NOTES
"SUBJECTIVE:   Derrick Morrison is a 73 year old male who presents for Preventive Visit.    Are you in the first 12 months of your Medicare coverage?  No    Healthy Habits:     In general, how would you rate your overall health?  Good    Frequency of exercise:  2-3 days/week    Duration of exercise:  15-30 minutes    Do you usually eat at least 4 servings of fruit and vegetables a day, include whole grains    & fiber and avoid regularly eating high fat or \"junk\" foods?  Yes    Taking medications regularly:  Yes    Ability to successfully perform activities of daily living:  No assistance needed    Home Safety:  No safety concerns identified    Hearing Impairment:  Difficulty following a conversation in a noisy restaurant or crowded room    In the past 6 months, have you been bothered by leaking of urine?  No    In general, how would you rate your overall mental or emotional health?  Excellent      PHQ-2 Total Score: 0    Additional concerns today:  Yes    Would like flu shot today and FIT test.     Questions about a \"stroke support group\"    Do you feel safe in your environment? Yes    Do you have a Health Care Directive? Yes: Advance Directive has been received and scanned.    Cad: stable.  plavix long term.  Fills.  Recent visit with cards.  No changes  BpH: finasteride.  Uses every other day.    Htn: stable on meds  Hyperlipdemia: stable, statin        Fall risk  Fallen 2 or more times in the past year?: No  Any fall with injury in the past year?: No  click delete button to remove this line now  Cognitive Screening   1) Repeat 3 items (Leader, Season, Table)    2) Clock draw: NORMAL  3) 3 item recall: Recalls 3 objects  Results: 3 items recalled: COGNITIVE IMPAIRMENT LESS LIKELY    Mini-CogTM Copyright S Jud. Licensed by the author for use in [facility]; reprinted with permission (snehal@.[facility]). All rights reserved.          Reviewed and updated as needed this visit by clinical staff  Tobacco  " "Allergies  Med Hx  Surg Hx  Fam Hx  Soc Hx        Reviewed and updated as needed this visit by Provider        Social History     Tobacco Use     Smoking status: Never Smoker     Smokeless tobacco: Never Used   Substance Use Topics     Alcohol use: Yes     Comment: occasionally     If you drink alcohol do you typically have >3 drinks per day or >7 drinks per week? Not applicable    Alcohol Use 9/16/2019   Prescreen: >3 drinks/day or >7 drinks/week? No   Prescreen: >3 drinks/day or >7 drinks/week? -         none    Current providers sharing in care for this patient include:   Patient Care Team:  Kaiden Zapata MD as PCP - General (Family Practice)  Kaiden Zapata MD as Assigned PCP    The following health maintenance items are reviewed in Epic and correct as of today:  Health Maintenance   Topic Date Due     FALL RISK ASSESSMENT  08/30/2018     INFLUENZA VACCINE (1) 09/01/2019     MEDICARE ANNUAL WELLNESS VISIT  09/13/2019     FIT  09/25/2019     ADVANCE CARE PLANNING  08/14/2020     LIPID  04/18/2023     DTAP/TDAP/TD IMMUNIZATION (3 - Td) 08/24/2025     HEPATITIS C SCREENING  Completed     PHQ-2  Completed     PNEUMOCOCCAL IMMUNIZATION 65+ LOW/MEDIUM RISK  Completed     ZOSTER IMMUNIZATION  Completed     AORTIC ANEURYSM SCREENING (SYSTEM ASSIGNED)  Completed     IPV IMMUNIZATION  Aged Out     MENINGITIS IMMUNIZATION  Aged Out           Review of Systems   Genitourinary: Positive for impotence.     Constitutional, HEENT, cardiovascular, pulmonary, gi and gu systems are negative, except as otherwise noted.    OBJECTIVE:   /72   Pulse 67   Temp 98.2  F (36.8  C) (Tympanic)   Resp 18   Wt 92.1 kg (203 lb)   SpO2 96%   BMI 29.98 kg/m   Estimated body mass index is 29.53 kg/m  as calculated from the following:    Height as of 5/1/19: 1.753 m (5' 9\").    Weight as of 8/7/19: 90.7 kg (200 lb).  Physical Exam  Gen: alert and oriented, in no acute distress, affect within normal limits  Neck: supple with no " "masses or nodes  Throat: oropharynx clear, no exudate or tonsillar/palate asymmetry.    CV: RRR, no murmur  Lungs: clear bilaterally with good effort  Abd: nontender, no mass  Ext: no edema or lesions   Neuro: moving all extremities, gait normal, no focal deficts noted        ASSESSMENT / PLAN:   Well exam  Cad: stable.  plavix long term.  Fills.    BpH: finasteride.  Uses every other day.    Htn: stable on meds  Hyperlipdemia: stable, statin    Fills.  Labs.  Flu shot.  FIT test.  Doing well.  Back in a year.      End of Life Planning:  Patient currently has an advanced directive: not discussed     COUNSELING:  Reviewed preventive health counseling, as reflected in patient instructions       Regular exercise       Healthy diet/nutrition    Estimated body mass index is 29.53 kg/m  as calculated from the following:    Height as of 5/1/19: 1.753 m (5' 9\").    Weight as of 8/7/19: 90.7 kg (200 lb).    Weight management plan: diet/ exercise     reports that he has never smoked. He has never used smokeless tobacco.      Appropriate preventive services were discussed with this patient, including applicable screening as appropriate for cardiovascular disease, diabetes, osteopenia/osteoporosis, and glaucoma.  As appropriate for age/gender, discussed screening for colorectal cancer, prostate cancer, breast cancer, and cervical cancer. Checklist reviewing preventive services available has been given to the patient.    Reviewed patients plan of care and provided an AVS. The Basic Care Plan (routine screening as documented in Health Maintenance) for Derrick meets the Care Plan requirement. This Care Plan has been established and reviewed with the Patient.      Kaiden Zapata MD  [facility]    Identified Health Risks:  "

## 2019-09-18 NOTE — LETTER
September 18, 2019      Derrick Morrison  [address] [address]        Dear ,    We are writing to inform you of your test results.    Labs look good.  I hope things are going well.     Resulted Orders   Basic metabolic panel  (Ca, Cl, CO2, Creat, Gluc, K, Na, BUN)   Result Value Ref Range    Sodium 137 133 - 144 mmol/L    Potassium 4.2 3.4 - 5.3 mmol/L    Chloride 106 94 - 109 mmol/L    Carbon Dioxide 28 20 - 32 mmol/L    Anion Gap 3 3 - 14 mmol/L    Glucose 89 70 - 99 mg/dL      Comment:      Fasting specimen    Urea Nitrogen 9 7 - 30 mg/dL    Creatinine 0.78 0.66 - 1.25 mg/dL    GFR Estimate 90 >60 mL/min/[1.73_m2]      Comment:      Non  GFR Calc  Starting 12/18/2018, serum creatinine based estimated GFR (eGFR) will be   calculated using the Chronic Kidney Disease Epidemiology Collaboration   (CKD-EPI) equation.      GFR Estimate If Black >90 >60 mL/min/[1.73_m2]      Comment:       GFR Calc  Starting 12/18/2018, serum creatinine based estimated GFR (eGFR) will be   calculated using the Chronic Kidney Disease Epidemiology Collaboration   (CKD-EPI) equation.      Calcium 9.2 8.5 - 10.1 mg/dL   Lipid panel reflex to direct LDL Fasting   Result Value Ref Range    Cholesterol 151 <200 mg/dL    Triglycerides 98 <150 mg/dL      Comment:      Fasting specimen    HDL Cholesterol 44 >39 mg/dL    LDL Cholesterol Calculated 87 <100 mg/dL      Comment:      Desirable:       <100 mg/dl    Non HDL Cholesterol 107 <130 mg/dL       If you have any questions or concerns, please call the clinic at the number listed above.       Sincerely,          Kaiden aZpata MD

## 2019-09-18 NOTE — PATIENT INSTRUCTIONS
Preventive Health Recommendations:     See your health care provider every year to    Review health changes.     Discuss preventive care.      Review your medicines if your doctor has prescribed any.      Talk with your health care provider about whether you should have a test to screen for prostate cancer (PSA).    Every 3 years, have a diabetes test (fasting glucose). If you are at risk for diabetes, you should have this test more often.    Every 5 years, have a cholesterol test. Have this test more often if you are at risk for high cholesterol or heart disease.     Every 10 years, have a colonoscopy. Or, have a yearly FIT test (stool test). These exams will check for colon cancer.    Talk to with your health care provider about screening for Abdominal Aortic Aneurysm if you have a family history of AAA or have a history of smoking.    Shots:     Get a flu shot each year.     Get a tetanus shot every 10 years.     Talk to your doctor about your pneumonia vaccines. There are now two you should receive - Pneumovax (PPSV 23) and Prevnar (PCV 13).     Talk to your pharmacist about a shingles vaccine.     Talk to your doctor about the hepatitis B vaccine.  Nutrition:     Eat at least 5 servings of fruits and vegetables each day.     Eat whole-grain bread, whole-wheat pasta and brown rice instead of white grains and rice.     Get adequate Calcium and Vitamin D.   Lifestyle    Exercise for at least 150 minutes a week (30 minutes a day, 5 days a week). This will help you control your weight and prevent disease.     Limit alcohol to one drink per day.     No smoking.     Wear sunscreen to prevent skin cancer.    See your dentist every six months for an exam and cleaning.    See your eye doctor every 1 to 2 years to screen for conditions such as glaucoma, macular degeneration, cataracts, etc.    Personalized Prevention Plan  You are due for the preventive services outlined below.  Your care team is available to assist you  in scheduling these services.  If you have already completed any of these items, please share that information with your care team to update in your medical record.  Health Maintenance Due   Topic Date Due     FALL RISK ASSESSMENT  08/30/2018     Flu Vaccine (1) 09/01/2019     Annual Wellness Visit  09/13/2019     FIT Test  09/25/2019

## 2019-09-18 NOTE — NURSING NOTE
"Chief Complaint   Patient presents with     Physical       Initial /72   Pulse 67   Temp 98.2  F (36.8  C) (Tympanic)   Resp 18   Wt 92.1 kg (203 lb)   SpO2 96%   BMI 29.98 kg/m   Estimated body mass index is 29.98 kg/m  as calculated from the following:    Height as of 5/1/19: 1.753 m (5' 9\").    Weight as of this encounter: 92.1 kg (203 lb).    Patient presents to the clinic using No DME    Health Maintenance that is potentially due pending provider review:  FIT and flu shot    Possibly completing today per provider review.    Is there anyone who you would like to be able to receive your results? No  If yes have patient fill out MARIANNE    Rosemary Carranza CMA([facility])    "

## 2019-10-01 ENCOUNTER — OFFICE VISIT (OUTPATIENT)
Dept: DERMATOLOGY | Facility: CLINIC | Age: 74
End: 2019-10-01
Payer: COMMERCIAL

## 2019-10-01 VITALS — DIASTOLIC BLOOD PRESSURE: 76 MMHG | SYSTOLIC BLOOD PRESSURE: 125 MMHG | RESPIRATION RATE: 20 BRPM | HEART RATE: 66 BPM

## 2019-10-01 DIAGNOSIS — L82.1 SEBORRHEIC KERATOSIS: ICD-10-CM

## 2019-10-01 DIAGNOSIS — D22.9 MULTIPLE BENIGN NEVI: ICD-10-CM

## 2019-10-01 DIAGNOSIS — D48.5 NEOPLASM OF UNCERTAIN BEHAVIOR OF SKIN: Primary | ICD-10-CM

## 2019-10-01 DIAGNOSIS — L81.4 LENTIGO: ICD-10-CM

## 2019-10-01 DIAGNOSIS — L57.0 ACTINIC KERATOSIS: ICD-10-CM

## 2019-10-01 DIAGNOSIS — D18.01 CHERRY ANGIOMA: ICD-10-CM

## 2019-10-01 DIAGNOSIS — Z85.828 HISTORY OF BASAL CELL CANCER: ICD-10-CM

## 2019-10-01 PROCEDURE — 11102 TANGNTL BX SKIN SINGLE LES: CPT | Performed by: PHYSICIAN ASSISTANT

## 2019-10-01 PROCEDURE — 17000 DESTRUCT PREMALG LESION: CPT | Mod: 59 | Performed by: PHYSICIAN ASSISTANT

## 2019-10-01 PROCEDURE — 88305 TISSUE EXAM BY PATHOLOGIST: CPT | Mod: TC | Performed by: PHYSICIAN ASSISTANT

## 2019-10-01 PROCEDURE — 99213 OFFICE O/P EST LOW 20 MIN: CPT | Mod: 25 | Performed by: PHYSICIAN ASSISTANT

## 2019-10-01 PROCEDURE — 17003 DESTRUCT PREMALG LES 2-14: CPT | Performed by: PHYSICIAN ASSISTANT

## 2019-10-01 NOTE — PATIENT INSTRUCTIONS
WOUND CARE INSTRUCTIONS   FOR CRYOSURGERY   This area treated with liquid nitrogen should form a blister (areas treated may or may not blister-skin may just turn dark and slough off). You do not need to bandage the area unless a blister forms and breaks (which may be a few days). When the blister breaks, begin daily dressing changes as follows:  1) Clean and dry the area with tap water using clean Q-tip or sterile gauze pad.   2) Apply Polysporin ointment or Bacitracin ointment over entire wound. Do NOT use Neosporin ointment.   3) Cover the wound with a band-aid or sterile non-stick gauze pad and micropore paper tape.   REPEAT THESE INSTRUCTIONS AT LEAST ONCE A DAY UNTIL THE WOUND HAS COMPLETELY HEALED.   It is an old wives tale that a wound heals better when it is exposed to air and allowed to dry out. The wound will heal faster with a better cosmetic result if it is kept moist with ointment and covered with a bandage.   Do not let the wound dry out.   IMPORTANT INFORMATION ON REVERSE SIDE   Supplies Needed:   *Cotton tipped applicators (Q-tips)   *Polysporin ointment or Bacitracin ointment (NOT NEOSPORIN)   *Band-aids, or non stick gauze pads and micropore paper tape   PATIENT INFORMATION   During the healing process you will notice a number of changes. All wounds develop a small halo of redness surrounding the wound. This means healing is occurring. Severe itching with extensive redness usually indicates sensitivity to the ointment or bandage tape used to dress the wound. You should call our office if this develops.   Swelling and/or discoloration around your surgical site is common, particularly when performed around the eye.   All wounds normally drain. The larger the wound the more drainage there will be. After 7-10 days, you will notice the wound beginning to shrink and new skin will begin to grow. The wound is healed when you can see skin has formed over the entire area. A healed wound has a healthy, shiny  look to the surface and is red to dark pink in color to normalize. Wounds may take approximately 4-6 weeks to heal. Larger wounds may take 6-8 weeks. After the wound is healed you may discontinue dressing changes.   You may experience a sensation of tightness as your wound heals. This is normal and will gradually subside.   Your healed wound may be sensitive to temperature changes. This sensitivity improves with time, but if you re having a lot of discomfort, try to avoid temperature extremes.   Patients frequently experience itching after their wound appears to have healed because of the continue healing under the skin. Plain Vaseline will help relieve the itching.   Wound Care Instructions     FOR SUPERFICIAL WOUNDS     AFTER 24 HOURS YOU SHOULD REMOVE THE BANDAGE AND BEGIN DAILY DRESSING CHANGES AS FOLLOWS:     1) Remove Dressing.     2) Clean and dry the area with tap water using a Q-tip or sterile gauze pad.     3) Apply Polysporin ointment or Bacitracin ointment over entire wound.  Do NOT use Neosporin ointment.     4) Cover the wound with a band-aid, or a sterile non-stick gauze pad and micropore paper tape      REPEAT THESE INSTRUCTIONS AT LEAST ONCE A DAY UNTIL THE WOUND HAS COMPLETELY HEALED.    It is an old wives tale that a wound heals better when it is exposed to air and allowed to dry out. The wound will heal faster with a better cosmetic result if it is kept moist with ointment and covered with a bandage.    **Do not let the wound dry out.**      Supplies Needed:      *Cotton tipped applicators (Q-tips)    *Polysporin Ointment or Bacitracin Ointment (NOT NEOSPORIN)    *Band-aids or non-stick gauze pads and micropore paper tape.    PATIENT INFORMATION:    During the healing process you will notice a number of changes. All wounds develop a small halo of redness surrounding the wound.  This means healing is occurring. Severe itching with extensive redness usually indicates sensitivity to the ointment or  bandage tape used to dress the wound.  You should call our office if this develops.      Swelling  and/or discoloration around your surgical site is common, particularly when performed around the eye.    All wounds normally drain.  The larger the wound the more drainage there will be.  After 7-10 days, you will notice the wound beginning to shrink and new skin will begin to grow.  The wound is healed when you can see skin has formed over the entire area.  A healed wound has a healthy, shiny look to the surface and is red to dark pink in color to normalize.  Wounds may take approximately 4-6 weeks to heal.  Larger wounds may take 6-8 weeks.  After the wound is healed you may discontinue dressing changes.    You may experience a sensation of tightness as your wound heals. This is normal and will gradually subside.    Your healed wound may be sensitive to temperature changes. This sensitivity improves with time, but if you re having a lot of discomfort, try to avoid temperature extremes.    Patients frequently experience itching after their wound appears to have healed because of the continue healing under the skin.  Plain Vaseline will help relieve the itching.    BLEEDIN. Leave the bandage in place.  2. Use tightly rolled up gauze or a cloth to apply direct pressure over the bandage for 20 minutes.  3. Reapply pressure for an additional 20 minutes if necessary  4. Call the office or go to the nearest emergency room if pressure fails to stop the bleeding.  5. Use additional gauze and tape to maintain pressure once the bleeding has stopped.  6. If pressure alone does not stop the bleeding, call the Dermatology Clinic at [phone] or go to the nearest Emergency room.        .wo

## 2019-10-01 NOTE — PROGRESS NOTES
Chief Complaint   Patient presents with     Skin Check       Vitals:    10/01/19 0929   BP: 125/76   BP Location: Left arm   Patient Position: Sitting   Cuff Size: Adult Regular   Pulse: 66   Resp: 20     Wt Readings from Last 1 Encounters:   09/18/19 92.1 kg (203 lb)       Aldo PALACIOS RN   Specialty Clinics

## 2019-10-01 NOTE — LETTER
"    10/1/2019         RE: Derrick Morrison  [address] [address]        Dear Colleague,    Thank you for referring your patient, Derrick Morrison, to the [facility]. Please see a copy of my visit note below.    Chief Complaint   Patient presents with     Skin Check       Vitals:    10/01/19 0929   BP: 125/76   BP Location: Left arm   Patient Position: Sitting   Cuff Size: Adult Regular   Pulse: 66   Resp: 20     Wt Readings from Last 1 Encounters:   09/18/19 92.1 kg (203 lb)       Aldo PALACIOS RN   Specialty Clinics       Derrick Morrison is a 73 year old year old male patient here today for skin check. He notes areas that he tends to pick at on his face. Denies any painful or bleeding areas.   Remainder of the HPI, Meds, PMH, Allergies, FH, and SH was reviewed in chart.    Pertinent Hx:   History of BCC   Past Medical History:   Diagnosis Date     Actinic keratosis      Basal cell carcinoma      BPH w urinary obs/LUTS 10/10/2011    flomax in past, \"quit working\".  Avodart in past.  Tapered off.        CAD (coronary artery disease) 10/10/2011    -8/16/2006 s/p GERALDINE to mid RCA, s/p GERALDINE to mid LAD in [place] -10/4/2007 s/p GERALDINE to pRCA and GERALDINE to dRCA in [place]  -11/17/11 Unstable angina, s/p GERALDINE to prox LAD (jailed small diagonal)       Hyperlipidaemia      Palpitations 8/7/2013    occasional, improved on low dose beta blocker      screening 10/10/2011    2007 colonoscopy \"all clean\" in [place].  He is sure about this.   AAA ultrasound screen 3/07 normal also.  (leg and neck done then too)      Skin cancer     had mohs on L temple       No past surgical history on file.     Family History   Problem Relation Age of Onset     Coronary Artery Disease Mother        Social History     Socioeconomic History     Marital status:      Spouse name: Not on file     Number of children: Not on file     Years of education: Not on file     Highest education level: Not " on file   Occupational History     Employer: RETIRED   Social Needs     Financial resource strain: Not on file     Food insecurity:     Worry: Not on file     Inability: Not on file     Transportation needs:     Medical: Not on file     Non-medical: Not on file   Tobacco Use     Smoking status: Never Smoker     Smokeless tobacco: Never Used   Substance and Sexual Activity     Alcohol use: Yes     Comment: occasionally     Drug use: No     Sexual activity: Yes     Partners: Female   Lifestyle     Physical activity:     Days per week: Not on file     Minutes per session: Not on file     Stress: Not on file   Relationships     Social connections:     Talks on phone: Not on file     Gets together: Not on file     Attends Sabianism service: Not on file     Active member of club or organization: Not on file     Attends meetings of clubs or organizations: Not on file     Relationship status: Not on file     Intimate partner violence:     Fear of current or ex partner: Not on file     Emotionally abused: Not on file     Physically abused: Not on file     Forced sexual activity: Not on file   Other Topics Concern     Parent/sibling w/ CABG, MI or angioplasty before 65F 55M? No   Social History Narrative     Not on file       Outpatient Encounter Medications as of 10/1/2019   Medication Sig Dispense Refill     ASPIRIN NOT PRESCRIBED, INTENTIONAL, 1 each continuous prn Antiplatelet medication not prescribed intentionally due to plavix 0 each 0     clopidogrel (PLAVIX) 75 MG tablet Take 1 tablet (75 mg) by mouth daily 90 tablet 3     dutasteride (AVODART) 0.5 MG capsule Take one pill every other day 45 capsule 3     metoprolol succinate ER (TOPROL-XL) 25 MG 24 hr tablet Take 1 tablet (25 mg) by mouth 2 times daily 180 tablet 3     nitroGLYcerin (NITROSTAT) 0.4 MG sublingual tablet PLACE 1 TABLET UNDER THE TONGUE AT THE ONSET OF CHEST PAIN. MAY REPEAT EVERY 5 MINUTES AS NEEDED FOR A TOTAL OF 3 DOSES. 25 tablet 1     rosuvastatin  (CRESTOR) 40 MG tablet Take 1 tablet (40 mg) by mouth every evening 90 tablet 3     No facility-administered encounter medications on file as of 10/1/2019.              Review Of Systems  Skin: As above  Eyes: negative  Ears/Nose/Throat: negative  Respiratory: No shortness of breath, dyspnea on exertion, cough, or hemoptysis  Cardiovascular: negative  Gastrointestinal: negative  Genitourinary: negative  Musculoskeletal: negative  Neurologic: negative  Psychiatric: negative  Hematologic/Lymphatic/Immunologic: negative  Endocrine: negative      O:   NAD, WDWN, Alert & Oriented, Mood & Affect wnl, Vitals stable   Here today alone   /76 (BP Location: Left arm, Patient Position: Sitting, Cuff Size: Adult Regular)   Pulse 66   Resp 20    General appearance normal   Vitals stable   Alert, oriented and in no acute distress     0.4 cm brownish pink papule on left posterior shoulder    Pink gritty papule on right zygoma and right medial cheek  Stuck on papules and brown macules on trunk and ext   Red papules on trunk  Brown papules and macules with regular pigment network and borders on torso and extremities  Open comedo on mid back   The remainder of the detailed exam was unremarkable; the following areas were examined:  scalp/hair, conjunctiva/lids, face, neck, lips/teeth, oral mucosa/gingiva, chest, back, abdomen, buttocks, digits/nails, RUE, LUE, RLE, LLE.      Eyes: Conjunctivae/lids:Normal     ENT: Lips: normal    MSK:Normal    Cardiovascular: peripheral edema none    Pulm: Breathing Normal    Neuro/Psych: Orientation:Normal; Mood/Affect:Normal  A/P:  1. R/O pigmented BCC vs atypical nevus on left posterior shoulder  TANGENTIAL BIOPSY SENT OUT:  After consent, anesthesia with LEC and prep, tangential excision performed and specimen sent out for permanent section histology.  No complications and routine wound care. Patient told to call our office in 1-2 weeks for result.      2. actnic keratosis x 2 on right  cheek   LN2:  Treated with LN2 for 5s for 1-2 cycles. Warned risks of blistering, pain, pigment change, scarring, and incomplete resolution.  Advised patient to return if lesions do not completely resolve.  Wound care sheet given.  3. Seborrheic keratosis, lentigo, angioma, benign nevi, History of BCC  BENIGN LESIONS DISCUSSED WITH PATIENT:  I discussed the specifics of tumor, prognosis, and genetics of benign lesions.  I explained that treatment of these lesions would be purely cosmetic and not medically neccessary.  I discussed with patient different removal options including excision, cautery and /or laser.      Nature and genetics of benign skin lesions dicussed with patient.  Signs and Symptoms of skin cancer discussed with patient.  ABCDEs of melanoma reviewed with patient.  Patient encouraged to perform monthly skin exams.  UV precautions reviewed with patient.   Risks of non-melanoma skin cancer discussed with patient   Return to clinic pending biopsy results.         Again, thank you for allowing me to participate in the care of your patient.        Sincerely,        Vandana Reid PA-C

## 2019-10-01 NOTE — PROGRESS NOTES
"Derrick Morrison is a 73 year old year old male patient here today for skin check. He notes areas that he tends to pick at on his face. Denies any painful or bleeding areas.   Remainder of the HPI, Meds, PMH, Allergies, FH, and SH was reviewed in chart.    Pertinent Hx:   History of BCC   Past Medical History:   Diagnosis Date     Actinic keratosis      Basal cell carcinoma      BPH w urinary obs/LUTS 10/10/2011    flomax in past, \"quit working\".  Avodart in past.  Tapered off.        CAD (coronary artery disease) 10/10/2011    -8/16/2006 s/p GERALDINE to mid RCA, s/p GERALDINE to mid LAD in [place] -10/4/2007 s/p GERALDINE to pRCA and GERALDINE to dRCA in [place]  -11/17/11 Unstable angina, s/p GERALDINE to prox LAD (jailed small diagonal)       Hyperlipidaemia      Palpitations 8/7/2013    occasional, improved on low dose beta blocker      screening 10/10/2011    2007 colonoscopy \"all clean\" in [place].  He is sure about this.   AAA ultrasound screen 3/07 normal also.  (leg and neck done then too)      Skin cancer     [facility]       No past surgical history on file.     Family History   Problem Relation Age of Onset     Coronary Artery Disease Mother        Social History     Socioeconomic History     Marital status:      Spouse name: Not on file     Number of children: Not on file     Years of education: Not on file     Highest education level: Not on file   Occupational History     Employer: RETIRED   Social Needs     Financial resource strain: Not on file     Food insecurity:     Worry: Not on file     Inability: Not on file     Transportation needs:     Medical: Not on file     Non-medical: Not on file   Tobacco Use     Smoking status: Never Smoker     Smokeless tobacco: Never Used   Substance and Sexual Activity     Alcohol use: Yes     Comment: occasionally     Drug use: No     Sexual activity: Yes     Partners: Female   Lifestyle     Physical activity:     Days per week: Not on file     Minutes " per session: Not on file     Stress: Not on file   Relationships     Social connections:     Talks on phone: Not on file     Gets together: Not on file     Attends Jehovah's witness service: Not on file     Active member of club or organization: Not on file     Attends meetings of clubs or organizations: Not on file     Relationship status: Not on file     Intimate partner violence:     Fear of current or ex partner: Not on file     Emotionally abused: Not on file     Physically abused: Not on file     Forced sexual activity: Not on file   Other Topics Concern     Parent/sibling w/ CABG, MI or angioplasty before 65F 55M? No   Social History Narrative     Not on file       Outpatient Encounter Medications as of 10/1/2019   Medication Sig Dispense Refill     ASPIRIN NOT PRESCRIBED, INTENTIONAL, 1 each continuous prn Antiplatelet medication not prescribed intentionally due to plavix 0 each 0     clopidogrel (PLAVIX) 75 MG tablet Take 1 tablet (75 mg) by mouth daily 90 tablet 3     dutasteride (AVODART) 0.5 MG capsule Take one pill every other day 45 capsule 3     metoprolol succinate ER (TOPROL-XL) 25 MG 24 hr tablet Take 1 tablet (25 mg) by mouth 2 times daily 180 tablet 3     nitroGLYcerin (NITROSTAT) 0.4 MG sublingual tablet PLACE 1 TABLET UNDER THE TONGUE AT THE ONSET OF CHEST PAIN. MAY REPEAT EVERY 5 MINUTES AS NEEDED FOR A TOTAL OF 3 DOSES. 25 tablet 1     rosuvastatin (CRESTOR) 40 MG tablet Take 1 tablet (40 mg) by mouth every evening 90 tablet 3     No facility-administered encounter medications on file as of 10/1/2019.              Review Of Systems  Skin: As above  Eyes: negative  Ears/Nose/Throat: negative  Respiratory: No shortness of breath, dyspnea on exertion, cough, or hemoptysis  Cardiovascular: negative  Gastrointestinal: negative  Genitourinary: negative  Musculoskeletal: negative  Neurologic: negative  Psychiatric: negative  Hematologic/Lymphatic/Immunologic: negative  Endocrine: negative      O:   NAD,  WDWN, Alert & Oriented, Mood & Affect wnl, Vitals stable   Here today alone   /76 (BP Location: Left arm, Patient Position: Sitting, Cuff Size: Adult Regular)   Pulse 66   Resp 20    General appearance normal   Vitals stable   Alert, oriented and in no acute distress     0.4 cm brownish pink papule on left posterior shoulder    Pink gritty papule on right zygoma and right medial cheek  Stuck on papules and brown macules on trunk and ext   Red papules on trunk  Brown papules and macules with regular pigment network and borders on torso and extremities  Open comedo on mid back   The remainder of the detailed exam was unremarkable; the following areas were examined:  scalp/hair, conjunctiva/lids, face, neck, lips/teeth, oral mucosa/gingiva, chest, back, abdomen, buttocks, digits/nails, RUE, LUE, RLE, LLE.      Eyes: Conjunctivae/lids:Normal     ENT: Lips: normal    MSK:Normal    Cardiovascular: peripheral edema none    Pulm: Breathing Normal    Neuro/Psych: Orientation:Normal; Mood/Affect:Normal  A/P:  1. R/O pigmented BCC vs atypical nevus on left posterior shoulder  TANGENTIAL BIOPSY SENT OUT:  After consent, anesthesia with LEC and prep, tangential excision performed and specimen sent out for permanent section histology.  No complications and routine wound care. Patient told to call our office in 1-2 weeks for result.      2. actnic keratosis x 2 on right cheek   LN2:  Treated with LN2 for 5s for 1-2 cycles. Warned risks of blistering, pain, pigment change, scarring, and incomplete resolution.  Advised patient to return if lesions do not completely resolve.  Wound care sheet given.  3. Seborrheic keratosis, lentigo, angioma, benign nevi, History of BCC  BENIGN LESIONS DISCUSSED WITH PATIENT:  I discussed the specifics of tumor, prognosis, and genetics of benign lesions.  I explained that treatment of these lesions would be purely cosmetic and not medically neccessary.  I discussed with patient different  removal options including excision, cautery and /or laser.      Nature and genetics of benign skin lesions dicussed with patient.  Signs and Symptoms of skin cancer discussed with patient.  ABCDEs of melanoma reviewed with patient.  Patient encouraged to perform monthly skin exams.  UV precautions reviewed with patient.   Risks of non-melanoma skin cancer discussed with patient   Return to clinic pending biopsy results.

## 2019-10-02 LAB — COPATH REPORT: NORMAL

## 2019-10-21 PROCEDURE — 82274 ASSAY TEST FOR BLOOD FECAL: CPT | Performed by: FAMILY MEDICINE

## 2019-10-23 LAB — HEMOCCULT STL QL IA: NEGATIVE

## 2019-11-04 NOTE — PROGRESS NOTES
Surgical Office Location :   [facility]  [address] [place] [address]

## 2019-11-05 ENCOUNTER — OFFICE VISIT (OUTPATIENT)
Dept: DERMATOLOGY | Facility: CLINIC | Age: 74
End: 2019-11-05
Payer: COMMERCIAL

## 2019-11-05 VITALS — SYSTOLIC BLOOD PRESSURE: 120 MMHG | OXYGEN SATURATION: 95 % | DIASTOLIC BLOOD PRESSURE: 69 MMHG | HEART RATE: 73 BPM

## 2019-11-05 DIAGNOSIS — C44.619 BASAL CELL CARCINOMA (BCC) OF LEFT SHOULDER: Primary | ICD-10-CM

## 2019-11-05 PROCEDURE — 11602 EXC TR-EXT MAL+MARG 1.1-2 CM: CPT | Performed by: DERMATOLOGY

## 2019-11-05 PROCEDURE — 88331 PATH CONSLTJ SURG 1 BLK 1SPC: CPT | Performed by: DERMATOLOGY

## 2019-11-05 NOTE — LETTER
"    11/5/2019         RE: Derrick Morrison  [address] [id number]        Dear Colleague,    Thank you for referring your patient, Derrick Morrison, to the [facility]. Please see a copy of my visit note below.    Surgical Office Location :   [facility]  [address] [place] [address]      Derrick Morrison is a 74 year old year old male patient here today for evaluation and managment of basal cell carcinoma on left post shoulder. Patient reports the following modifying factors none.  Associated symptoms: none.  Patient has no other skin complaints today.  Remainder of the HPI, Meds, PMH, Allergies, FH, and SH was reviewed in chart.      Past Medical History:   Diagnosis Date     Actinic keratosis      Basal cell carcinoma      BPH w urinary obs/LUTS 10/10/2011    flomax in past, \"quit working\".  Avodart in past.  Tapered off.        CAD (coronary artery disease) 10/10/2011    -8/16/2006 s/p GERALDINE to mid RCA, s/p GERALDINE to mid LAD in [place] -10/4/2007 s/p GERALDINE to pRCA and GERALDINE to dRCA in [place]  -11/17/11 Unstable angina, s/p GERALDINE to prox LAD (jailed small diagonal)       Hyperlipidaemia      Palpitations 8/7/2013    occasional, improved on low dose beta blocker      screening 10/10/2011    2007 colonoscopy \"all clean\" in [place].  He is sure about this.   AAA ultrasound screen 3/07 normal also.  (leg and neck done then too)      Skin cancer     had mohs on L temple       History reviewed. No pertinent surgical history.     Family History   Problem Relation Age of Onset     Coronary Artery Disease Mother      Melanoma No family hx of        Social History     Socioeconomic History     Marital status:      Spouse name: Not on file     Number of children: Not on file     Years of education: Not on file     Highest education level: Not on file   Occupational History     Employer: RETIRED   Social Needs     Financial resource strain: Not " on file     Food insecurity:     Worry: Not on file     Inability: Not on file     Transportation needs:     Medical: Not on file     Non-medical: Not on file   Tobacco Use     Smoking status: Never Smoker     Smokeless tobacco: Never Used   Substance and Sexual Activity     Alcohol use: Yes     Comment: occasionally     Drug use: No     Sexual activity: Yes     Partners: Female   Lifestyle     Physical activity:     Days per week: Not on file     Minutes per session: Not on file     Stress: Not on file   Relationships     Social connections:     Talks on phone: Not on file     Gets together: Not on file     Attends [employer] service: Not on file     Active member of club or organization: Not on file     Attends meetings of clubs or organizations: Not on file     Relationship status: Not on file     Intimate partner violence:     Fear of current or ex partner: Not on file     Emotionally abused: Not on file     Physically abused: Not on file     Forced sexual activity: Not on file   Other Topics Concern     Parent/sibling w/ CABG, MI or angioplasty before 65F 55M? No   Social History Narrative     Not on file       Outpatient Encounter Medications as of 11/5/2019   Medication Sig Dispense Refill     ASPIRIN NOT PRESCRIBED, INTENTIONAL, 1 each continuous prn Antiplatelet medication not prescribed intentionally due to plavix 0 each 0     clopidogrel (PLAVIX) 75 MG tablet Take 1 tablet (75 mg) by mouth daily 90 tablet 3     dutasteride (AVODART) 0.5 MG capsule Take one pill every other day 45 capsule 3     metoprolol succinate ER (TOPROL-XL) 25 MG 24 hr tablet Take 1 tablet (25 mg) by mouth 2 times daily 180 tablet 3     nitroGLYcerin (NITROSTAT) 0.4 MG sublingual tablet PLACE 1 TABLET UNDER THE TONGUE AT THE ONSET OF CHEST PAIN. MAY REPEAT EVERY 5 MINUTES AS NEEDED FOR A TOTAL OF 3 DOSES. 25 tablet 1     rosuvastatin (CRESTOR) 40 MG tablet Take 1 tablet (40 mg) by mouth every evening 90 tablet 3     No  facility-administered encounter medications on file as of 11/5/2019.              Review Of Systems  Skin: As above  Eyes: negative  Ears/Nose/Throat: negative  Respiratory: No shortness of breath, dyspnea on exertion, cough, or hemoptysis  Cardiovascular: negative  Gastrointestinal: negative  Genitourinary: negative  Musculoskeletal: negative  Neurologic: negative  Psychiatric: negative  Hematologic/Lymphatic/Immunologic: negative  Endocrine: negative      O:   NAD, WDWN, Alert & Oriented, Mood & Affect wnl, Vitals stable   Here today alone   /69   Pulse 73   SpO2 95%    General appearance normal   Vitals stable   Alert, oriented and in no acute distress     L post shoulder 7cm red plaque      Eyes: Conjunctivae/lids:Normal     ENT: Lips, buccal mucosa, tongue: normal    MSK:Normal    Cardiovascular: peripheral edema none    Pulm: Breathing Normal    Neuro/Psych: Orientation:Normal; Mood/Affect:Normal      MICRO:   L post shouldeR:Unremarkable epidermis with parallel bundles of cellular collagen within the superficial dermis.  No concerning areas for malignancy.     A/P:  1. L post shoulder basal cell carcinoma   EXCISION OF BASAL CELL CARCINOMA, Margins confirmed with FROZEN SECTIONS AND Second intent: After thorough discussion of PGACAC, consent obtained, anesthesia and prep, the margins of the lesion were identified and an elliptical incision was made encompassing the lesion with 4mm margin. The incisions were made through the skin and down to and including the superficial dermis.  The lesion was removed en bloc and submitted for frozen section pathologic review. Clear margins obtained (1.3cm).    REPAIR SECOND INTENT: We discussed the options for wound management in full with the patient including risks/benefits/possible outcomes. Decision made to allow the wound to heal by second intention. EBL minimal; complications none; wound care routine.  The patient was discharged in good condition and will  return in one month or prn for wound evaluation.     BENIGN LESIONS DISCUSSED WITH PATIENT:  I discussed the specifics of tumor, prognosis, and genetics of benign lesions.  I explained that treatment of these lesions would be purely cosmetic and not medically neccessary.  I discussed with patient different removal options including excision, cautery and /or laser.      Nature and genetics of benign skin lesions dicussed with patient.  Signs and Symptoms of skin cancer discussed with patient.  Patient encouraged to perform monthly skin exams.  UV precautions reviewed with patient.  Skin care regimen reviewed with patient: Eliminate harsh soaps, i.e. Dial, zest, irsih spring; Mild soaps such as Cetaphil or Dove sensitive skin, avoid hot or cold showers, aggressive use of emollients including vanicream, cetaphil or cerave discussed with patient.    Risks of non-melanoma skin cancer discussed with patient   Return to clinic 6 months      Again, thank you for allowing me to participate in the care of your patient.        Sincerely,        Og Canales MD

## 2019-11-05 NOTE — PROGRESS NOTES
"Derrick Morrison is a 74 year old year old male patient here today for evaluation and managment of basal cell carcinoma on left post shoulder. Patient reports the following modifying factors none.  Associated symptoms: none.  Patient has no other skin complaints today.  Remainder of the HPI, Meds, PMH, Allergies, FH, and SH was reviewed in chart.      Past Medical History:   Diagnosis Date     Actinic keratosis      Basal cell carcinoma      BPH w urinary obs/LUTS 10/10/2011    flomax in past, \"quit working\".  Avodart in past.  Tapered off.        CAD (coronary artery disease) 10/10/2011    -8/16/2006 s/p GERALDINE to mid RCA, s/p GERALDINE to mid LAD in [place] -10/4/2007 s/p GERALDINE to pRCA and GERALDINE to dRCA in [place]  -11/17/11 Unstable angina, s/p GERALDINE to prox LAD (jailed small diagonal)       Hyperlipidaemia      Palpitations 8/7/2013    occasional, improved on low dose beta blocker      screening 10/10/2011    2007 colonoscopy \"all clean\" in [place].  He is sure about this.   AAA ultrasound screen 3/07 normal also.  (leg and neck done then too)      Skin cancer     had mohs on L temple       History reviewed. No pertinent surgical history.     Family History   Problem Relation Age of Onset     Coronary Artery Disease Mother      Melanoma No family hx of        Social History     Socioeconomic History     Marital status:      Spouse name: Not on file     Number of children: Not on file     Years of education: Not on file     Highest education level: Not on file   Occupational History     Employer: RETIRED   Social Needs     Financial resource strain: Not on file     Food insecurity:     Worry: Not on file     Inability: Not on file     Transportation needs:     Medical: Not on file     Non-medical: Not on file   Tobacco Use     Smoking status: Never Smoker     Smokeless tobacco: Never Used   Substance and Sexual Activity     Alcohol use: Yes     Comment: occasionally     Drug use: No     Sexual " activity: Yes     Partners: Female   Lifestyle     Physical activity:     Days per week: Not on file     Minutes per session: Not on file     Stress: Not on file   Relationships     Social connections:     Talks on phone: Not on file     Gets together: Not on file     Attends Gnosticist service: Not on file     Active member of club or organization: Not on file     Attends meetings of clubs or organizations: Not on file     Relationship status: Not on file     Intimate partner violence:     Fear of current or ex partner: Not on file     Emotionally abused: Not on file     Physically abused: Not on file     Forced sexual activity: Not on file   Other Topics Concern     Parent/sibling w/ CABG, MI or angioplasty before 65F 55M? No   Social History Narrative     Not on file       Outpatient Encounter Medications as of 11/5/2019   Medication Sig Dispense Refill     ASPIRIN NOT PRESCRIBED, INTENTIONAL, 1 each continuous prn Antiplatelet medication not prescribed intentionally due to plavix 0 each 0     clopidogrel (PLAVIX) 75 MG tablet Take 1 tablet (75 mg) by mouth daily 90 tablet 3     dutasteride (AVODART) 0.5 MG capsule Take one pill every other day 45 capsule 3     metoprolol succinate ER (TOPROL-XL) 25 MG 24 hr tablet Take 1 tablet (25 mg) by mouth 2 times daily 180 tablet 3     nitroGLYcerin (NITROSTAT) 0.4 MG sublingual tablet PLACE 1 TABLET UNDER THE TONGUE AT THE ONSET OF CHEST PAIN. MAY REPEAT EVERY 5 MINUTES AS NEEDED FOR A TOTAL OF 3 DOSES. 25 tablet 1     rosuvastatin (CRESTOR) 40 MG tablet Take 1 tablet (40 mg) by mouth every evening 90 tablet 3     No facility-administered encounter medications on file as of 11/5/2019.              Review Of Systems  Skin: As above  Eyes: negative  Ears/Nose/Throat: negative  Respiratory: No shortness of breath, dyspnea on exertion, cough, or hemoptysis  Cardiovascular: negative  Gastrointestinal: negative  Genitourinary: negative  Musculoskeletal: negative  Neurologic:  negative  Psychiatric: negative  Hematologic/Lymphatic/Immunologic: negative  Endocrine: negative      O:   NAD, WDWN, Alert & Oriented, Mood & Affect wnl, Vitals stable   Here today alone   /69   Pulse 73   SpO2 95%    General appearance normal   Vitals stable   Alert, oriented and in no acute distress     L post shoulder 7cm red plaque      Eyes: Conjunctivae/lids:Normal     ENT: Lips, buccal mucosa, tongue: normal    MSK:Normal    Cardiovascular: peripheral edema none    Pulm: Breathing Normal    Neuro/Psych: Orientation:Normal; Mood/Affect:Normal      MICRO:   L post shouldeR:Unremarkable epidermis with parallel bundles of cellular collagen within the superficial dermis.  No concerning areas for malignancy.     A/P:  1. L post shoulder basal cell carcinoma   EXCISION OF BASAL CELL CARCINOMA, Margins confirmed with FROZEN SECTIONS AND Second intent: After thorough discussion of PGACAC, consent obtained, anesthesia and prep, the margins of the lesion were identified and an elliptical incision was made encompassing the lesion with 4mm margin. The incisions were made through the skin and down to and including the superficial dermis.  The lesion was removed en bloc and submitted for frozen section pathologic review. Clear margins obtained (1.3cm).    REPAIR SECOND INTENT: We discussed the options for wound management in full with the patient including risks/benefits/possible outcomes. Decision made to allow the wound to heal by second intention. EBL minimal; complications none; wound care routine.  The patient was discharged in good condition and will return in one month or prn for wound evaluation.     BENIGN LESIONS DISCUSSED WITH PATIENT:  I discussed the specifics of tumor, prognosis, and genetics of benign lesions.  I explained that treatment of these lesions would be purely cosmetic and not medically neccessary.  I discussed with patient different removal options including excision, cautery and /or  laser.      Nature and genetics of benign skin lesions dicussed with patient.  Signs and Symptoms of skin cancer discussed with patient.  Patient encouraged to perform monthly skin exams.  UV precautions reviewed with patient.  Skin care regimen reviewed with patient: Eliminate harsh soaps, i.e. Dial, zest, irsih spring; Mild soaps such as Cetaphil or Dove sensitive skin, avoid hot or cold showers, aggressive use of emollients including vanicream, cetaphil or cerave discussed with patient.    Risks of non-melanoma skin cancer discussed with patient   Return to clinic 6 months

## 2019-11-05 NOTE — PATIENT INSTRUCTIONS
Open Wound Care     for _shoulder_____________        ? No strenuous activity for 48 hours    ? Take Tylenol as needed for discomfort.                                                .         ? Do not drink alcoholic beverages for 48 hours.    ? Keep the pressure bandage in place for 24 hours. If the bandage becomes blood tinged or loose, reinforce it with gauze and tape.        (Refer to the reverse side of this page for management of bleeding).    ? Remove bandage in 24 hours and begin wound care as follows:     1. Clean area with tap water using a Q tip or gauze pad, (shower / bathe normally)  2. Dry wound with Q tip or gauze pad  3. Apply Aquaphor, Vaseline, Polysporin or Bacitracin Ointment with a Q tip    Do NOT use Neosporin Ointment *  4. Cover the wound with a band-aid or nonstick gauze pad and paper tape.  5. Repeat wound care once a day until wound is completely healed.    It is an old wives tale that a wound heals better when it is exposed to air and allowed to dry out. The wound will heal faster with a better cosmetic result if it is kept moist with ointment and covered with a bandage.  Do not let the wound dry out.      Supplies Needed:                Qtips or gauze pads                Polysporin or Bacitracin Ointment                Bandaids or nonstick gauze pads and paper tape    Wound care kits and brown paper tape are available for purchase at   the pharmacy.    BLEEDIN. Use tightly rolled up gauze or cloth to apply direct pressure over the bandage for 20   minutes.  2. Reapply pressure for an additional 20 minutes if necessary  3. Call the office or go to the nearest emergency room if pressure fails to stop the bleeding.  4. Use additional gauze and tape to maintain pressure once the bleeding has stopped.  5. Begin wound care 24 hours after surgery as directed.                  WOUND HEALING    1. One week after surgery a pink / red halo will form around the outside of the wound.   This is  new skin.  2. The center of the wound will appear yellowish white and produce some drainage.  3. The pink halo will slowly migrate in toward the center of the wound until the wound is covered with new shiny pink skin.  4. There will be no more drainage when the wound is completely healed.  5. It will take six months to one year for the redness to fade.  6. The scar may be itchy, tight and sensitive to extreme temperatures for a year after the surgery.  7. Massaging the area several times a day for several minutes after the wound is completely healed will help the scar soften and normalize faster. Begin massage only after healing is complete.      In case of emergency call: Dr Canales: [phone]     [facility]: [phone]    [facility]: [phone]

## 2019-11-05 NOTE — NURSING NOTE
"Initial /69   Pulse 73   SpO2 95%  Estimated body mass index is 29.98 kg/m  as calculated from the following:    Height as of 5/1/19: 1.753 m (5' 9\").    Weight as of 9/18/19: 92.1 kg (203 lb). .    Haven Chan LPN    "

## 2020-02-12 ENCOUNTER — MYC MEDICAL ADVICE (OUTPATIENT)
Dept: FAMILY MEDICINE | Facility: CLINIC | Age: 75
End: 2020-02-12

## 2020-02-24 ENCOUNTER — OFFICE VISIT (OUTPATIENT)
Dept: FAMILY MEDICINE | Facility: CLINIC | Age: 75
End: 2020-02-24
Payer: COMMERCIAL

## 2020-02-24 VITALS
TEMPERATURE: 97.9 F | OXYGEN SATURATION: 97 % | SYSTOLIC BLOOD PRESSURE: 120 MMHG | RESPIRATION RATE: 16 BRPM | HEART RATE: 73 BPM | BODY MASS INDEX: 29.47 KG/M2 | HEIGHT: 69 IN | DIASTOLIC BLOOD PRESSURE: 70 MMHG | WEIGHT: 199 LBS

## 2020-02-24 DIAGNOSIS — M54.10 RADICULAR SYNDROME OF LEFT LEG: Primary | ICD-10-CM

## 2020-02-24 PROCEDURE — 99213 OFFICE O/P EST LOW 20 MIN: CPT | Performed by: FAMILY MEDICINE

## 2020-02-24 ASSESSMENT — MIFFLIN-ST. JEOR: SCORE: 1638.04

## 2020-02-24 NOTE — PROGRESS NOTES
"Subjective     Derrick Morrison is a 74 year old male who presents to clinic today for the following health issues:    HPI   Back Pain      Duration: 4-5 weeks    Description (location/character/radiation): low back, radiating into left leg    Intensity:  7-8/10    Accompanying signs and symptoms: None    History (similar episodes/previous evaluation): Similar episodes - previous MRIs    Precipitating or alleviating factors: worse when sitting, pain improves with standing or lying down    Therapies tried and outcome: From [place] ED - Meloxicam, methocarbamol, naproxen, prednisone, hydrocodone     S :Derrick Morrison is a 74 year old male with L lower back pain, down leg.  About a month.  Slipped on stair, landed weird on foot, thinks that's when it started.     Hurts more to sit.  Better with lying down.  Has hx of \"bulging discs\" on old MRI from 15 years ago he says, down in [place].    Problem list, med list, additional histories reviewed and updated, as indicated.      O;/70   Pulse 73   Temp 97.9  F (36.6  C) (Tympanic)   Resp 16   Ht 1.753 m (5' 9\")   Wt 90.3 kg (199 lb)   SpO2 97%   BMI 29.39 kg/m    GEN: Alert and oriented, in no acute distress  No reflexes at knees  Gait normal  Strength symmetric in legs  No pain over spine, tender L buttocks area, no mass/redness    A: L leg radicular sx    P: therapy.  He has some nsaids.  MRI if not improving over next few weeks, this is really bothering him and he may have a disc on a nerve.    "

## 2020-02-24 NOTE — NURSING NOTE
"Chief Complaint   Patient presents with     Back Pain     sciatica - left low back/leg       Initial /70   Pulse 73   Temp 97.9  F (36.6  C) (Tympanic)   Resp 16   Ht 1.753 m (5' 9\")   Wt 90.3 kg (199 lb)   SpO2 97%   BMI 29.39 kg/m   Estimated body mass index is 29.39 kg/m  as calculated from the following:    Height as of this encounter: 1.753 m (5' 9\").    Weight as of this encounter: 90.3 kg (199 lb).    Patient presents to the clinic using No DME    Health Maintenance that is potentially due pending provider review:  NONE    n/a    Is there anyone who you would like to be able to receive your results? No  If yes have patient fill out MARIANNE    "

## 2020-02-26 ENCOUNTER — MYC MEDICAL ADVICE (OUTPATIENT)
Dept: FAMILY MEDICINE | Facility: CLINIC | Age: 75
End: 2020-02-26

## 2020-02-26 DIAGNOSIS — M54.42 ACUTE LEFT-SIDED LOW BACK PAIN WITH LEFT-SIDED SCIATICA: Primary | ICD-10-CM

## 2020-02-26 RX ORDER — METHOCARBAMOL 750 MG/1
750 TABLET, FILM COATED ORAL 3 TIMES DAILY
Qty: 45 TABLET | Refills: 1 | Status: SHIPPED | OUTPATIENT
Start: 2020-02-26 | End: 2020-03-19

## 2020-02-27 ENCOUNTER — HOSPITAL ENCOUNTER (OUTPATIENT)
Dept: PHYSICAL THERAPY | Facility: CLINIC | Age: 75
Setting detail: THERAPIES SERIES
End: 2020-02-27
Attending: FAMILY MEDICINE
Payer: MEDICARE

## 2020-02-27 DIAGNOSIS — M54.10 RADICULAR SYNDROME OF LEFT LEG: ICD-10-CM

## 2020-02-27 PROCEDURE — 97535 SELF CARE MNGMENT TRAINING: CPT | Mod: GP | Performed by: PHYSICAL MEDICINE & REHABILITATION

## 2020-02-27 PROCEDURE — 97110 THERAPEUTIC EXERCISES: CPT | Mod: GP | Performed by: PHYSICAL MEDICINE & REHABILITATION

## 2020-02-27 PROCEDURE — 97161 PT EVAL LOW COMPLEX 20 MIN: CPT | Mod: GP | Performed by: PHYSICAL MEDICINE & REHABILITATION

## 2020-02-27 NOTE — PROGRESS NOTES
[facility]          OUTPATIENT PHYSICAL THERAPY ORTHOPEDIC EVALUATION  PLAN OF TREATMENT FOR OUTPATIENT REHABILITATION  (COMPLETE FOR INITIAL CLAIMS ONLY)  Patient's Last Name, First Name, M.I.  YOB: 1945  Derrick Morrison    Provider s Name:  [facility]   Medical Record No.  [id number]   Start of Care Date:  02/27/20   Onset Date:  01/20/20(~occurred at end of january 2020)   Type:     _X__PT   ___OT   ___SLP Medical Diagnosis:  Radicular syndrome of left leg     PT Diagnosis:  LBP secondary to disc pathology with radicular sx into L LE   Visits from SOC:  1      _________________________________________________________________________________  Plan of Treatment/Functional Goals:  ADL retraining, IADL retraining, balance training, bed mobility training, gait training, joint mobilization, manual therapy, motor coordination training, neuromuscular re-education, orthotic fitting/training, ROM, strengthening, stretching, transfer training     Biofeedback, Contrast bath immersion, Cryotherapy, Electrical stimulation, Hot packs, Iontophoresis, Low level laser therapy, Shortwave diathermy, TENS, Traction, Ultrasound  only as needed  Goals  Goal Identifier: 1  Goal Description: Pt will be able to sit >10 minutes in order to decrease difficutly with ADLs.   Target Date: 03/26/20    Goal Identifier: 2  Goal Description: Pt will be able to drive >20 minutes in order to deccrease difficulty with community participation.   Target Date: 04/24/20    Goal Identifier: 3  Goal Description: Pt will be independent with HEP in order to self manage symptoms.   Target Date: 04/24/20    Therapy Frequency:  2 times/Week  Predicted Duration of Therapy Intervention:  8 weeks (decreasing to 1x/week as appropriate)    Ana Paula Jerry, PT                 I CERTIFY THE NEED FOR THESE SERVICES FURNISHED UNDER        THIS PLAN OF TREATMENT AND WHILE UNDER MY CARE     (Physician  co-signature of this document indicates review and certification of the therapy plan).                       Certification Date From:  02/27/20   Certification Date To:  04/24/20    Referring Provider:  Kaiden Zapata MD    Initial Assessment        See Epic Evaluation Start of Care Date: 02/27/20

## 2020-02-27 NOTE — PROGRESS NOTES
02/27/20 0800   General Information   Type of Visit Initial OP Ortho PT Evaluation   Start of Care Date 02/27/20   Referring Physician Kaiden Zapata MD   Patient/Family Goals Statement drive without pain (has pain currently with 8 min drive to clinic), increase overall tolerance to sitting (1 minutes currently)   Orders Evaluate and Treat   Date of Order 02/24/20   Certification Required? Yes  ([employer]; [employer])   Medical Diagnosis Radicular syndrome of left leg    Surgical/Medical history reviewed Yes   Precautions/Limitations no known precautions/limitations   General Information Comments PMHx per pt report: heart problems, 3 bulging discs and 1broken in [id number]   Body Part(s)   Body Part(s) Lumbar Spine/SI   Presentation and Etiology   Pertinent history of current problem (include personal factors and/or comorbidities that impact the POC) Pt arrived to PT today for LBP with L radicular sx. Notes pain started late January 2020, notes he was walking down stairs and slipped on step and jammed back. Notes he has hx of LBP from 3 bulging discs and 1 burst fracture [id number]. States he performed water aerobics and seemed to help with prior episode of pain. Radicular sx down backside of L LE, constant ache, and sometimes shoots.    Impairments A. Pain;D. Decreased ROM;K. Numbness;L. Tingling   Functional Limitations perform activities of daily living;perform desired leisure / sports activities   Symptom Location low back, L LE   How/Where did it occur Other  (on stairs)   Onset date of current episode/exacerbation 01/20/20  (~occurred at end of january 2020)   Chronicity New   Pain rating (0-10 point scale) Best (/10);Worst (/10)   Best (/10) 3   Worst (/10) 7   Pain quality A. Sharp;C. Aching;E. Shooting   Frequency of pain/symptoms A. Constant   Pain/symptoms are: The same all the time   Pain/symptoms exacerbated by A. Sitting   Pain/symptoms eased by B. Walking;I. OTC medication(s);K. Other   Pain eased by comment laying  "down   Progression of symptoms since onset: Unchanged   Prior Level of Function   Prior Level of Function-Mobility independent   Prior Level of Function-ADLs independent   Current Level of Function   Current Community Support Family/friend caregiver   Patient role/employment history F. Retired   Living environment House/townhome   Home/community accessibility 3-4 steps, has railings--reports no difficulty   Current equipment-Gait/Locomotion None   Fall Risk Screen   Fall screen completed by PT   Have you fallen 2 or more times in the past year? No   Have you fallen and had an injury in the past year? No   Timed Up and Go score (seconds) 13   Is patient a fall risk? No   Abuse Screen (yes response referral indicated)   Feels Unsafe at Home or Work/School no   Feels Threatened by Someone no   Does Anyone Try to Keep You From Having Contact with Others or Doing Things Outside Your Home? no   Physical Signs of Abuse Present no   Functional Scales   Functional Scales Other   Other Scales  LEFS: 43/80   Lumbar Spine/SI Objective Findings   Observation Pt found in lobby standing and stood through subjective due to pain   Integumentary no palpable signs of swelling   Posture rounded shoulders, forward head   Gait/Locomotion pt amb stairs reciprocally, demonstrates shuffling gait (minimal foot clearance) during amb throughout clinic   Flexion ROM 21\" from floor (pain)   Extension ROM 50% limited (pain)   Right Side Bending ROM 20\" from floor (pain)   Left Side Bending ROM 17\" from floor   Repeated Extension-Standing ROM pos   Repeated Flexion-Standing ROM pos   Lumbar ROM Comment reported increased pain with most AROM but L SB, notes pain radiates down L LE, with pain most specific at ischial tuberosity on L   Hip Screen neg alfredo B, neg CRISTINE PINEDO.   Hip Flexion (L2) Strength 4+/5 B (min increase in sx on L)   Hip Abduction Strength seated: 5/5 B   Hip Adduction Strength seated: 5/5 B   Knee Flexion Strength 5/5 B   Knee " Extension (L3) Strength unable to extend knee on L due to pain, R=5/5   Ankle Dorsiflexion (L4) Strength 5/5 B   Great Toe Extension (L5) Strength 5/5 B   Ankle Plantar Flexion (S1) Strength 5/5 B   Hamstring Flexibility R=45*, L=30* (pain)   Hip Flexor Flexibility limited B   Quadricep Flexibility wnl B   Piriformis Flexibility min limitation B   SLR neg B   Pito Test min limited 1 jt hip flexors B   Prone Instability Test neg   Crossover SLR neg B   Repeated Extension Prone neg   Slump Test pos on L   Spring Test neg   Segmental Mobility hypomobility noted throughout lumbar spine   Sensation Testing normall dull touch sensation amongst B LE in dermatomal pattern   Palpation noted no increase in sx with mod palpation of lumbar spine and pelvic soft tissues and bony prominances   Planned Therapy Interventions   Planned Therapy Interventions ADL retraining;IADL retraining;balance training;bed mobility training;gait training;joint mobilization;manual therapy;motor coordination training;neuromuscular re-education;orthotic fitting/training;ROM;strengthening;stretching;transfer training   Planned Modality Interventions   Planned Modality Interventions Biofeedback;Contrast bath immersion;Cryotherapy;Electrical stimulation;Hot packs;Iontophoresis;Low level laser therapy;Shortwave diathermy;TENS;Traction;Ultrasound   Planned Modality Interventions Comments only as needed   Clinical Impression   Criteria for Skilled Therapeutic Interventions Met yes, treatment indicated   PT Diagnosis LBP secondary to disc pathology with radicular sx into L LE   Influenced by the following impairments decreased ROM, decreased strength, radicular sx, pain   Functional limitations due to impairments sitting, driving   Clinical Presentation Stable/Uncomplicated   Clinical Presentation Rationale few cormorbidities, ROM, strength, high motivation, clinical judgement   Clinical Decision Making (Complexity) Low complexity   Therapy Frequency 2  times/Week   Predicted Duration of Therapy Intervention (days/wks) 8 weeks (decreasing to 1x/week as appropriate)   Risk & Benefits of therapy have been explained Yes   Patient, Family & other staff in agreement with plan of care Yes   Clinical Impression Comments Pt is a 74 y.o. male who presented to the PT clinic today with a rehab diagnosis of LBP secondary to disc pathology with radicular sx into L LE as evidenced by decreased ROM, decreased strength, radicular sx, and pain. Pt is appropriate for skilled PT to address previously listed impairments in order to decrease difficulty with sitting and driving.    Education Assessment   Preferred Learning Style Listening;Reading;Demonstration;Pictures/video   Barriers to Learning No barriers   ORTHO GOALS   PT Ortho Eval Goals 1;2;3   Ortho Goal 1   Goal Identifier 1   Goal Description Pt will be able to sit >10 minutes in order to decrease difficutly with ADLs.    Target Date 03/26/20   Ortho Goal 2   Goal Identifier 2   Goal Description Pt will be able to drive >20 minutes in order to deccrease difficulty with community participation.    Target Date 04/24/20   Ortho Goal 3   Goal Identifier 3   Goal Description Pt will be independent with HEP in order to self manage symptoms.    Target Date 04/24/20   Total Evaluation Time   PT Loli Low Complexity Minutes (06918) 30   Therapy Certification   Certification date from 02/27/20   Certification date to 04/24/20   Medical Diagnosis Radicular syndrome of left leg       Please contact me with any questions or concerns.    Thank you for your referral,     Ana Paula Jerry, PT, DPT  [place] [address]  [phone]

## 2020-03-03 ENCOUNTER — HOSPITAL ENCOUNTER (OUTPATIENT)
Dept: PHYSICAL THERAPY | Facility: CLINIC | Age: 75
Setting detail: THERAPIES SERIES
End: 2020-03-03
Attending: FAMILY MEDICINE
Payer: MEDICARE

## 2020-03-03 PROCEDURE — 97140 MANUAL THERAPY 1/> REGIONS: CPT | Mod: GP | Performed by: PHYSICAL MEDICINE & REHABILITATION

## 2020-03-03 PROCEDURE — 97110 THERAPEUTIC EXERCISES: CPT | Mod: GP | Performed by: PHYSICAL MEDICINE & REHABILITATION

## 2020-03-06 ENCOUNTER — HOSPITAL ENCOUNTER (OUTPATIENT)
Dept: PHYSICAL THERAPY | Facility: CLINIC | Age: 75
Setting detail: THERAPIES SERIES
End: 2020-03-06
Attending: FAMILY MEDICINE
Payer: MEDICARE

## 2020-03-06 PROCEDURE — 97110 THERAPEUTIC EXERCISES: CPT | Mod: GP | Performed by: PHYSICAL MEDICINE & REHABILITATION

## 2020-03-06 PROCEDURE — 97140 MANUAL THERAPY 1/> REGIONS: CPT | Mod: GP | Performed by: PHYSICAL MEDICINE & REHABILITATION

## 2020-03-10 ENCOUNTER — HOSPITAL ENCOUNTER (OUTPATIENT)
Dept: PHYSICAL THERAPY | Facility: CLINIC | Age: 75
Setting detail: THERAPIES SERIES
End: 2020-03-10
Attending: FAMILY MEDICINE
Payer: MEDICARE

## 2020-03-10 PROCEDURE — 97140 MANUAL THERAPY 1/> REGIONS: CPT | Mod: GP | Performed by: PHYSICAL MEDICINE & REHABILITATION

## 2020-03-17 ENCOUNTER — MYC MEDICAL ADVICE (OUTPATIENT)
Dept: FAMILY MEDICINE | Facility: CLINIC | Age: 75
End: 2020-03-17

## 2020-03-17 ENCOUNTER — HOSPITAL ENCOUNTER (OUTPATIENT)
Dept: PHYSICAL THERAPY | Facility: CLINIC | Age: 75
Setting detail: THERAPIES SERIES
End: 2020-03-17
Attending: FAMILY MEDICINE
Payer: MEDICARE

## 2020-03-17 DIAGNOSIS — M54.10 RADICULAR LOW BACK PAIN: Primary | ICD-10-CM

## 2020-03-17 DIAGNOSIS — M54.42 ACUTE LEFT-SIDED LOW BACK PAIN WITH LEFT-SIDED SCIATICA: ICD-10-CM

## 2020-03-17 PROCEDURE — 97110 THERAPEUTIC EXERCISES: CPT | Mod: GP | Performed by: PHYSICAL MEDICINE & REHABILITATION

## 2020-03-17 NOTE — PROGRESS NOTES
"Outpatient Physical Therapy Progress Note     Patient: Derrick Morrison  : 1945    Beginning/End Dates of Reporting Period:  2020 to 3/17/2020    Referring Provider: Kaiden Zapata MD    Therapy Diagnosis: LBP secondary to disc pathology with radicular sx into L LE     Client Self Report: Pt reports he tried stretcing hamstrings on 3\" step while sitting and standing, and reports increased sx. Reports can do all other exercises, \"no problem.\" States he still cannot sit down at all or drive car due to pain. Reports he tried the pool again (self aquatic therapy) and felt good following. Has not returned to the pool since though due to covid 19. States forward flexion causes increased sx along L ischial tuberosity, no increase in radicular sx or LBP.    Objective Measurements:  Objective Measure: lumbar ROM  Details: flexion: 23\" from floor (pain in L hamstring origin), extension: no limitation; SB R: 16\" from floor; SB L: 17\" from floor  Objective Measure: hamstring length start of session)  Details: L= 30* (pain along length of hamstrings, primarily insertion; no increase in LBP)     Goals:  Goal Identifier 1   Goal Description Pt will be able to sit >10 minutes in order to decrease difficutly with ADLs.    Target Date 20   Date Met      Progress: (unable at this time due to pain)     Goal Identifier 2    Goal Description Pt will be able to drive >20 minutes in order to deccrease difficulty with community participation.    Target Date 20   Date Met      Progress: (unable at this time)     Goal Identifier 3   Goal Description Pt will be independent with HEP in order to self manage symptoms.    Target Date 20   Date Met      Progress: (long term goal, I with current HEP)       Progress Toward Goals:   Progress this reporting period: Pt has attended 5 PT sessions, achieving 0/3 short term and long term goals. Pt has improved lumbar mobility but continues to be limited due to poor hamstring " "length and pain on L. Pt's primary location of pain is L ischial tuberosity, verbalizing no pain in low back at this time but occasional \"shocks\" down L LE. PROM, manual therapy, stretching and strengthening have been utlized throughout PT but pt reports no change in pain. PT and pt discussed and agreed upon return to PCP at this time for further pain management.       Plan:  Other: follow up with PCP for further pain management; with return to PT if deemed appropriate.    Discharge:  No    Please contact me with any questions or concerns.    Thank you for your referral,     Ana Paula Jerry, PT, DPT  Physical Therapist  [address] [address] [phone]    "

## 2020-03-19 RX ORDER — METHOCARBAMOL 750 MG/1
750 TABLET, FILM COATED ORAL 3 TIMES DAILY
Qty: 45 TABLET | Refills: 3 | Status: SHIPPED | OUTPATIENT
Start: 2020-03-19 | End: 2020-04-06

## 2020-03-24 ENCOUNTER — TELEPHONE (OUTPATIENT)
Dept: FAMILY MEDICINE | Facility: CLINIC | Age: 75
End: 2020-03-24

## 2020-03-24 DIAGNOSIS — M54.10 RADICULAR LEG PAIN: Primary | ICD-10-CM

## 2020-03-24 NOTE — TELEPHONE ENCOUNTER
Reason for Call:  Other     Detailed comments: Patient has a ortho appt in 4 weeks but he is in so much pain in his left leg - please advise -     Phone Number Patient can be reached at: Home number on file [phone] (home)    Best Time:     Can we leave a detailed message on this number? YES    Call taken on 3/24/2020 at 8:44 AM by Sveta Dorantes

## 2020-03-24 NOTE — TELEPHONE ENCOUNTER
S-(situation): patient C/O pain in left leg.  Pain in left buttocks and going down back of leg.  States can't sit in a chair.  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  Walking hurts the left leg, lays down and feels a bit better.    When walks, left leg spasms and will have some involuntary shaking of the leg.    Is taking naprosyn 440 mg BID and taking the methocarbamol four times a day.  This combo not helping much he says.     B-(background): sounds like missed a step while in [place] and hurt back.  Ortho appointment 4/21/20    A-(assessment): left leg pain    R-(recommendations): I told Derrick that I Dr Zapata is in the office tomorrow and he may want a telephone visit.  Derrick did mention possible MRI.    Maribel Becerril RN

## 2020-03-25 ENCOUNTER — TELEPHONE (OUTPATIENT)
Dept: FAMILY MEDICINE | Facility: CLINIC | Age: 75
End: 2020-03-25

## 2020-03-25 DIAGNOSIS — M54.10 RADICULAR LEG PAIN: Primary | ICD-10-CM

## 2020-03-25 RX ORDER — ALPRAZOLAM 0.5 MG
TABLET ORAL
Qty: 2 TABLET | Refills: 0 | Status: SHIPPED | OUTPATIENT
Start: 2020-03-25 | End: 2020-09-21

## 2020-03-25 RX ORDER — OXYCODONE AND ACETAMINOPHEN 5; 325 MG/1; MG/1
TABLET ORAL
Qty: 20 TABLET | Refills: 0 | Status: SHIPPED | OUTPATIENT
Start: 2020-03-25 | End: 2020-04-06

## 2020-03-25 NOTE — TELEPHONE ENCOUNTER
Reason for Call:  Other     Detailed comments: Pt is scheduled for his MRI April 20th. They told him he should contact Dr Zapata for a sedative to take prior due to claustrophobia.  He is aware he needs a .   [facility]    Phone Number Patient can be reached at: Home number on file [phone] (home)    Best Time: anytime    Can we leave a detailed message on this number? YES    Call taken on 3/25/2020 at 8:29 AM by Magdalena Deluca

## 2020-03-25 NOTE — TELEPHONE ENCOUNTER
Reason for Call:  Other     Detailed comments: Patient cannot get out of bed because of the spasms and pain - please advise He wants a different pain med so he can get out of bed    Phone Number Patient can be reached at: Home number on file [phone] (home)    Best Time:     Can we leave a detailed message on this number? YES    Call taken on 3/25/2020 at 10:16 AM by Sveta Dorantes

## 2020-03-26 ENCOUNTER — TELEPHONE (OUTPATIENT)
Dept: FAMILY MEDICINE | Facility: CLINIC | Age: 75
End: 2020-03-26

## 2020-03-26 NOTE — TELEPHONE ENCOUNTER
Reason for call:  Patient reporting a symptom    Symptom or request: Back Pain going down left Leg.  Pt said he is in so much pain. Pt was to take 2 percocet to help with his pain during the night. Pt is wondering what can he do for the pain during the day.  Pt has MRI 4/20/20.  Wondering if he can have it moved up sooner.    Phone Number patient can be reached at:  Home number on file [phone] (home)    Best Time:  Any Time      Can we leave a detailed message on this number:  YES    Call taken on 3/26/2020 at 8:07 AM by Romi Rust

## 2020-03-26 NOTE — TELEPHONE ENCOUNTER
Voice message left at 7:36 AM    Pt says he talked to Dr Zapata yesterday for knife like nerve pain. He took the Percocet last night that Dr Zapata ordered. He wants to know what he should do today, go back to regular regimen or what to do.     [place] [phone]

## 2020-03-26 NOTE — TELEPHONE ENCOUNTER
Derrick says radiology is not scheduling patients until the end of April because of Covid. He says the Percocet did help him sleep last night. He wonders if okay if he takes one during the day if he needs to as the Naproxen and Robaxin just does not touch his pain. Advised okay to take a Percocet during the day if he absolutely has to but try to keep it for bedtime. Advised we will call him back on Monday morning to see how he is doing, he will call back sooner if worse.

## 2020-03-27 ENCOUNTER — MYC MEDICAL ADVICE (OUTPATIENT)
Dept: FAMILY MEDICINE | Facility: CLINIC | Age: 75
End: 2020-03-27

## 2020-04-04 ENCOUNTER — MYC MEDICAL ADVICE (OUTPATIENT)
Dept: FAMILY MEDICINE | Facility: CLINIC | Age: 75
End: 2020-04-04

## 2020-04-04 DIAGNOSIS — M54.42 ACUTE LEFT-SIDED LOW BACK PAIN WITH LEFT-SIDED SCIATICA: ICD-10-CM

## 2020-04-04 DIAGNOSIS — M54.10 RADICULAR LEG PAIN: ICD-10-CM

## 2020-04-06 ENCOUNTER — TELEPHONE (OUTPATIENT)
Dept: FAMILY MEDICINE | Facility: CLINIC | Age: 75
End: 2020-04-06

## 2020-04-06 RX ORDER — OXYCODONE AND ACETAMINOPHEN 5; 325 MG/1; MG/1
TABLET ORAL
Qty: 60 TABLET | Refills: 0 | Status: SHIPPED | OUTPATIENT
Start: 2020-04-06 | End: 2020-09-21

## 2020-04-06 RX ORDER — METHOCARBAMOL 750 MG/1
TABLET, FILM COATED ORAL
Qty: 60 TABLET | Refills: 3 | Status: SHIPPED | OUTPATIENT
Start: 2020-04-06 | End: 2022-11-02

## 2020-04-06 NOTE — TELEPHONE ENCOUNTER
Reason for Call:  Other prescription    Detailed comments: [facility] pharmacy has directions on the Rx you sent today for the Methocarbamol.  Si times daily as needed for back or leg pain    They need to clarify how many tablets daily.    Phone Number Patient can be reached at: Other phone number:  Pharmacy [phone]  Call taken on 2020 at 10:11 AM by Magdalena Deluca

## 2020-04-15 ENCOUNTER — VIRTUAL VISIT (OUTPATIENT)
Dept: ORTHOPEDICS | Facility: CLINIC | Age: 75
End: 2020-04-15
Attending: FAMILY MEDICINE
Payer: COMMERCIAL

## 2020-04-15 VITALS — BODY MASS INDEX: 28.14 KG/M2 | WEIGHT: 190 LBS | HEART RATE: 76 BPM | HEIGHT: 69 IN

## 2020-04-15 DIAGNOSIS — M25.552 POSTERIOR PAIN OF HIP, LEFT: Primary | ICD-10-CM

## 2020-04-15 PROCEDURE — 99202 OFFICE O/P NEW SF 15 MIN: CPT | Mod: 95 | Performed by: PEDIATRICS

## 2020-04-15 ASSESSMENT — MIFFLIN-ST. JEOR: SCORE: 1597.21

## 2020-04-15 NOTE — PATIENT INSTRUCTIONS
This is consistent with a hamstring injury.  Continue with comfortable home exercises from therapy.  Plan to obtain x-ray next; will coordinate something closer to home.  We discussed potential for an MRI of this area, as well as seeing one of my colleagues for evaluation with ultrasound. I would favor doing the ultrasound with one of my colleagues, with possibly doing an injection depending on that evaluation.      Can obtain x ray thru [facility].  Call [phone] to schedule an X ray only visit      If you have any further questions for your physician or physician s care team you can call [phone] and use option 3 to leave a voice message. Calls received during business hours will be returned same day.

## 2020-04-15 NOTE — PROGRESS NOTES
"Derrick Morrison is a 74 year old male who is being evaluated via a billable video visit.      The patient has been notified of following:     \"This video visit will be conducted via a call between you and your physician/provider. We have found that certain health care needs can be provided without the need for an in-person physical exam.  This service lets us provide the care you need with a video conversation.  If a prescription is necessary we can send it directly to your pharmacy.  If lab work is needed we can place an order for that and you can then stop by our lab to have the test done at a later time.    Video visits are billed at different rates depending on your insurance coverage.  Please reach out to your insurance provider with any questions.    If during the course of the call the physician/provider feels a video visit is not appropriate, you will not be charged for this service.\"    Patient has given verbal consent for Video visit? Yes    How would you like to obtain your AVS? [place]    Patient would like the video invitation sent by: Send to e-mail at: [email or web address]      Video Start Time: see below    Additional provider notes:        Sports Medicine Clinic Visit    PCP: Kaiden Zapata    Derrick Morrison is a 74 year old male who is seen  in consultation at the request of Kaiden Zapata MD presenting with low back and posterior leg pain.  While going down some steps in January he slipped and missed a step jamming his leg into his back.  He began to have pain shortly after this.   Pain is left posterior hip and down the back of his leg.  His PT believes he has more of a hamstring issue than low back.  He does not feel the therapy has been beneficial.   Does have intermittent tingling or numbness in his \"whole leg\" which is worse in the morning.  Pain is worse with sitting, can adjust to not have the pain over the area.  Not comfortable on toilet, car seat.    MRI was ordered, but has been " "rescheduled to 5/8/20 due to Covid-19.      Injury: slipped on step, jammed straight leg   **  Naproxen 440 mg in pm. Also percocet at night.    **  Difficult to complete video connection. Did majority of visit by telephone.  This telephone visit was conducted during the 2020 coronavirus (COVID-19) outbreak, and done in lieu of an in-person visit to limit potential viral spread.      Location of Pain: left posterior hip and leg   Duration of Pain: 3 month(s)  Rating of Pain at worst: 8/10  Rating of Pain Currently: 9/10  Symptoms are better with: Other medications: naproxen, methocarbamol, percocet   Symptoms are worse with: standing up, leaning forward, stretching hamstring   Additional Features:   Positive: paresthesias   Negative: swelling, bruising, popping, grinding, catching, locking, instability, numbness, weakness, pain in other joints and systemic symptoms  Other evaluation and/or treatments so far consists of: PT, medications   Prior History of related problems: previous sciatica, feels this is different     Social History: retired     Review of Systems  Musculoskeletal: as above  Remainder of review of systems is negative including constitutional, CV, pulmonary, GI, Skin and Neurologic except as noted in HPI or medical history.    Past Medical History:   Diagnosis Date     Actinic keratosis      Basal cell carcinoma      BPH w urinary obs/LUTS 10/10/2011    flomax in past, \"quit working\".  Avodart in past.  Tapered off.        CAD (coronary artery disease) 10/10/2011    -8/16/2006 s/p GERALDINE to mid RCA, s/p GERALDINE to mid LAD in [place] -10/4/2007 s/p GERALDINE to pRCA and GERALDINE to dRCA in [place]  -11/17/11 Unstable angina, s/p GERALDINE to prox LAD (jailed small diagonal)       Hyperlipidaemia      Palpitations 8/7/2013    occasional, improved on low dose beta blocker      screening 10/10/2011    2007 colonoscopy \"all clean\" in [place].  He is sure about this.   AAA ultrasound screen 3/07 normal also.  " "(leg and neck done then too)      Skin cancer     had mohs on L temple     PSHx: above    Family History   Problem Relation Age of Onset     Coronary Artery Disease Mother      Melanoma No family hx of      Social History     Socioeconomic History     Marital status:      Spouse name: Not on file     Number of children: Not on file     Years of education: Not on file     Highest education level: Not on file   Occupational History     Employer: RETIRED   Social Needs     Financial resource strain: Not on file     Food insecurity     Worry: Not on file     Inability: Not on file     Transportation needs     Medical: Not on file     Non-medical: Not on file   Tobacco Use     Smoking status: Never Smoker     Smokeless tobacco: Never Used   Substance and Sexual Activity     Alcohol use: Yes     Comment: occasionally     Drug use: No     Sexual activity: Yes     Partners: Female   Lifestyle     Physical activity     Days per week: Not on file     Minutes per session: Not on file     Stress: Not on file   Relationships     Social connections     Talks on phone: Not on file     Gets together: Not on file     Attends [employer] service: Not on file     Active member of club or organization: Not on file     Attends meetings of clubs or organizations: Not on file     Relationship status: Not on file     Intimate partner violence     Fear of current or ex partner: Not on file     Emotionally abused: Not on file     Physically abused: Not on file     Forced sexual activity: Not on file   Other Topics Concern     Parent/sibling w/ CABG, MI or angioplasty before 65F 55M? No   Social History Narrative     Not on file       Objective  Pulse 76   Ht 1.753 m (5' 9\")   Wt 86.2 kg (190 lb)   BMI 28.06 kg/m      Pt describes focal tenderness in the left posterior hip, proximal thigh area.  Describes limited ability to stretch hamstring; flexing hip with knee extended causes pain  Per pt, standing left hip extension did not cause " pain, nor did standing left knee flexion with hip neutral position  Per pt, mild posterior pain with externally rotating hip      Radiology:  none    Assessment:  1. Posterior pain of hip, left        Plan:  Discussed the assessment with the patient.  See patient instructions.  He desires x-ray; will try to do locally for him. Order placed. Will contact him with results.  From there, we discussed potential for additional imaging. Does not sound lumbar source currently; he can cancel the lumbar MRI.  Discussed potential pelvis/hip MRI, vs US evaluation. In reviewing options, plan for now to have him see one of my colleagues in clinic for use of US. If consistent with ischial bursitis, no significant tissue disruption, one possibility may be imaging guided steroid injection.  Follow up: will have staff contact him by phone to connect about x-ray and future visit with one of my colleagues.  Questions answered. The patient indicates understanding of these issues and agrees with the plan.    Jose De La Torre, DO, CAQ    CC: Kaiden Zapata            Patient Instructions   This is consistent with a hamstring injury.  Continue with comfortable home exercises from therapy.  Plan to obtain x-ray next; will coordinate something closer to home.  We discussed potential for an MRI of this area, as well as seeing one of my colleagues for evaluation with ultrasound. I would favor doing the ultrasound with one of my colleagues, with possibly doing an injection depending on that evaluation.      Can obtain x ray thru [facility].  Call [phone] to schedule an X ray only visit      If you have any further questions for your physician or physician s care team you can call [phone] and use option 3 to leave a voice message. Calls received during business hours will be returned same day.            Video-Visit Details    Type of service:  Video Visit attempted; converted to telephone  Video intermittently used (spotty images)  for about 5 mins; total phone call time 20 mins    Video End Time (time video stopped):     Originating Location (pt. Location): Home    Distant Location (provider location):  [facility]     Mode of Communication:  Video Conference via [facility]      Jose De La Torre DO

## 2020-04-17 ENCOUNTER — TELEPHONE (OUTPATIENT)
Dept: ORTHOPEDICS | Facility: CLINIC | Age: 75
End: 2020-04-17

## 2020-04-17 ENCOUNTER — ANCILLARY PROCEDURE (OUTPATIENT)
Dept: GENERAL RADIOLOGY | Facility: CLINIC | Age: 75
End: 2020-04-17
Attending: PEDIATRICS
Payer: COMMERCIAL

## 2020-04-17 DIAGNOSIS — M25.552 POSTERIOR PAIN OF HIP, LEFT: ICD-10-CM

## 2020-04-17 PROCEDURE — 73502 X-RAY EXAM HIP UNI 2-3 VIEWS: CPT

## 2020-04-17 NOTE — TELEPHONE ENCOUNTER
Results for orders placed or performed in visit on 04/17/20   XR Pelvis w Hip Left 1 View    Narrative    PELVIS AND LEFT HIP ONE VIEW  4/17/2020 1:05 PM    HISTORY:  Posterior pain of hip, left.    COMPARISON:  None.    FINDINGS:  No fracture or osseous lesion is seen. The joint spaces are  well preserved. No soft tissue pathology is seen.        Impression    IMPRESSION:  Unremarkable examination.     ELOY PALOMARES MD

## 2020-04-17 NOTE — TELEPHONE ENCOUNTER
Please pass along x-ray report; no concerning bony abnormality noted related to his ongoing pain.  Thanks.  Jose De La Torre DO, CAQ

## 2020-04-17 NOTE — TELEPHONE ENCOUNTER
Called and spoke with patient, relayed results.  Would be interested in an US evaluation in clinic to evaluate soft tissue/hamstring.  Scheduled with Dr. Samuel on 4/24/20 4 PM    Serena Mann MS ATC

## 2020-04-24 ENCOUNTER — OFFICE VISIT (OUTPATIENT)
Dept: ORTHOPEDICS | Facility: CLINIC | Age: 75
End: 2020-04-24
Payer: COMMERCIAL

## 2020-04-24 VITALS
WEIGHT: 199 LBS | SYSTOLIC BLOOD PRESSURE: 125 MMHG | BODY MASS INDEX: 29.47 KG/M2 | DIASTOLIC BLOOD PRESSURE: 70 MMHG | HEIGHT: 69 IN

## 2020-04-24 DIAGNOSIS — M54.16 LUMBAR RADICULOPATHY: ICD-10-CM

## 2020-04-24 DIAGNOSIS — M25.552 POSTERIOR PAIN OF HIP, LEFT: Primary | ICD-10-CM

## 2020-04-24 PROCEDURE — 76881 US COMPL JOINT R-T W/IMG: CPT | Performed by: FAMILY MEDICINE

## 2020-04-24 ASSESSMENT — MIFFLIN-ST. JEOR: SCORE: 1638.04

## 2020-04-24 NOTE — PROGRESS NOTES
Derrick Morrison  :  1945  DOS: 2020  MRN: [id number]    Sports Medicine Clinic Procedure    Ultrasound Guided Evaluation of left Hamstring/buttocks    Clinical History: Patient presents for evaluation of left buttocks and hamstring.  He has noted radiating pain over the last ~ 3 months that is not resolving with physical therapy.    Diagnosis:   1. Posterior pain of hip, left    2. Lumbar radiculopathy      Referring Physician: Jose De La Torre DO     Impression: Pt having right lateral buttock pain over exit of sciatic nerve.  No TTP over ischial tuberosity, no sig pain with resisted knee flexion.  U/S showing intact prox hamstring tendons with no evidence of tear or overlying bursitis.  Likely cause lumbar radiculopathy in the setting of known previous radicular sx.  No red flag symptoms/signs on history or examination.  Plan to have pt complete MRI and f/u with Dr. De La Torre for further evaluation and treatment.  Pt understands and agrees with the plan.    Plan:  Follow up Lumbar MRI and F/U with Dr. De La Torre.    Nuno Samuel MD

## 2020-04-24 NOTE — PATIENT INSTRUCTIONS
Diagnosis: Left buttock pain differential lumbar radiculopathy vs piriformis syndrome  Image Findings: No hamstring tear  Treatment: Complete scheduled MRI, continue home exercises, follow-up with Dr. De La Torre afterwards.  You may benefit from an epidural steroid injection  Medications: As per primary care provider and Dr. De La Torre  Follow-up: After MRI with Dr. De La Torre    It was great seeing you today!    Nuno Samuel

## 2020-04-24 NOTE — LETTER
2020         RE: Derrick Morrison  [address] [id number]        Dear Colleague,    Thank you for referring your patient, Derrick Morrison, to the [facility] [place]. Please see a copy of my visit note below.    Derrick Morrison  :  1945  DOS: 2020  MRN: [id number]    Sports Medicine Clinic Procedure    Ultrasound Guided Evaluation of left Hamstring/buttocks    Clinical History: Patient presents for evaluation of left buttocks and hamstring.  He has noted radiating pain over the last ~ 3 months that is not resolving with physical therapy.    Diagnosis:   1. Posterior pain of hip, left    2. Lumbar radiculopathy      Referring Physician: Jose De La Torre DO     Impression: Pt having right lateral buttock pain over exit of sciatic nerve.  No TTP over ischial tuberosity, no sig pain with resisted knee flexion.  U/S showing intact prox hamstring tendons with no evidence of tear or overlying bursitis.  Likely cause lumbar radiculopathy in the setting of known previous radicular sx.  No red flag symptoms/signs on history or examination.  Plan to have pt complete MRI and f/u with Dr. De La Torre for further evaluation and treatment.  Pt understands and agrees with the plan.    Plan:  Follow up Lumbar MRI and F/U with Dr. De La Torre.    Nuno Samuel MD                Again, thank you for allowing me to participate in the care of your patient.        Sincerely,        Nuno Samuel MD

## 2020-04-24 NOTE — Clinical Note
Didn't see a tear at the hamstrings or bursitis.  Pain more over sciatic nerve.  Possible piriformis syndrome but more likely lumbar radiculopathy.  Sorry for the delay in this.  He will follow-up with your after the lumbar MRI.    Nuno Samuel MD

## 2020-05-01 ENCOUNTER — MYC MEDICAL ADVICE (OUTPATIENT)
Dept: FAMILY MEDICINE | Facility: CLINIC | Age: 75
End: 2020-05-01

## 2020-05-08 ENCOUNTER — HOSPITAL ENCOUNTER (OUTPATIENT)
Dept: MRI IMAGING | Facility: CLINIC | Age: 75
Discharge: HOME | End: 2020-05-08
Attending: FAMILY MEDICINE | Admitting: FAMILY MEDICINE
Payer: MEDICARE

## 2020-05-08 DIAGNOSIS — M54.10 RADICULAR LEG PAIN: ICD-10-CM

## 2020-05-08 PROCEDURE — 72148 MRI LUMBAR SPINE W/O DYE: CPT

## 2020-09-16 NOTE — PROGRESS NOTES
Outpatient Physical Therapy Discharge Note     Patient: Derrick Morrison  : 1945    Beginning/End Dates of Reporting Period:  20 to 20    Referring Provider: Kaiden Zapata MD    Therapy Diagnosis: LBP secondary to disc pathology with radicular sx into L LE    Patient did not return for follow up treatments.  Goal status and current objective information is therefore unknown.  Discharge from PT services at this time for this episode of treatment. Please see attached documentation under this episode of care for further information including dates of service, start of care date, referring physician, Dx, treatment plan, treatments, etc.    Please contact me with any questions or concerns.    Thank you for your referral.    Ana Paula Jerry, PT, DPT  Physical Therapist   [address] [address] [phone]

## 2020-09-21 ENCOUNTER — OFFICE VISIT (OUTPATIENT)
Dept: FAMILY MEDICINE | Facility: CLINIC | Age: 75
End: 2020-09-21
Payer: COMMERCIAL

## 2020-09-21 VITALS
DIASTOLIC BLOOD PRESSURE: 70 MMHG | HEART RATE: 62 BPM | WEIGHT: 202.8 LBS | BODY MASS INDEX: 30.04 KG/M2 | HEIGHT: 69 IN | TEMPERATURE: 97.6 F | SYSTOLIC BLOOD PRESSURE: 112 MMHG | RESPIRATION RATE: 18 BRPM

## 2020-09-21 DIAGNOSIS — E78.5 HYPERLIPIDEMIA LDL GOAL <70: ICD-10-CM

## 2020-09-21 DIAGNOSIS — I25.10 CORONARY ARTERY DISEASE INVOLVING NATIVE HEART WITHOUT ANGINA PECTORIS, UNSPECIFIED VESSEL OR LESION TYPE: ICD-10-CM

## 2020-09-21 DIAGNOSIS — N13.8 HYPERTROPHY OF PROSTATE WITH URINARY OBSTRUCTION: ICD-10-CM

## 2020-09-21 DIAGNOSIS — N40.1 HYPERTROPHY OF PROSTATE WITH URINARY OBSTRUCTION: ICD-10-CM

## 2020-09-21 DIAGNOSIS — Z00.00 MEDICARE ANNUAL WELLNESS VISIT, SUBSEQUENT: Primary | ICD-10-CM

## 2020-09-21 DIAGNOSIS — Z12.11 SPECIAL SCREENING FOR MALIGNANT NEOPLASMS, COLON: ICD-10-CM

## 2020-09-21 DIAGNOSIS — I10 ESSENTIAL HYPERTENSION: ICD-10-CM

## 2020-09-21 LAB
CHOLEST SERPL-MCNC: 129 MG/DL
GLUCOSE SERPL-MCNC: 88 MG/DL (ref 70–99)
HDLC SERPL-MCNC: 43 MG/DL
LDLC SERPL CALC-MCNC: 68 MG/DL
NONHDLC SERPL-MCNC: 86 MG/DL
TRIGL SERPL-MCNC: 91 MG/DL

## 2020-09-21 PROCEDURE — 99213 OFFICE O/P EST LOW 20 MIN: CPT | Mod: 25 | Performed by: FAMILY MEDICINE

## 2020-09-21 PROCEDURE — 99397 PER PM REEVAL EST PAT 65+ YR: CPT | Performed by: FAMILY MEDICINE

## 2020-09-21 PROCEDURE — 80061 LIPID PANEL: CPT | Performed by: FAMILY MEDICINE

## 2020-09-21 PROCEDURE — 82947 ASSAY GLUCOSE BLOOD QUANT: CPT | Performed by: FAMILY MEDICINE

## 2020-09-21 PROCEDURE — 36415 COLL VENOUS BLD VENIPUNCTURE: CPT | Performed by: FAMILY MEDICINE

## 2020-09-21 RX ORDER — METOPROLOL SUCCINATE 25 MG/1
25 TABLET, EXTENDED RELEASE ORAL 2 TIMES DAILY
Qty: 180 TABLET | Refills: 3 | Status: SHIPPED | OUTPATIENT
Start: 2020-09-21 | End: 2021-10-04

## 2020-09-21 RX ORDER — CLOPIDOGREL BISULFATE 75 MG/1
75 TABLET ORAL DAILY
Qty: 90 TABLET | Refills: 3 | Status: SHIPPED | OUTPATIENT
Start: 2020-09-21 | End: 2021-10-04

## 2020-09-21 RX ORDER — DUTASTERIDE 0.5 MG/1
CAPSULE, LIQUID FILLED ORAL
Qty: 45 CAPSULE | Refills: 3 | Status: SHIPPED | OUTPATIENT
Start: 2020-09-21 | End: 2021-10-04

## 2020-09-21 RX ORDER — ROSUVASTATIN CALCIUM 40 MG/1
40 TABLET, COATED ORAL EVERY EVENING
Qty: 90 TABLET | Refills: 3 | Status: SHIPPED | OUTPATIENT
Start: 2020-09-21 | End: 2021-10-04

## 2020-09-21 ASSESSMENT — ENCOUNTER SYMPTOMS
HEADACHES: 0
HEMATURIA: 0
HEMATOCHEZIA: 0
DIARRHEA: 0
CHILLS: 0
DIZZINESS: 0
FREQUENCY: 1
NAUSEA: 0
FEVER: 0
JOINT SWELLING: 0
COUGH: 0
EYE PAIN: 0
MYALGIAS: 0
DYSURIA: 0
HEARTBURN: 0
NERVOUS/ANXIOUS: 0
CONSTIPATION: 0
PARESTHESIAS: 0
PALPITATIONS: 0
SHORTNESS OF BREATH: 0
SORE THROAT: 0
ARTHRALGIAS: 0
ABDOMINAL PAIN: 0
WEAKNESS: 0

## 2020-09-21 ASSESSMENT — MIFFLIN-ST. JEOR: SCORE: 1655.27

## 2020-09-21 ASSESSMENT — ACTIVITIES OF DAILY LIVING (ADL): CURRENT_FUNCTION: NO ASSISTANCE NEEDED

## 2020-09-21 NOTE — PROGRESS NOTES
"SUBJECTIVE:   Derrick Morirson is a 74 year old male who presents for Preventive Visit.      Patient has been advised of split billing requirements and indicates understanding: Yes   Are you in the first 12 months of your Medicare coverage?  No    Healthy Habits:     In general, how would you rate your overall health?  Good    Frequency of exercise:  2-3 days/week    Duration of exercise:  15-30 minutes    Do you usually eat at least 4 servings of fruit and vegetables a day, include whole grains    & fiber and avoid regularly eating high fat or \"junk\" foods?  Yes    Taking medications regularly:  Yes    Medication side effects:  None    Ability to successfully perform activities of daily living:  No assistance needed    Home Safety:  No safety concerns identified    Hearing Impairment:  Difficulty following a conversation in a noisy restaurant or crowded room and find that men's voices are easier to understand than woman's    In the past 6 months, have you been bothered by leaking of urine?  No    In general, how would you rate your overall mental or emotional health?  Good      PHQ-2 Total Score: 0    Additional concerns today:  No    Do you feel safe in your environment? Yes    Have you ever done Advance Care Planning? (For example, a Health Directive, POLST, or a discussion with a medical provider or your loved ones about your wishes): Yes, patient states has an Advance Care Planning document and will bring a copy to the clinic.      Fall risk  Fallen 2 or more times in the past year?: No  Any fall with injury in the past year?: No    Cognitive Screening   1) Repeat 3 items (Leader, Season, Table)    2) Clock draw: NORMAL  3) 3 item recall: Recalls 3 objects  Results: 3 items recalled: COGNITIVE IMPAIRMENT LESS LIKELY    Mini-CogTM Copyright NORAH Renner. Licensed by the author for use in [facility]; reprinted with permission (snehal@.[facility]). All rights reserved.      Do you have sleep apnea, excessive " snoring or daytime drowsiness?: no    Reviewed and updated as needed this visit by clinical staff  Tobacco  Allergies  Meds  Med Hx  Surg Hx  Fam Hx  Soc Hx        Reviewed and updated as needed this visit by Provider        Social History     Tobacco Use     Smoking status: Never Smoker     Smokeless tobacco: Never Used   Substance Use Topics     Alcohol use: Yes     Comment: occasionally     If you drink alcohol do you typically have >3 drinks per day or >7 drinks per week? No    Alcohol Use 9/21/2020   Prescreen: >3 drinks/day or >7 drinks/week? No   Prescreen: >3 drinks/day or >7 drinks/week? -   No flowsheet data found.    Questions/Concerns:  Right knee-hit it and it has been in pain ever since  Breathing test from a couple years ago   Hip and back   Any changes to medications  Scalp itch   Tick that he had developed     Current providers sharing in care for this patient include:   Patient Care Team:  Kaiden Zapata MD as PCP - General (Family Practice)  Kaiden Zapata MD as Assigned PCP    The following health maintenance items are reviewed in Epic and correct as of today:  Health Maintenance   Topic Date Due     ADVANCE CARE PLANNING  08/14/2020     INFLUENZA VACCINE (1) 09/01/2020     FALL RISK ASSESSMENT  09/18/2020     MEDICARE ANNUAL WELLNESS VISIT  09/18/2020     COLORECTAL CANCER SCREENING  10/21/2020     LIPID  09/18/2024     DTAP/TDAP/TD IMMUNIZATION (3 - Td) 08/24/2025     HEPATITIS C SCREENING  Completed     PHQ-2  Completed     PNEUMOCOCCAL IMMUNIZATION 65+ LOW/MEDIUM RISK  Completed     ZOSTER IMMUNIZATION  Completed     AORTIC ANEURYSM SCREENING (SYSTEM ASSIGNED)  Completed     IPV IMMUNIZATION  Aged Out     MENINGITIS IMMUNIZATION  Aged Out     HEPATITIS B IMMUNIZATION  Aged Out     CAD: stable.  Metoprolol.  No cp or sob  Bph: avodart helps.  Refills  Hyperlipidemia: statin, fills  Htn: metoprolol.  Doing well.  Could maybe even cut back to 25mg daily    Wants to do FIT test  "again      Review of Systems   Constitutional: Negative for chills and fever.   HENT: Positive for hearing loss. Negative for congestion, ear pain and sore throat.    Eyes: Positive for visual disturbance. Negative for pain.   Respiratory: Negative for cough and shortness of breath.    Cardiovascular: Negative for chest pain, palpitations and peripheral edema.   Gastrointestinal: Negative for abdominal pain, constipation, diarrhea, heartburn, hematochezia and nausea.   Genitourinary: Positive for frequency, impotence and urgency. Negative for discharge, dysuria, genital sores and hematuria.   Musculoskeletal: Negative for arthralgias, joint swelling and myalgias.   Skin: Negative for rash.   Neurological: Negative for dizziness, weakness, headaches and paresthesias.   Psychiatric/Behavioral: Negative for mood changes. The patient is not nervous/anxious.          OBJECTIVE:   /70 (BP Location: Right arm, Patient Position: Sitting, Cuff Size: Adult Large)   Pulse 62   Temp 97.6  F (36.4  C) (Tympanic)   Resp 18   Ht 1.753 m (5' 9\")   Wt 92 kg (202 lb 12.8 oz)   BMI 29.95 kg/m   Estimated body mass index is 29.95 kg/m  as calculated from the following:    Height as of this encounter: 1.753 m (5' 9\").    Weight as of this encounter: 92 kg (202 lb 12.8 oz).  Physical Exam  Gen: alert and oriented, in no acute distress, affect within normal limits  Neck: supple with no masses or nodes  Throat: oropharynx clear, no exudate or tonsillar/palate asymmetry.    CV: RRR, no murmur  Lungs: clear bilaterally with good effort  Abd: nontender, no mass  Ext: no edema or lesions   Neuro: moving all extremities, gait normal, no focal deficts noted      ASSESSMENT / PLAN:   Well exam  CAD: stable.  Metoprolol.  No cp or sob  Bph: avodart helps.  Refills  Hyperlipidemia: statin, fills  Htn: metoprolol.  Doing well.  Could maybe even cut back to 25mg daily    Fills on all meds.  Update labs.  FIT test.  Try 25mg on toprol " "instead of 50mg, see if feels more energy       COUNSELING:  Reviewed preventive health counseling, as reflected in patient instructions       Regular exercise       Healthy diet/nutrition    Estimated body mass index is 29.95 kg/m  as calculated from the following:    Height as of this encounter: 1.753 m (5' 9\").    Weight as of this encounter: 92 kg (202 lb 12.8 oz).        He reports that he has never smoked. He has never used smokeless tobacco.      Appropriate preventive services were discussed with this patient, including applicable screening as appropriate for cardiovascular disease, diabetes, osteopenia/osteoporosis, and glaucoma.  As appropriate for age/gender, discussed screening for colorectal cancer, prostate cancer, breast cancer, and cervical cancer. Checklist reviewing preventive services available has been given to the patient.    Reviewed patients plan of care and provided an AVS. The Basic Care Plan (routine screening as documented in Health Maintenance) for Derrick meets the Care Plan requirement. This Care Plan has been established and reviewed with the Patient.    Counseling Resources:  ATP IV Guidelines  Pooled Cohorts Equation Calculator  Breast Cancer Risk Calculator  Breast Cancer: Medication to Reduce Risk  FRAX Risk Assessment  ICSI Preventive Guidelines  Dietary Guidelines for Americans, 2010  [employer]'s MyPlate  ASA Prophylaxis  Lung CA Screening    Kaiden Zapata MD  [facility]    Identified Health Risks:  "

## 2020-10-20 ENCOUNTER — OFFICE VISIT (OUTPATIENT)
Dept: DERMATOLOGY | Facility: CLINIC | Age: 75
End: 2020-10-20
Payer: COMMERCIAL

## 2020-10-20 VITALS — SYSTOLIC BLOOD PRESSURE: 129 MMHG | DIASTOLIC BLOOD PRESSURE: 74 MMHG | OXYGEN SATURATION: 97 % | HEART RATE: 80 BPM

## 2020-10-20 DIAGNOSIS — L81.4 LENTIGO: ICD-10-CM

## 2020-10-20 DIAGNOSIS — Z85.828 HISTORY OF BASAL CELL CANCER: ICD-10-CM

## 2020-10-20 DIAGNOSIS — D18.01 CHERRY ANGIOMA: ICD-10-CM

## 2020-10-20 DIAGNOSIS — L82.0 INFLAMED SEBORRHEIC KERATOSIS: ICD-10-CM

## 2020-10-20 DIAGNOSIS — L82.1 SEBORRHEIC KERATOSIS: Primary | ICD-10-CM

## 2020-10-20 PROCEDURE — 99213 OFFICE O/P EST LOW 20 MIN: CPT | Mod: 25 | Performed by: PHYSICIAN ASSISTANT

## 2020-10-20 PROCEDURE — 17110 DESTRUCTION B9 LES UP TO 14: CPT | Performed by: PHYSICIAN ASSISTANT

## 2020-10-20 NOTE — LETTER
"    10/20/2020         RE: Derrick Morrison  [address] [facility] [address]        Dear Colleague,    Thank you for referring your patient, Derrick Morrison, to the [facility]. Please see a copy of my visit note below.    Derrick Morrison is a 75 year old year old male patient here today for spots on face and back. He notes that he will pick at spots. Denies any pain or bleeding.  Patient has no other skin complaints today.  Remainder of the HPI, Meds, PMH, Allergies, FH, and SH was reviewed in chart.    Pertinent Hx:  History of BCC  Past Medical History:   Diagnosis Date     Actinic keratosis      Basal cell carcinoma      BPH w urinary obs/LUTS 10/10/2011    flomax in past, \"quit working\".  Avodart in past.  Tapered off.        CAD (coronary artery disease) 10/10/2011    -8/16/2006 s/p GERALDINE to mid RCA, s/p GERALDINE to mid LAD in [place] -10/4/2007 s/p GERALDINE to pRCA and GERALDINE to dRCA in [place]  -11/17/11 Unstable angina, s/p GERALDINE to prox LAD (jailed small diagonal)       Hyperlipidaemia      Palpitations 8/7/2013    occasional, improved on low dose beta blocker      screening 10/10/2011    2007 colonoscopy \"all clean\" in [place].  He is sure about this.   AAA ultrasound screen 3/07 normal also.  (leg and neck done then too)      Skin cancer     had mohs on L temple       No past surgical history on file.     Family History   Problem Relation Age of Onset     Coronary Artery Disease Mother      Melanoma No family hx of        Social History     Socioeconomic History     Marital status:      Spouse name: Not on file     Number of children: Not on file     Years of education: Not on file     Highest education level: Not on file   Occupational History     Employer: RETIRED   Social Needs     Financial resource strain: Not on file     Food insecurity     Worry: Not on file     Inability: Not on file     Transportation needs     Medical: Not on file     " Non-medical: Not on file   Tobacco Use     Smoking status: Never Smoker     Smokeless tobacco: Never Used   Substance and Sexual Activity     Alcohol use: Yes     Comment: occasionally     Drug use: No     Sexual activity: Yes     Partners: Female   Lifestyle     Physical activity     Days per week: Not on file     Minutes per session: Not on file     Stress: Not on file   Relationships     Social connections     Talks on phone: Not on file     Gets together: Not on file     Attends Confucianist service: Not on file     Active member of club or organization: Not on file     Attends meetings of clubs or organizations: Not on file     Relationship status: Not on file     Intimate partner violence     Fear of current or ex partner: Not on file     Emotionally abused: Not on file     Physically abused: Not on file     Forced sexual activity: Not on file   Other Topics Concern     Parent/sibling w/ CABG, MI or angioplasty before 65F 55M? No   Social History Narrative     Not on file       Outpatient Encounter Medications as of 10/20/2020   Medication Sig Dispense Refill     ASPIRIN NOT PRESCRIBED, INTENTIONAL, 1 each continuous prn Antiplatelet medication not prescribed intentionally due to plavix 0 each 0     clopidogrel (PLAVIX) 75 MG tablet Take 1 tablet (75 mg) by mouth daily 90 tablet 3     dutasteride (AVODART) 0.5 MG capsule Take one pill every other day 45 capsule 3     methocarbamol (ROBAXIN) 750 MG tablet 2 times daily as needed for back or leg pain 60 tablet 3     metoprolol succinate ER (TOPROL-XL) 25 MG 24 hr tablet Take 1 tablet (25 mg) by mouth 2 times daily 180 tablet 3     nitroGLYcerin (NITROSTAT) 0.4 MG sublingual tablet PLACE 1 TABLET UNDER THE TONGUE AT THE ONSET OF CHEST PAIN. MAY REPEAT EVERY 5 MINUTES AS NEEDED FOR A TOTAL OF 3 DOSES. 25 tablet 1     rosuvastatin (CRESTOR) 40 MG tablet Take 1 tablet (40 mg) by mouth every evening 90 tablet 3     No facility-administered encounter medications on file  as of 10/20/2020.              Review Of Systems  Skin: As above  Eyes: negative  Ears/Nose/Throat: negative  Respiratory: No shortness of breath, dyspnea on exertion, cough, or hemoptysis  Cardiovascular: negative  Gastrointestinal: negative  Genitourinary: negative  Musculoskeletal: negative  Neurologic: negative  Psychiatric: negative  Hematologic/Lymphatic/Immunologic: negative  Endocrine: negative      O:   NAD, WDWN, Alert & Oriented, Mood & Affect wnl, Vitals stable   Here today alone   /74 (BP Location: Right arm, Patient Position: Sitting, Cuff Size: Adult Regular)   Pulse 80   SpO2 97%    General appearance normal   Vitals stable   Alert, oriented and in no acute distress     Brown stuck on papules on face   Stuck on papules and brown macules on trunk and ext   Red papules on trunk     The remainder of upper body skin exam is normal    Eyes: Conjunctivae/lids:Normal     ENT: Lips: normal    MSK:Normal    Pulm: Breathing Normal    Neuro/Psych: Orientation:Alert and Orientedx3 ; Mood/Affect:normal   A/P:  1. Inflamed seborrheic keratosis on face, back x 6  LN2:  Treated with LN2 for 5s for 1-2 cycles. Warned risks of blistering, pain, pigment change, scarring, and incomplete resolution.  Advised patient to return if lesions do not completely resolve.  Wound care sheet given.  2. Seborrheic keratosis, lentigo, angioma, History of BCC  BENIGN LESIONS DISCUSSED WITH PATIENT:  I discussed the specifics of tumor, prognosis, and genetics of benign lesions.  I explained that treatment of these lesions would be purely cosmetic and not medically neccessary.  I discussed with patient different removal options including excision, cautery and /or laser.      Nature and genetics of benign skin lesions dicussed with patient.  Signs and Symptoms of skin cancer discussed with patient.  ABCDEs of melanoma reviewed with patient.  Patient encouraged to perform monthly skin exams.  UV precautions reviewed with  patient.  Risks of non-melanoma skin cancer discussed with patient   Return to clinic one year or sooner if needed.       Again, thank you for allowing me to participate in the care of your patient.        Sincerely,        Vandana Reid PA-C

## 2020-10-20 NOTE — NURSING NOTE
Chief Complaint   Patient presents with     Derm Problem     spots on face and back       Vitals:    10/20/20 1330   BP: 129/74   BP Location: Right arm   Patient Position: Sitting   Cuff Size: Adult Regular   Pulse: 80   SpO2: 97%     Wt Readings from Last 1 Encounters:   09/21/20 92 kg (202 lb 12.8 oz)       Priscilla Dozier LPN.................10/20/2020

## 2020-10-20 NOTE — PROGRESS NOTES
"Derrick Morrison is a 75 year old year old male patient here today for spots on face and back. He notes that he will pick at spots. Denies any pain or bleeding.  Patient has no other skin complaints today.  Remainder of the HPI, Meds, PMH, Allergies, FH, and SH was reviewed in chart.    Pertinent Hx:  History of BCC  Past Medical History:   Diagnosis Date     Actinic keratosis      Basal cell carcinoma      BPH w urinary obs/LUTS 10/10/2011    flomax in past, \"quit working\".  Avodart in past.  Tapered off.        CAD (coronary artery disease) 10/10/2011    -8/16/2006 s/p GERALDINE to mid RCA, s/p GERALDINE to mid LAD in [place] -10/4/2007 s/p GERALDINE to pRCA and GERALDINE to dRCA in [place]  -11/17/11 Unstable angina, s/p GERALDINE to prox LAD (jailed small diagonal)       Hyperlipidaemia      Palpitations 8/7/2013    occasional, improved on low dose beta blocker      screening 10/10/2011    2007 colonoscopy \"all clean\" in [place].  He is sure about this.   AAA ultrasound screen 3/07 normal also.  (leg and neck done then too)      Skin cancer     had mohs on L temple       No past surgical history on file.     Family History   Problem Relation Age of Onset     Coronary Artery Disease Mother      Melanoma No family hx of        Social History     Socioeconomic History     Marital status:      Spouse name: Not on file     Number of children: Not on file     Years of education: Not on file     Highest education level: Not on file   Occupational History     Employer: RETIRED   Social Needs     Financial resource strain: Not on file     Food insecurity     Worry: Not on file     Inability: Not on file     Transportation needs     Medical: Not on file     Non-medical: Not on file   Tobacco Use     Smoking status: Never Smoker     Smokeless tobacco: Never Used   Substance and Sexual Activity     Alcohol use: Yes     Comment: occasionally     Drug use: No     Sexual activity: Yes     Partners: Female   Lifestyle     " Physical activity     Days per week: Not on file     Minutes per session: Not on file     Stress: Not on file   Relationships     Social connections     Talks on phone: Not on file     Gets together: Not on file     Attends Episcopal service: Not on file     Active member of club or organization: Not on file     Attends meetings of clubs or organizations: Not on file     Relationship status: Not on file     Intimate partner violence     Fear of current or ex partner: Not on file     Emotionally abused: Not on file     Physically abused: Not on file     Forced sexual activity: Not on file   Other Topics Concern     Parent/sibling w/ CABG, MI or angioplasty before 65F 55M? No   Social History Narrative     Not on file       Outpatient Encounter Medications as of 10/20/2020   Medication Sig Dispense Refill     ASPIRIN NOT PRESCRIBED, INTENTIONAL, 1 each continuous prn Antiplatelet medication not prescribed intentionally due to plavix 0 each 0     clopidogrel (PLAVIX) 75 MG tablet Take 1 tablet (75 mg) by mouth daily 90 tablet 3     dutasteride (AVODART) 0.5 MG capsule Take one pill every other day 45 capsule 3     methocarbamol (ROBAXIN) 750 MG tablet 2 times daily as needed for back or leg pain 60 tablet 3     metoprolol succinate ER (TOPROL-XL) 25 MG 24 hr tablet Take 1 tablet (25 mg) by mouth 2 times daily 180 tablet 3     nitroGLYcerin (NITROSTAT) 0.4 MG sublingual tablet PLACE 1 TABLET UNDER THE TONGUE AT THE ONSET OF CHEST PAIN. MAY REPEAT EVERY 5 MINUTES AS NEEDED FOR A TOTAL OF 3 DOSES. 25 tablet 1     rosuvastatin (CRESTOR) 40 MG tablet Take 1 tablet (40 mg) by mouth every evening 90 tablet 3     No facility-administered encounter medications on file as of 10/20/2020.              Review Of Systems  Skin: As above  Eyes: negative  Ears/Nose/Throat: negative  Respiratory: No shortness of breath, dyspnea on exertion, cough, or hemoptysis  Cardiovascular: negative  Gastrointestinal: negative  Genitourinary:  negative  Musculoskeletal: negative  Neurologic: negative  Psychiatric: negative  Hematologic/Lymphatic/Immunologic: negative  Endocrine: negative      O:   NAD, WDWN, Alert & Oriented, Mood & Affect wnl, Vitals stable   Here today alone   /74 (BP Location: Right arm, Patient Position: Sitting, Cuff Size: Adult Regular)   Pulse 80   SpO2 97%    General appearance normal   Vitals stable   Alert, oriented and in no acute distress     Brown stuck on papules on face   Stuck on papules and brown macules on trunk and ext   Red papules on trunk     The remainder of upper body skin exam is normal    Eyes: Conjunctivae/lids:Normal     ENT: Lips: normal    MSK:Normal    Pulm: Breathing Normal    Neuro/Psych: Orientation:Alert and Orientedx3 ; Mood/Affect:normal   A/P:  1. Inflamed seborrheic keratosis on face, back x 6  LN2:  Treated with LN2 for 5s for 1-2 cycles. Warned risks of blistering, pain, pigment change, scarring, and incomplete resolution.  Advised patient to return if lesions do not completely resolve.  Wound care sheet given.  2. Seborrheic keratosis, lentigo, angioma, History of BCC  BENIGN LESIONS DISCUSSED WITH PATIENT:  I discussed the specifics of tumor, prognosis, and genetics of benign lesions.  I explained that treatment of these lesions would be purely cosmetic and not medically neccessary.  I discussed with patient different removal options including excision, cautery and /or laser.      Nature and genetics of benign skin lesions dicussed with patient.  Signs and Symptoms of skin cancer discussed with patient.  ABCDEs of melanoma reviewed with patient.  Patient encouraged to perform monthly skin exams.  UV precautions reviewed with patient.  Risks of non-melanoma skin cancer discussed with patient   Return to clinic one year or sooner if needed.

## 2020-10-21 DIAGNOSIS — Z12.11 SPECIAL SCREENING FOR MALIGNANT NEOPLASMS, COLON: ICD-10-CM

## 2020-10-25 LAB — HEMOCCULT STL QL IA: NORMAL

## 2020-10-26 DIAGNOSIS — Z12.11 SCREENING FOR COLORECTAL CANCER: Primary | ICD-10-CM

## 2020-10-26 DIAGNOSIS — Z12.12 SCREENING FOR COLORECTAL CANCER: Primary | ICD-10-CM

## 2020-10-26 NOTE — PROGRESS NOTES
Patient submitted a FIT test on an  kit.  Patient was notified and new kit was mailed out to patient.

## 2020-10-31 DIAGNOSIS — Z12.12 SCREENING FOR COLORECTAL CANCER: ICD-10-CM

## 2020-10-31 DIAGNOSIS — Z12.11 SCREENING FOR COLORECTAL CANCER: ICD-10-CM

## 2020-10-31 PROCEDURE — 82274 ASSAY TEST FOR BLOOD FECAL: CPT | Performed by: FAMILY MEDICINE

## 2020-11-02 LAB — HEMOCCULT STL QL IA: NEGATIVE

## 2020-11-03 ENCOUNTER — ALLIED HEALTH/NURSE VISIT (OUTPATIENT)
Dept: AUDIOLOGY | Facility: CLINIC | Age: 75
End: 2020-11-03

## 2020-11-03 DIAGNOSIS — H90.3 SENSORINEURAL HEARING LOSS, BILATERAL: Primary | ICD-10-CM

## 2020-11-03 PROCEDURE — 99207 PR NO CHARGE LOS: CPT | Performed by: AUDIOLOGIST

## 2020-11-03 PROCEDURE — V5014 HEARING AID REPAIR/MODIFYING: HCPCS | Performed by: AUDIOLOGIST

## 2020-11-03 NOTE — PROGRESS NOTES
[facility]        SUBJECTIVE:  Derrick Morrison , 75 year old male requests hearing aid supplies. He is currently wearing 2016 Phonak Bolero V70-P hearing aids bilaterally.     OBJECTIVE:  Patient was mailed #2 slim tubing with medium closed domes bilaterally. Discussed with patient how to change the tubing. He reports he has done this in the past.     Patient reports his hearing aids are working well and he is using them full time.     ASSESSMENT/PLAN:    Return to clinic for hearing aid checks as needed.     Blanca Santos M.A. F-MIGUEL, #[id number]

## 2020-11-05 ENCOUNTER — OFFICE VISIT (OUTPATIENT)
Dept: CARDIOLOGY | Facility: CLINIC | Age: 75
End: 2020-11-05
Attending: NURSE PRACTITIONER
Payer: COMMERCIAL

## 2020-11-05 VITALS
HEART RATE: 66 BPM | OXYGEN SATURATION: 96 % | WEIGHT: 202.6 LBS | DIASTOLIC BLOOD PRESSURE: 73 MMHG | BODY MASS INDEX: 29.92 KG/M2 | TEMPERATURE: 98.1 F | SYSTOLIC BLOOD PRESSURE: 115 MMHG

## 2020-11-05 DIAGNOSIS — I10 ESSENTIAL HYPERTENSION: ICD-10-CM

## 2020-11-05 DIAGNOSIS — R00.2 PALPITATIONS: ICD-10-CM

## 2020-11-05 DIAGNOSIS — I25.10 CORONARY ARTERY DISEASE INVOLVING NATIVE CORONARY ARTERY OF NATIVE HEART WITHOUT ANGINA PECTORIS: Primary | ICD-10-CM

## 2020-11-05 DIAGNOSIS — E78.5 HYPERLIPIDEMIA LDL GOAL <70: ICD-10-CM

## 2020-11-05 PROCEDURE — 99214 OFFICE O/P EST MOD 30 MIN: CPT | Performed by: NURSE PRACTITIONER

## 2020-11-05 NOTE — PROGRESS NOTES
Cardiology Clinic Progress Note  Derrick Morrison MRN# [id number]   YOB: 1945 Age: 71 year old     Primary Cardiologist:   Dr. Lan          History of Presenting Illness:    Derrick Morrison is a pleasant 71 year old patient with a past cardiac history significant for   1. CAD,   2. HTN,   3. palpitations improved after starting beta blocker,   4. hyperlipidemia.      He previously underwent stenting to the LAD and RCA and had ISR with GERALDINE to the LAD in 2011.   In 2017 he had a negative stress test but did have jaw discomfort with exertion.  He was started on Imdur but this was subsequently discontinued due to side effects of fogginess.  Also, in 2017 he was in the ED for palpitations associated with chest pain.  This correlated with PVCs on telemetry and metoprolol was increased.  At follow-up, palpitation symptoms have improved.  In 2018, he continued with jaw and chest discomfort if exerting himself after eating.  He was able to exercise 3 times weekly without angina. Exercise nuclear stress test 4/23/2018 showed normal perfusion with normal LVEF.   Abdominal ultrasound 4/23/2018 showed no evidence for AAA. In May 2019 he was seen for shortness of breath with stairs and had no complaints while walking on flat surface.  PFTs 5/2019 were stopped due to lightheadedness, normal spirometry with possible extrathoracic obstruction.    Patient was last seen by me in August 2018 for routine follow-up.  He denied any further jaw pain.  He was working on decreasing caffeine.  He had not been getting any further routine exercise as he was helping his wife who recently had 2 CVAs.  He denied any further shortness of breath and palpitations were well controlled.    Pt presents today for annual follow-up.  Lipid profile September 2020 well controlled with total cholesterol 129 HDL 43 LDL 68 and triglycerides 91.  Over the last year, he has been doing well from a cardiac standpoint.  He takes his dog for a walk,  which is approximately half mile, without any anginal symptoms.  He does report some mild shortness of breath when going up a flight of stairs which has been stable.  He was seen by PCP in September 2020 and was noted to have a BP of 112 systolic which was asymptomatic.  However, PCP decreased metoprolol and so patient currently takes 25 mg daily alternating with 25 mg twice daily, every other day.  Home blood pressures have been 1 teens to 120 systolic and is well controlled today.  He has not noted any worsening palpitations after decreasing beta-blocker and actually has not had any palpitations the last month that he can recall.  [place]. Patient reports no CP, PND, orthopnea, presyncope, syncope, edema, heart racing.      Current Cardiac Medications   Rosuvastatin 40 mg daily  Plavix 75 mg daily  Metoprolol XL 25 mg b.i.d.- Pt taking 25 mg daily alternating with 25 BID every other day   Nitroglycerin p.r.n.                     Assessment and Plan:     Plan  1. Check blood pressure at least 1 hour after medications. Call the clinic if your blood pressure is consistently greater than 130/80.   2. Call if palpitations are getting worse     Follow-up:  1. See Dr. Lan for cardiology follow up at [place]: Nov 2021.    1. CAD    GERALDINE to the proximal LAD for ISR in 2011 with previous stents to the LAD and RCA    Stress test 4/2018 with no ischemia or infarct    No angina (prior angina jaw pain)     Continue statin, beta blocker, plavix       2. Palpitations     H/o Intermittent palpitations with sharp chest pain twinges which correlated with PVCs on telemetry     improved after starting beta blocker    Continue metoprolol      3. Hyperlipidemia    Last LDL 68 9/2020     Continue rosuvastatin 40 mg daily       4.    Hypertension     Controlled     PCP recently decreased metoprolol d/t asymptomatic  systolic      continue metoprolol         Thank you for  "allowing me to participate in this delightful patient's care.      This note was completed in part using Dragon voice recognition software. Although reviewed after completion, some word and grammatical errors may occur.    MARIA T Cm, CNP           Data:   All laboratory data reviewed      HPI and Plan:   See dictation    Orders Placed This Encounter   Procedures     Follow-Up with Cardiologist       No orders of the defined types were placed in this encounter.      There are no discontinued medications.      Encounter Diagnoses   Name Primary?     Coronary artery disease involving native coronary artery of native heart without angina pectoris Yes     Hyperlipidemia LDL goal <70      Essential hypertension      Palpitations        CURRENT MEDICATIONS:  Current Outpatient Medications   Medication Sig Dispense Refill     clopidogrel (PLAVIX) 75 MG tablet Take 1 tablet (75 mg) by mouth daily 90 tablet 3     dutasteride (AVODART) 0.5 MG capsule Take one pill every other day 45 capsule 3     methocarbamol (ROBAXIN) 750 MG tablet 2 times daily as needed for back or leg pain 60 tablet 3     metoprolol succinate ER (TOPROL-XL) 25 MG 24 hr tablet Take 1 tablet (25 mg) by mouth 2 times daily 180 tablet 3     nitroGLYcerin (NITROSTAT) 0.4 MG sublingual tablet PLACE 1 TABLET UNDER THE TONGUE AT THE ONSET OF CHEST PAIN. MAY REPEAT EVERY 5 MINUTES AS NEEDED FOR A TOTAL OF 3 DOSES. 25 tablet 1     rosuvastatin (CRESTOR) 40 MG tablet Take 1 tablet (40 mg) by mouth every evening 90 tablet 3     ASPIRIN NOT PRESCRIBED, INTENTIONAL, 1 each continuous prn Antiplatelet medication not prescribed intentionally due to plavix (Patient not taking: Reported on 11/5/2020) 0 each 0       ALLERGIES   No Known Allergies    PAST MEDICAL HISTORY:  Past Medical History:   Diagnosis Date     Actinic keratosis      Basal cell carcinoma      BPH w urinary obs/LUTS 10/10/2011    flomax in past, \"quit working\".  Avodart in past.  Tapered " "off.        CAD (coronary artery disease) 10/10/2011    -8/16/2006 s/p GERALDINE to mid RCA, s/p GERALDINE to mid LAD in [place] -10/4/2007 s/p GERALDINE to pRCA and GERALDINE to dRCA in [place]  -11/17/11 Unstable angina, s/p GERALDINE to prox LAD (jailed small diagonal)       Hyperlipidaemia      Palpitations 8/7/2013    occasional, improved on low dose beta blocker      screening 10/10/2011    2007 colonoscopy \"all clean\" in [place].  He is sure about this.   AAA ultrasound screen 3/07 normal also.  (leg and neck done then too)      Skin cancer     [facility]       PAST SURGICAL HISTORY:  No past surgical history on file.    FAMILY HISTORY:  Family History   Problem Relation Age of Onset     Coronary Artery Disease Mother      Melanoma No family hx of        SOCIAL HISTORY:  Social History     Socioeconomic History     Marital status:      Spouse name: None     Number of children: None     Years of education: None     Highest education level: None   Occupational History     Employer: RETIRED   Social Needs     Financial resource strain: None     Food insecurity     Worry: None     Inability: None     Transportation needs     Medical: None     Non-medical: None   Tobacco Use     Smoking status: Never Smoker     Smokeless tobacco: Never Used   Substance and Sexual Activity     Alcohol use: Yes     Comment: occasionally     Drug use: No     Sexual activity: Yes     Partners: Female   Lifestyle     Physical activity     Days per week: None     Minutes per session: None     Stress: None   Relationships     Social connections     Talks on phone: None     Gets together: None     Attends Gnosticist service: None     Active member of club or organization: None     Attends meetings of clubs or organizations: None     Relationship status: None     Intimate partner violence     Fear of current or ex partner: None     Emotionally abused: None     Physically abused: None     Forced sexual activity: None   Other Topics " Concern     Parent/sibling w/ CABG, MI or angioplasty before 65F 55M? No   Social History Narrative     None       Review of Systems:  Skin:  Negative       Eyes:  Positive for glasses    ENT:  Negative      Respiratory:  Positive for dyspnea on exertion     Cardiovascular:    Positive for;dizziness    Gastroenterology: Negative      Genitourinary:  Negative      Musculoskeletal:  Negative      Neurologic:  Negative      Psychiatric:  Negative      Heme/Lymph/Imm:  Negative      Endocrine:  Negative        Physical Exam:  Vitals: /73 (BP Location: Right arm, Patient Position: Sitting, Cuff Size: Adult Regular)   Pulse 66   Temp 98.1  F (36.7  C) (Tympanic)   Wt 91.9 kg (202 lb 9.6 oz)   SpO2 96%   BMI 29.92 kg/m       Constitutional:  cooperative, alert and oriented, well developed, well nourished, in no acute distress  overweight      Skin:  warm and dry to the touch         Head:  normocephalic       Eyes:  pupils equal and round       ENT:  no pallor or cyanosis       Neck:  no stiffness       Respiratory:  clear to auscultation; normal symmetry        Cardiac: regular rate and rhythm; normal S1 and S2                 pulses full and equal       GI:  abdomen soft, nondistended     Extremities and Muscular Skeletal:  no edema        Neurological:  affect appropriate; no gross motor deficits       Psych:  Alert and Oriented x 3 , appropriate affact      Constitutional:           Skin:             Head:           Eyes:           Lymph:      ENT:           Neck:           Respiratory:            Cardiac:                                                           GI:           Extremities and Muscular Skeletal:                 Neurological:           Psych:           CC  Chidi Lan MD  [address]  [place] [id number]

## 2020-11-05 NOTE — PATIENT INSTRUCTIONS
Medication Changes:  None     Recommendations:  1. Check blood pressure at least 1 hour after medications. Call the clinic if your blood pressure is consistently greater than 130/80.   2. Call if palpitations are getting worse     Follow-up:  1. See Dr. Lan for cardiology follow up at [facility]: Nov 2021. Call in Aug to schedule.     Cardiology Scheduling~[phone]  Cardiology Clinic RNs~[phone] (Carole Dodd RN and Conchita Bhakta RN)

## 2020-11-05 NOTE — LETTER
11/5/2020    Kaiden Zapata MD  [address] [place] [id number]    RE: Derrick Morrison       Dear Colleague,    I had the pleasure of seeing Derrick Morrison in the [facility].    Cardiology Clinic Progress Note  Derrick Morrison MRN# [id number]   YOB: 1945 Age: 71 year old     Primary Cardiologist:   Dr. Lan          History of Presenting Illness:    Derrick Morrison is a pleasant 71 year old patient with a past cardiac history significant for   1. CAD,   2. HTN,   3. palpitations improved after starting beta blocker,   4. hyperlipidemia.      He previously underwent stenting to the LAD and RCA and had ISR with GERALDINE to the LAD in 2011.   In 2017 he had a negative stress test but did have jaw discomfort with exertion.  He was started on Imdur but this was subsequently discontinued due to side effects of fogginess.  Also, in 2017 he was in the ED for palpitations associated with chest pain.  This correlated with PVCs on telemetry and metoprolol was increased.  At follow-up, palpitation symptoms have improved.  In 2018, he continued with jaw and chest discomfort if exerting himself after eating.  He was able to exercise 3 times weekly without angina. Exercise nuclear stress test 4/23/2018 showed normal perfusion with normal LVEF.   Abdominal ultrasound 4/23/2018 showed no evidence for AAA. In May 2019 he was seen for shortness of breath with stairs and had no complaints while walking on flat surface.  PFTs 5/2019 were stopped due to lightheadedness, normal spirometry with possible extrathoracic obstruction.    Patient was last seen by me in August 2018 for routine follow-up.  He denied any further jaw pain.  He was working on decreasing caffeine.  He had not been getting any further routine exercise as he was helping his wife who recently had 2 CVAs.  He denied any further shortness of breath and palpitations were well controlled.    Pt presents today for annual  follow-up.  Lipid profile September 2020 well controlled with total cholesterol 129 HDL 43 LDL 68 and triglycerides 91.  Over the last year, he has been doing well from a cardiac standpoint.  He takes his dog for a walk, which is approximately half mile, without any anginal symptoms.  He does report some mild shortness of breath when going up a flight of stairs which has been stable.  He was seen by PCP in September 2020 and was noted to have a BP of 112 systolic which was asymptomatic.  However, PCP decreased metoprolol and so patient currently takes 25 mg daily alternating with 25 mg twice daily, every other day.  Home blood pressures have been 1 teens to 120 systolic and is well controlled today.  He has not noted any worsening palpitations after decreasing beta-blocker and actually has not had any palpitations the last month that he can recall.  [place]. Patient reports no CP, PND, orthopnea, presyncope, syncope, edema, heart racing.      Current Cardiac Medications   Rosuvastatin 40 mg daily  Plavix 75 mg daily  Metoprolol XL 25 mg b.i.d.- Pt taking 25 mg daily alternating with 25 BID every other day   Nitroglycerin p.r.n.                     Assessment and Plan:     Plan  1. Check blood pressure at least 1 hour after medications. Call the clinic if your blood pressure is consistently greater than 130/80.   2. Call if palpitations are getting worse     Follow-up:  1. See Dr. Lan for cardiology follow up at [facility]: Nov 2021.    1. CAD    GERALDINE to the proximal LAD for ISR in 2011 with previous stents to the LAD and RCA    Stress test 4/2018 with no ischemia or infarct    No angina (prior angina jaw pain)     Continue statin, beta blocker, plavix       2. Palpitations     H/o Intermittent palpitations with sharp chest pain twinges which correlated with PVCs on telemetry     improved after starting beta blocker    Continue  metoprolol      3. Hyperlipidemia    Last LDL 68 9/2020     Continue rosuvastatin 40 mg daily       4.    Hypertension     Controlled     PCP recently decreased metoprolol d/t asymptomatic  systolic      continue metoprolol         Thank you for allowing me to participate in this delightful patient's care.      This note was completed in part using Dragon voice recognition software. Although reviewed after completion, some word and grammatical errors may occur.    Janeth Campos, APRN, CNP           Data:   All laboratory data reviewed      HPI and Plan:   See dictation    Orders Placed This Encounter   Procedures     Follow-Up with Cardiologist       No orders of the defined types were placed in this encounter.      There are no discontinued medications.      Encounter Diagnoses   Name Primary?     Coronary artery disease involving native coronary artery of native heart without angina pectoris Yes     Hyperlipidemia LDL goal <70      Essential hypertension      Palpitations        CURRENT MEDICATIONS:  Current Outpatient Medications   Medication Sig Dispense Refill     clopidogrel (PLAVIX) 75 MG tablet Take 1 tablet (75 mg) by mouth daily 90 tablet 3     dutasteride (AVODART) 0.5 MG capsule Take one pill every other day 45 capsule 3     methocarbamol (ROBAXIN) 750 MG tablet 2 times daily as needed for back or leg pain 60 tablet 3     metoprolol succinate ER (TOPROL-XL) 25 MG 24 hr tablet Take 1 tablet (25 mg) by mouth 2 times daily 180 tablet 3     nitroGLYcerin (NITROSTAT) 0.4 MG sublingual tablet PLACE 1 TABLET UNDER THE TONGUE AT THE ONSET OF CHEST PAIN. MAY REPEAT EVERY 5 MINUTES AS NEEDED FOR A TOTAL OF 3 DOSES. 25 tablet 1     rosuvastatin (CRESTOR) 40 MG tablet Take 1 tablet (40 mg) by mouth every evening 90 tablet 3     ASPIRIN NOT PRESCRIBED, INTENTIONAL, 1 each continuous prn Antiplatelet medication not prescribed intentionally due to plavix (Patient not taking: Reported on 11/5/2020) 0 each  "0       ALLERGIES   No Known Allergies    PAST MEDICAL HISTORY:  Past Medical History:   Diagnosis Date     Actinic keratosis      Basal cell carcinoma      BPH w urinary obs/LUTS 10/10/2011    flomax in past, \"quit working\".  Avodart in past.  Tapered off.        CAD (coronary artery disease) 10/10/2011    -8/16/2006 s/p GERALDINE to mid RCA, s/p GERALDINE to mid LAD in [place] -10/4/2007 s/p GERALDINE to pRCA and GERALDINE to dRCA in [place]  -11/17/11 Unstable angina, s/p GERALDINE to prox LAD (jailed small diagonal)       Hyperlipidaemia      Palpitations 8/7/2013    occasional, improved on low dose beta blocker      screening 10/10/2011    2007 colonoscopy \"all clean\" in [place].  He is sure about this.   AAA ultrasound screen 3/07 normal also.  (leg and neck done then too)      Skin cancer     [facility]       PAST SURGICAL HISTORY:  No past surgical history on file.    FAMILY HISTORY:  Family History   Problem Relation Age of Onset     Coronary Artery Disease Mother      Melanoma No family hx of        SOCIAL HISTORY:  Social History     Socioeconomic History     Marital status:      Spouse name: None     Number of children: None     Years of education: None     Highest education level: None   Occupational History     Employer: RETIRED   Social Needs     Financial resource strain: None     Food insecurity     Worry: None     Inability: None     Transportation needs     Medical: None     Non-medical: None   Tobacco Use     Smoking status: Never Smoker     Smokeless tobacco: Never Used   Substance and Sexual Activity     Alcohol use: Yes     Comment: occasionally     Drug use: No     Sexual activity: Yes     Partners: Female   Lifestyle     Physical activity     Days per week: None     Minutes per session: None     Stress: None   Relationships     Social connections     Talks on phone: None     Gets together: None     Attends Rastafari service: None     Active member of club or organization: None     " Attends meetings of clubs or organizations: None     Relationship status: None     Intimate partner violence     Fear of current or ex partner: None     Emotionally abused: None     Physically abused: None     Forced sexual activity: None   Other Topics Concern     Parent/sibling w/ CABG, MI or angioplasty before 65F 55M? No   Social History Narrative     None       Review of Systems:  Skin:  Negative       Eyes:  Positive for glasses    ENT:  Negative      Respiratory:  Positive for dyspnea on exertion     Cardiovascular:    Positive for;dizziness    Gastroenterology: Negative      Genitourinary:  Negative      Musculoskeletal:  Negative      Neurologic:  Negative      Psychiatric:  Negative      Heme/Lymph/Imm:  Negative      Endocrine:  Negative        Physical Exam:  Vitals: /73 (BP Location: Right arm, Patient Position: Sitting, Cuff Size: Adult Regular)   Pulse 66   Temp 98.1  F (36.7  C) (Tympanic)   Wt 91.9 kg (202 lb 9.6 oz)   SpO2 96%   BMI 29.92 kg/m       Constitutional:  cooperative, alert and oriented, well developed, well nourished, in no acute distress  overweight      Skin:  warm and dry to the touch         Head:  normocephalic       Eyes:  pupils equal and round       ENT:  no pallor or cyanosis       Neck:  no stiffness       Respiratory:  clear to auscultation; normal symmetry        Cardiac: regular rate and rhythm; normal S1 and S2                 pulses full and equal       GI:  abdomen soft, nondistended     Extremities and Muscular Skeletal:  no edema        Neurological:  affect appropriate; no gross motor deficits       Psych:  Alert and Oriented x 3 , appropriate affact      Constitutional:           Skin:             Head:           Eyes:           Lymph:      ENT:           Neck:           Respiratory:            Cardiac:                                                           GI:           Extremities and Muscular Skeletal:                 Neurological:           Psych:                Thank you for allowing me to participate in the care of your patient.    Sincerely,     MARIA T Vyas [facility]

## 2021-04-26 ENCOUNTER — OFFICE VISIT (OUTPATIENT)
Dept: FAMILY MEDICINE | Facility: CLINIC | Age: 76
End: 2021-04-26
Payer: COMMERCIAL

## 2021-04-26 ENCOUNTER — MYC MEDICAL ADVICE (OUTPATIENT)
Dept: FAMILY MEDICINE | Facility: CLINIC | Age: 76
End: 2021-04-26

## 2021-04-26 VITALS
DIASTOLIC BLOOD PRESSURE: 74 MMHG | TEMPERATURE: 97.5 F | OXYGEN SATURATION: 96 % | HEIGHT: 69 IN | BODY MASS INDEX: 30.96 KG/M2 | WEIGHT: 209 LBS | HEART RATE: 60 BPM | SYSTOLIC BLOOD PRESSURE: 120 MMHG

## 2021-04-26 DIAGNOSIS — I10 ESSENTIAL HYPERTENSION: ICD-10-CM

## 2021-04-26 DIAGNOSIS — I25.719 CORONARY ARTERY DISEASE INVOLVING AUTOLOGOUS VEIN CORONARY BYPASS GRAFT WITH ANGINA PECTORIS (H): ICD-10-CM

## 2021-04-26 DIAGNOSIS — H25.9 AGE-RELATED CATARACT OF BOTH EYES, UNSPECIFIED AGE-RELATED CATARACT TYPE: ICD-10-CM

## 2021-04-26 DIAGNOSIS — N40.1 HYPERTROPHY OF PROSTATE WITH URINARY OBSTRUCTION: ICD-10-CM

## 2021-04-26 DIAGNOSIS — Z01.818 PREOP GENERAL PHYSICAL EXAM: Primary | ICD-10-CM

## 2021-04-26 DIAGNOSIS — N13.8 HYPERTROPHY OF PROSTATE WITH URINARY OBSTRUCTION: ICD-10-CM

## 2021-04-26 PROCEDURE — 93000 ELECTROCARDIOGRAM COMPLETE: CPT | Performed by: FAMILY MEDICINE

## 2021-04-26 PROCEDURE — 99214 OFFICE O/P EST MOD 30 MIN: CPT | Performed by: FAMILY MEDICINE

## 2021-04-26 ASSESSMENT — MIFFLIN-ST. JEOR: SCORE: 1678.4

## 2021-04-26 NOTE — PROGRESS NOTES
[facility]  [address] [place] [id number]  Phone: [phone]  Fax: [phone]  Primary Provider: Kaiden Zapata      {Provider  Link to PREOP SmartSet  Use this to apply standard patient instructions to AVS; includes medication directions, common orders, guidelines for anemia, warfarin, additional testing  PREOPERATIVE EVALUATION:  Today's date: 4/26/2021    Derrick Morrison is a 75 year old male who presents for a preoperative evaluation.    Surgical Information:  Surgery/Procedure: cataratc   Surgery Location: [facility]   Surgeon:  Surgery Date: 5/12 and 5/19   Time of Surgery:   Where patient plans to recover: At home with family  Fax number for surgical facility: [phone]         Subjective     HPI related to upcoming procedure: cataract surgery.  Blurry vision, progressive.    CAD: stable.  No cp or sob  Hx stents, last 2011.  plavix indefinitely due to hx multiple stents.    Htn: stable on meds  BpH: avodart.  Stable           Preop Questions 4/19/2021   1. Have you ever had a heart attack or stroke? No   2. Have you ever had surgery on your heart or blood vessels, such as a stent placement, a coronary artery bypass, or surgery on an artery in your head, neck, heart, or legs? YES - stent    3. Do you have chest pain with activity? No   4. Do you have a history of  heart failure? No   5. Do you currently have a cold, bronchitis or symptoms of other infection? No   6. Do you have a cough, shortness of breath, or wheezing? YES - SOB, is not new    7. Do you or anyone in your family have previous history of blood clots? No   8. Do you or does anyone in your family have a serious bleeding problem such as prolonged bleeding following surgeries or cuts? No   9. Have you ever had problems with anemia or been told to take iron pills? No   10. Have you had any abnormal blood loss such as black, tarry or bloody stools? No   11. Have you ever had a  blood transfusion? UNKNOWN -    12. Are you willing to have a blood transfusion if it is medically needed before, during, or after your surgery? Yes   13. Have you or any of your relatives ever had problems with anesthesia? No   14. Do you have sleep apnea, excessive snoring or daytime drowsiness? No   15. Do you have any artifical heart valves or other implanted medical devices like a pacemaker, defibrillator, or continuous glucose monitor? No   16. Do you have artificial joints? No   17. Are you allergic to latex? No       Health Care Directive:  Patient does not have a Health Care Directive or Living Will: Advance Directive received and scanned. Click on Code in the patient header to view.    Preoperative Review of :   reviewed - no conerns or  findings          Review of Systems  No cp or new sob. No stool or urine changes.  No rash or cough or fever or illness . Has had covid vaccine.      Patient Active Problem List    Diagnosis Date Noted     Gastroesophageal reflux disease without esophagitis 08/24/2015     Priority: Medium     Counseling regarding advanced directives 08/14/2015     Priority: Medium     Advance Care Planning 8/14/2015: ACP Review and Resources Provided:  Reviewed chart for advance care plan.  Derrick MANTILLA Prince has no plan or code status on file however states presence of ACP document. Copy requested.  Added by Alix Cotton           Subjective tinnitus 05/01/2015     Priority: Medium     Sensorineural hearing loss, bilateral 05/01/2015     Priority: Medium     Screening for prostate cancer 08/21/2014     Priority: Medium     OK with USPSTF recommendations against screening.  See 8/2014 note for details.  Discussed in detail at physical in 2017 again, still OK with no screening.  (now 70 y/o)       HTN (hypertension) 08/21/2014     Priority: Medium     Palpitations 08/07/2013     Priority: Medium     occasional, improved on low dose beta blocker       Hyperlipidemia LDL goal <70  "11/16/2011     Priority: Medium     crestor       CAD (coronary artery disease) 10/10/2011     Priority: Medium     -8/16/2006 s/p GERALDINE to mid RCA, s/p GERALDINE to mid LAD in [place]  -10/4/2007 s/p GERALDINE to pRCA and GERALDINE to dRCA in [place]   -11/17/11 Unstable angina, s/p GERALDINE to prox LAD (jailed small diagonal)   He has remained on plavix long term due to multiple stents.         Hypertrophy of prostate with urinary obstruction 10/10/2011     Priority: Medium     flomax in past, \"quit working\".  Avodart in past.  Tapered off.   Update 10/14: wanted to restart flomax, stopped it, didn't think it helped.    Now back on Avodart.           screening 10/10/2011     Priority: Medium     2007 colonoscopy \"all clean\" in [place].  He is sure about this.    AAA ultrasound screen 3/07 normal also.  (leg and neck done then too)          No past surgical history on file.  Current Outpatient Medications   Medication Sig Dispense Refill     ASPIRIN NOT PRESCRIBED, INTENTIONAL, 1 each continuous prn Antiplatelet medication not prescribed intentionally due to plavix (Patient not taking: Reported on 11/5/2020) 0 each 0     clopidogrel (PLAVIX) 75 MG tablet Take 1 tablet (75 mg) by mouth daily 90 tablet 3     dutasteride (AVODART) 0.5 MG capsule Take one pill every other day 45 capsule 3     methocarbamol (ROBAXIN) 750 MG tablet 2 times daily as needed for back or leg pain 60 tablet 3     metoprolol succinate ER (TOPROL-XL) 25 MG 24 hr tablet Take 1 tablet (25 mg) by mouth 2 times daily 180 tablet 3     nitroGLYcerin (NITROSTAT) 0.4 MG sublingual tablet PLACE 1 TABLET UNDER THE TONGUE AT THE ONSET OF CHEST PAIN. MAY REPEAT EVERY 5 MINUTES AS NEEDED FOR A TOTAL OF 3 DOSES. 25 tablet 1     rosuvastatin (CRESTOR) 40 MG tablet Take 1 tablet (40 mg) by mouth every evening 90 tablet 3       No Known Allergies     Social History     Tobacco Use     Smoking status: Never Smoker     Smokeless tobacco: Never Used   Substance Use " "Topics     Alcohol use: Yes     Comment: occasionally       History   Drug Use No         Objective     /74   Pulse 60   Temp 97.5  F (36.4  C) (Tympanic)   Ht 1.753 m (5' 9\")   Wt 94.8 kg (209 lb)   SpO2 96%   BMI 30.86 kg/m      Physical Exam  GEN: Alert and oriented, in no acute distress  CV: RRR, no murmur  RESP: lungs clear bilaterally, good effort  EXT: no edema or lesions noted in lower extremities  Neck: neck supple without mass or lymphadenopathy  ENT: oropharynx clear, no exudate or palate/tonsil asymmetry      Recent Labs   Lab Test 09/18/19  0908      POTASSIUM 4.2   CR 0.78        Diagnostics:  EKG: NSR, no ST or T wave changes.  Normal axis, intervals.  No Q waves.      Revised Cardiac Risk Index (RCRI):  The patient has the following serious cardiovascular risks for perioperative complications:   - Coronary Artery Disease (MI, positive stress test, angina, Qs on EKG) = 1 point     RCRI Interpretation: 1 point: Class II (low risk - 0.9% complication rate)    A; Pre op      Cataracts      CAD: stable.        Htn: stable on meds      BpH: avodart.  Stable     P: no further workup or evaluation indicated for cataract surgery.  OK to proceed.           Signed Electronically by: Kaiden Zapata MD  Copy of this evaluation report is provided to requesting physician.      "

## 2021-04-26 NOTE — PATIENT INSTRUCTIONS

## 2021-09-20 NOTE — LETTER
September 21, 2020      Derrick Morrison  [address] [facility] [address]        Dear ,    We are writing to inform you of your test results.           Labs look good.  I hope things are going well.     Resulted Orders   Lipid panel reflex to direct LDL Fasting   Result Value Ref Range    Cholesterol 129 <200 mg/dL    Triglycerides 91 <150 mg/dL      Comment:      Fasting specimen    HDL Cholesterol 43 >39 mg/dL    LDL Cholesterol Calculated 68 <100 mg/dL      Comment:      Desirable:       <100 mg/dl    Non HDL Cholesterol 86 <130 mg/dL   Glucose   Result Value Ref Range    Glucose 88 70 - 99 mg/dL      Comment:      Fasting specimen       If you have any questions or concerns, please call the clinic at the number listed above.       Sincerely,        Kaiden Zapata MD                 V-Y Flap Text: The defect edges were debeveled with a #15 scalpel blade.  Given the location of the defect, shape of the defect and the proximity to free margins a V-Y flap was deemed most appropriate.  Using a sterile surgical marker, an appropriate advancement flap was drawn incorporating the defect and placing the expected incisions within the relaxed skin tension lines where possible.    The area thus outlined was incised deep to adipose tissue with a #15 scalpel blade.  The skin margins were undermined to an appropriate distance in all directions utilizing iris scissors.

## 2021-10-01 ENCOUNTER — MYC MEDICAL ADVICE (OUTPATIENT)
Dept: FAMILY MEDICINE | Facility: CLINIC | Age: 76
End: 2021-10-01

## 2021-10-01 ASSESSMENT — ENCOUNTER SYMPTOMS
ARTHRALGIAS: 0
HEADACHES: 0
HEMATURIA: 0
NERVOUS/ANXIOUS: 0
SHORTNESS OF BREATH: 0
NAUSEA: 0
DYSURIA: 0
WEAKNESS: 0
COUGH: 0
ABDOMINAL PAIN: 0
FEVER: 0
CONSTIPATION: 0
MYALGIAS: 1
FREQUENCY: 1
PALPITATIONS: 0
PARESTHESIAS: 0
DIARRHEA: 0
CHILLS: 0
DIZZINESS: 0
EYE PAIN: 0
HEMATOCHEZIA: 0
SORE THROAT: 0
HEARTBURN: 0
JOINT SWELLING: 0

## 2021-10-01 ASSESSMENT — ACTIVITIES OF DAILY LIVING (ADL): CURRENT_FUNCTION: NO ASSISTANCE NEEDED

## 2021-10-02 ENCOUNTER — HEALTH MAINTENANCE LETTER (OUTPATIENT)
Age: 76
End: 2021-10-02

## 2021-10-02 DIAGNOSIS — E78.5 HYPERLIPIDEMIA LDL GOAL <70: ICD-10-CM

## 2021-10-04 ENCOUNTER — OFFICE VISIT (OUTPATIENT)
Dept: FAMILY MEDICINE | Facility: CLINIC | Age: 76
End: 2021-10-04
Payer: COMMERCIAL

## 2021-10-04 ENCOUNTER — MYC MEDICAL ADVICE (OUTPATIENT)
Dept: FAMILY MEDICINE | Facility: CLINIC | Age: 76
End: 2021-10-04

## 2021-10-04 VITALS
BODY MASS INDEX: 29.92 KG/M2 | OXYGEN SATURATION: 98 % | WEIGHT: 202 LBS | RESPIRATION RATE: 18 BRPM | DIASTOLIC BLOOD PRESSURE: 70 MMHG | HEIGHT: 69 IN | SYSTOLIC BLOOD PRESSURE: 110 MMHG | TEMPERATURE: 99.8 F | HEART RATE: 56 BPM

## 2021-10-04 DIAGNOSIS — I10 PRIMARY HYPERTENSION: ICD-10-CM

## 2021-10-04 DIAGNOSIS — E78.5 HYPERLIPIDEMIA LDL GOAL <70: ICD-10-CM

## 2021-10-04 DIAGNOSIS — I25.10 CORONARY ARTERY DISEASE INVOLVING NATIVE HEART WITHOUT ANGINA PECTORIS, UNSPECIFIED VESSEL OR LESION TYPE: ICD-10-CM

## 2021-10-04 DIAGNOSIS — H90.3 SENSORINEURAL HEARING LOSS, BILATERAL: ICD-10-CM

## 2021-10-04 DIAGNOSIS — N40.1 HYPERTROPHY OF PROSTATE WITH URINARY OBSTRUCTION: ICD-10-CM

## 2021-10-04 DIAGNOSIS — Z00.00 ENCOUNTER FOR MEDICARE ANNUAL WELLNESS EXAM: Primary | ICD-10-CM

## 2021-10-04 DIAGNOSIS — N13.8 HYPERTROPHY OF PROSTATE WITH URINARY OBSTRUCTION: ICD-10-CM

## 2021-10-04 LAB
ANION GAP SERPL CALCULATED.3IONS-SCNC: 3 MMOL/L (ref 3–14)
BUN SERPL-MCNC: 11 MG/DL (ref 7–30)
CALCIUM SERPL-MCNC: 8.8 MG/DL (ref 8.5–10.1)
CHLORIDE BLD-SCNC: 107 MMOL/L (ref 94–109)
CHOLEST SERPL-MCNC: 118 MG/DL
CO2 SERPL-SCNC: 28 MMOL/L (ref 20–32)
CREAT SERPL-MCNC: 0.93 MG/DL (ref 0.66–1.25)
FASTING STATUS PATIENT QL REPORTED: NORMAL
GFR SERPL CREATININE-BSD FRML MDRD: 81 ML/MIN/1.73M2
GLUCOSE BLD-MCNC: 89 MG/DL (ref 70–99)
HDLC SERPL-MCNC: 43 MG/DL
LDLC SERPL CALC-MCNC: 60 MG/DL
NONHDLC SERPL-MCNC: 75 MG/DL
POTASSIUM BLD-SCNC: 4.3 MMOL/L (ref 3.4–5.3)
SODIUM SERPL-SCNC: 138 MMOL/L (ref 133–144)
TRIGL SERPL-MCNC: 77 MG/DL

## 2021-10-04 PROCEDURE — G0008 ADMIN INFLUENZA VIRUS VAC: HCPCS | Performed by: FAMILY MEDICINE

## 2021-10-04 PROCEDURE — 99397 PER PM REEVAL EST PAT 65+ YR: CPT | Mod: 25 | Performed by: FAMILY MEDICINE

## 2021-10-04 PROCEDURE — 80061 LIPID PANEL: CPT | Performed by: FAMILY MEDICINE

## 2021-10-04 PROCEDURE — 90662 IIV NO PRSV INCREASED AG IM: CPT | Performed by: FAMILY MEDICINE

## 2021-10-04 PROCEDURE — 80048 BASIC METABOLIC PNL TOTAL CA: CPT | Performed by: FAMILY MEDICINE

## 2021-10-04 PROCEDURE — 99213 OFFICE O/P EST LOW 20 MIN: CPT | Mod: 25 | Performed by: FAMILY MEDICINE

## 2021-10-04 PROCEDURE — 36415 COLL VENOUS BLD VENIPUNCTURE: CPT | Performed by: FAMILY MEDICINE

## 2021-10-04 RX ORDER — CLOPIDOGREL BISULFATE 75 MG/1
75 TABLET ORAL DAILY
Qty: 90 TABLET | Refills: 3 | Status: SHIPPED | OUTPATIENT
Start: 2021-10-04 | End: 2022-10-06

## 2021-10-04 RX ORDER — DUTASTERIDE 0.5 MG/1
CAPSULE, LIQUID FILLED ORAL
Qty: 45 CAPSULE | Refills: 3 | Status: SHIPPED | OUTPATIENT
Start: 2021-10-04 | End: 2022-10-06

## 2021-10-04 RX ORDER — ROSUVASTATIN CALCIUM 40 MG/1
40 TABLET, COATED ORAL EVERY EVENING
Qty: 90 TABLET | Refills: 2 | Status: SHIPPED | OUTPATIENT
Start: 2021-10-04 | End: 2021-10-26

## 2021-10-04 RX ORDER — ROSUVASTATIN CALCIUM 40 MG/1
40 TABLET, COATED ORAL EVERY EVENING
Qty: 90 TABLET | Refills: 3 | Status: SHIPPED | OUTPATIENT
Start: 2021-10-04 | End: 2022-10-06

## 2021-10-04 RX ORDER — METOPROLOL SUCCINATE 25 MG/1
25 TABLET, EXTENDED RELEASE ORAL 2 TIMES DAILY
Qty: 180 TABLET | Refills: 3 | Status: SHIPPED | OUTPATIENT
Start: 2021-10-04 | End: 2022-03-02

## 2021-10-04 ASSESSMENT — ENCOUNTER SYMPTOMS
SHORTNESS OF BREATH: 0
HEARTBURN: 0
SORE THROAT: 0
MYALGIAS: 1
WEAKNESS: 0
JOINT SWELLING: 0
DIZZINESS: 0
ABDOMINAL PAIN: 0
DYSURIA: 0
HEADACHES: 0
EYE PAIN: 0
DIARRHEA: 0
HEMATOCHEZIA: 0
NERVOUS/ANXIOUS: 0
PALPITATIONS: 0
FEVER: 0
CONSTIPATION: 0
COUGH: 0
PARESTHESIAS: 0
HEMATURIA: 0
FREQUENCY: 1
NAUSEA: 0
CHILLS: 0
ARTHRALGIAS: 0

## 2021-10-04 ASSESSMENT — MIFFLIN-ST. JEOR: SCORE: 1646.65

## 2021-10-04 ASSESSMENT — ACTIVITIES OF DAILY LIVING (ADL): CURRENT_FUNCTION: NO ASSISTANCE NEEDED

## 2021-10-04 NOTE — PATIENT INSTRUCTIONS
Patient Education   Personalized Prevention Plan  You are due for the preventive services outlined below.  Your care team is available to assist you in scheduling these services.  If you have already completed any of these items, please share that information with your care team to update in your medical record.  Health Maintenance Due   Topic Date Due     ANNUAL REVIEW OF HM ORDERS  Never done     Flu Vaccine (1) 09/01/2021     FALL RISK ASSESSMENT  09/21/2021     Colorectal Cancer Screening  10/31/2021       Signs of Hearing Loss      Hearing much better with one ear can be a sign of hearing loss.   Hearing loss is a problem shared by many people. In fact, it is one of the most common health problems, particularly as people age. Most people age 65 and older have some hearing loss. By age 80, almost everyone does. Hearing loss often occurs slowly over the years. So you may not realize your hearing has gotten worse.  Have your hearing checked  Call your healthcare provider if you:    Have to strain to hear normal conversation    Have to watch other people s faces very carefully to follow what they re saying    Need to ask people to repeat what they ve said    Often misunderstand what people are saying    Turn the volume of the television or radio up so high that others complain    Feel that people are mumbling when they re talking to you    Find that the effort to hear leaves you feeling tired and irritated    Notice, when using the phone, that you hear better with one ear than the other  [employer]rodio last reviewed this educational content on 1/1/2020 2000-2021 [employer]. All rights reserved. This information is not intended as a substitute for professional medical care. Always follow your healthcare professional's instructions.

## 2021-10-04 NOTE — TELEPHONE ENCOUNTER
LDL Cholesterol Calculated   Date Value Ref Range Status   10/04/2021 60 <=100 mg/dL Final   09/21/2020 68 <100 mg/dL Final     Comment:     Desirable:       <100 mg/dl

## 2021-10-04 NOTE — PROGRESS NOTES
"SUBJECTIVE:   Derrick Morrison is a 75 year old male who presents for Preventive Visit.    Patient has been advised of split billing requirements and indicates understanding: Yes   Are you in the first 12 months of your Medicare coverage?  No    Healthy Habits:     In general, how would you rate your overall health?  Good    Frequency of exercise:  4-5 days/week    Duration of exercise:  15-30 minutes    Do you usually eat at least 4 servings of fruit and vegetables a day, include whole grains    & fiber and avoid regularly eating high fat or \"junk\" foods?  Yes    Taking medications regularly:  No    Medication side effects:  None    Ability to successfully perform activities of daily living:  No assistance needed    Home Safety:  No safety concerns identified    Hearing Impairment:  Difficulty following a conversation in a noisy restaurant or crowded room and find that men's voices are easier to understand than woman's    In the past 6 months, have you been bothered by leaking of urine?  No    In general, how would you rate your overall mental or emotional health?  Good      PHQ-2 Total Score: 0    Do you feel safe in your environment? Yes        Fall risk  Fallen 2 or more times in the past year?: No  Any fall with injury in the past year?: No    Cognitive Screening   1) Repeat 3 items (Leader, Season, Table)    2) Clock draw: NORMAL  3) 3 item recall: Recalls 3 objects  Results: NORMAL clock, 1-2 items recalled: COGNITIVE IMPAIRMENT LESS LIKELY    Mini-CogTM Copyright NORAH Renner. Licensed by the author for use in [facility]; reprinted with permission (snehal@.[facility]). All rights reserved.      Do you have sleep apnea, excessive snoring or daytime drowsiness?: no    Reviewed and updated as needed this visit by clinical staff                 Reviewed and updated as needed this visit by Provider                Social History     Tobacco Use     Smoking status: Never Smoker     Smokeless tobacco: Never Used "   Substance Use Topics     Alcohol use: Yes     Comment: occasionally     If you drink alcohol do you typically have >3 drinks per day or >7 drinks per week? No    Alcohol Use 10/1/2021   Prescreen: >3 drinks/day or >7 drinks/week? No   Prescreen: >3 drinks/day or >7 drinks/week? -   No flowsheet data found.        1. Med refills  2 wanting resources to get help at home- takes care of his wife that had a stroke a few years ago   3. Referral for audiology       CAD: no cp or sob.  Stable.  BPH: dutasteride long term. Stable  Hyperlipidemia: crestor, stable  Htn: stable on low dose metoprolol          Current providers sharing in care for this patient include:   Patient Care Team:  Kaiden Zapata MD as PCP - General (Family Practice)  Kaiden Zapata MD as Assigned PCP  Nuno Samuel MD as Assigned Musculoskeletal Provider  Janeth Campos APRN CNP as Assigned Heart and Vascular Provider    The following health maintenance items are reviewed in Epic and correct as of today:  Health Maintenance Due   Topic Date Due     ANNUAL REVIEW OF HM ORDERS  Never done     INFLUENZA VACCINE (1) 09/01/2021     FALL RISK ASSESSMENT  09/21/2021     MEDICARE ANNUAL WELLNESS VISIT  09/21/2021     COLORECTAL CANCER SCREENING  10/31/2021             Review of Systems   Constitutional: Negative for chills and fever.   HENT: Positive for hearing loss. Negative for congestion, ear pain and sore throat.    Eyes: Positive for visual disturbance. Negative for pain.   Respiratory: Negative for cough and shortness of breath.    Cardiovascular: Negative for chest pain, palpitations and peripheral edema.   Gastrointestinal: Negative for abdominal pain, constipation, diarrhea, heartburn, hematochezia and nausea.   Genitourinary: Positive for frequency. Negative for dysuria, genital sores, hematuria and urgency.   Musculoskeletal: Positive for myalgias. Negative for arthralgias and joint swelling.   Skin: Negative for rash.  "  Neurological: Negative for dizziness, weakness, headaches and paresthesias.   Psychiatric/Behavioral: Negative for mood changes. The patient is not nervous/anxious.          OBJECTIVE:   /70   Pulse 56   Temp 99.8  F (37.7  C) (Tympanic)   Resp 18   Ht 1.753 m (5' 9\")   Wt 91.6 kg (202 lb)   SpO2 98%   BMI 29.83 kg/m   Estimated body mass index is 30.86 kg/m  as calculated from the following:    Height as of 4/26/21: 1.753 m (5' 9\").    Weight as of 4/26/21: 94.8 kg (209 lb).  Physical Exam  Gen: alert and oriented, in no acute distress, affect within normal limits  Neck: supple with no masses or nodes  Throat: oropharynx clear, no exudate or tonsillar/palate asymmetry.    CV: RRR, no murmur  Lungs: clear bilaterally with good effort  Abd: nontender, no mass  Ext: no edema or lesions   Neuro: moving all extremities, gait normal, no focal deficts noted      ASSESSMENT / PLAN:   Wellness visit  CAD: no cp or sob.  Stable.  BPH: dutasteride long term. Stable  Hyperlipidemia: crestor, stable  Htn: stable on low dose metoprolol    Fills.  Referral for hearing aid check with audiology.  Some issues with volume    Labs updated.  Doing well . Back in a year.       COUNSELING:  Reviewed preventive health counseling, as reflected in patient instructions       Regular exercise       Healthy diet/nutrition    Estimated body mass index is 30.86 kg/m  as calculated from the following:    Height as of 4/26/21: 1.753 m (5' 9\").    Weight as of 4/26/21: 94.8 kg (209 lb).        He reports that he has never smoked. He has never used smokeless tobacco.      Appropriate preventive services were discussed with this patient, including applicable screening as appropriate for cardiovascular disease, diabetes, osteopenia/osteoporosis, and glaucoma.  As appropriate for age/gender, discussed screening for colorectal cancer, prostate cancer, breast cancer, and cervical cancer. Checklist reviewing preventive services available " has been given to the patient.    Reviewed patients plan of care and provided an AVS. The Basic Care Plan (routine screening as documented in Health Maintenance) for Derrick meets the Care Plan requirement. This Care Plan has been established and reviewed with the Patient.        Kaiden Zapata MD  [facility]    Identified Health Risks:    The patient was provided with written information regarding signs of hearing loss.

## 2021-10-05 ENCOUNTER — MYC MEDICAL ADVICE (OUTPATIENT)
Dept: FAMILY MEDICINE | Facility: CLINIC | Age: 76
End: 2021-10-05

## 2021-10-05 DIAGNOSIS — Z12.11 SPECIAL SCREENING FOR MALIGNANT NEOPLASMS, COLON: Primary | ICD-10-CM

## 2021-10-25 ENCOUNTER — LAB (OUTPATIENT)
Dept: LAB | Facility: CLINIC | Age: 76
End: 2021-10-25
Payer: MEDICARE

## 2021-10-25 DIAGNOSIS — Z12.11 SPECIAL SCREENING FOR MALIGNANT NEOPLASMS, COLON: ICD-10-CM

## 2021-10-25 PROCEDURE — 82274 ASSAY TEST FOR BLOOD FECAL: CPT

## 2021-10-26 ENCOUNTER — HOSPITAL ENCOUNTER (EMERGENCY)
Facility: CLINIC | Age: 76
Discharge: ANOTHER HOSPITAL | End: 2021-10-26
Attending: FAMILY MEDICINE | Admitting: FAMILY MEDICINE
Payer: MEDICARE

## 2021-10-26 ENCOUNTER — APPOINTMENT (OUTPATIENT)
Dept: GENERAL RADIOLOGY | Facility: CLINIC | Age: 76
End: 2021-10-26
Attending: FAMILY MEDICINE
Payer: MEDICARE

## 2021-10-26 VITALS
HEIGHT: 69 IN | DIASTOLIC BLOOD PRESSURE: 78 MMHG | SYSTOLIC BLOOD PRESSURE: 116 MMHG | BODY MASS INDEX: 28.88 KG/M2 | WEIGHT: 195 LBS | TEMPERATURE: 98.3 F | HEART RATE: 64 BPM | OXYGEN SATURATION: 93 % | RESPIRATION RATE: 12 BRPM

## 2021-10-26 DIAGNOSIS — I21.4 NSTEMI (NON-ST ELEVATED MYOCARDIAL INFARCTION) (H): ICD-10-CM

## 2021-10-26 LAB
ALBUMIN SERPL-MCNC: 3.4 G/DL (ref 3.4–5)
ALBUMIN UR-MCNC: NEGATIVE MG/DL
ALP SERPL-CCNC: 74 U/L (ref 40–150)
ALT SERPL W P-5'-P-CCNC: 49 U/L (ref 0–70)
ANION GAP SERPL CALCULATED.3IONS-SCNC: 3 MMOL/L (ref 3–14)
APPEARANCE UR: CLEAR
AST SERPL W P-5'-P-CCNC: 28 U/L (ref 0–45)
BACTERIA #/AREA URNS HPF: ABNORMAL /HPF
BASOPHILS # BLD AUTO: 0.1 10E3/UL (ref 0–0.2)
BASOPHILS NFR BLD AUTO: 1 %
BILIRUB DIRECT SERPL-MCNC: <0.1 MG/DL (ref 0–0.2)
BILIRUB SERPL-MCNC: 0.4 MG/DL (ref 0.2–1.3)
BILIRUB UR QL STRIP: NEGATIVE
BUN SERPL-MCNC: 10 MG/DL (ref 7–30)
CALCIUM SERPL-MCNC: 9 MG/DL (ref 8.5–10.1)
CHLORIDE BLD-SCNC: 109 MMOL/L (ref 94–109)
CO2 SERPL-SCNC: 26 MMOL/L (ref 20–32)
COLOR UR AUTO: ABNORMAL
CREAT SERPL-MCNC: 0.89 MG/DL (ref 0.66–1.25)
EOSINOPHIL # BLD AUTO: 0.2 10E3/UL (ref 0–0.7)
EOSINOPHIL NFR BLD AUTO: 3 %
ERYTHROCYTE [DISTWIDTH] IN BLOOD BY AUTOMATED COUNT: 12.4 % (ref 10–15)
GFR SERPL CREATININE-BSD FRML MDRD: 84 ML/MIN/1.73M2
GLUCOSE BLD-MCNC: 99 MG/DL (ref 70–99)
GLUCOSE UR STRIP-MCNC: NEGATIVE MG/DL
HCT VFR BLD AUTO: 47.6 % (ref 40–53)
HGB BLD-MCNC: 15.7 G/DL (ref 13.3–17.7)
HGB UR QL STRIP: NEGATIVE
HOLD SPECIMEN: NORMAL
IMM GRANULOCYTES # BLD: 0 10E3/UL
IMM GRANULOCYTES NFR BLD: 0 %
KETONES UR STRIP-MCNC: NEGATIVE MG/DL
LEUKOCYTE ESTERASE UR QL STRIP: NEGATIVE
LIPASE SERPL-CCNC: 152 U/L (ref 73–393)
LYMPHOCYTES # BLD AUTO: 1.1 10E3/UL (ref 0.8–5.3)
LYMPHOCYTES NFR BLD AUTO: 15 %
MCH RBC QN AUTO: 29.5 PG (ref 26.5–33)
MCHC RBC AUTO-ENTMCNC: 33 G/DL (ref 31.5–36.5)
MCV RBC AUTO: 90 FL (ref 78–100)
MONOCYTES # BLD AUTO: 0.7 10E3/UL (ref 0–1.3)
MONOCYTES NFR BLD AUTO: 10 %
MUCOUS THREADS #/AREA URNS LPF: PRESENT /LPF
NEUTROPHILS # BLD AUTO: 5.4 10E3/UL (ref 1.6–8.3)
NEUTROPHILS NFR BLD AUTO: 71 %
NITRATE UR QL: NEGATIVE
NRBC # BLD AUTO: 0 10E3/UL
NRBC BLD AUTO-RTO: 0 /100
PH UR STRIP: 6 [PH] (ref 5–7)
PLATELET # BLD AUTO: 176 10E3/UL (ref 150–450)
POTASSIUM BLD-SCNC: 4.5 MMOL/L (ref 3.4–5.3)
PROT SERPL-MCNC: 7.6 G/DL (ref 6.8–8.8)
RBC # BLD AUTO: 5.32 10E6/UL (ref 4.4–5.9)
RBC URINE: <1 /HPF
SARS-COV-2 RNA RESP QL NAA+PROBE: NEGATIVE
SODIUM SERPL-SCNC: 138 MMOL/L (ref 133–144)
SP GR UR STRIP: 1.01 (ref 1–1.03)
TROPONIN I SERPL-MCNC: 0.06 UG/L (ref 0–0.04)
TROPONIN I SERPL-MCNC: 1.01 UG/L (ref 0–0.04)
UFH PPP CHRO-ACNC: 0.94 IU/ML
UROBILINOGEN UR STRIP-MCNC: NORMAL MG/DL
WBC # BLD AUTO: 7.5 10E3/UL (ref 4–11)
WBC URINE: <1 /HPF

## 2021-10-26 PROCEDURE — 36415 COLL VENOUS BLD VENIPUNCTURE: CPT | Mod: 59 | Performed by: FAMILY MEDICINE

## 2021-10-26 PROCEDURE — 85025 COMPLETE CBC W/AUTO DIFF WBC: CPT | Performed by: FAMILY MEDICINE

## 2021-10-26 PROCEDURE — 96375 TX/PRO/DX INJ NEW DRUG ADDON: CPT

## 2021-10-26 PROCEDURE — 84484 ASSAY OF TROPONIN QUANT: CPT | Performed by: FAMILY MEDICINE

## 2021-10-26 PROCEDURE — 80048 BASIC METABOLIC PNL TOTAL CA: CPT | Performed by: FAMILY MEDICINE

## 2021-10-26 PROCEDURE — 84484 ASSAY OF TROPONIN QUANT: CPT | Performed by: EMERGENCY MEDICINE

## 2021-10-26 PROCEDURE — 81001 URINALYSIS AUTO W/SCOPE: CPT | Performed by: FAMILY MEDICINE

## 2021-10-26 PROCEDURE — 99285 EMERGENCY DEPT VISIT HI MDM: CPT | Mod: 25 | Performed by: FAMILY MEDICINE

## 2021-10-26 PROCEDURE — 96365 THER/PROPH/DIAG IV INF INIT: CPT

## 2021-10-26 PROCEDURE — 93010 ELECTROCARDIOGRAM REPORT: CPT | Performed by: FAMILY MEDICINE

## 2021-10-26 PROCEDURE — 250N000013 HC RX MED GY IP 250 OP 250 PS 637: Performed by: FAMILY MEDICINE

## 2021-10-26 PROCEDURE — 99291 CRITICAL CARE FIRST HOUR: CPT | Mod: 25

## 2021-10-26 PROCEDURE — 250N000011 HC RX IP 250 OP 636: Performed by: FAMILY MEDICINE

## 2021-10-26 PROCEDURE — C9803 HOPD COVID-19 SPEC COLLECT: HCPCS

## 2021-10-26 PROCEDURE — 71045 X-RAY EXAM CHEST 1 VIEW: CPT

## 2021-10-26 PROCEDURE — 87635 SARS-COV-2 COVID-19 AMP PRB: CPT | Performed by: FAMILY MEDICINE

## 2021-10-26 PROCEDURE — 85520 HEPARIN ASSAY: CPT | Performed by: FAMILY MEDICINE

## 2021-10-26 PROCEDURE — 36415 COLL VENOUS BLD VENIPUNCTURE: CPT | Performed by: EMERGENCY MEDICINE

## 2021-10-26 PROCEDURE — 82040 ASSAY OF SERUM ALBUMIN: CPT | Performed by: FAMILY MEDICINE

## 2021-10-26 PROCEDURE — 83690 ASSAY OF LIPASE: CPT | Performed by: FAMILY MEDICINE

## 2021-10-26 PROCEDURE — 93005 ELECTROCARDIOGRAM TRACING: CPT

## 2021-10-26 PROCEDURE — 96366 THER/PROPH/DIAG IV INF ADDON: CPT

## 2021-10-26 RX ORDER — HEPARIN SODIUM 10000 [USP'U]/100ML
0-5000 INJECTION, SOLUTION INTRAVENOUS CONTINUOUS
Status: DISCONTINUED | OUTPATIENT
Start: 2021-10-26 | End: 2021-10-26 | Stop reason: HOSPADM

## 2021-10-26 RX ORDER — NITROGLYCERIN 0.4 MG/1
0.4 TABLET SUBLINGUAL EVERY 5 MIN PRN
Status: DISCONTINUED | OUTPATIENT
Start: 2021-10-26 | End: 2021-10-26 | Stop reason: HOSPADM

## 2021-10-26 RX ORDER — ASPIRIN 325 MG
325 TABLET ORAL ONCE
Status: COMPLETED | OUTPATIENT
Start: 2021-10-26 | End: 2021-10-26

## 2021-10-26 RX ADMIN — ASPIRIN 325 MG ORAL TABLET 325 MG: 325 PILL ORAL at 10:35

## 2021-10-26 RX ADMIN — HEPARIN SODIUM 1050 UNITS/HR: 10000 INJECTION, SOLUTION INTRAVENOUS at 10:33

## 2021-10-26 RX ADMIN — NITROGLYCERIN 0.4 MG: 0.4 TABLET SUBLINGUAL at 08:50

## 2021-10-26 ASSESSMENT — MIFFLIN-ST. JEOR: SCORE: 1609.89

## 2021-10-26 NOTE — ED NOTES
Pt tolerated nitroglycerin 1 tab well. BP stable at 108/61. Pt reports that chest pain is 1/10 or less.

## 2021-10-26 NOTE — ED PROVIDER NOTES
"     Emergency Department Patient Sign-out       Brief HPI:  This is a 76 year old male signed out to me by Dr. Johnson .  See initial ED Provider note for details of the presentation.            Significant Events prior to my assuming care: 76 year old male with hx of CAD s/p pci x 3 with chest pain radiating to neck. No acute changes on EKG. Troponin elevated to 0.055. also with dental caries recently treated with amoxicillin. Concern for unstable angina. On heparin. Received aspirin. Accepted at [facility] by Dr. Bourgeois. 4-8 hour wait for bed availability.     No significant events after assuming care.  I reviewed the ECG personally.  Patient remained chest pain-free while in the emergency department and was transferred to [facility] in stable condition.  Repeat troponin drawn prior to transfer but results pending at time of final disposition.  Troponin level would not change current management.  Patient and his wife have no questions at this time and are in agreement with plan for transfer.      Exam:   Patient Vitals for the past 24 hrs:   BP Temp Temp src Pulse Resp SpO2 Height Weight   10/26/21 1400 117/61 -- -- 59 18 96 % -- --   10/26/21 1315 -- -- -- 56 19 94 % -- --   10/26/21 1300 -- -- -- 54 16 95 % -- --   10/26/21 1245 -- -- -- 57 (!) 7 97 % -- --   10/26/21 1230 -- -- -- 60 11 96 % -- --   10/26/21 1215 -- -- -- 57 17 98 % -- --   10/26/21 1200 -- -- -- 51 18 97 % -- --   10/26/21 1145 113/58 -- -- 54 19 95 % -- --   10/26/21 1130 114/61 -- -- 58 19 95 % -- --   10/26/21 1115 107/59 -- -- 58 16 97 % -- --   10/26/21 1100 105/62 -- -- 56 10 98 % -- --   10/26/21 1045 114/59 -- -- 53 14 96 % -- --   10/26/21 1030 108/73 -- -- 52 9 97 % -- --   10/26/21 1015 -- -- -- 52 10 96 % -- --   10/26/21 1000 101/62 -- -- 58 19 96 % -- --   10/26/21 0900 127/77 -- -- 80 16 92 % -- --   10/26/21 0811 117/72 98.3  F (36.8  C) Temporal 65 20 97 % 1.753 m (5' 9\") 88.5 kg (195 lb)           ED RESULTS: "   Results for orders placed or performed during the hospital encounter of 10/26/21 (from the past 24 hour(s))   [place] Draw     Status: None    Collection Time: 10/26/21  8:19 AM    Narrative    The following orders were created for panel order [place] Draw.  Procedure                               Abnormality         Status                     ---------                               -----------         ------                     Extra Blue Top Tube[[id number]]                              Final result               Extra Red Top Tube[[id number]]                               Final result               Extra Green Top (Lithium...[[id number]]                      Final result               Extra Purple Top Tube[[id number]]                            Final result                 Please view results for these tests on the individual orders.   Extra Blue Top Tube     Status: None    Collection Time: 10/26/21  8:19 AM   Result Value Ref Range    Hold Specimen JIC    Extra Red Top Tube     Status: None    Collection Time: 10/26/21  8:19 AM   Result Value Ref Range    Hold Specimen JIC    Extra Green Top (Lithium Heparin) Tube     Status: None    Collection Time: 10/26/21  8:19 AM   Result Value Ref Range    Hold Specimen JIC    Extra Purple Top Tube     Status: None    Collection Time: 10/26/21  8:19 AM   Result Value Ref Range    Hold Specimen JIC    Troponin I     Status: Abnormal    Collection Time: 10/26/21  8:19 AM   Result Value Ref Range    Troponin I 0.055 (H) 0.000 - 0.045 ug/L   CBC with platelets, differential     Status: None    Collection Time: 10/26/21  8:19 AM    Narrative    The following orders were created for panel order CBC with platelets, differential.  Procedure                               Abnormality         Status                     ---------                               -----------         ------                     CBC with platelets and d...[[id number]]                      Final result                  Please view results for these tests on the individual orders.   Basic metabolic panel     Status: Normal    Collection Time: 10/26/21  8:19 AM   Result Value Ref Range    Sodium 138 133 - 144 mmol/L    Potassium 4.5 3.4 - 5.3 mmol/L    Chloride 109 94 - 109 mmol/L    Carbon Dioxide (CO2) 26 20 - 32 mmol/L    Anion Gap 3 3 - 14 mmol/L    Urea Nitrogen 10 7 - 30 mg/dL    Creatinine 0.89 0.66 - 1.25 mg/dL    Calcium 9.0 8.5 - 10.1 mg/dL    Glucose 99 70 - 99 mg/dL    GFR Estimate 84 >60 mL/min/1.73m2   Hepatic panel     Status: Normal    Collection Time: 10/26/21  8:19 AM   Result Value Ref Range    Bilirubin Total 0.4 0.2 - 1.3 mg/dL    Bilirubin Direct <0.1 0.0 - 0.2 mg/dL    Protein Total 7.6 6.8 - 8.8 g/dL    Albumin 3.4 3.4 - 5.0 g/dL    Alkaline Phosphatase 74 40 - 150 U/L    AST 28 0 - 45 U/L    ALT 49 0 - 70 U/L   Lipase     Status: Normal    Collection Time: 10/26/21  8:19 AM   Result Value Ref Range    Lipase 152 73 - 393 U/L   CBC with platelets and differential     Status: None    Collection Time: 10/26/21  8:19 AM   Result Value Ref Range    WBC Count 7.5 4.0 - 11.0 10e3/uL    RBC Count 5.32 4.40 - 5.90 10e6/uL    Hemoglobin 15.7 13.3 - 17.7 g/dL    Hematocrit 47.6 40.0 - 53.0 %    MCV 90 78 - 100 fL    MCH 29.5 26.5 - 33.0 pg    MCHC 33.0 31.5 - 36.5 g/dL    RDW 12.4 10.0 - 15.0 %    Platelet Count 176 150 - 450 10e3/uL    % Neutrophils 71 %    % Lymphocytes 15 %    % Monocytes 10 %    % Eosinophils 3 %    % Basophils 1 %    % Immature Granulocytes 0 %    NRBCs per 100 WBC 0 <1 /100    Absolute Neutrophils 5.4 1.6 - 8.3 10e3/uL    Absolute Lymphocytes 1.1 0.8 - 5.3 10e3/uL    Absolute Monocytes 0.7 0.0 - 1.3 10e3/uL    Absolute Eosinophils 0.2 0.0 - 0.7 10e3/uL    Absolute Basophils 0.1 0.0 - 0.2 10e3/uL    Absolute Immature Granulocytes 0.0 <=0.0 10e3/uL    Absolute NRBCs 0.0 10e3/uL   UA with Microscopic reflex to Culture     Status: Abnormal    Collection Time: 10/26/21  9:06 AM    Specimen:  Urine, Midstream   Result Value Ref Range    Color Urine Straw Colorless, Straw, Light Yellow, Yellow    Appearance Urine Clear Clear    Glucose Urine Negative Negative mg/dL    Bilirubin Urine Negative Negative    Ketones Urine Negative Negative mg/dL    Specific Gravity Urine 1.006 1.003 - 1.035    Blood Urine Negative Negative    pH Urine 6.0 5.0 - 7.0    Protein Albumin Urine Negative Negative mg/dL    Urobilinogen Urine Normal Normal, 2.0 mg/dL    Nitrite Urine Negative Negative    Leukocyte Esterase Urine Negative Negative    Bacteria Urine Few (A) None Seen /HPF    Mucus Urine Present (A) None Seen /LPF    RBC Urine <1 <=2 /HPF    WBC Urine <1 <=5 /HPF    Narrative    Urine Culture not indicated   Asymptomatic COVID-19 Virus (Coronavirus) by PCR Nasopharyngeal     Status: Normal    Collection Time: 10/26/21 10:37 AM    Specimen: Nasopharyngeal; Swab   Result Value Ref Range    SARS CoV2 PCR Negative Negative    Narrative    Testing was performed using the [employer]  SARS-CoV-2 & Influenza A/B Assay on the [employer]  Karen  System.  This test should be ordered for the detection of SARS-COV-2 in individuals who meet SARS-CoV-2 clinical and/or epidemiological criteria. Test performance is unknown in asymptomatic patients.  This test is for in vitro diagnostic use under the FDA EUA for laboratories certified under CLIA to perform moderate and/or high complexity testing. This test has not been FDA cleared or approved.  A negative test does not rule out the presence of PCR inhibitors in the specimen or target RNA in concentration below the limit of detection for the assay. The possibility of a false negative should be considered if the patient's recent exposure or clinical presentation suggests COVID-19.  [facility] are certified under the Clinical Laboratory Improvement Amendments of 1988 (CLIA-88) as qualified to perform moderate and/or high complexity laboratory testing.   XR Chest Port 1 View      Status: None    Collection Time: 10/26/21 10:50 AM    Narrative    CHEST PORTABLE ONE VIEW  October 26, 2021 10:50 AM     HISTORY: Chest pain.    COMPARISON: Chest x-ray on 5/16/2018.      Impression    IMPRESSION: AP view of the chest was obtained. Cardiomediastinal  silhouette is within normal limits. No suspicious focal pulmonary  opacities. No significant pleural effusion or pneumothorax.     MARSHAL JOHNS MD         SYSTEM ID:  [id number]       ED MEDICATIONS:   Medications   nitroGLYcerin (NITROSTAT) sublingual tablet 0.4 mg (0.4 mg Sublingual Given 10/26/21 0850)   heparin 25,000 units in 0.45% NaCl 250 mL ANTICOAGULANT infusion (1,050 Units/hr Intravenous Rate/Dose Verify 10/26/21 1320)   heparin loading dose for LOW INTENSITY TREATMENT * Give BEFORE starting heparin infusion (5,300 Units Intravenous Given 10/26/21 1032)   aspirin (ASA) tablet 325 mg (325 mg Oral Given 10/26/21 1035)         Impression:    ICD-10-CM    1. NSTEMI (non-ST elevated myocardial infarction) (H)  I21.4      Plan: continue heparin gtt and awaiting transfer to [facility].       MD Akanksha Steiner Richard J, MD  10/26/21 3065

## 2021-10-26 NOTE — ED PROVIDER NOTES
History     Chief Complaint   Patient presents with     Chest Pain     intermittent onset last night. pain radiated up into the jaw. took nitro at home     HPI  Derrick Morrison is a 76 year old male, past medical history is significant for GERD, sensorineural hearing loss, hypertension, palpitations, hyperlipidemia, ASCVD with history of PCI with multiple stents on Plavix, BPH, presents to the emergency department concerns of chest pain.  History is obtained from the patient who is accompanied by his wife.  The patient is a very vague historian.  He identifies bilateral tooth pain over the last 48 hours that is consistent with what he is experienced in the past prior to the necessity for cardiac intervention with PTCA.  This fluctuates and generally gets worse with exercise but occasionally will come on at rest.  He is also had fluctuating tightness in his chest usually with fairly consistently with exertion over the last couple of days, more so this morning at rest for which he took nitro x2 with near complete resolution of the discomfort.  There is no associated shortness of breath lightheadedness palpitations nausea or vomiting.  Unfortunately complicating this the patient has had a dental infection, tooth #14 by his description and his significant others over the past 1 month for which she has been on 2 courses of amoxicillin.  He has been told that he needs dental extraction for tooth #14.  He has been off antibiotics for a week.  He does not feel that the tooth pain that he has experienced the last couple days is related to this.    He notes no fever chills or sweats.  He notes that his last cardiac intervention was in 2011 with a stent at that time at the [facility] by their account.  He follows with Dr. Lan.  His last cardiac stress test was 4/23/2018 and reviewed in his E HR at this time.   EKG Findings  The resting EKG demonstrated normal findings . The stress EKG  demonstrated no significant ST segment  "changes.     Tomographic Findings  Overall, the study quality is adequate . On the stress images, no  significant perfusion defects were seen. On the rest images, normal  myocardial perfusion was seen . Gated images demonstrated normal wall  motion. The left ventricular ejection fraction was calculated to be  59%. TID was absent.     ROSARIO BARTLETT MD  Allergies:  No Known Allergies    Problem List:    Patient Active Problem List    Diagnosis Date Noted     Gastroesophageal reflux disease without esophagitis 08/24/2015     Priority: Medium     Counseling regarding advanced directives 08/14/2015     Priority: Medium     Advance Care Planning 8/14/2015: ACP Review and Resources Provided:  Reviewed chart for advance care plan.  Derrick Morrison has no plan or code status on file however states presence of ACP document. Copy requested.  Added by Alix Cotton           Subjective tinnitus 05/01/2015     Priority: Medium     Sensorineural hearing loss, bilateral 05/01/2015     Priority: Medium     Aids bilateral       Screening for prostate cancer 08/21/2014     Priority: Medium     OK with USPSTF recommendations against screening.  See 8/2014 note for details.  Discussed in detail at physical in 2017 again, still OK with no screening.  (now 70 y/o)       HTN (hypertension) 08/21/2014     Priority: Medium     Palpitations 08/07/2013     Priority: Medium     occasional, improved on low dose beta blocker       Hyperlipidemia LDL goal <70 11/16/2011     Priority: Medium     crestor       CAD (coronary artery disease) 10/10/2011     Priority: Medium     -8/16/2006 s/p GERALDINE to mid RCA, s/p GERALDINE to mid LAD in [place]  -10/4/2007 s/p GERALDINE to pRCA and GEARLDINE to dRCA in [place]   -11/17/11 Unstable angina, s/p GERALDINE to prox LAD (jailed small diagonal)   He has remained on plavix long term due to multiple stents.         Hypertrophy of prostate with urinary obstruction 10/10/2011     Priority: Medium     flomax in past, \"quit " "working\".  Avodart in past.  Tapered off.   Update 10/14: wanted to restart flomax, stopped it, didn't think it helped.    Now back on Avodart.           screening 10/10/2011     Priority: Medium     2007 colonoscopy \"all clean\" in [place].  He is sure about this.    AAA ultrasound screen 3/07 normal also.  (leg and neck done then too)          Past Medical History:    Past Medical History:   Diagnosis Date     Actinic keratosis      Basal cell carcinoma      BPH w urinary obs/LUTS 10/10/2011     CAD (coronary artery disease) 10/10/2011     Hyperlipidaemia      Palpitations 8/7/2013     screening 10/10/2011     Skin cancer        Past Surgical History:    No past surgical history on file.    Family History:    Family History   Problem Relation Age of Onset     Coronary Artery Disease Mother      Melanoma No family hx of        Social History:  Marital Status:   [2]  Social History     Tobacco Use     Smoking status: Never Smoker     Smokeless tobacco: Never Used   Substance Use Topics     Alcohol use: Yes     Comment: occasionally     Drug use: No        Medications:    ASPIRIN NOT PRESCRIBED, INTENTIONAL,  clopidogrel (PLAVIX) 75 MG tablet  dutasteride (AVODART) 0.5 MG capsule  methocarbamol (ROBAXIN) 750 MG tablet  metoprolol succinate ER (TOPROL-XL) 25 MG 24 hr tablet  nitroGLYcerin (NITROSTAT) 0.4 MG sublingual tablet  rosuvastatin (CRESTOR) 40 MG tablet  rosuvastatin (CRESTOR) 40 MG tablet          Review of Systems   All other systems reviewed and are negative.      Physical Exam   BP: 117/72  Pulse: 65  Temp: 98.3  F (36.8  C)  Resp: 20  Height: 175.3 cm (5' 9\")  Weight: 88.5 kg (195 lb)  SpO2: 97 %      Physical Exam  Vitals and nursing note reviewed.   Constitutional:       General: He is not in acute distress.     Appearance: He is well-developed and normal weight. He is not ill-appearing.      Comments: Patient appears comfortable, states that he has no chest pain but then states that it may " still be there very slightly   HENT:      Head: Normocephalic and atraumatic.   Eyes:      Extraocular Movements: Extraocular movements intact.      Pupils: Pupils are equal, round, and reactive to light.   Cardiovascular:      Rate and Rhythm: Normal rate and regular rhythm.      Heart sounds: Normal heart sounds.   Pulmonary:      Effort: Pulmonary effort is normal.      Breath sounds: Normal breath sounds.   Abdominal:      General: Bowel sounds are normal.      Palpations: Abdomen is soft.   Musculoskeletal:         General: Normal range of motion.      Cervical back: Normal range of motion and neck supple.   Skin:     General: Skin is warm and dry.      Capillary Refill: Capillary refill takes less than 2 seconds.   Neurological:      General: No focal deficit present.      Mental Status: He is alert.   Psychiatric:         Mood and Affect: Mood normal.         Behavior: Behavior normal.         ED Course        Procedures              EKG Interpretation:      Interpreted by Shabbir Johnson MD  Time reviewed: Time obtained 821 time interpreted same 57 bpm, sinus bradycardia no acute ST-T wave changes normal axis, normal intervals.        Critical Care time:  none               Results for orders placed or performed during the hospital encounter of 10/26/21 (from the past 24 hour(s))   [place] Draw    Narrative    The following orders were created for panel order [place] Draw.  Procedure                               Abnormality         Status                     ---------                               -----------         ------                     Extra Blue Top Tube[[id number]]                              Final result               Extra Red Top Tube[[id number]]                               Final result               Extra Green Top (Lithium...[[id number]]                      Final result               Extra Purple Top Tube[[id number]]                            Final result                 Please view  results for these tests on the individual orders.   Extra Blue Top Tube   Result Value Ref Range    Hold Specimen JIC    Extra Red Top Tube   Result Value Ref Range    Hold Specimen JIC    Extra Green Top (Lithium Heparin) Tube   Result Value Ref Range    Hold Specimen JIC    Extra Purple Top Tube   Result Value Ref Range    Hold Specimen JIC    Troponin I   Result Value Ref Range    Troponin I 0.055 (H) 0.000 - 0.045 ug/L   CBC with platelets, differential    Narrative    The following orders were created for panel order CBC with platelets, differential.  Procedure                               Abnormality         Status                     ---------                               -----------         ------                     CBC with platelets and d...[[id number]]                      Final result                 Please view results for these tests on the individual orders.   Basic metabolic panel   Result Value Ref Range    Sodium 138 133 - 144 mmol/L    Potassium 4.5 3.4 - 5.3 mmol/L    Chloride 109 94 - 109 mmol/L    Carbon Dioxide (CO2) 26 20 - 32 mmol/L    Anion Gap 3 3 - 14 mmol/L    Urea Nitrogen 10 7 - 30 mg/dL    Creatinine 0.89 0.66 - 1.25 mg/dL    Calcium 9.0 8.5 - 10.1 mg/dL    Glucose 99 70 - 99 mg/dL    GFR Estimate 84 >60 mL/min/1.73m2   Hepatic panel   Result Value Ref Range    Bilirubin Total 0.4 0.2 - 1.3 mg/dL    Bilirubin Direct <0.1 0.0 - 0.2 mg/dL    Protein Total 7.6 6.8 - 8.8 g/dL    Albumin 3.4 3.4 - 5.0 g/dL    Alkaline Phosphatase 74 40 - 150 U/L    AST 28 0 - 45 U/L    ALT 49 0 - 70 U/L   Lipase   Result Value Ref Range    Lipase 152 73 - 393 U/L   CBC with platelets and differential   Result Value Ref Range    WBC Count 7.5 4.0 - 11.0 10e3/uL    RBC Count 5.32 4.40 - 5.90 10e6/uL    Hemoglobin 15.7 13.3 - 17.7 g/dL    Hematocrit 47.6 40.0 - 53.0 %    MCV 90 78 - 100 fL    MCH 29.5 26.5 - 33.0 pg    MCHC 33.0 31.5 - 36.5 g/dL    RDW 12.4 10.0 - 15.0 %    Platelet Count 176 150 - 450  10e3/uL    % Neutrophils 71 %    % Lymphocytes 15 %    % Monocytes 10 %    % Eosinophils 3 %    % Basophils 1 %    % Immature Granulocytes 0 %    NRBCs per 100 WBC 0 <1 /100    Absolute Neutrophils 5.4 1.6 - 8.3 10e3/uL    Absolute Lymphocytes 1.1 0.8 - 5.3 10e3/uL    Absolute Monocytes 0.7 0.0 - 1.3 10e3/uL    Absolute Eosinophils 0.2 0.0 - 0.7 10e3/uL    Absolute Basophils 0.1 0.0 - 0.2 10e3/uL    Absolute Immature Granulocytes 0.0 <=0.0 10e3/uL    Absolute NRBCs 0.0 10e3/uL   UA with Microscopic reflex to Culture    Specimen: Urine, Midstream   Result Value Ref Range    Color Urine Straw Colorless, Straw, Light Yellow, Yellow    Appearance Urine Clear Clear    Glucose Urine Negative Negative mg/dL    Bilirubin Urine Negative Negative    Ketones Urine Negative Negative mg/dL    Specific Gravity Urine 1.006 1.003 - 1.035    Blood Urine Negative Negative    pH Urine 6.0 5.0 - 7.0    Protein Albumin Urine Negative Negative mg/dL    Urobilinogen Urine Normal Normal, 2.0 mg/dL    Nitrite Urine Negative Negative    Leukocyte Esterase Urine Negative Negative    Bacteria Urine Few (A) None Seen /HPF    Mucus Urine Present (A) None Seen /LPF    RBC Urine <1 <=2 /HPF    WBC Urine <1 <=5 /HPF    Narrative    Urine Culture not indicated       Medications   nitroGLYcerin (NITROSTAT) sublingual tablet 0.4 mg (0.4 mg Sublingual Given 10/26/21 0850)   heparin loading dose for LOW INTENSITY TREATMENT * Give BEFORE starting heparin infusion (has no administration in time range)   heparin 25,000 units in 0.45% NaCl 250 mL ANTICOAGULANT infusion (has no administration in time range)   aspirin (ASA) tablet 325 mg (has no administration in time range)     10:16 AM  I spoke with Dr. Feng cardiology at [facility] as that was the only bed available or potentially available for this patient.  There were no beds available in the [facility] system.  Dr. Feng agreed to accept the patient in transfer as an NSTEMI, I was  informed by bed control on the line at the same time that there will be a 4-8-hour delay.  Patient is informed.  This patient was discussed with my colleague  at shift change pending bed availability at [facility].  Dr. Vale will oversee the care of the patient until the transfer is accomplished.      Assessments & Plan (with Medical Decision Making)     I have reviewed the nursing notes.    I have reviewed the findings, diagnosis, plan and need for follow up with the patient.       New Prescriptions    No medications on file       Final diagnoses:   NSTEMI (non-ST elevated myocardial infarction) (H)       10/26/2021   [facility] EMERGENCY DEPT     Shabbir Johnson MD  10/29/21 114

## 2021-10-28 ENCOUNTER — TRANSFERRED RECORDS (OUTPATIENT)
Dept: HEALTH INFORMATION MANAGEMENT | Facility: CLINIC | Age: 76
End: 2021-10-28
Payer: COMMERCIAL

## 2021-10-28 LAB — HEMOCCULT STL QL IA: NEGATIVE

## 2021-10-29 ENCOUNTER — MYC MEDICAL ADVICE (OUTPATIENT)
Dept: FAMILY MEDICINE | Facility: CLINIC | Age: 76
End: 2021-10-29

## 2021-11-03 ENCOUNTER — OFFICE VISIT (OUTPATIENT)
Dept: FAMILY MEDICINE | Facility: CLINIC | Age: 76
End: 2021-11-03
Payer: COMMERCIAL

## 2021-11-03 VITALS
HEART RATE: 64 BPM | TEMPERATURE: 97.8 F | DIASTOLIC BLOOD PRESSURE: 70 MMHG | BODY MASS INDEX: 29.18 KG/M2 | HEIGHT: 69 IN | OXYGEN SATURATION: 98 % | RESPIRATION RATE: 16 BRPM | WEIGHT: 197 LBS | SYSTOLIC BLOOD PRESSURE: 114 MMHG

## 2021-11-03 DIAGNOSIS — I25.719 CORONARY ARTERY DISEASE INVOLVING AUTOLOGOUS VEIN CORONARY BYPASS GRAFT WITH ANGINA PECTORIS (H): Primary | ICD-10-CM

## 2021-11-03 DIAGNOSIS — E78.5 HYPERLIPIDEMIA LDL GOAL <70: ICD-10-CM

## 2021-11-03 DIAGNOSIS — I10 PRIMARY HYPERTENSION: ICD-10-CM

## 2021-11-03 PROCEDURE — 99214 OFFICE O/P EST MOD 30 MIN: CPT | Performed by: FAMILY MEDICINE

## 2021-11-03 ASSESSMENT — MIFFLIN-ST. JEOR: SCORE: 1618.97

## 2021-11-03 NOTE — PROGRESS NOTES
"A: cad, stable after 3 more stents       Htn, stable      Hyperlpidemia, stable    P: continue meds.  F/u with cardiac rehab.  Plavix/asa for a year, maybe longer?      Doing well.          S :Derrick Morrison is a 76 year old male with CAD.  3 stents last week for nstemi.  10 total.  Done at [facility].  Site in R arm looks good.   Asa/plavix for a year.  Maybe long term?  Had need for more stents on plavix monotherapy.      Htn: didn't tolerate losartan.  Runs 110/70 on low dose beta    Hyperlipidemia: 40mg crestor.  Tolerating    O:/70   Pulse 64   Temp 97.8  F (36.6  C) (Tympanic)   Resp 16   Ht 1.753 m (5' 9\")   Wt 89.4 kg (197 lb)   SpO2 98%   BMI 29.09 kg/m    GEN: Alert and oriented, in no acute distress  CV: RRR, no murmur  RESP: lungs clear bilaterally, good effort  R radial artery area looks good          "

## 2021-11-04 ENCOUNTER — TELEPHONE (OUTPATIENT)
Dept: FAMILY MEDICINE | Facility: CLINIC | Age: 76
End: 2021-11-04

## 2021-11-04 NOTE — TELEPHONE ENCOUNTER
Received call from Dr Solis, oral surgeon. He is asking a safe time to pull chronic infected tooth following the placements of 3 stents 10/26/21.    Conferred with Dr Javier and the answer is deferred to cardiology. Oral surgeon recontacted and provided number for cardiology.

## 2021-11-05 ENCOUNTER — TRANSCRIBE ORDERS (OUTPATIENT)
Dept: CARDIOLOGY | Facility: CLINIC | Age: 76
End: 2021-11-05

## 2021-11-05 DIAGNOSIS — Z95.5 S/P CORONARY ARTERY STENT PLACEMENT: ICD-10-CM

## 2021-11-05 DIAGNOSIS — I21.4 NSTEMI (NON-ST ELEVATED MYOCARDIAL INFARCTION) (H): Primary | ICD-10-CM

## 2021-11-08 ENCOUNTER — HOSPITAL ENCOUNTER (OUTPATIENT)
Dept: CARDIAC REHAB | Facility: CLINIC | Age: 76
End: 2021-11-08
Attending: FAMILY MEDICINE
Payer: MEDICARE

## 2021-11-08 DIAGNOSIS — Z95.5 S/P CORONARY ARTERY STENT PLACEMENT: ICD-10-CM

## 2021-11-08 DIAGNOSIS — I21.4 NSTEMI (NON-ST ELEVATED MYOCARDIAL INFARCTION) (H): ICD-10-CM

## 2021-11-08 PROCEDURE — 93797 PHYS/QHP OP CAR RHAB WO ECG: CPT | Mod: XU

## 2021-11-08 PROCEDURE — 93798 PHYS/QHP OP CAR RHAB W/ECG: CPT

## 2021-11-10 ENCOUNTER — HOSPITAL ENCOUNTER (OUTPATIENT)
Dept: CARDIAC REHAB | Facility: CLINIC | Age: 76
End: 2021-11-10
Attending: FAMILY MEDICINE
Payer: MEDICARE

## 2021-11-10 PROCEDURE — 93798 PHYS/QHP OP CAR RHAB W/ECG: CPT

## 2021-11-12 ENCOUNTER — HOSPITAL ENCOUNTER (OUTPATIENT)
Dept: CARDIAC REHAB | Facility: CLINIC | Age: 76
End: 2021-11-12
Attending: FAMILY MEDICINE
Payer: MEDICARE

## 2021-11-12 PROCEDURE — 93798 PHYS/QHP OP CAR RHAB W/ECG: CPT

## 2021-11-15 ENCOUNTER — HOSPITAL ENCOUNTER (OUTPATIENT)
Dept: CARDIAC REHAB | Facility: CLINIC | Age: 76
End: 2021-11-15
Attending: FAMILY MEDICINE
Payer: MEDICARE

## 2021-11-15 PROCEDURE — 93798 PHYS/QHP OP CAR RHAB W/ECG: CPT

## 2021-11-15 PROCEDURE — 93797 PHYS/QHP OP CAR RHAB WO ECG: CPT | Performed by: CLINICAL EXERCISE PHYSIOLOGIST

## 2021-11-17 ENCOUNTER — HOSPITAL ENCOUNTER (OUTPATIENT)
Dept: CARDIAC REHAB | Facility: CLINIC | Age: 76
End: 2021-11-17
Attending: FAMILY MEDICINE
Payer: MEDICARE

## 2021-11-17 ENCOUNTER — OFFICE VISIT (OUTPATIENT)
Dept: CARDIOLOGY | Facility: CLINIC | Age: 76
End: 2021-11-17
Attending: NURSE PRACTITIONER
Payer: COMMERCIAL

## 2021-11-17 VITALS
DIASTOLIC BLOOD PRESSURE: 74 MMHG | HEART RATE: 73 BPM | BODY MASS INDEX: 29.21 KG/M2 | OXYGEN SATURATION: 96 % | SYSTOLIC BLOOD PRESSURE: 117 MMHG | WEIGHT: 197.8 LBS

## 2021-11-17 DIAGNOSIS — E78.5 HYPERLIPIDEMIA LDL GOAL <70: ICD-10-CM

## 2021-11-17 DIAGNOSIS — R00.2 PALPITATIONS: ICD-10-CM

## 2021-11-17 DIAGNOSIS — I25.10 CORONARY ARTERY DISEASE INVOLVING NATIVE CORONARY ARTERY OF NATIVE HEART WITHOUT ANGINA PECTORIS: ICD-10-CM

## 2021-11-17 DIAGNOSIS — I10 ESSENTIAL HYPERTENSION: ICD-10-CM

## 2021-11-17 PROCEDURE — 99214 OFFICE O/P EST MOD 30 MIN: CPT | Performed by: INTERNAL MEDICINE

## 2021-11-17 PROCEDURE — 93798 PHYS/QHP OP CAR RHAB W/ECG: CPT

## 2021-11-17 RX ORDER — ASPIRIN 81 MG/1
81 TABLET, CHEWABLE ORAL DAILY
COMMUNITY
End: 2022-11-02

## 2021-11-17 NOTE — LETTER
11/17/2021    Kaiden Zapata MD  [address] [place] [id number]    RE: Derrick Morrison       Dear Colleague,    I had the pleasure of seeing Derrick Morrison in the [facility].    HPI and Plan:     I had a pleasure seeing Mr. Morrison in followup at the [facility] today.  He is a very pleasant 76-year-old gentleman who I last saw in 04/2018.  He has a history of coronary artery disease and is status post drug-eluting stent placement to the LAD for in-stent restenosis in 2011.  He previously underwent stenting to the LAD and the right coronary artery.  His left ventricular systolic function is known to be normal.      At his last office visit, he was doing well overall from a cardiovascular standpoint.  Unfortunately he was recently admitted from 10/26/2021 through 10/28/2021 at [facility] with a non-ST elevation myocardial infarction.  He presented to the emergency department at [facility] with Tae chest discomfort and was noted to have elevated troponins.  Subsequent coronary angiography on 10/27/2021 resulted in GERALDINE x2 to area of restenosis involving the LAD as well as drug-eluting stent placement to a significant circumflex lesion.  Left ventricular systolic function was within normal limits on echocardiogram obtained prior to discharge.    Today he feels well overall from a cardiovascular standpoint.  His chest and jaw discomfort has resolved.  He has been participating in cardiac rehab 3 times a week and denies any chest discomfort or shortness of breath while doing so.  He has been taking all his medications as prescribed.  He does complain of some generalized fatigue and decreased his dose of Toprol-XL to 25 mg daily as a result.    PHYSICAL EXAMINATION:  Physical exam is dictated below.     Labs on 10/4/2021 demonstrated a total cholesterol 118, HDL 43, LDL of 60 and triglycerides of 77.        IMPRESSION:   1.  Coronary artery disease, status post multiple PCIs in the past, the most recent being drug-eluting placement x2 to the LAD and x1 to the circumflex in the context of a non-ST elevation myocardial infarction on 10/27/2021.  2.  Dyslipidemia.  LDL under excellent control on 40 mg of rosuvastatin daily.  3.  Hypertension.   4.  Frequent PVCs.     The patient is doing well overall from a cardiovascular standpoint.  His chest and jaw discomfort have resolved after his recent revascularization and fortunately left ventricular systolic function remains preserved.  At this point in time I think it is appropriate to continue his current medications as prescribed.  He will continue DAPT for 1 year at which point he will continue to take Plavix indefinitely.    Given his generalized fatigue during the day, I think it would be reasonable to consider screening for obstructive sleep apnea.  I will defer this to his primary care physician.    It was a pleasure seeing him in follow-up today.  Assuming his clinical status remains stable, we will see him in approximately 3 months in clinic.              CURRENT MEDICATIONS:  Current Outpatient Medications   Medication Sig Dispense Refill     ASPIRIN NOT PRESCRIBED, INTENTIONAL, 1 each continuous prn Antiplatelet medication not prescribed intentionally due to plavix 0 each 0     clopidogrel (PLAVIX) 75 MG tablet Take 1 tablet (75 mg) by mouth daily 90 tablet 3     dutasteride (AVODART) 0.5 MG capsule Take one pill every other day (Patient taking differently: Take 0.5 mg by mouth every other day Take one pill every other day) 45 capsule 3     methocarbamol (ROBAXIN) 750 MG tablet 2 times daily as needed for back or leg pain (Patient not taking: Reported on 11/3/2021) 60 tablet 3     metoprolol succinate ER (TOPROL-XL) 25 MG 24 hr tablet Take 1 tablet (25 mg) by mouth 2 times daily (Patient taking differently: Take 25 mg by mouth daily ) 180 tablet 3      "nitroGLYcerin (NITROSTAT) 0.4 MG sublingual tablet PLACE 1 TABLET UNDER THE TONGUE AT THE ONSET OF CHEST PAIN. MAY REPEAT EVERY 5 MINUTES AS NEEDED FOR A TOTAL OF 3 DOSES. (Patient not taking: Reported on 11/3/2021) 25 tablet 1     rosuvastatin (CRESTOR) 40 MG tablet Take 1 tablet (40 mg) by mouth every evening 90 tablet 3       ALLERGIES   No Known Allergies    PAST MEDICAL HISTORY:  Past Medical History:   Diagnosis Date     Actinic keratosis      Basal cell carcinoma      BPH w urinary obs/LUTS 10/10/2011    flomax in past, \"quit working\".  Avodart in past.  Tapered off.        CAD (coronary artery disease) 10/10/2011    -8/16/2006 s/p GERALDINE to mid RCA, s/p GERALDINE to mid LAD in [place] -10/4/2007 s/p GERALDINE to pRCA and GERALDINE to dRCA in [place]  -11/17/11 Unstable angina, s/p GERALDINE to prox LAD (jailed small diagonal)       Hyperlipidaemia      Palpitations 8/7/2013    occasional, improved on low dose beta blocker      screening 10/10/2011    2007 colonoscopy \"all clean\" in [place].  He is sure about this.   AAA ultrasound screen 3/07 normal also.  (leg and neck done then too)      Skin cancer     had mohs on L temple       PAST SURGICAL HISTORY:  No past surgical history on file.    FAMILY HISTORY:  Family History   Problem Relation Age of Onset     Coronary Artery Disease Mother      Melanoma No family hx of        SOCIAL HISTORY:  Social History     Socioeconomic History     Marital status:      Spouse name: Not on file     Number of children: Not on file     Years of education: Not on file     Highest education level: Not on file   Occupational History     Employer: RETIRED   Tobacco Use     Smoking status: Never Smoker     Smokeless tobacco: Never Used   Substance and Sexual Activity     Alcohol use: Yes     Comment: occasionally     Drug use: No     Sexual activity: Yes     Partners: Female   Other Topics Concern     Parent/sibling w/ CABG, MI or angioplasty before 65F 55M? No   Social History " Narrative     Not on file     Social Determinants of Health     Financial Resource Strain: Not on file   Food Insecurity: Not on file   Transportation Needs: Not on file   Physical Activity: Not on file   Stress: Not on file   Social Connections: Not on file   Intimate Partner Violence: Not on file   Housing Stability: Not on file       Review of Systems:  Skin:          Eyes:         ENT:         Respiratory:          Cardiovascular:         Gastroenterology:        Genitourinary:         Musculoskeletal:         Neurologic:         Psychiatric:         Heme/Lymph/Imm:         Endocrine:           Physical Exam:  Vitals: There were no vitals taken for this visit.    Constitutional:  cooperative, alert and oriented, well developed, well nourished, in no acute distress        Skin:  warm and dry to the touch          Head:  normocephalic, no masses or lesions        Eyes:           Lymph:      ENT:  no pallor or cyanosis, dentition good        Neck:  JVP normal        Respiratory:  clear to auscultation         Cardiac: regular rhythm                pulses full and equal                                        GI:  abdomen soft        Extremities and Muscular Skeletal:                 Neurological:  no gross motor deficits        Psych:  Alert and Oriented x 3        CC  Janeth Campos, MARIA T CNP  [address]  [place] [address]    Thank you for allowing me to participate in the care of your patient.      Sincerely,     Chidi Lan MD     [facility]

## 2021-11-17 NOTE — PROGRESS NOTES
HPI and Plan:     I had a pleasure seeing Mr. Morrison in followup at the [facility] today.  He is a very pleasant 76-year-old gentleman who I last saw in 04/2018.  He has a history of coronary artery disease and is status post drug-eluting stent placement to the LAD for in-stent restenosis in 2011.  He previously underwent stenting to the LAD and the right coronary artery.  His left ventricular systolic function is known to be normal.      At his last office visit, he was doing well overall from a cardiovascular standpoint.  Unfortunately he was recently admitted from 10/26/2021 through 10/28/2021 at [facility] with a non-ST elevation myocardial infarction.  He presented to the emergency department at [facility] with Tae chest discomfort and was noted to have elevated troponins.  Subsequent coronary angiography on 10/27/2021 resulted in GERALDINE x2 to area of restenosis involving the LAD as well as drug-eluting stent placement to a significant circumflex lesion.  Left ventricular systolic function was within normal limits on echocardiogram obtained prior to discharge.    Today he feels well overall from a cardiovascular standpoint.  His chest and jaw discomfort has resolved.  He has been participating in cardiac rehab 3 times a week and denies any chest discomfort or shortness of breath while doing so.  He has been taking all his medications as prescribed.  He does complain of some generalized fatigue and decreased his dose of Toprol-XL to 25 mg daily as a result.    PHYSICAL EXAMINATION:  Physical exam is dictated below.     Labs on 10/4/2021 demonstrated a total cholesterol 118, HDL 43, LDL of 60 and triglycerides of 77.       IMPRESSION:   1.  Coronary artery disease, status post multiple PCIs in the past, the most recent being drug-eluting placement x2 to the LAD and x1 to the circumflex in the context of a non-ST elevation myocardial infarction on 10/27/2021.  2.   Dyslipidemia.  LDL under excellent control on 40 mg of rosuvastatin daily.  3.  Hypertension.   4.  Frequent PVCs.     The patient is doing well overall from a cardiovascular standpoint.  His chest and jaw discomfort have resolved after his recent revascularization and fortunately left ventricular systolic function remains preserved.  At this point in time I think it is appropriate to continue his current medications as prescribed.  He will continue DAPT for 1 year at which point he will continue to take Plavix indefinitely.    Given his generalized fatigue during the day, I think it would be reasonable to consider screening for obstructive sleep apnea.  I will defer this to his primary care physician.    It was a pleasure seeing him in follow-up today.  Assuming his clinical status remains stable, we will see him in approximately 3 months in clinic.              CURRENT MEDICATIONS:  Current Outpatient Medications   Medication Sig Dispense Refill     ASPIRIN NOT PRESCRIBED, INTENTIONAL, 1 each continuous prn Antiplatelet medication not prescribed intentionally due to plavix 0 each 0     clopidogrel (PLAVIX) 75 MG tablet Take 1 tablet (75 mg) by mouth daily 90 tablet 3     dutasteride (AVODART) 0.5 MG capsule Take one pill every other day (Patient taking differently: Take 0.5 mg by mouth every other day Take one pill every other day) 45 capsule 3     methocarbamol (ROBAXIN) 750 MG tablet 2 times daily as needed for back or leg pain (Patient not taking: Reported on 11/3/2021) 60 tablet 3     metoprolol succinate ER (TOPROL-XL) 25 MG 24 hr tablet Take 1 tablet (25 mg) by mouth 2 times daily (Patient taking differently: Take 25 mg by mouth daily ) 180 tablet 3     nitroGLYcerin (NITROSTAT) 0.4 MG sublingual tablet PLACE 1 TABLET UNDER THE TONGUE AT THE ONSET OF CHEST PAIN. MAY REPEAT EVERY 5 MINUTES AS NEEDED FOR A TOTAL OF 3 DOSES. (Patient not taking: Reported on 11/3/2021) 25 tablet 1     rosuvastatin (CRESTOR) 40  "MG tablet Take 1 tablet (40 mg) by mouth every evening 90 tablet 3       ALLERGIES   No Known Allergies    PAST MEDICAL HISTORY:  Past Medical History:   Diagnosis Date     Actinic keratosis      Basal cell carcinoma      BPH w urinary obs/LUTS 10/10/2011    flomax in past, \"quit working\".  Avodart in past.  Tapered off.        CAD (coronary artery disease) 10/10/2011    -8/16/2006 s/p GERALDINE to mid RCA, s/p GERALDINE to mid LAD in [place] -10/4/2007 s/p GERALDINE to pRCA and GERALDINE to dRCA in [place]  -11/17/11 Unstable angina, s/p GERALDINE to prox LAD (jailed small diagonal)       Hyperlipidaemia      Palpitations 8/7/2013    occasional, improved on low dose beta blocker      screening 10/10/2011    2007 colonoscopy \"all clean\" in [place].  He is sure about this.   AAA ultrasound screen 3/07 normal also.  (leg and neck done then too)      Skin cancer     had mohs on L temple       PAST SURGICAL HISTORY:  No past surgical history on file.    FAMILY HISTORY:  Family History   Problem Relation Age of Onset     Coronary Artery Disease Mother      Melanoma No family hx of        SOCIAL HISTORY:  Social History     Socioeconomic History     Marital status:      Spouse name: Not on file     Number of children: Not on file     Years of education: Not on file     Highest education level: Not on file   Occupational History     Employer: RETIRED   Tobacco Use     Smoking status: Never Smoker     Smokeless tobacco: Never Used   Substance and Sexual Activity     Alcohol use: Yes     Comment: occasionally     Drug use: No     Sexual activity: Yes     Partners: Female   Other Topics Concern     Parent/sibling w/ CABG, MI or angioplasty before 65F 55M? No   Social History Narrative     Not on file     Social Determinants of Health     Financial Resource Strain: Not on file   Food Insecurity: Not on file   Transportation Needs: Not on file   Physical Activity: Not on file   Stress: Not on file   Social Connections: Not on " file   Intimate Partner Violence: Not on file   Housing Stability: Not on file       Review of Systems:  Skin:          Eyes:         ENT:         Respiratory:          Cardiovascular:         Gastroenterology:        Genitourinary:         Musculoskeletal:         Neurologic:         Psychiatric:         Heme/Lymph/Imm:         Endocrine:           Physical Exam:  Vitals: There were no vitals taken for this visit.    Constitutional:  cooperative, alert and oriented, well developed, well nourished, in no acute distress        Skin:  warm and dry to the touch          Head:  normocephalic, no masses or lesions        Eyes:           Lymph:      ENT:  no pallor or cyanosis, dentition good        Neck:  JVP normal        Respiratory:  clear to auscultation         Cardiac: regular rhythm                pulses full and equal                                        GI:  abdomen soft        Extremities and Muscular Skeletal:                 Neurological:  no gross motor deficits        Psych:  Alert and Oriented x 3        CC  Janeth Campos, APRN CNP  [id number]  [place] [id number]

## 2021-11-22 ENCOUNTER — HOSPITAL ENCOUNTER (OUTPATIENT)
Dept: CARDIAC REHAB | Facility: CLINIC | Age: 76
End: 2021-11-22
Attending: FAMILY MEDICINE
Payer: MEDICARE

## 2021-11-22 PROCEDURE — 93798 PHYS/QHP OP CAR RHAB W/ECG: CPT

## 2021-11-23 ENCOUNTER — OFFICE VISIT (OUTPATIENT)
Dept: AUDIOLOGY | Facility: CLINIC | Age: 76
End: 2021-11-23
Payer: COMMERCIAL

## 2021-11-23 DIAGNOSIS — H90.3 SENSORINEURAL HEARING LOSS, BILATERAL: ICD-10-CM

## 2021-11-23 DIAGNOSIS — H90.3 SENSORINEURAL HEARING LOSS, BILATERAL: Primary | ICD-10-CM

## 2021-11-23 DIAGNOSIS — H90.3 BILATERAL SENSORINEURAL HEARING LOSS: Primary | ICD-10-CM

## 2021-11-23 PROCEDURE — 92550 TYMPANOMETRY & REFLEX THRESH: CPT | Performed by: AUDIOLOGIST

## 2021-11-23 PROCEDURE — 92557 COMPREHENSIVE HEARING TEST: CPT | Performed by: AUDIOLOGIST

## 2021-11-23 PROCEDURE — V5014 HEARING AID REPAIR/MODIFYING: HCPCS | Performed by: AUDIOLOGIST

## 2021-11-23 PROCEDURE — 99207 PR NO CHARGE LOS: CPT | Performed by: AUDIOLOGIST

## 2021-11-23 NOTE — PROGRESS NOTES
[facility]        SUBJECTIVE:  Derrick Morrison , 76 year old male requests in office cleaning and programming of his 2016 Phonak Bolero V70-P hearing aids. He would like to have his hearing aids programmed to his 11/23/2021 audiogram results.     OBJECTIVE:  Replaced #2 slim tubing and medium closed domes bilaterally. Verified hearing aid functionality.      Unable to program hearing aids as the left hearing aid would not connect to the computer.     Discussed hearing aid repair versus obtaining new hearing aids.     ASSESSMENT/PLAN:    Return to clinic for hearing aid consultation as desires.     Blanca Santos M.A. -AAA, #[id number]

## 2021-11-23 NOTE — PROGRESS NOTES
AUDIOLOGY REPORT    SUBJECTIVE:  Derrick Morrison is a 76 year old male who was seen at [facility] for an audiologic evaluation, referred by Dr. Zapata.  The patient has been seen previously in this clinic for assessment and results indicated a mild to severe sensorineural hearing loss bilaterally. The patient reports greater difficulty hearing bilaterally. He reports he increases his Phonak Bolero V70-P hearing aid volumes 2-3 clicks every time he puts them on. The patient denies bilateral otalgia and bilateral drainage.     OBJECTIVE:    Otoscopic exam indicates ears are clear of cerumen bilaterally       Pure Tone Thresholds assessed using conventional audiometry with good  reliability from 250-8000 Hz bilaterally using circumaural headphones     RIGHT:  mild, moderate and severe sensorineural hearing loss    LEFT:    mild, moderate and severe sensorineural hearing loss    Tympanogram:    RIGHT: normal eardrum mobility    LEFT:   normal eardrum mobility    Reflexes (reported by stimulus ear 1000 Hz):     RIGHT: Ipsilateral is present    RIGHT: Contralateral is present    LEFT: Ipsilateral is present    LEFT: Contralateral is present    Speech Reception Threshold:    RIGHT: 40 dB HL    LEFT:   40 dB HL  Word Recognition Score:     RIGHT: 88% at 75 dB HL using NU-6 recorded word list.    LEFT:   80% at 75 dB HL using NU-6 recorded word list.      ASSESSMENT:   Mild sloping to severe sensorineural hearing loss bilaterally.     Compared to patient's previous audiogram dated 10/24/2018, hearing has decreased 10-15 dB  bilaterally from 1920-3258 Hz.  Today s results were discussed with the patient in detail.     PLAN: It is recommended that the patient be seen for a hearing aid check. Patient was counseled regarding hearing loss and impact on communication. Patient is a good candidate for newer amplification at this time.   Please call this clinic with questions regarding these results or  recommendations.        Blanca Santos M.A. F-AAA  Clinical audiologist [place] # [id number]  11/23/2021

## 2021-11-29 ENCOUNTER — HOSPITAL ENCOUNTER (OUTPATIENT)
Dept: CARDIAC REHAB | Facility: CLINIC | Age: 76
End: 2021-11-29
Attending: FAMILY MEDICINE
Payer: MEDICARE

## 2021-11-29 PROCEDURE — 93798 PHYS/QHP OP CAR RHAB W/ECG: CPT

## 2021-12-01 ENCOUNTER — HOSPITAL ENCOUNTER (OUTPATIENT)
Dept: CARDIAC REHAB | Facility: CLINIC | Age: 76
End: 2021-12-01
Attending: FAMILY MEDICINE
Payer: MEDICARE

## 2021-12-01 PROCEDURE — 93798 PHYS/QHP OP CAR RHAB W/ECG: CPT

## 2021-12-08 ENCOUNTER — HOSPITAL ENCOUNTER (OUTPATIENT)
Dept: CARDIAC REHAB | Facility: CLINIC | Age: 76
End: 2021-12-08
Attending: FAMILY MEDICINE
Payer: MEDICARE

## 2021-12-08 PROCEDURE — 93798 PHYS/QHP OP CAR RHAB W/ECG: CPT

## 2021-12-10 ENCOUNTER — HOSPITAL ENCOUNTER (OUTPATIENT)
Dept: CARDIAC REHAB | Facility: CLINIC | Age: 76
End: 2021-12-10
Attending: FAMILY MEDICINE
Payer: MEDICARE

## 2021-12-10 PROCEDURE — 93798 PHYS/QHP OP CAR RHAB W/ECG: CPT

## 2021-12-13 ENCOUNTER — HOSPITAL ENCOUNTER (OUTPATIENT)
Dept: CARDIAC REHAB | Facility: CLINIC | Age: 76
End: 2021-12-13
Attending: FAMILY MEDICINE
Payer: MEDICARE

## 2021-12-13 ENCOUNTER — OFFICE VISIT (OUTPATIENT)
Dept: SPIRITUAL SERVICES | Facility: CLINIC | Age: 76
End: 2021-12-13
Payer: COMMERCIAL

## 2021-12-13 PROCEDURE — 93797 PHYS/QHP OP CAR RHAB WO ECG: CPT | Mod: XU

## 2021-12-13 PROCEDURE — 93798 PHYS/QHP OP CAR RHAB W/ECG: CPT

## 2021-12-13 NOTE — PROGRESS NOTES
Spirituality Group Note    UNIT - [place] [facility]    Name:    Derrick Morrison                                                                     YOB: 1945    MRN:     [id number]                                                                       Age: 76 year old      Patient attended -led group, which included discussion of Coping with Depression and Anxiety during Serious Illness, Emotional Responses to Cardiac Illness, and The Healing Process as it relates to coping with stress.    Patient attended group for 1.0 hrs.    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    Mirza Mercedes M.A., [facility]  Staff    Spiritual Health Services  [facility]  [phone] (Office)  [phone] (Pager)  [email or web address]

## 2021-12-17 ENCOUNTER — HOSPITAL ENCOUNTER (OUTPATIENT)
Dept: CARDIAC REHAB | Facility: CLINIC | Age: 76
End: 2021-12-17
Attending: FAMILY MEDICINE
Payer: MEDICARE

## 2021-12-17 PROCEDURE — 93798 PHYS/QHP OP CAR RHAB W/ECG: CPT

## 2021-12-22 ENCOUNTER — HOSPITAL ENCOUNTER (OUTPATIENT)
Dept: CARDIAC REHAB | Facility: CLINIC | Age: 76
End: 2021-12-22
Attending: FAMILY MEDICINE
Payer: MEDICARE

## 2021-12-22 PROCEDURE — 93798 PHYS/QHP OP CAR RHAB W/ECG: CPT

## 2022-01-03 ENCOUNTER — HOSPITAL ENCOUNTER (OUTPATIENT)
Dept: CARDIAC REHAB | Facility: CLINIC | Age: 77
End: 2022-01-03
Attending: FAMILY MEDICINE
Payer: MEDICARE

## 2022-01-03 PROCEDURE — 93798 PHYS/QHP OP CAR RHAB W/ECG: CPT | Performed by: CLINICAL EXERCISE PHYSIOLOGIST

## 2022-01-10 ENCOUNTER — HOSPITAL ENCOUNTER (OUTPATIENT)
Dept: CARDIAC REHAB | Facility: CLINIC | Age: 77
End: 2022-01-10
Attending: FAMILY MEDICINE
Payer: MEDICARE

## 2022-01-10 PROCEDURE — 93798 PHYS/QHP OP CAR RHAB W/ECG: CPT

## 2022-01-12 ENCOUNTER — HOSPITAL ENCOUNTER (OUTPATIENT)
Dept: CARDIAC REHAB | Facility: CLINIC | Age: 77
End: 2022-01-12
Attending: FAMILY MEDICINE
Payer: MEDICARE

## 2022-01-12 PROCEDURE — 93798 PHYS/QHP OP CAR RHAB W/ECG: CPT

## 2022-01-17 ENCOUNTER — HOSPITAL ENCOUNTER (OUTPATIENT)
Dept: CARDIAC REHAB | Facility: CLINIC | Age: 77
End: 2022-01-17
Attending: FAMILY MEDICINE
Payer: MEDICARE

## 2022-01-17 PROCEDURE — 93798 PHYS/QHP OP CAR RHAB W/ECG: CPT | Mod: KX

## 2022-01-19 ENCOUNTER — HOSPITAL ENCOUNTER (OUTPATIENT)
Dept: CARDIAC REHAB | Facility: CLINIC | Age: 77
End: 2022-01-19
Attending: FAMILY MEDICINE
Payer: MEDICARE

## 2022-01-19 PROCEDURE — 93798 PHYS/QHP OP CAR RHAB W/ECG: CPT | Mod: KX

## 2022-01-21 ENCOUNTER — HOSPITAL ENCOUNTER (OUTPATIENT)
Dept: CARDIAC REHAB | Facility: CLINIC | Age: 77
End: 2022-01-21
Attending: FAMILY MEDICINE
Payer: MEDICARE

## 2022-01-21 PROCEDURE — 93798 PHYS/QHP OP CAR RHAB W/ECG: CPT | Performed by: REHABILITATION PRACTITIONER

## 2022-01-24 ENCOUNTER — HOSPITAL ENCOUNTER (OUTPATIENT)
Dept: CARDIAC REHAB | Facility: CLINIC | Age: 77
End: 2022-01-24
Attending: FAMILY MEDICINE
Payer: MEDICARE

## 2022-01-24 PROCEDURE — 93797 PHYS/QHP OP CAR RHAB WO ECG: CPT | Mod: XU,KX

## 2022-01-24 PROCEDURE — 93798 PHYS/QHP OP CAR RHAB W/ECG: CPT | Mod: KX

## 2022-01-25 ENCOUNTER — DOCUMENTATION ONLY (OUTPATIENT)
Dept: OTHER | Facility: CLINIC | Age: 77
End: 2022-01-25
Payer: COMMERCIAL

## 2022-01-28 ENCOUNTER — HOSPITAL ENCOUNTER (OUTPATIENT)
Dept: CARDIAC REHAB | Facility: CLINIC | Age: 77
End: 2022-01-28
Attending: FAMILY MEDICINE
Payer: MEDICARE

## 2022-01-28 PROCEDURE — 93798 PHYS/QHP OP CAR RHAB W/ECG: CPT | Mod: KX

## 2022-01-31 ENCOUNTER — HOSPITAL ENCOUNTER (OUTPATIENT)
Dept: CARDIAC REHAB | Facility: CLINIC | Age: 77
End: 2022-01-31
Attending: FAMILY MEDICINE
Payer: MEDICARE

## 2022-01-31 PROCEDURE — 93798 PHYS/QHP OP CAR RHAB W/ECG: CPT | Mod: KX

## 2022-02-03 ENCOUNTER — DOCUMENTATION ONLY (OUTPATIENT)
Dept: OTHER | Facility: CLINIC | Age: 77
End: 2022-02-03
Payer: COMMERCIAL

## 2022-02-04 ENCOUNTER — HOSPITAL ENCOUNTER (OUTPATIENT)
Dept: CARDIAC REHAB | Facility: CLINIC | Age: 77
End: 2022-02-04
Attending: FAMILY MEDICINE
Payer: MEDICARE

## 2022-02-04 PROCEDURE — 93798 PHYS/QHP OP CAR RHAB W/ECG: CPT | Mod: KX

## 2022-02-23 PROBLEM — I21.4 NSTEMI (NON-ST ELEVATED MYOCARDIAL INFARCTION) (H): Status: ACTIVE | Noted: 2021-10-26

## 2022-03-02 ENCOUNTER — VIRTUAL VISIT (OUTPATIENT)
Dept: CARDIOLOGY | Facility: CLINIC | Age: 77
End: 2022-03-02
Payer: COMMERCIAL

## 2022-03-02 DIAGNOSIS — I25.10 CORONARY ARTERY DISEASE INVOLVING NATIVE HEART WITHOUT ANGINA PECTORIS, UNSPECIFIED VESSEL OR LESION TYPE: ICD-10-CM

## 2022-03-02 DIAGNOSIS — E78.5 HYPERLIPIDEMIA LDL GOAL <70: ICD-10-CM

## 2022-03-02 DIAGNOSIS — I25.10 CORONARY ARTERY DISEASE INVOLVING NATIVE CORONARY ARTERY OF NATIVE HEART WITHOUT ANGINA PECTORIS: Primary | ICD-10-CM

## 2022-03-02 DIAGNOSIS — I10 ESSENTIAL HYPERTENSION: ICD-10-CM

## 2022-03-02 PROCEDURE — 99214 OFFICE O/P EST MOD 30 MIN: CPT | Mod: 95 | Performed by: NURSE PRACTITIONER

## 2022-03-02 RX ORDER — METOPROLOL SUCCINATE 25 MG/1
25 TABLET, EXTENDED RELEASE ORAL DAILY
Qty: 90 TABLET | Refills: 3 | Status: SHIPPED | OUTPATIENT
Start: 2022-03-02 | End: 2022-10-06

## 2022-03-02 NOTE — LETTER
3/2/2022    Kaiden Zapata MD  [address] [place] [id number]    RE: Derrick Morrison       Dear Colleague,     I had the pleasure of seeing Derrick Morrison in the [facility].  Derrick is a 76 year old who is being evaluated via a billable telephone visit.      What phone number would you like to be contacted at? [phone]  How would you like to obtain your AVS? MyChart  Phone call duration: 8 minutes      Cardiology Clinic Progress Note  Derrick Morrison MRN# [id number]   YOB: 1945 Age: 71 year old     Primary Cardiologist:   Dr. Lan          History of Presenting Illness:    Derrick Morrison is a pleasant 71 year old patient with a past cardiac history significant for   1. CAD,   2. HTN,   3. palpitations improved after starting beta blocker,   4. hyperlipidemia.      He previously underwent stenting to the LAD and RCA and had ISR with GERALDINE to the LAD in 2011.   In 2017 he had a negative stress test but did have jaw discomfort with exertion.  He was started on Imdur but this was subsequently discontinued due to side effects of fogginess.  Also, in 2017 he was in the ED for palpitations associated with chest pain.  This correlated with PVCs on telemetry and metoprolol was increased.   In 2018, he continued with jaw and chest discomfort if exerting himself after eating.  Exercise nuclear stress test 4/23/2018 showed normal perfusion with normal LVEF.   Abdominal ultrasound 4/23/2018 showed no evidence for AAA. In May 2019 he was seen for shortness of breath with stairs and had no complaints while walking on flat surface.  PFTs 5/2019 were stopped due to lightheadedness, normal spirometry with possible extrathoracic obstruction.     He was admitted to [facility] for non-STEMI in October 2021.  Presenting with chest discomfort.  Coronary angiogram 10/27/2021 with GERALDINE x2 to ISR of the LAD and GERALDINE to LCx.  Echocardiogram showed normal EF.    Patient was seen by Dr. Lan  "in November 2021.  His angina had resolved and was participating in cardiac rehab.  He had generalized fatigue and decreased his metoprolol 25 mg daily.  He recommended continuing DAPT for 1 year and then continue Plavix indefinitely.  Recommended patient be screened for WARREN to be deferred to PCP.    Pt presents today for 3 month follow-up.  Blood pressure has been controlled at cardiac rehab.  He just completed his cardiac rehab.  He denies any anginal symptoms.  He did have what he calls a hematoma on his arm a couple weeks ago.  He does notice that he has been bruising more easily but has no significant bleeding concerns.  He did mention thinking about \"missing\" his aspirin a couple times.  I discussed the importance of not missing his dual antiplatelet therapy.  Overall, he has been doing well from a cardiac standpoint without any complaints.  He has not been screened for sleep apnea yet.  He reports that his primary care previously decreased metoprolol to 25 mg daily due to low blood pressures so has continued taking this daily.  Patient reports no CP, SOB, PND, orthopnea, presyncope, syncope, edema, heart racing.      Current Cardiac Medications   Rosuvastatin 40 mg daily  ASA 81 mg daily   Plavix 75 mg daily  Metoprolol XL 25 mg b.i.d.- Pt taking 25 mg daily   Nitroglycerin p.r.n.                     Assessment and Plan:     Plan    Patient Instructions   Medication Changes:  1. Continue with metoprolol XL 25 mg daily     Recommendations:  1. Check blood pressure at least 1 hour after medications. Call the clinic if your blood pressure is consistently greater than 130/80.      Follow-up:  1. See Dr. Lan for cardiology follow up at [facility]: Oct 2022. Call in June  to schedule.     Cardiology Scheduling~[phone]  Cardiology Clinic RN~[phone] (Conchita Bhakta, RN and Jazmyn QUACH RN)      1. CAD    GERALDINE to the proximal LAD for ISR in 2011 with previous stents to the LAD and RCA    Angio 1/2021 GERALDINE x2 to the " mid LAD GERALDINE x1 to LCx    No angina (prior angina jaw pain)     Continue statin, ASA, beta blocker, plavix indefinitely      2. Palpitations     H/o Intermittent palpitations with sharp chest pain twinges which correlated with PVCs on telemetry     improved after starting beta blocker    Continue metoprolol      3. Hyperlipidemia    Last LDL 60 10/2021    Continue rosuvastatin 40 mg daily       4.    Hypertension     Controlled      continue metoprolol         Thank you for allowing me to participate in this delightful patient's care.      This note was completed in part using Dragon voice recognition software. Although reviewed after completion, some word and grammatical errors may occur.    MARIA T Cm, CNP           Data:   All laboratory data reviewed      Thank you for allowing me to participate in the care of your patient.      Sincerely,     MARIA T Vyas CNP     [facility]  cc:   No referring provider defined for this encounter.

## 2022-03-02 NOTE — PROGRESS NOTES
Derrick is a 76 year old who is being evaluated via a billable telephone visit.      What phone number would you like to be contacted at? [phone]  How would you like to obtain your AVS? [place]  Phone call duration: 8 minutes      Cardiology Clinic Progress Note  Derrick Morrison MRN# [id number]   YOB: 1945 Age: 71 year old     Primary Cardiologist:   Dr. Lan          History of Presenting Illness:    Derrick Morrison is a pleasant 71 year old patient with a past cardiac history significant for   1. CAD,   2. HTN,   3. palpitations improved after starting beta blocker,   4. hyperlipidemia.      He previously underwent stenting to the LAD and RCA and had ISR with GERALDINE to the LAD in 2011.   In 2017 he had a negative stress test but did have jaw discomfort with exertion.  He was started on Imdur but this was subsequently discontinued due to side effects of fogginess.  Also, in 2017 he was in the ED for palpitations associated with chest pain.  This correlated with PVCs on telemetry and metoprolol was increased.   In 2018, he continued with jaw and chest discomfort if exerting himself after eating.  Exercise nuclear stress test 4/23/2018 showed normal perfusion with normal LVEF.   Abdominal ultrasound 4/23/2018 showed no evidence for AAA. In May 2019 he was seen for shortness of breath with stairs and had no complaints while walking on flat surface.  PFTs 5/2019 were stopped due to lightheadedness, normal spirometry with possible extrathoracic obstruction.     He was admitted to [facility] for non-STEMI in October 2021.  Presenting with chest discomfort.  Coronary angiogram 10/27/2021 with GERALDINE x2 to ISR of the LAD and GERALDINE to LCx.  Echocardiogram showed normal EF.    Patient was seen by Dr. Lan in November 2021.  His angina had resolved and was participating in cardiac rehab.  He had generalized fatigue and decreased his metoprolol 25 mg daily.  He recommended continuing DAPT for 1 year and  "then continue Plavix indefinitely.  Recommended patient be screened for WARREN to be deferred to PCP.    Pt presents today for 3 month follow-up.  Blood pressure has been controlled at cardiac rehab.  He just completed his cardiac rehab.  He denies any anginal symptoms.  He did have what he calls a hematoma on his arm a couple weeks ago.  He does notice that he has been bruising more easily but has no significant bleeding concerns.  He did mention thinking about \"missing\" his aspirin a couple times.  I discussed the importance of not missing his dual antiplatelet therapy.  Overall, he has been doing well from a cardiac standpoint without any complaints.  He has not been screened for sleep apnea yet.  He reports that his primary care previously decreased metoprolol to 25 mg daily due to low blood pressures so has continued taking this daily.  Patient reports no CP, SOB, PND, orthopnea, presyncope, syncope, edema, heart racing.      Current Cardiac Medications   Rosuvastatin 40 mg daily  ASA 81 mg daily   Plavix 75 mg daily  Metoprolol XL 25 mg b.i.d.- Pt taking 25 mg daily   Nitroglycerin p.r.n.                     Assessment and Plan:     Plan    Patient Instructions   Medication Changes:  1. Continue with metoprolol XL 25 mg daily     Recommendations:  1. Check blood pressure at least 1 hour after medications. Call the clinic if your blood pressure is consistently greater than 130/80.      Follow-up:  1. See Dr. Lan for cardiology follow up at [facility]: Oct 2022. Call in June  to schedule.     Cardiology Scheduling~[phone]  Cardiology Clinic RN~[phone] (Conchita Bhakta, VINICIO and Jazmyn QUACH RN)      1. CAD    GERALDINE to the proximal LAD for ISR in 2011 with previous stents to the LAD and RCA    Angio 1/2021 GERALDINE x2 to the mid LAD GERALDINE x1 to LCx    No angina (prior angina jaw pain)     Continue statin, ASA, beta blocker, plavix indefinitely      2. Palpitations     H/o Intermittent palpitations with sharp chest pain " twinges which correlated with PVCs on telemetry     improved after starting beta blocker    Continue metoprolol      3. Hyperlipidemia    Last LDL 60 10/2021    Continue rosuvastatin 40 mg daily       4.    Hypertension     Controlled      continue metoprolol         Thank you for allowing me to participate in this delightful patient's care.      This note was completed in part using Dragon voice recognition software. Although reviewed after completion, some word and grammatical errors may occur.    MARIA T Cm, CNP           Data:   All laboratory data reviewed

## 2022-03-02 NOTE — PATIENT INSTRUCTIONS
Medication Changes:  1. Continue with metoprolol XL 25 mg daily     Recommendations:  1. Check blood pressure at least 1 hour after medications. Call the clinic if your blood pressure is consistently greater than 130/80.      Follow-up:  1. See Dr. Lan for cardiology follow up at [facility]: Oct 2022. Call in June  to schedule.     Cardiology Scheduling~[phone]  Cardiology Clinic RN~[phone] (Conchita Bhakta, RN and Jazmyn QUACH RN)

## 2022-03-28 ENCOUNTER — MYC MEDICAL ADVICE (OUTPATIENT)
Dept: FAMILY MEDICINE | Facility: CLINIC | Age: 77
End: 2022-03-28
Payer: COMMERCIAL

## 2022-03-28 DIAGNOSIS — R00.2 PALPITATIONS: Primary | ICD-10-CM

## 2022-03-31 ENCOUNTER — ALLIED HEALTH/NURSE VISIT (OUTPATIENT)
Dept: FAMILY MEDICINE | Facility: CLINIC | Age: 77
End: 2022-03-31
Payer: COMMERCIAL

## 2022-03-31 ENCOUNTER — TELEPHONE (OUTPATIENT)
Dept: FAMILY MEDICINE | Facility: CLINIC | Age: 77
End: 2022-03-31

## 2022-03-31 VITALS
HEART RATE: 64 BPM | RESPIRATION RATE: 20 BRPM | OXYGEN SATURATION: 97 % | TEMPERATURE: 98 F | SYSTOLIC BLOOD PRESSURE: 120 MMHG | DIASTOLIC BLOOD PRESSURE: 70 MMHG

## 2022-03-31 DIAGNOSIS — R07.89 CHEST PRESSURE: Primary | ICD-10-CM

## 2022-03-31 PROCEDURE — 99207 PR NO CHARGE NURSE ONLY: CPT

## 2022-03-31 NOTE — TELEPHONE ENCOUNTER
S-(situation): Pt thought was seeing Dr. Zapata today, appt is not until 04-07-22.   Has Lucio scheduled 04-06-22, thinks may need to have sooner based on sx.     B-(background): Hx NSTEMI, CAD, HTN. Follows with cardiology, VV 03-02-22 reviewed. Medications reviewed with pt. Hx angina, palpitations- states metoprolol 25 mg daily initially helped with sx however has now increased to 50 mg daily on own due to noting intermittent irreg HR again and pressure across chest. States had this sensation enroute to clinic today, has subsided.     A-(assessment): Pt alert, oriented. Skin warm, dry. No pallor or cyanosis noted. No acute distress. Denies chest pain, pressure, palpitations, SOA, dizziness, lightheadedness, fatigue.   Vitals: /70 (BP Location: Right arm, Patient Position: Sitting, Cuff Size: Adult Regular)   Pulse 64   Temp 98  F (36.7  C) (Tympanic)   Resp 20   SpO2 97%    No noted irreg to HR.       R-(recommendations): Advised pt to be seen by  provider now to eval sx, get EKG. Pt declines stating he feels fine now, if any further sx will go to ED. Again, advised importance of eval now and not waiting for recurrent sx or event. States will call 911 if sx occur again or go to ED.   Pt left clinic per his decision.   Forwarded to Dr. Zapata for review, urgency of Lucio recommendations.  PHILOMENA Arzate RN

## 2022-04-04 ENCOUNTER — OFFICE VISIT (OUTPATIENT)
Dept: AUDIOLOGY | Facility: CLINIC | Age: 77
End: 2022-04-04
Payer: COMMERCIAL

## 2022-04-04 DIAGNOSIS — H90.3 SENSORINEURAL HEARING LOSS, BILATERAL: Primary | ICD-10-CM

## 2022-04-04 PROCEDURE — 99207 PR NO CHARGE LOS: CPT | Performed by: AUDIOLOGIST

## 2022-04-06 ENCOUNTER — HOSPITAL ENCOUNTER (OUTPATIENT)
Dept: CARDIOLOGY | Facility: CLINIC | Age: 77
Discharge: HOME | End: 2022-04-06
Attending: FAMILY MEDICINE | Admitting: FAMILY MEDICINE
Payer: MEDICARE

## 2022-04-06 DIAGNOSIS — R00.2 PALPITATIONS: ICD-10-CM

## 2022-04-06 PROCEDURE — 93242 EXT ECG>48HR<7D RECORDING: CPT

## 2022-04-06 PROCEDURE — 93244 EXT ECG>48HR<7D REV&INTERPJ: CPT | Performed by: INTERNAL MEDICINE

## 2022-04-21 ENCOUNTER — MYC MEDICAL ADVICE (OUTPATIENT)
Dept: FAMILY MEDICINE | Facility: CLINIC | Age: 77
End: 2022-04-21
Payer: COMMERCIAL

## 2022-08-11 ENCOUNTER — OFFICE VISIT (OUTPATIENT)
Dept: FAMILY MEDICINE | Facility: CLINIC | Age: 77
End: 2022-08-11
Payer: COMMERCIAL

## 2022-08-11 VITALS
WEIGHT: 196.2 LBS | OXYGEN SATURATION: 96 % | HEIGHT: 69 IN | DIASTOLIC BLOOD PRESSURE: 66 MMHG | HEART RATE: 60 BPM | BODY MASS INDEX: 29.06 KG/M2 | SYSTOLIC BLOOD PRESSURE: 110 MMHG | TEMPERATURE: 97.3 F | RESPIRATION RATE: 20 BRPM

## 2022-08-11 DIAGNOSIS — R26.81 GAIT INSTABILITY: ICD-10-CM

## 2022-08-11 DIAGNOSIS — R29.818 PARKINSONIAN FEATURES: ICD-10-CM

## 2022-08-11 DIAGNOSIS — M54.42 CHRONIC BILATERAL LOW BACK PAIN WITH LEFT-SIDED SCIATICA: Primary | ICD-10-CM

## 2022-08-11 DIAGNOSIS — G89.29 CHRONIC BILATERAL LOW BACK PAIN WITH LEFT-SIDED SCIATICA: Primary | ICD-10-CM

## 2022-08-11 PROCEDURE — 99214 OFFICE O/P EST MOD 30 MIN: CPT | Performed by: FAMILY MEDICINE

## 2022-08-11 ASSESSMENT — PAIN SCALES - GENERAL: PAINLEVEL: NO PAIN (0)

## 2022-08-11 NOTE — PROGRESS NOTES
"S: Derrick Morrison is a 76 year old male with some  L groin pain.  Off/on.  Mild bulge?      No blood in stool .     Mentions weight loss, but says it's stabilized.  Weight same as 9 months ago on our scale    Lots of questions about more stiff, tremor, etc.  Has uncle with parkinson's     O:/66 (BP Location: Right arm, Patient Position: Sitting, Cuff Size: Adult Regular)   Pulse 60   Temp 97.3  F (36.3  C) (Tympanic)   Resp 20   Ht 1.753 m (5' 9\")   Wt 89 kg (196 lb 3.2 oz)   SpO2 96%   BMI 28.97 kg/m    GEN: Alert and oriented, in no acute distress  GENITAL: No lesions on or around genitals.  No testicular mass, no spermatocele or epiditymits appreciated.  No hydrocele or varicocele.  No inguinal hernia.  Penis without drainage.    Has some mild cogwheeling on arms  Mild shuffle on gait  Gets out of chair easily.    Gait is a bit slow, but steady  Resting tremor off/on on L arm  Mild/mod bradykinesia in general    A: groin pain, benign       Parkinsonian symptoms, mild/mod      Gait abnormality, mild     P: reassured on groin    Long talk on parkinson's.  He knows it's a clinical diagnosis.  Let's observe for now.  If worsening tremor, stiffness, etc. May have him see neuro for discussion on treatment vs. Trying some sinemet and see how he responds.      Back in 2 months for yearly physical, will re-address above then.  He is comfortable with that.    Try some therapy for now, see how his balance, strength, etc is doing.  He is motivated for that.      30  min spent on date of encounter reviewing chart, history, physical, documenting and counseling as mentioned in this note     "

## 2022-08-23 ENCOUNTER — HOSPITAL ENCOUNTER (OUTPATIENT)
Dept: PHYSICAL THERAPY | Facility: CLINIC | Age: 77
Setting detail: THERAPIES SERIES
Discharge: HOME | End: 2022-08-23
Attending: FAMILY MEDICINE
Payer: MEDICARE

## 2022-08-23 DIAGNOSIS — R26.81 GAIT INSTABILITY: ICD-10-CM

## 2022-08-23 DIAGNOSIS — M54.42 CHRONIC BILATERAL LOW BACK PAIN WITH LEFT-SIDED SCIATICA: ICD-10-CM

## 2022-08-23 DIAGNOSIS — G89.29 CHRONIC BILATERAL LOW BACK PAIN WITH LEFT-SIDED SCIATICA: ICD-10-CM

## 2022-08-23 PROCEDURE — 97110 THERAPEUTIC EXERCISES: CPT | Mod: GP | Performed by: PHYSICAL THERAPIST

## 2022-08-23 PROCEDURE — 97161 PT EVAL LOW COMPLEX 20 MIN: CPT | Mod: GP | Performed by: PHYSICAL THERAPIST

## 2022-08-23 NOTE — PROGRESS NOTES
[facility]    OUTPATIENT PHYSICAL THERAPY ORTHOPEDIC EVALUATION  PLAN OF TREATMENT FOR OUTPATIENT REHABILITATION  (COMPLETE FOR INITIAL CLAIMS ONLY)  Patient's Last Name, First Name, M.I.  YOB: 1945  Derrick Morrison    Provider s Name:  [facility]   Medical Record No.  [id number]   Start of Care Date:  08/23/22   Onset Date:  08/23/22   Type:     _X__PT   ___OT   ___SLP Medical Diagnosis:  Gait instability, chronic bilateral low back pain with left-sided sciatica     PT Diagnosis:  LBP with mobility deficits   Visits from SOC:  1      _________________________________________________________________________________  Plan of Treatment/Functional Goals:  ADL retraining, balance training, gait training, joint mobilization, manual therapy, motor coordination training, neuromuscular re-education, ROM, strengthening, stretching     Biofeedback, Electrical stimulation, Cryotherapy, TENS     Goals  Goal Identifier: Lifting  Goal Description: Pt will be able to life 25 lbs with proper body mechanics in order to complete ADL's including lifting laundry down the stairs  Target Date: 10/04/22    Goal Identifier: Driving  Goal Description: Pt will increase sitting tolerance to 60 minutes in order to drive long distances in his car without significant stiffness when getting out of the car  Target Date: 09/06/22    Goal Identifier: Walking  Goal Description: Pt will be able to walk for 30 mins with <2/10 pain in order to complete ADL's and outdoor activity  Target Date: 09/13/22    Goal Identifier: HEP  Goal Description: Pt will demonstrate understanding of HEP to progress toward increased hip strength and ROM  Target Date: 10/18/22    Therapy Frequency:  1 time/week  Predicted Duration of Therapy Intervention:  8 wks    Gertrude Benitez, PT                 I CERTIFY THE NEED  "FOR THESE SERVICES FURNISHED UNDER        THIS PLAN OF TREATMENT AND WHILE UNDER MY CARE     (Physician co-signature of this document indicates review and certification of the therapy plan).                     Certification Date From:  08/23/22   Certification Date To:  10/18/22    Referring Provider:  Kaiden Zapata MD    Initial Assessment        See Epic Evaluation Start of Care Date: 08/23/22                                                                       Physical Therapy Evaluation  08/23/22 1300   General Information   Type of Visit Initial OP Ortho PT Evaluation   Start of Care Date 08/23/22   Referring Physician Kaiden Zapata MD   Patient/Family Goals Statement Pt wants to complete ADL's independently, return to driving, and move without pain or tightness   Orders Evaluate and Treat   Date of Order 08/11/22   Certification Required? Yes   Medical Diagnosis Gait instability, chronic bilateral low back pain with left-sided sciatica   Surgical/Medical history reviewed Yes   Precautions/Limitations no known precautions/limitations   Body Part(s)   Body Part(s) Lumbar Spine/SI;Hip   Presentation and Etiology   Pertinent history of current problem (include personal factors and/or comorbidities that impact the POC) Pt reports pain in buttock, radiates up his back. Pt walked in limping due to pain. Pt reports trouble turning while walking and tries to \"take it easy\" with the left leg by hanging onto banisters. Pt is unable to lift laundry down stairs, 15 books hurts, pt reports it feels like a knot in buttock, pt reports walking helps, driving is difficult because of stiffness getting out of the car, pt does not use ice or medications currently, pt does not remember progression in sx since last PT session, pt does not participate in any current exercise program.   Impairments A. Pain;G. Impaired balance;B. Decreased WB tolerance   Functional Limitations perform activities of daily living;perform desired " leisure / sports activities   Symptom Location Buttock, low back, hip   How/Where did it occur While lifting;From insidious onset;With repetition/overuse;From Degenerative Joint Disease   Onset date of current episode/exacerbation 08/23/22   Chronicity Recurrent   Pain rating (0-10 point scale) Best (/10);Worst (/10)   Best (/10) 1   Worst (/10) 3   Pain quality C. Aching   Frequency of pain/symptoms A. Constant   Pain/symptoms are: Worse in the morning   Pain symptoms comment Gets better with walking   Pain/symptoms exacerbated by A. Sitting;D. Carrying   Pain/symptoms eased by B. Walking   Progression of symptoms since onset: Unchanged;Other  (Could not remember)   Current Level of Function   Current Community Support Family/friend caregiver   Patient role/employment history F. Retired   Fall Risk Screen   Fall screen completed by PT   Have you fallen 2 or more times in the past year? No   Have you fallen and had an injury in the past year? No   Is patient a fall risk? No   Abuse Screen (yes response referral indicated)   Feels Unsafe at Home or Work/School no   Feels Threatened by Someone no   Does Anyone Try to Keep You From Having Contact with Others or Doing Things Outside Your Home? no   Physical Signs of Abuse Present no   System Outcome Measures   Outcome Measures Low Back Pain (see Oswestry and Magui)   Functional Scales   Functional Scales Other   Other Scales  ABC scale   Hip Objective Findings   Gait/Locomotion Slowed gait, limping with shortened step length   Balance/Proprioception (Single Leg Stance) Not tested today-plan to test next session   Side (if bilateral, select both right and left) Left   Hip ROM Comments Increased pain with hip flexion/piriformis stretch   Scour Test R: (-); L: (+)   CRISTINE Test R: (-); L: (+)   Hip Special Tests Comments Pt reports tightness in posterior thigh and buttock on L side   Palpation mod restrictions in T3-4 lumbar spine, soft tissue tightness in L piriformis    Observation Walked in with limp due,   Posture Thoracic kyphosis   Lumbar Spine/SI Objective Findings   Gait/Locomotion Limping, slow, shortened step length   Hamstring Flexibility 70* B LE   Hip Flexor Flexibility WNL   Piriformis Flexibility restricted L   Flexion ROM 97 deg   Extension ROM 20 deg with pain - increased symptoms in L posterior leg   Right Side Bending ROM 3 cm distal to knee joint   Left Side Bending ROM 10cm distal knee joint   Pelvic Screen L hypomobile with posterior SIJ mobilization; ASIS elevated L LE - posterior innominate rotation; (+) supine to sit   Hip Flexion (L2) Strength L: 4/5; R: 3+/5   Hip Abduction Strength R: 4/5; L: 3+/5   Hip Extension Strength R: 3/5 ; L: pain limiting testing   Knee Extension (L3) Strength R: 5/5; L: 5/5   Ankle Dorsiflexion (L4) Strength R: 5/5; L: 5/5   Great Toe Extension (L5) Strength R: 5/5; L: 5/5   Ankle Plantar Flexion (S1) Strength Slight restrictions noted   Lumbar/Hip/Knee/Foot Strength Comments Hip R IR: 4/5; R ER: 5/5; L IR: 5/5; L ER: 5/5   SLR Tightness B, but no reports of pain   Palpation Tightness in L3-4 transverse and spinous processes.  L buttock pain near piriformis with deep pressure   Posture Moderate thoracic kyphosis   Sensation Testing no changes with light touch   Planned Therapy Interventions   Planned Therapy Interventions ADL retraining;balance training;gait training;joint mobilization;manual therapy;motor coordination training;neuromuscular re-education;ROM;strengthening;stretching   Planned Modality Interventions   Planned Modality Interventions Biofeedback;Electrical stimulation;Cryotherapy;TENS   Clinical Impression   Criteria for Skilled Therapeutic Interventions Met yes, treatment indicated   PT Diagnosis LBP with mobility deficits   Influenced by the following impairments Decreased hip ROM due to muscle length and pain, strength deficits in hip ext and abd B   Functional limitations due to impairments Impacting ADLs,  walking, gardening   Clinical Presentation Stable/Uncomplicated   Clinical Presentation Rationale Recurring hip pain, pain due to muscle stretch and decreased strength in hip extensors   Clinical Decision Making (Complexity) Low complexity   Therapy Frequency 1 time/week   Predicted Duration of Therapy Intervention (days/wks) 8 wks   Risk & Benefits of therapy have been explained Yes   Patient, Family & other staff in agreement with plan of care Yes   Clinical Impression Comments Pt is a 75 yo male presenting to PT with L sided LBP and buttock pain.  Pt reports back and buttock pain with driving, housework, and general lifting.  Pt demonstrates decreased L hip ROM and B hip extensor strength impacting his ability to complete ADL's and participate in household and community events.  Pt prognosis is fair due to his high motivation to return to PLOF, though he does have very limited hip extension ROM and pain with   Education Assessment   Preferred Learning Style Listening;Reading;Demonstration;Pictures/video   Barriers to Learning No barriers   ORTHO GOALS   PT Ortho Eval Goals 1;2;3;4   Ortho Goal 1   Goal Identifier Lifting   Goal Description Pt will be able to life 25 lbs with proper body mechanics in order to complete ADL's including lifting laundry down the stairs   Target Date 10/04/22   Ortho Goal 2   Goal Identifier Driving   Goal Description Pt will increase sitting tolerance to 60 minutes in order to drive long distances in his car without significant stiffness when getting out of the car   Target Date 09/06/22   Ortho Goal 3   Goal Identifier Walking   Goal Description Pt will be able to walk for 30 mins with <2/10 pain in order to complete ADL's and outdoor activity   Target Date 09/13/22   Ortho Goal 4   Goal Identifier HEP   Goal Description Pt will demonstrate understanding of HEP to progress toward increased hip strength and ROM   Target Date 10/18/22   Total Evaluation Time   PT Eval, Low Complexity  Minutes (52449) 60   Therapy Certification   Certification date from 08/23/22   Certification date to 10/18/22   Medical Diagnosis Gait instability, chronic bilateral low back pain with left-sided sciatica     Gertrude Benitez PT, DPT, CLT  Physical Therapist & Certified Lymphedema Therapist  [facility]

## 2022-08-29 ENCOUNTER — MYC MEDICAL ADVICE (OUTPATIENT)
Dept: FAMILY MEDICINE | Facility: CLINIC | Age: 77
End: 2022-08-29

## 2022-09-03 ENCOUNTER — HEALTH MAINTENANCE LETTER (OUTPATIENT)
Age: 77
End: 2022-09-03

## 2022-09-13 ENCOUNTER — HOSPITAL ENCOUNTER (OUTPATIENT)
Dept: PHYSICAL THERAPY | Facility: CLINIC | Age: 77
Setting detail: THERAPIES SERIES
Discharge: HOME | End: 2022-09-13
Attending: FAMILY MEDICINE
Payer: MEDICARE

## 2022-09-13 PROCEDURE — 97112 NEUROMUSCULAR REEDUCATION: CPT | Mod: GP | Performed by: PHYSICAL THERAPIST

## 2022-09-21 ENCOUNTER — HOSPITAL ENCOUNTER (OUTPATIENT)
Dept: PHYSICAL THERAPY | Facility: CLINIC | Age: 77
Setting detail: THERAPIES SERIES
Discharge: HOME | End: 2022-09-21
Attending: FAMILY MEDICINE
Payer: MEDICARE

## 2022-09-21 PROCEDURE — 97140 MANUAL THERAPY 1/> REGIONS: CPT | Mod: GP | Performed by: PHYSICAL THERAPIST

## 2022-09-21 PROCEDURE — 97110 THERAPEUTIC EXERCISES: CPT | Mod: GP | Performed by: PHYSICAL THERAPIST

## 2022-10-03 ASSESSMENT — ENCOUNTER SYMPTOMS
CHILLS: 0
EYE PAIN: 0
FREQUENCY: 1
FEVER: 0
PARESTHESIAS: 0
DIZZINESS: 1
DIARRHEA: 0
HEMATURIA: 0
ARTHRALGIAS: 0
HEARTBURN: 0
CONSTIPATION: 1
MYALGIAS: 0
COUGH: 0
HEMATOCHEZIA: 0
HEADACHES: 0
NERVOUS/ANXIOUS: 0
DYSURIA: 0
SORE THROAT: 0
PALPITATIONS: 0
WEAKNESS: 0
SHORTNESS OF BREATH: 1
JOINT SWELLING: 0
NAUSEA: 0
ABDOMINAL PAIN: 0

## 2022-10-03 ASSESSMENT — ACTIVITIES OF DAILY LIVING (ADL): CURRENT_FUNCTION: NO ASSISTANCE NEEDED

## 2022-10-04 ENCOUNTER — HOSPITAL ENCOUNTER (OUTPATIENT)
Dept: PHYSICAL THERAPY | Facility: CLINIC | Age: 77
Setting detail: THERAPIES SERIES
Discharge: HOME | End: 2022-10-04
Attending: FAMILY MEDICINE
Payer: MEDICARE

## 2022-10-04 PROCEDURE — 97110 THERAPEUTIC EXERCISES: CPT | Mod: GP | Performed by: PHYSICAL THERAPIST

## 2022-10-04 PROCEDURE — 97140 MANUAL THERAPY 1/> REGIONS: CPT | Mod: GP | Performed by: PHYSICAL THERAPIST

## 2022-10-06 ENCOUNTER — OFFICE VISIT (OUTPATIENT)
Dept: FAMILY MEDICINE | Facility: CLINIC | Age: 77
End: 2022-10-06
Payer: COMMERCIAL

## 2022-10-06 VITALS
TEMPERATURE: 98.7 F | HEIGHT: 69 IN | RESPIRATION RATE: 22 BRPM | OXYGEN SATURATION: 99 % | BODY MASS INDEX: 29.38 KG/M2 | WEIGHT: 198.4 LBS | HEART RATE: 72 BPM | SYSTOLIC BLOOD PRESSURE: 110 MMHG | DIASTOLIC BLOOD PRESSURE: 74 MMHG

## 2022-10-06 DIAGNOSIS — N13.8 HYPERTROPHY OF PROSTATE WITH URINARY OBSTRUCTION: ICD-10-CM

## 2022-10-06 DIAGNOSIS — Z00.00 MEDICARE ANNUAL WELLNESS VISIT, SUBSEQUENT: ICD-10-CM

## 2022-10-06 DIAGNOSIS — Z12.11 SCREEN FOR COLON CANCER: Primary | ICD-10-CM

## 2022-10-06 DIAGNOSIS — E78.5 HYPERLIPIDEMIA LDL GOAL <70: ICD-10-CM

## 2022-10-06 DIAGNOSIS — Z23 NEED FOR PROPHYLACTIC VACCINATION AND INOCULATION AGAINST INFLUENZA: ICD-10-CM

## 2022-10-06 DIAGNOSIS — N40.1 HYPERTROPHY OF PROSTATE WITH URINARY OBSTRUCTION: ICD-10-CM

## 2022-10-06 DIAGNOSIS — R29.818 PARKINSONIAN FEATURES: ICD-10-CM

## 2022-10-06 DIAGNOSIS — I25.10 CORONARY ARTERY DISEASE INVOLVING NATIVE HEART WITHOUT ANGINA PECTORIS, UNSPECIFIED VESSEL OR LESION TYPE: ICD-10-CM

## 2022-10-06 PROBLEM — I21.4 NSTEMI (NON-ST ELEVATED MYOCARDIAL INFARCTION) (H): Status: RESOLVED | Noted: 2021-10-26 | Resolved: 2022-10-06

## 2022-10-06 LAB
ANION GAP SERPL CALCULATED.3IONS-SCNC: 10 MMOL/L (ref 7–15)
BUN SERPL-MCNC: 9.5 MG/DL (ref 8–23)
CALCIUM SERPL-MCNC: 9.5 MG/DL (ref 8.8–10.2)
CHLORIDE SERPL-SCNC: 103 MMOL/L (ref 98–107)
CHOLEST SERPL-MCNC: 133 MG/DL
CREAT SERPL-MCNC: 0.94 MG/DL (ref 0.67–1.17)
DEPRECATED HCO3 PLAS-SCNC: 27 MMOL/L (ref 22–29)
GFR SERPL CREATININE-BSD FRML MDRD: 85 ML/MIN/1.73M2
GLUCOSE SERPL-MCNC: 98 MG/DL (ref 70–99)
HDLC SERPL-MCNC: 41 MG/DL
LDLC SERPL CALC-MCNC: 70 MG/DL
NONHDLC SERPL-MCNC: 92 MG/DL
POTASSIUM SERPL-SCNC: 4.4 MMOL/L (ref 3.4–5.3)
SODIUM SERPL-SCNC: 140 MMOL/L (ref 136–145)
TRIGL SERPL-MCNC: 110 MG/DL
TSH SERPL DL<=0.005 MIU/L-ACNC: 0.8 UIU/ML (ref 0.3–4.2)

## 2022-10-06 PROCEDURE — 90662 IIV NO PRSV INCREASED AG IM: CPT | Performed by: FAMILY MEDICINE

## 2022-10-06 PROCEDURE — G0008 ADMIN INFLUENZA VIRUS VAC: HCPCS | Performed by: FAMILY MEDICINE

## 2022-10-06 PROCEDURE — 80048 BASIC METABOLIC PNL TOTAL CA: CPT | Performed by: FAMILY MEDICINE

## 2022-10-06 PROCEDURE — 84443 ASSAY THYROID STIM HORMONE: CPT | Performed by: FAMILY MEDICINE

## 2022-10-06 PROCEDURE — G0439 PPPS, SUBSEQ VISIT: HCPCS | Performed by: FAMILY MEDICINE

## 2022-10-06 PROCEDURE — 99214 OFFICE O/P EST MOD 30 MIN: CPT | Mod: 25 | Performed by: FAMILY MEDICINE

## 2022-10-06 PROCEDURE — 36415 COLL VENOUS BLD VENIPUNCTURE: CPT | Performed by: FAMILY MEDICINE

## 2022-10-06 PROCEDURE — 80061 LIPID PANEL: CPT | Performed by: FAMILY MEDICINE

## 2022-10-06 RX ORDER — METOPROLOL SUCCINATE 25 MG/1
25 TABLET, EXTENDED RELEASE ORAL DAILY
Qty: 90 TABLET | Refills: 3 | Status: SHIPPED | OUTPATIENT
Start: 2022-10-06 | End: 2023-10-09

## 2022-10-06 RX ORDER — CLOPIDOGREL BISULFATE 75 MG/1
75 TABLET ORAL DAILY
Qty: 90 TABLET | Refills: 3 | Status: SHIPPED | OUTPATIENT
Start: 2022-10-06 | End: 2023-10-09

## 2022-10-06 RX ORDER — DUTASTERIDE 0.5 MG/1
CAPSULE, LIQUID FILLED ORAL
Qty: 45 CAPSULE | Refills: 3 | Status: SHIPPED | OUTPATIENT
Start: 2022-10-06 | End: 2023-10-09

## 2022-10-06 RX ORDER — ROSUVASTATIN CALCIUM 40 MG/1
40 TABLET, COATED ORAL EVERY EVENING
Qty: 90 TABLET | Refills: 3 | Status: SHIPPED | OUTPATIENT
Start: 2022-10-06 | End: 2023-10-09

## 2022-10-06 ASSESSMENT — ENCOUNTER SYMPTOMS
PARESTHESIAS: 0
HEARTBURN: 0
EYE PAIN: 0
FEVER: 0
DYSURIA: 0
HEMATOCHEZIA: 0
WEAKNESS: 0
COUGH: 0
SHORTNESS OF BREATH: 1
ARTHRALGIAS: 0
SORE THROAT: 0
ABDOMINAL PAIN: 0
HEADACHES: 0
CONSTIPATION: 1
CHILLS: 0
DIARRHEA: 0
NERVOUS/ANXIOUS: 0
JOINT SWELLING: 0
DIZZINESS: 1
FREQUENCY: 1
NAUSEA: 0
PALPITATIONS: 0
HEMATURIA: 0
MYALGIAS: 0

## 2022-10-06 ASSESSMENT — PAIN SCALES - GENERAL: PAINLEVEL: NO PAIN (0)

## 2022-10-06 ASSESSMENT — ACTIVITIES OF DAILY LIVING (ADL): CURRENT_FUNCTION: NO ASSISTANCE NEEDED

## 2022-10-06 NOTE — PROGRESS NOTES
"SUBJECTIVE:   Derrick is a 76 year old who presents for Preventive Visit.      Patient has been advised of split billing requirements and indicates understanding: Yes  Are you in the first 12 months of your Medicare coverage?  No    Healthy Habits:     In general, how would you rate your overall health?  Good    Frequency of exercise:  2-3 days/week    Duration of exercise:  Less than 15 minutes    Do you usually eat at least 4 servings of fruit and vegetables a day, include whole grains    & fiber and avoid regularly eating high fat or \"junk\" foods?  Yes    Taking medications regularly:  Yes    Medication side effects:  Significant flushing    Ability to successfully perform activities of daily living:  No assistance needed    Home Safety:  No safety concerns identified    Hearing Impairment:  Difficulty following a conversation in a noisy restaurant or crowded room, find that men's voices are easier to understand than woman's and difficulty understanding speech on the telephone    In the past 6 months, have you been bothered by leaking of urine? Yes    In general, how would you rate your overall mental or emotional health?  Good      PHQ-2 Total Score: 0    Additional concerns today:  No    Do you feel safe in your environment? Yes    Have you ever done Advance Care Planning? (For example, a Health Directive, POLST, or a discussion with a medical provider or your loved ones about your wishes): Yes, advance care planning is on file.       Fall risk  Fallen 2 or more times in the past year?: No  Any fall with injury in the past year?: No    Cognitive Screening   1) Repeat 3 items (Leader, Season, Table)    2) Clock draw: NORMAL  3) 3 item recall: Recalls 2 objects   Results: NORMAL clock, 1-2 items recalled: COGNITIVE IMPAIRMENT LESS LIKELY    Mini-CogTM Copyright NORAH Renner. Licensed by the author for use in [facility]; reprinted with permission (snehal@.[facility]). All rights reserved.      Do you have sleep " apnea, excessive snoring or daytime drowsiness?: no    Reviewed and updated as needed this visit by clinical staff   Tobacco  Allergies  Meds   Med Hx  Surg Hx  Fam Hx  Soc Hx          Reviewed and updated as needed this visit by Provider                   Social History     Tobacco Use     Smoking status: Never Smoker     Smokeless tobacco: Never Used   Substance Use Topics     Alcohol use: Yes     Comment: wine     If you drink alcohol do you typically have >3 drinks per day or >7 drinks per week? No    Alcohol Use 10/3/2022   Prescreen: >3 drinks/day or >7 drinks/week? No   Prescreen: >3 drinks/day or >7 drinks/week? -           Current providers sharing in care for this patient include:   Patient Care Team:  Kiaden Zapata MD as PCP - General (Family Practice)  Kaiden Zapata MD as Assigned PCP  Janeth Campos APRN CNP as Assigned Heart and Vascular Provider  Vandana Frausto PA-C as Physician Assistant (Dermatology)    The following health maintenance items are reviewed in Epic and correct as of today:  Health Maintenance   Topic Date Due     ANNUAL REVIEW OF HM ORDERS  Never done     INFLUENZA VACCINE (1) 09/01/2022     COVID-19 Vaccine (5 - Booster for Moderna series) 09/16/2022     COLORECTAL CANCER SCREENING  10/25/2022     MEDICARE ANNUAL WELLNESS VISIT  10/06/2023     FALL RISK ASSESSMENT  10/10/2023     DTAP/TDAP/TD IMMUNIZATION (3 - Td or Tdap) 08/24/2025     LIPID  10/04/2026     ADVANCE CARE PLANNING  02/03/2027     HEPATITIS C SCREENING  Completed     PHQ-2 (once per calendar year)  Completed     Pneumococcal Vaccine: 65+ Years  Completed     ZOSTER IMMUNIZATION  Completed     AORTIC ANEURYSM SCREENING (SYSTEM ASSIGNED)  Completed     IPV IMMUNIZATION  Aged Out     MENINGITIS IMMUNIZATION  Aged Out     HEPATITIS B IMMUNIZATION  Aged Out     CAD: many stents.  Dual platelet therapy ends soon.  plavix long term.  No cp or sob  Parkinsonian features: tremor, stiff, less movement,  "dizzy.  Is bringing in family to discuss next week  BpH: avodart every other day long term.  Stable  Hyperlipdiemia: statin, stable        Review of Systems   Constitutional: Negative for chills and fever.   HENT: Positive for hearing loss. Negative for congestion, ear pain and sore throat.    Eyes: Negative for pain and visual disturbance.   Respiratory: Positive for shortness of breath. Negative for cough.    Cardiovascular: Negative for chest pain, palpitations and peripheral edema.   Gastrointestinal: Positive for constipation. Negative for abdominal pain, diarrhea, heartburn, hematochezia and nausea.   Genitourinary: Positive for frequency, impotence and urgency. Negative for dysuria, genital sores, hematuria and penile discharge.   Musculoskeletal: Negative for arthralgias, joint swelling and myalgias.   Skin: Negative for rash.   Neurological: Positive for dizziness. Negative for weakness, headaches and paresthesias.   Psychiatric/Behavioral: Negative for mood changes. The patient is not nervous/anxious.      OBJECTIVE:   /74 (BP Location: Right arm, Patient Position: Sitting, Cuff Size: Adult Regular)   Pulse 72   Temp 98.7  F (37.1  C) (Tympanic)   Resp 22   Ht 1.753 m (5' 9\")   Wt 90 kg (198 lb 6.4 oz)   SpO2 99%   BMI 29.30 kg/m   Estimated body mass index is 29.3 kg/m  as calculated from the following:    Height as of this encounter: 1.753 m (5' 9\").    Weight as of this encounter: 90 kg (198 lb 6.4 oz).  Physical Exam  GEN: Alert and oriented, in no acute distress  CV: RRR, no murmur  Slow moving, as usual.  Resting tremor, worse on L      ASSESSMENT / PLAN:   Wellness  CAD: many stents. No cp or sob  Parkinsonian features: ongoing  BpH:   Stable  Hyperlipdiemia: statin, stable      Update fills, labs.  Flu shot.  FIT test.  Back next week.           COUNSELING:  Reviewed preventive health counseling, as reflected in patient instructions       Regular exercise       Healthy " "diet/nutrition    Estimated body mass index is 29.3 kg/m  as calculated from the following:    Height as of this encounter: 1.753 m (5' 9\").    Weight as of this encounter: 90 kg (198 lb 6.4 oz).    Weight management plan: diet/exercise     He reports that he has never smoked. He has never used smokeless tobacco.      Appropriate preventive services were discussed with this patient, including applicable screening as appropriate for cardiovascular disease, diabetes, osteopenia/osteoporosis, and glaucoma.  As appropriate for age/gender, discussed screening for colorectal cancer, prostate cancer, breast cancer, and cervical cancer. Checklist reviewing preventive services available has been given to the patient.    Reviewed patients plan of care and provided an AVS. The Basic Care Plan (routine screening as documented in Health Maintenance) for Derrick meets the Care Plan requirement. This Care Plan has been established and reviewed with the Patient.        Kaiden Zapata MD  [facility]    Identified Health Risks:  "

## 2022-10-10 ENCOUNTER — OFFICE VISIT (OUTPATIENT)
Dept: FAMILY MEDICINE | Facility: CLINIC | Age: 77
End: 2022-10-10
Payer: COMMERCIAL

## 2022-10-10 VITALS
SYSTOLIC BLOOD PRESSURE: 116 MMHG | RESPIRATION RATE: 16 BRPM | HEART RATE: 86 BPM | DIASTOLIC BLOOD PRESSURE: 78 MMHG | WEIGHT: 198 LBS | TEMPERATURE: 97.5 F | HEIGHT: 69 IN | OXYGEN SATURATION: 98 % | BODY MASS INDEX: 29.33 KG/M2

## 2022-10-10 DIAGNOSIS — R29.818 PARKINSONIAN FEATURES: Primary | ICD-10-CM

## 2022-10-10 DIAGNOSIS — G20.A1 PARKINSON'S DISEASE (H): ICD-10-CM

## 2022-10-10 PROCEDURE — 99214 OFFICE O/P EST MOD 30 MIN: CPT | Performed by: FAMILY MEDICINE

## 2022-10-10 NOTE — PROGRESS NOTES
"S: Derrick Morrison is a 76 year old male here with wife, son, dtr in law.  Wants to talk about Parkinson's.      Gradual worsening on stiffness, slow gait, blank face, tremor L hand (is left handed).  Has researched some, thinks he agrees with Parkinson's as diagnosis.      Is interested in establishing with Neurology.      Doesn't want to try symptomatic treatment yet.      Family has researched sx of parkinson's, they agree with pretty much everything, short of hallucinations, dementia, mood alterations.      O:/78   Pulse 86   Temp 97.5  F (36.4  C) (Tympanic)   Resp 16   Ht 1.753 m (5' 9\")   Wt 89.8 kg (198 lb)   SpO2 98%   BMI 29.24 kg/m    GEN: Alert and oriented, in no acute distress  Sits stiffly, doesn't move much  Does have \"masked\" face somewhat  Slow to get up, does  Shuffles some  Not much cogwheeling on R arm today, did notice more last visit    A: early parkinson's     P: long talk with family.  They agree with diagnosis.  Derrick doesn't want to try sinemet yet.  Would start with 25/100 1/2 tab with meals, titrate to full tab if needed over time.      Were interested in neurology, more to get another viewpoint and establish relationship.   Consult ordered    F/u prn here when ready to start meds.      30  min spent on date of encounter reviewing chart, history, physical, documenting and counseling as mentioned in this note         "

## 2022-10-11 ENCOUNTER — HOSPITAL ENCOUNTER (OUTPATIENT)
Dept: PHYSICAL THERAPY | Facility: CLINIC | Age: 77
Setting detail: THERAPIES SERIES
Discharge: HOME | End: 2022-10-11
Attending: FAMILY MEDICINE
Payer: MEDICARE

## 2022-10-11 PROCEDURE — 97140 MANUAL THERAPY 1/> REGIONS: CPT | Mod: GP | Performed by: PHYSICAL THERAPIST

## 2022-10-11 PROCEDURE — 97110 THERAPEUTIC EXERCISES: CPT | Mod: GP | Performed by: PHYSICAL THERAPIST

## 2022-10-13 ENCOUNTER — MYC MEDICAL ADVICE (OUTPATIENT)
Dept: FAMILY MEDICINE | Facility: CLINIC | Age: 77
End: 2022-10-13

## 2022-11-02 ENCOUNTER — OFFICE VISIT (OUTPATIENT)
Dept: CARDIOLOGY | Facility: CLINIC | Age: 77
End: 2022-11-02
Payer: COMMERCIAL

## 2022-11-02 VITALS
RESPIRATION RATE: 12 BRPM | WEIGHT: 199.8 LBS | SYSTOLIC BLOOD PRESSURE: 122 MMHG | DIASTOLIC BLOOD PRESSURE: 76 MMHG | HEART RATE: 80 BPM | OXYGEN SATURATION: 93 % | BODY MASS INDEX: 29.51 KG/M2

## 2022-11-02 DIAGNOSIS — I25.10 CORONARY ARTERY DISEASE INVOLVING NATIVE HEART WITHOUT ANGINA PECTORIS, UNSPECIFIED VESSEL OR LESION TYPE: ICD-10-CM

## 2022-11-02 DIAGNOSIS — I10 BENIGN ESSENTIAL HYPERTENSION: ICD-10-CM

## 2022-11-02 DIAGNOSIS — E78.5 HYPERLIPIDEMIA LDL GOAL <70: ICD-10-CM

## 2022-11-02 DIAGNOSIS — R00.2 PALPITATIONS: ICD-10-CM

## 2022-11-02 DIAGNOSIS — I25.10 CORONARY ARTERY DISEASE INVOLVING NATIVE CORONARY ARTERY OF NATIVE HEART WITHOUT ANGINA PECTORIS: Primary | ICD-10-CM

## 2022-11-02 PROCEDURE — 99214 OFFICE O/P EST MOD 30 MIN: CPT | Performed by: NURSE PRACTITIONER

## 2022-11-02 NOTE — PROGRESS NOTES
Cardiology Clinic Progress Note  Derrick Morrison MRN# [id number]   YOB: 1945 Age: 77 year old      Primary Cardiologist:   Dr. Lan          History of Presenting Illness:    Derrick Morrison is a pleasant 71 year old patient with a past cardiac history significant for   1. CAD    previous stents to the LAD and RCA    2011 GERALDINE to the proximal LAD for ISR     NSTEMI 10/2021 GERALDINE x2 to the mid LAD and GERALDINE x1 to LCx [facility]   2. HTN   3. palpitations     sharp chest pain twinges which correlated with PVCs    improved after starting beta blocker  4. hyperlipidemia  Past medical history significant for Parkinson's.  He is .     Abdominal aortic ultrasound 2018 was normal.  Echocardiogram 10/2021 at [facility] showed normal EF, normal RV, no significant valvular disease.  After his non-STEMI in October 2021 he had generalized fatigue and soft BPs, so metoprolol has been down titrated.  Long-term Plavix has been recommended.    Patient was seen by me in March 2022 for routine follow-up.  He completed cardiac rehab and denied any angina.  He noted bruising easily but no difficulty with bleeding, on DAPT.    Pt presents today for 6-month follow-up. BMP 10/6/2022 showing normal renal function and electrolytes.  Lipid profile well controlled.  Results reviewed today.    Blood pressure today is well controlled.  He has only been taking metoprolol XL 12.5 mg once a day due to previously having fatigue and soft BPs.  His fatigue has resolved.  He denied any sleep apnea symptoms at recent follow-up with PCP.  He discontinued aspirin about a week ago and will continue Plavix long-term.  He denies any anginal symptoms or significant palpitations.      He is wondering about AAA screening.  He had this done in 2018 with normal findings.  We can consider repeating this at 5 years which would be next year 2023.  He denies any history of smoking and blood pressures have been well controlled. Patient reports no chest  pain, SOB, PND, orthopnea, presyncope, syncope, edema, heart racing.      Current Cardiac Medications   Rosuvastatin 40 mg daily  ASA 81 mg daily - pt not taking   Plavix 75 mg daily  Metoprolol XL 25 mg daily - taking 12.5 mg daily   Nitroglycerin p.r.n.                     Assessment and Plan:     Plan    Patient Instructions   Medication Changes:  1. None     Recommendations:  1. Call if blood pressure is less than 90 on top or less than 100 with lightheadedness.    2. Check blood pressure at least 1 hour after medications. Call the clinic if your blood pressure is consistently greater than 130/80.      Follow-up:  1. See Dr. Lan or Janeth GALE for cardiology follow up at [facility]: Nov 2023.   Call 6 months prior, to schedule.     Cardiology Scheduling~[phone]  Cardiology Clinic RN~[phone] (Conchita RN, Nelsy RN, Mamta RN)          1. CAD    No angina (prior angina jaw pain)     Continue statin, beta blocker, plavix indefinitely    Imdur caused fogginess      2. Palpitations     H/o Intermittent palpitations with sharp chest pain twinges which correlated with PVCs on telemetry     improved after starting beta blocker    Continue metoprolol      3. Hyperlipidemia    Last LDL 70 on 10/2022    Continue rosuvastatin 40 mg daily       4.    Hypertension     Controlled      continue metoprolol         Thank you for allowing me to participate in this delightful patient's care.      This note was completed in part using Dragon voice recognition software. Although reviewed after completion, some word and grammatical errors may occur.    Janeth Campos, APRN, CNP           Data:   All laboratory data reviewed      Total time spent today was 32 mins, reviewing labs, testing, notes, documenting notes, and seeing patient.         Constitutional:  cooperative, alert and oriented, well developed, well nourished, in no acute distress  overweight     Skin:  warm and dry to the touch         Head:   normocephalic       Eyes:  pupils equal and round       ENT:  no pallor or cyanosis       Neck:  no stiffness       Respiratory:  clear to auscultation; normal symmetry        Cardiac: regular rate and rhythm; normal S1 and S2                 pulses full and equal     GI:  abdomen soft, nondistended     Extremities and Muscular Skeletal:  no edema        Neurological:  affect appropriate; no gross motor deficits       Psych:  Alert and Oriented x 3 , appropriate affact

## 2022-11-02 NOTE — LETTER
11/2/2022    Kaiden Zapata MD  [address] [place] [id number]    RE: Derrick Morrison       Dear Colleague,     I had the pleasure of seeing Derrick Morrison in the [facility].    Cardiology Clinic Progress Note  Derrick Morrison MRN# [id number]   YOB: 1945 Age: 77 year old      Primary Cardiologist:   Dr. Lan          History of Presenting Illness:    Derrick Morrison is a pleasant 71 year old patient with a past cardiac history significant for   1. CAD    previous stents to the LAD and RCA    2011 GERALDINE to the proximal LAD for ISR     NSTEMI 10/2021 GERALDINE x2 to the mid LAD and GERALDINE x1 to [facility]x [facility]   2. HTN   3. palpitations     sharp chest pain twinges which correlated with PVCs    improved after starting beta blocker  4. hyperlipidemia  Past medical history significant for Parkinson's.  He is .     Abdominal aortic ultrasound 2018 was normal.  Echocardiogram 10/2021 at [facility] showed normal EF, normal RV, no significant valvular disease.  After his non-STEMI in October 2021 he had generalized fatigue and soft BPs, so metoprolol has been down titrated.  Long-term Plavix has been recommended.    Patient was seen by me in March 2022 for routine follow-up.  He completed cardiac rehab and denied any angina.  He noted bruising easily but no difficulty with bleeding, on DAPT.    Pt presents today for 6-month follow-up. BMP 10/6/2022 showing normal renal function and electrolytes.  Lipid profile well controlled.  Results reviewed today.    Blood pressure today is well controlled.  He has only been taking metoprolol XL 12.5 mg once a day due to previously having fatigue and soft BPs.  His fatigue has resolved.  He denied any sleep apnea symptoms at recent follow-up with PCP.  He discontinued aspirin about a week ago and will continue Plavix long-term.  He denies any anginal symptoms or significant palpitations.      He is wondering about AAA screening.  He had this done in 2018  with normal findings.  We can consider repeating this at 5 years which would be next year 2023.  He denies any history of smoking and blood pressures have been well controlled. Patient reports no chest pain, SOB, PND, orthopnea, presyncope, syncope, edema, heart racing.      Current Cardiac Medications   Rosuvastatin 40 mg daily  ASA 81 mg daily - pt not taking   Plavix 75 mg daily  Metoprolol XL 25 mg daily - taking 12.5 mg daily   Nitroglycerin p.r.n.                     Assessment and Plan:     Plan    Patient Instructions   Medication Changes:  1. None     Recommendations:  1. Call if blood pressure is less than 90 on top or less than 100 with lightheadedness.    2. Check blood pressure at least 1 hour after medications. Call the clinic if your blood pressure is consistently greater than 130/80.      Follow-up:  1. See Dr. Lan or Janeth GALE for cardiology follow up at [facility]: Nov 2023.   Call 6 months prior, to schedule.     Cardiology Scheduling~[phone]  Cardiology Clinic RN~[phone] (Conchita RN, Nelsy RN, Mamta RN)          1. CAD    No angina (prior angina jaw pain)     Continue statin, beta blocker, plavix indefinitely    Imdur caused fogginess      2. Palpitations     H/o Intermittent palpitations with sharp chest pain twinges which correlated with PVCs on telemetry     improved after starting beta blocker    Continue metoprolol      3. Hyperlipidemia    Last LDL 70 on 10/2022    Continue rosuvastatin 40 mg daily       4.    Hypertension     Controlled      continue metoprolol         Thank you for allowing me to participate in this delightful patient's care.      This note was completed in part using Dragon voice recognition software. Although reviewed after completion, some word and grammatical errors may occur.    Janeth Campos, APRN, CNP           Data:   All laboratory data reviewed      Total time spent today was 32 mins, reviewing labs, testing, notes, documenting notes, and  seeing patient.         Constitutional:  cooperative, alert and oriented, well developed, well nourished, in no acute distress  overweight     Skin:  warm and dry to the touch         Head:  normocephalic       Eyes:  pupils equal and round       ENT:  no pallor or cyanosis       Neck:  no stiffness       Respiratory:  clear to auscultation; normal symmetry        Cardiac: regular rate and rhythm; normal S1 and S2                 pulses full and equal     GI:  abdomen soft, nondistended     Extremities and Muscular Skeletal:  no edema        Neurological:  affect appropriate; no gross motor deficits       Psych:  Alert and Oriented x 3 , appropriate affact          Thank you for allowing me to participate in the care of your patient.      Sincerely,     MARIA T Vyas [facility]  cc:   Referred Self,

## 2022-11-02 NOTE — PATIENT INSTRUCTIONS
Medication Changes:  None     Recommendations:  Call if blood pressure is less than 90 on top or less than 100 with lightheadedness.    Check blood pressure at least 1 hour after medications. Call the clinic if your blood pressure is consistently greater than 130/80.      Follow-up:  See Dr. Lan or Janeth GALE for cardiology follow up at [facility]: May 2023.   Call 6 months prior, to schedule.     Cardiology Scheduling~[phone]  Cardiology Clinic RN~[phone] (Conchita RN, Nelsy RN, Mamta RN)

## 2022-11-04 ENCOUNTER — MYC MEDICAL ADVICE (OUTPATIENT)
Dept: FAMILY MEDICINE | Facility: CLINIC | Age: 77
End: 2022-11-04

## 2022-11-04 DIAGNOSIS — Z12.11 SPECIAL SCREENING FOR MALIGNANT NEOPLASMS, COLON: Primary | ICD-10-CM

## 2022-11-04 NOTE — TELEPHONE ENCOUNTER
Referral pended, forwarded to PCP.  Pt on Plavix, will need directions pre- procedure.  Thank you  PHILOMENA Arzate RN

## 2022-11-08 ENCOUNTER — OFFICE VISIT (OUTPATIENT)
Dept: DERMATOLOGY | Facility: CLINIC | Age: 77
End: 2022-11-08
Payer: COMMERCIAL

## 2022-11-08 DIAGNOSIS — D22.9 MULTIPLE BENIGN NEVI: ICD-10-CM

## 2022-11-08 DIAGNOSIS — L82.1 SEBORRHEIC KERATOSIS: Primary | ICD-10-CM

## 2022-11-08 DIAGNOSIS — L82.0 INFLAMED SEBORRHEIC KERATOSIS: ICD-10-CM

## 2022-11-08 DIAGNOSIS — L57.0 ACTINIC KERATOSIS: ICD-10-CM

## 2022-11-08 DIAGNOSIS — L81.4 LENTIGO: ICD-10-CM

## 2022-11-08 DIAGNOSIS — D18.01 CHERRY ANGIOMA: ICD-10-CM

## 2022-11-08 PROCEDURE — 17110 DESTRUCTION B9 LES UP TO 14: CPT | Performed by: PHYSICIAN ASSISTANT

## 2022-11-08 PROCEDURE — 17000 DESTRUCT PREMALG LESION: CPT | Mod: 59 | Performed by: PHYSICIAN ASSISTANT

## 2022-11-08 PROCEDURE — 17003 DESTRUCT PREMALG LES 2-14: CPT | Mod: 59 | Performed by: PHYSICIAN ASSISTANT

## 2022-11-08 PROCEDURE — 99213 OFFICE O/P EST LOW 20 MIN: CPT | Mod: 25 | Performed by: PHYSICIAN ASSISTANT

## 2022-11-08 ASSESSMENT — PAIN SCALES - GENERAL: PAINLEVEL: NO PAIN (0)

## 2022-11-08 NOTE — NURSING NOTE
Chief Complaint   Patient presents with     Skin Check     Right Flank        There were no vitals filed for this visit.  Wt Readings from Last 1 Encounters:   11/02/22 90.6 kg (199 lb 12.8 oz)       Madie Deutsch LPN .................11/8/2022

## 2022-11-08 NOTE — LETTER
"    11/8/2022         RE: Derrick Morrison  [address] [facility] [address]        Dear Colleague,    Thank you for referring your patient, Derrick Morrison, to the [facility]. Please see a copy of my visit note below.    Derrick Morrison is an extremely pleasant 77 year old year old male patient here today for rough area on chest, he notes will he will pick at spot. No pain or bleeding.  Patient has no other skin complaints today.  Remainder of the HPI, Meds, PMH, Allergies, FH, and SH was reviewed in chart.    Pertinent Hx:  History of BCC  Past Medical History:   Diagnosis Date     Actinic keratosis      Basal cell carcinoma      BPH w urinary obs/LUTS 10/10/2011    flomax in past, \"quit working\".  Avodart in past.  Tapered off.        CAD (coronary artery disease) 10/10/2011    -8/16/2006 s/p GERALDINE to mid RCA, s/p GERALDINE to mid LAD in [place] -10/4/2007 s/p GERALDINE to pRCA and GERALDINE to dRCA in [place]  -11/17/11 Unstable angina, s/p GERALDINE to prox LAD (jailed small diagonal)       Hyperlipidaemia      Palpitations 08/07/2013    occasional, improved on low dose beta blocker      screening 10/10/2011    2007 colonoscopy \"all clean\" in [place].  He is sure about this.   AAA ultrasound screen 3/07 normal also.  (leg and neck done then too)      Skin cancer     had mohs on L temple       Past Surgical History:   Procedure Laterality Date     APPENDECTOMY      childhood     CARDIAC SURGERY  2006, 2007, 2011    7 stents total     COLONOSCOPY      about 8-9 years ago     EYE SURGERY  2020?    cataracts        Family History   Problem Relation Age of Onset     Coronary Artery Disease Mother      Melanoma No family hx of        Social History     Socioeconomic History     Marital status:      Spouse name: Not on file     Number of children: Not on file     Years of education: Not on file     Highest education level: Not on file   Occupational History     Employer: " RETIRED   Tobacco Use     Smoking status: Never     Smokeless tobacco: Never   Vaping Use     Vaping Use: Never used   Substance and Sexual Activity     Alcohol use: Yes     Comment: wine     Drug use: No     Sexual activity: Yes     Partners: Female   Other Topics Concern     Parent/sibling w/ CABG, MI or angioplasty before 65F 55M? Yes     Comment: mother   Social History Narrative     Not on file     Social Determinants of Health     Financial Resource Strain: Not on file   Food Insecurity: Not on file   Transportation Needs: Not on file   Physical Activity: Not on file   Stress: Not on file   Social Connections: Not on file   Intimate Partner Violence: Not on file   Housing Stability: Not on file       Outpatient Encounter Medications as of 11/8/2022   Medication Sig Dispense Refill     clopidogrel (PLAVIX) 75 MG tablet Take 1 tablet (75 mg) by mouth daily 90 tablet 3     dutasteride (AVODART) 0.5 MG capsule Take one pill every other day 45 capsule 3     metoprolol succinate ER (TOPROL XL) 25 MG 24 hr tablet Take 1 tablet (25 mg) by mouth daily (Patient taking differently: Take 12.5 mg by mouth daily) 90 tablet 3     nitroGLYcerin (NITROSTAT) 0.4 MG sublingual tablet PLACE 1 TABLET UNDER THE TONGUE AT THE ONSET OF CHEST PAIN. MAY REPEAT EVERY 5 MINUTES AS NEEDED FOR A TOTAL OF 3 DOSES. 25 tablet 1     rosuvastatin (CRESTOR) 40 MG tablet Take 1 tablet (40 mg) by mouth every evening 90 tablet 3     ASPIRIN NOT PRESCRIBED, INTENTIONAL, 1 each continuous prn Antiplatelet medication not prescribed intentionally due to plavix (Patient not taking: Reported on 11/2/2022) 0 each 0     No facility-administered encounter medications on file as of 11/8/2022.             O:   NAD, WDWN, Alert & Oriented, Mood & Affect wnl, Vitals stable   Here today alone   There were no vitals taken for this visit.   General appearance normal   Vitals stable   Alert, oriented and in no acute distress     Gritty papule on left cheek and left  temple x 2    Stuck on papules and brown macules on trunk and ext   Red papules on trunk  Brown papules and macules with regular pigment network and borders on torso and extremities     The remainder of skin exam is normal       Eyes: Conjunctivae/lids:Normal     ENT: Lips: normal    MSK:Normal    Cardiovascular: peripheral edema none    Pulm: Breathing Normal    Neuro/Psych: Orientation:Alert and Orientedx3 ; Mood/Affect:normal   A/P:  1. Actinic keratosis on left cheek and left temple x 2  LN2:  Treated with LN2 for 5s for 1-2 cycles. Warned risks of blistering, pain, pigment change, scarring, and incomplete resolution.  Advised patient to return if lesions do not completely resolve.  Wound care sheet given.  2. Inflamed seborrheic keratosis on left lateral chest and right abdomen   LN2:  Treated with LN2 for 5s for 1-2 cycles. Warned risks of blistering, pain, pigment change, scarring, and incomplete resolution.  Advised patient to return if lesions do not completely resolve.  Wound care sheet given.  3. Seborrheic keratosis, lentigo, angioma, benign nevi, History of BCC  It was a pleasure speaking to Derrick Morrison today.  BENIGN LESIONS DISCUSSED WITH PATIENT:  I discussed the specifics of tumor, prognosis, and genetics of benign lesions.  I explained that treatment of these lesions would be purely cosmetic and not medically neccessary.  I discussed with patient different removal options including excision, cautery and /or laser.      Nature and genetics of benign skin lesions dicussed with patient.  Signs and Symptoms of skin cancer discussed with patient.  ABCDEs of melanoma reviewed with patient.  Patient encouraged to perform monthly skin exams.  UV precautions reviewed with patient.  Risks of non-melanoma skin cancer discussed with patient   Return to clinic in one year or sooner if needed.       Again, thank you for allowing me to participate in the care of your patient.        Sincerely,        Vandana  Claudia Reid PA-C

## 2022-11-08 NOTE — PROGRESS NOTES
"Derrick Morrison is an extremely pleasant 77 year old year old male patient here today for rough area on chest, he notes will he will pick at spot. No pain or bleeding.  Patient has no other skin complaints today.  Remainder of the HPI, Meds, PMH, Allergies, FH, and SH was reviewed in chart.    Pertinent Hx:  History of BCC  Past Medical History:   Diagnosis Date     Actinic keratosis      Basal cell carcinoma      BPH w urinary obs/LUTS 10/10/2011    flomax in past, \"quit working\".  Avodart in past.  Tapered off.        CAD (coronary artery disease) 10/10/2011    -8/16/2006 s/p GERALDINE to mid RCA, s/p GERALDINE to mid LAD in [place] -10/4/2007 s/p GERALDINE to pRCA and GERALDINE to dRCA in [place]  -11/17/11 Unstable angina, s/p GERALDINE to prox LAD (jailed small diagonal)       Hyperlipidaemia      Palpitations 08/07/2013    occasional, improved on low dose beta blocker      screening 10/10/2011    2007 colonoscopy \"all clean\" in [place].  He is sure about this.   AAA ultrasound screen 3/07 normal also.  (leg and neck done then too)      Skin cancer     had mohs on L temple       Past Surgical History:   Procedure Laterality Date     APPENDECTOMY      childhood     CARDIAC SURGERY  2006, 2007, 2011    7 stents total     COLONOSCOPY      about 8-9 years ago     EYE SURGERY  2020?    cataracts        Family History   Problem Relation Age of Onset     Coronary Artery Disease Mother      Melanoma No family hx of        Social History     Socioeconomic History     Marital status:      Spouse name: Not on file     Number of children: Not on file     Years of education: Not on file     Highest education level: Not on file   Occupational History     Employer: RETIRED   Tobacco Use     Smoking status: Never     Smokeless tobacco: Never   Vaping Use     Vaping Use: Never used   Substance and Sexual Activity     Alcohol use: Yes     Comment: wine     Drug use: No     Sexual activity: Yes     Partners: Female   Other Topics " Concern     Parent/sibling w/ CABG, MI or angioplasty before 65F 55M? Yes     Comment: mother   Social History Narrative     Not on file     Social Determinants of Health     Financial Resource Strain: Not on file   Food Insecurity: Not on file   Transportation Needs: Not on file   Physical Activity: Not on file   Stress: Not on file   Social Connections: Not on file   Intimate Partner Violence: Not on file   Housing Stability: Not on file       Outpatient Encounter Medications as of 11/8/2022   Medication Sig Dispense Refill     clopidogrel (PLAVIX) 75 MG tablet Take 1 tablet (75 mg) by mouth daily 90 tablet 3     dutasteride (AVODART) 0.5 MG capsule Take one pill every other day 45 capsule 3     metoprolol succinate ER (TOPROL XL) 25 MG 24 hr tablet Take 1 tablet (25 mg) by mouth daily (Patient taking differently: Take 12.5 mg by mouth daily) 90 tablet 3     nitroGLYcerin (NITROSTAT) 0.4 MG sublingual tablet PLACE 1 TABLET UNDER THE TONGUE AT THE ONSET OF CHEST PAIN. MAY REPEAT EVERY 5 MINUTES AS NEEDED FOR A TOTAL OF 3 DOSES. 25 tablet 1     rosuvastatin (CRESTOR) 40 MG tablet Take 1 tablet (40 mg) by mouth every evening 90 tablet 3     ASPIRIN NOT PRESCRIBED, INTENTIONAL, 1 each continuous prn Antiplatelet medication not prescribed intentionally due to plavix (Patient not taking: Reported on 11/2/2022) 0 each 0     No facility-administered encounter medications on file as of 11/8/2022.             O:   NAD, WDWN, Alert & Oriented, Mood & Affect wnl, Vitals stable   Here today alone   There were no vitals taken for this visit.   General appearance normal   Vitals stable   Alert, oriented and in no acute distress     Gritty papule on left cheek and left temple x 2    Stuck on papules and brown macules on trunk and ext   Red papules on trunk  Brown papules and macules with regular pigment network and borders on torso and extremities     The remainder of skin exam is normal       Eyes: Conjunctivae/lids:Normal      ENT: Lips: normal    MSK:Normal    Cardiovascular: peripheral edema none    Pulm: Breathing Normal    Neuro/Psych: Orientation:Alert and Orientedx3 ; Mood/Affect:normal   A/P:  1. Actinic keratosis on left cheek and left temple x 2  LN2:  Treated with LN2 for 5s for 1-2 cycles. Warned risks of blistering, pain, pigment change, scarring, and incomplete resolution.  Advised patient to return if lesions do not completely resolve.  Wound care sheet given.  2. Inflamed seborrheic keratosis on left lateral chest and right abdomen   LN2:  Treated with LN2 for 5s for 1-2 cycles. Warned risks of blistering, pain, pigment change, scarring, and incomplete resolution.  Advised patient to return if lesions do not completely resolve.  Wound care sheet given.  3. Seborrheic keratosis, lentigo, angioma, benign nevi, History of BCC  It was a pleasure speaking to Derrick Morrison today.  BENIGN LESIONS DISCUSSED WITH PATIENT:  I discussed the specifics of tumor, prognosis, and genetics of benign lesions.  I explained that treatment of these lesions would be purely cosmetic and not medically neccessary.  I discussed with patient different removal options including excision, cautery and /or laser.      Nature and genetics of benign skin lesions dicussed with patient.  Signs and Symptoms of skin cancer discussed with patient.  ABCDEs of melanoma reviewed with patient.  Patient encouraged to perform monthly skin exams.  UV precautions reviewed with patient.  Risks of non-melanoma skin cancer discussed with patient   Return to clinic in one year or sooner if needed.

## 2022-11-14 ENCOUNTER — MYC MEDICAL ADVICE (OUTPATIENT)
Dept: FAMILY MEDICINE | Facility: CLINIC | Age: 77
End: 2022-11-14

## 2022-11-21 ENCOUNTER — TELEPHONE (OUTPATIENT)
Dept: PODIATRY | Facility: CLINIC | Age: 77
End: 2022-11-21

## 2022-11-21 NOTE — TELEPHONE ENCOUNTER
Screening Questions  BLUE  KIND OF PREP RED  LOCATION [review exclusion criteria] GREEN  SEDATION TYPE        Yes Are you active on mychart?       [place] Ordering/Referring Provider?        [employer] What type of coverage do you have?      No Have you had a positive covid test in the last 90 days?     No 1. BMI  [BMI 40+ - review exclusion criteria]    Yes  2. Are you able to give consent for your medical care? [IF NO,RN REVIEW]        No  3. Are you taking any prescription pain medications on a routine schedule?      NA  3a. EXTENDED PREP What kind of prescription?     No 4. Do you have any chemical dependencies such as alcohol, street drugs, or methadone?    No 5. Do you have any history of post-traumatic stress syndrome, severe anxiety or history of psychosis?      **If yes 3- 5 , please schedule with MAC sedation.**          IF YES TO ANY 6 - 10 - HOSPITAL SETTING ONLY.     No 6.   Do you need assistance transferring?     No 7.   Have you had a heart or lung transplant?    No 8.   Are you currently on dialysis?   No 9.   Do you use daily home oxygen?   No 10. Do you take nitroglycerin?   10a. NA If yes, how often?     11. [FEMALES]  NA Are you currently pregnant?    11a. NA If yes, how many weeks? [ Greater than 12 weeks, OR NEEDED]    No 12. Do you have Pulmonary Hypertension? *NEED PAC APPT AT [facility]*     No 13. [review exclusion criteria]  Do you have any implantable devices in your body (pacemaker, defib, LVAD)?    No 14. In the past 6 months, have you had any heart related issues including cardiomyopathy or heart attack?     14a. NA If yes, did it require cardiac stenting if so when?     No 15. Have you had a stroke or Transient ischemic attack (TIA - aka  mini stroke ) within 6 months?      No 16. Do you have mod to severe Obstructive Sleep Apnea?  [Hospital only - Ok at [place]]    No 17. Do you have SEVERE AND UNCONTROLLED asthma? *NEED PAC APPT AT [facility]*     Yes 18. Are you currently taking any blood  "thinners?     18a. If yes, inform patient to \"follow up w/ ordering provider for bridging instructions.\" patient informed to contact prescribing    No 19. Do you take the medication Phentermine?    19a. If yes, \"Hold for 7 days before procedure.  Please consult your prescribing provider if you have questions about holding this medication.\"     No  20. Do you have chronic kidney disease?      No  21. Do you have a diagnosis of diabetes?     No  22. On a regular basis do you go 3-5 days between bowel movements?     See below 23. Preferred LOCAL Pharmacy for Pre Prescription    [ LIST ONLY ONE PHARMACY]        [place], [place] - [address]        - CLOSING REMINDERS -    Informed patient they will need an adult    Cannot take any type of public or medical transportation alone    Conscious Sedation- Needs  for 6 hours after the procedure       MAC/General-Needs  for 24 hours after procedure    Pre-Procedure Covid test to be completed [[facility] PCR Testing Required]    Confirmed Nurse will call to complete assessment       - SCHEDULING DETAILS -     Jett  Surgeon    2-28-23  Date of Procedure  Lower Endoscopy [Colonoscopy]  Type of Procedure Scheduled  [facility]-If you answer yes to questions #8, #20, #21Which Colonoscopy Prep was Sent?     GEN Sedation Type     NA PAC / Pre-op Required         Additional comments:  patient advised to contact prescribing doctor regarding holding Plavix          "

## 2022-12-12 ENCOUNTER — MYC MEDICAL ADVICE (OUTPATIENT)
Dept: FAMILY MEDICINE | Facility: CLINIC | Age: 77
End: 2022-12-12

## 2023-01-23 ENCOUNTER — APPOINTMENT (OUTPATIENT)
Dept: GENERAL RADIOLOGY | Facility: CLINIC | Age: 78
End: 2023-01-23
Attending: EMERGENCY MEDICINE
Payer: MEDICARE

## 2023-01-23 ENCOUNTER — NURSE TRIAGE (OUTPATIENT)
Dept: FAMILY MEDICINE | Facility: CLINIC | Age: 78
End: 2023-01-23
Payer: COMMERCIAL

## 2023-01-23 ENCOUNTER — HOSPITAL ENCOUNTER (EMERGENCY)
Facility: CLINIC | Age: 78
Discharge: HOME | End: 2023-01-23
Attending: EMERGENCY MEDICINE | Admitting: EMERGENCY MEDICINE
Payer: MEDICARE

## 2023-01-23 VITALS
SYSTOLIC BLOOD PRESSURE: 124 MMHG | RESPIRATION RATE: 16 BRPM | DIASTOLIC BLOOD PRESSURE: 67 MMHG | HEART RATE: 59 BPM | OXYGEN SATURATION: 97 % | TEMPERATURE: 98 F

## 2023-01-23 DIAGNOSIS — R00.2 PALPITATIONS: ICD-10-CM

## 2023-01-23 LAB
ANION GAP SERPL CALCULATED.3IONS-SCNC: 8 MMOL/L (ref 7–15)
BASOPHILS # BLD AUTO: 0 10E3/UL (ref 0–0.2)
BASOPHILS NFR BLD AUTO: 1 %
BUN SERPL-MCNC: 10.3 MG/DL (ref 8–23)
CALCIUM SERPL-MCNC: 9.4 MG/DL (ref 8.8–10.2)
CHLORIDE SERPL-SCNC: 105 MMOL/L (ref 98–107)
CREAT SERPL-MCNC: 0.85 MG/DL (ref 0.67–1.17)
DEPRECATED HCO3 PLAS-SCNC: 25 MMOL/L (ref 22–29)
EOSINOPHIL # BLD AUTO: 0.1 10E3/UL (ref 0–0.7)
EOSINOPHIL NFR BLD AUTO: 1 %
ERYTHROCYTE [DISTWIDTH] IN BLOOD BY AUTOMATED COUNT: 12.3 % (ref 10–15)
GFR SERPL CREATININE-BSD FRML MDRD: 90 ML/MIN/1.73M2
GLUCOSE SERPL-MCNC: 89 MG/DL (ref 70–99)
HCT VFR BLD AUTO: 46.8 % (ref 40–53)
HGB BLD-MCNC: 15.6 G/DL (ref 13.3–17.7)
HOLD SPECIMEN: NORMAL
IMM GRANULOCYTES # BLD: 0 10E3/UL
IMM GRANULOCYTES NFR BLD: 0 %
LYMPHOCYTES # BLD AUTO: 1.3 10E3/UL (ref 0.8–5.3)
LYMPHOCYTES NFR BLD AUTO: 19 %
MAGNESIUM SERPL-MCNC: 2.1 MG/DL (ref 1.7–2.3)
MCH RBC QN AUTO: 29.9 PG (ref 26.5–33)
MCHC RBC AUTO-ENTMCNC: 33.3 G/DL (ref 31.5–36.5)
MCV RBC AUTO: 90 FL (ref 78–100)
MONOCYTES # BLD AUTO: 0.6 10E3/UL (ref 0–1.3)
MONOCYTES NFR BLD AUTO: 10 %
NEUTROPHILS # BLD AUTO: 4.7 10E3/UL (ref 1.6–8.3)
NEUTROPHILS NFR BLD AUTO: 69 %
NRBC # BLD AUTO: 0 10E3/UL
NRBC BLD AUTO-RTO: 0 /100
PLATELET # BLD AUTO: 170 10E3/UL (ref 150–450)
POTASSIUM SERPL-SCNC: 4.5 MMOL/L (ref 3.4–5.3)
RBC # BLD AUTO: 5.22 10E6/UL (ref 4.4–5.9)
SODIUM SERPL-SCNC: 138 MMOL/L (ref 136–145)
TROPONIN T SERPL HS-MCNC: 6 NG/L
WBC # BLD AUTO: 6.8 10E3/UL (ref 4–11)

## 2023-01-23 PROCEDURE — 80048 BASIC METABOLIC PNL TOTAL CA: CPT | Performed by: EMERGENCY MEDICINE

## 2023-01-23 PROCEDURE — 99285 EMERGENCY DEPT VISIT HI MDM: CPT | Mod: 25 | Performed by: EMERGENCY MEDICINE

## 2023-01-23 PROCEDURE — 93010 ELECTROCARDIOGRAM REPORT: CPT | Performed by: EMERGENCY MEDICINE

## 2023-01-23 PROCEDURE — 84484 ASSAY OF TROPONIN QUANT: CPT | Performed by: EMERGENCY MEDICINE

## 2023-01-23 PROCEDURE — 83735 ASSAY OF MAGNESIUM: CPT | Performed by: EMERGENCY MEDICINE

## 2023-01-23 PROCEDURE — 36415 COLL VENOUS BLD VENIPUNCTURE: CPT | Performed by: EMERGENCY MEDICINE

## 2023-01-23 PROCEDURE — 71046 X-RAY EXAM CHEST 2 VIEWS: CPT

## 2023-01-23 PROCEDURE — 99284 EMERGENCY DEPT VISIT MOD MDM: CPT | Mod: 25 | Performed by: EMERGENCY MEDICINE

## 2023-01-23 PROCEDURE — 93005 ELECTROCARDIOGRAM TRACING: CPT | Performed by: EMERGENCY MEDICINE

## 2023-01-23 PROCEDURE — 85004 AUTOMATED DIFF WBC COUNT: CPT | Performed by: EMERGENCY MEDICINE

## 2023-01-23 ASSESSMENT — ACTIVITIES OF DAILY LIVING (ADL)
ADLS_ACUITY_SCORE: 35
ADLS_ACUITY_SCORE: 35

## 2023-01-23 NOTE — ED TRIAGE NOTES
"Pt here with what he describes as left sided heart congestion and fullness that gets worse with activity. Feels like \"it holds a heartbeat for a long time while the rest of the heart continues to beat\". Hx of 10 stents. Has been adjusting his metoprolol. Had decreased the dose in Oct and increased it a week and a half ago to see if it helped with palpations.      Triage Assessment       Row Name 01/23/23 1031       Triage Assessment (Adult)    Airway WDL WDL       Respiratory WDL    Respiratory WDL WDL       Skin Circulation/Temperature WDL    Skin Circulation/Temperature WDL WDL       Cardiac WDL    Cardiac WDL X;all       Chest Pain Assessment    Chest Pain Location anterior chest, left  left sided \"congestion\"    Precipitating Factors activity       Peripheral/Neurovascular WDL    Peripheral Neurovascular WDL WDL       Cognitive/Neuro/Behavioral WDL    Cognitive/Neuro/Behavioral WDL WDL                  "

## 2023-01-23 NOTE — DISCHARGE INSTRUCTIONS
Your evaluation in the Emergency Department was reassuring.     The [employer] printout showed what appear to be PVCs and PACs which are premature heart beats which can cause the sensation of palpitations. In themselves they are not dangerous.     I would like to repeat a holter monitor in the outpatient setting.     Please return to the emergency department if you develop severe and persistent chest pain, difficulty breathing, dizziness, leg swelling or if you are coughing up blood as these can be signs of a medical emergency. Please call your doctor for a follow up appointment in 2-3 days to determine the need for further testing.    Call [phone] to set up appointment for heart monitor.

## 2023-01-23 NOTE — ED PROVIDER NOTES
"  History     Chief Complaint   Patient presents with     Palpitations     HPI  Derrick Morrison is a 77 year old male with history of coronary artery disease status post PCI, hypertension, hyperlipidemia, palpitations, GERD, Parkinson's disease, with 1-1/2-week of intermittent palpitations and chest discomfort.  Patient says that the palpitations are difficult to describe but unlike ones he has had previously.  Says he feels like his heart is beating for 20 to 30 seconds with other beats behind it.  Has intermittent fullness or congestion in the left side of his chest.  There is \"no pain\".  Sometimes feels a little short of breath when he walks up a flight of stairs but no new changes.  No fevers.  No cough.  Has been adjusting his metoprolol which is normally at 25 mg and recently changed to 12.5 and now back up to 25.  Recent diagnosis of Parkinson's and has not been able to see neurology or start any medications yet.  Reports having Holter monitor previously but did not have symptoms at this time.  He does have a print out from the [employer] sara which appears to have PVCs and occasional PACs.  No extremity edema.    The patient's PMHx, Surgical Hx, Allergies, and Medications were all reviewed with the patient.    Allergies:  No Known Allergies    Problem List:    Patient Active Problem List    Diagnosis Date Noted     Parkinson's disease (H) 10/10/2022     Priority: Medium     Gastroesophageal reflux disease without esophagitis 08/24/2015     Priority: Medium     Counseling regarding advanced directives 08/14/2015     Priority: Medium     Advance Care Planning 8/14/2015: ACP Review and Resources Provided:  Reviewed chart for advance care plan.  Derrick Morrison has no plan or code status on file however states presence of ACP document. Copy requested.  Added by Alix Cotton           Subjective tinnitus 05/01/2015     Priority: Medium     Sensorineural hearing loss, bilateral 05/01/2015     Priority: Medium     " "Aids bilateral       Screening for prostate cancer 08/21/2014     Priority: Medium     OK with USPSTF recommendations against screening.  See 8/2014 note for details.  Discussed in detail at physical in 2017 again, still OK with no screening.  (now 70 y/o)       Benign essential hypertension 08/21/2014     Priority: Medium     Palpitations 08/07/2013     Priority: Medium     occasional, improved on low dose beta blocker       Hyperlipidemia LDL goal <70 11/16/2011     Priority: Medium     crestor       Coronary artery disease involving native coronary artery of native heart without angina pectoris 10/10/2011     Priority: Medium     -8/16/2006 s/p GERALDINE to mid RCA, s/p GERALDINE to mid LAD in [place]  -10/4/2007 s/p GERALDINE to pRCA and GERALDINE to dRCA in [place]   -11/17/11 Unstable angina, s/p GERALDINE to prox LAD (jailed small diagonal)   He has remained on plavix long term due to multiple stents.    Total stents now 10.  3 more 10/21 after nstemi     Formatting of this note might be different from the original.  Formatting of this note might be different from the original.  -8/16/2006 s/p GERALDINE to mid RCA, s/p GERALDINE to mid LAD in [place]  -10/4/2007 s/p GERALDINE to pRCA and GERALDINE to dRCA in [place]   -11/17/11 Unstable angina, s/p GERALDINE to prox LAD (jailed small diagonal)   He has remained on plavix long term due to multiple stents.       Hypertrophy of prostate with urinary obstruction 10/10/2011     Priority: Medium     flomax in past, \"quit working\".  Avodart in past.  Tapered off.   Update 10/14: wanted to restart flomax, stopped it, didn't think it helped.    Now back on Avodart.           screening 10/10/2011     Priority: Medium     2007 colonoscopy \"all clean\" in [place].  He is sure about this.    AAA ultrasound screen 3/07 normal also.  (leg and neck done then too)          Past Medical History:    Past Medical History:   Diagnosis Date     Actinic keratosis      Basal cell carcinoma      BPH w urinary " obs/LUTS 10/10/2011     CAD (coronary artery disease) 10/10/2011     Hyperlipidaemia      Palpitations 08/07/2013     screening 10/10/2011     Skin cancer        Past Surgical History:    Past Surgical History:   Procedure Laterality Date     APPENDECTOMY      childhood     CARDIAC SURGERY  2006, 2007, 2011    7 stents total     COLONOSCOPY      about 8-9 years ago     EYE SURGERY  2020?    cataracts       Family History:    Family History   Problem Relation Age of Onset     Coronary Artery Disease Mother      Melanoma No family hx of        Social History:  Marital Status:   [2]  Social History     Tobacco Use     Smoking status: Never     Smokeless tobacco: Never   Vaping Use     Vaping Use: Never used   Substance Use Topics     Alcohol use: Yes     Comment: wine     Drug use: No        Medications:    ASPIRIN NOT PRESCRIBED, INTENTIONAL,  clopidogrel (PLAVIX) 75 MG tablet  dutasteride (AVODART) 0.5 MG capsule  metoprolol succinate ER (TOPROL XL) 25 MG 24 hr tablet  nitroGLYcerin (NITROSTAT) 0.4 MG sublingual tablet  rosuvastatin (CRESTOR) 40 MG tablet          Review of Systems  Pertinent positives and negatives mentioned in HPI    Physical Exam   BP: (!) 159/80  Pulse: 69  Temp: 98  F (36.7  C)  Resp: 18  SpO2: 97 %    Physical Exam  GEN: Awake, alert, and cooperative.  Well-appearing  HENT: MMM. External ears and nose normal bilaterally.  EYES: EOM intact. Conjunctiva clear. No discharge.   NECK: Symmetric, freely mobile.  No JVD  CV : Regular rate and rhythm.  PULM: Normal effort. No wheezes, rales, or rhonchi bilaterally.  ABD: Soft, non-tender, non-distended. No rebound or guarding.   NEURO: Normal speech. Following commands. CN II-XII grossly intact. Answering questions and interacting appropriately.   EXT: No gross deformity. Warm and well perfused.  No pitting pedal or pretibial edema  INT: Warm. No diaphoresis. Normal color.        ED Course        Procedures         EKG: Interpreted by myself.   Sinus rhythm with rate of 59 bpm.  Normal axis.  Normal R wave progression.  No ST segment elevations.  No ectopy.  No significant change compared to 10/29/2021.  Impression sinus bradycardia with no acute changes.    Critical Care time:  none               Results for orders placed or performed during the hospital encounter of 01/23/23 (from the past 24 hour(s))   [place] Draw    Narrative    The following orders were created for panel order [place] Draw.  Procedure                               Abnormality         Status                     ---------                               -----------         ------                     Extra Blue Top Tube[[id number]]                              Final result               Extra Red Top Tube[[id number]]                               Final result               Extra Green Top (Lithium...[[id number]]                      Final result               Extra Purple Top Tube[[id number]]                            Final result                 Please view results for these tests on the individual orders.   CBC with platelets, differential    Narrative    The following orders were created for panel order CBC with platelets, differential.  Procedure                               Abnormality         Status                     ---------                               -----------         ------                     CBC with platelets and d...[[id number]]                      Final result                 Please view results for these tests on the individual orders.   Basic metabolic panel   Result Value Ref Range    Sodium 138 136 - 145 mmol/L    Potassium 4.5 3.4 - 5.3 mmol/L    Chloride 105 98 - 107 mmol/L    Carbon Dioxide (CO2) 25 22 - 29 mmol/L    Anion Gap 8 7 - 15 mmol/L    Urea Nitrogen 10.3 8.0 - 23.0 mg/dL    Creatinine 0.85 0.67 - 1.17 mg/dL    Calcium 9.4 8.8 - 10.2 mg/dL    Glucose 89 70 - 99 mg/dL    GFR Estimate 90 >60 mL/min/1.73m2   Extra Blue Top Tube   Result Value Ref Range     Hold Specimen JIC    Extra Red Top Tube   Result Value Ref Range    Hold Specimen JIC    Extra Green Top (Lithium Heparin) Tube   Result Value Ref Range    Hold Specimen JIC    Extra Purple Top Tube   Result Value Ref Range    Hold Specimen JIC    CBC with platelets and differential   Result Value Ref Range    WBC Count 6.8 4.0 - 11.0 10e3/uL    RBC Count 5.22 4.40 - 5.90 10e6/uL    Hemoglobin 15.6 13.3 - 17.7 g/dL    Hematocrit 46.8 40.0 - 53.0 %    MCV 90 78 - 100 fL    MCH 29.9 26.5 - 33.0 pg    MCHC 33.3 31.5 - 36.5 g/dL    RDW 12.3 10.0 - 15.0 %    Platelet Count 170 150 - 450 10e3/uL    % Neutrophils 69 %    % Lymphocytes 19 %    % Monocytes 10 %    % Eosinophils 1 %    % Basophils 1 %    % Immature Granulocytes 0 %    NRBCs per 100 WBC 0 <1 /100    Absolute Neutrophils 4.7 1.6 - 8.3 10e3/uL    Absolute Lymphocytes 1.3 0.8 - 5.3 10e3/uL    Absolute Monocytes 0.6 0.0 - 1.3 10e3/uL    Absolute Eosinophils 0.1 0.0 - 0.7 10e3/uL    Absolute Basophils 0.0 0.0 - 0.2 10e3/uL    Absolute Immature Granulocytes 0.0 <=0.4 10e3/uL    Absolute NRBCs 0.0 10e3/uL   Magnesium   Result Value Ref Range    Magnesium 2.1 1.7 - 2.3 mg/dL   Troponin T, High Sensitivity   Result Value Ref Range    Troponin T, High Sensitivity 6 <=22 ng/L   Chest XR,  PA & LAT    Narrative    XR CHEST 2 VIEWS 1/23/2023 12:59 PM    HISTORY: left sided chest discomfort and palpitations    COMPARISON: 10/26/2021      Impression    IMPRESSION: No infiltrate, pleural effusion or pneumothorax. Calcified  hilar lymph nodes, the sequela of prior granulomatous disease, stable.  Normal heart size. Coronary stent.    ALVARO COFFMAN MD         SYSTEM ID:  [id number]       Medications - No data to display    Assessments & Plan (with Medical Decision Making)   77 year old male with past medical history of coronary disease status post PCI, palpitations, hypertension, hyperlipidemia with 1-1/2-week of palpitations.  Also has intermittent sensation of chest congestion  but no chest pain.  EKG with sinus bradycardia no evidence of acute ischemia.  No arrhythmias identified.  Patient did bring in a cardia print out from a home monitor application which did have what appeared to be a PVC and a few PACs.  Evaluation emergency part was reassuring.  CBC, BMP, magnesium were all normal.  High sensitive troponin T was not elevated.  Chest x-ray without acute infiltrate, patient or pneumothorax.  Patient asymptomatic while in the emergency department.    My suspicion for ACS is very low.  No infectious symptoms to suggest pneumonia.  No pneumothorax on chest x-ray.  Normal electrolytes.  No signs of congestive heart failure.    Given his reassuring clinical evaluation I think he is appropriate to follow-up in the outpatient clinic.  An order for a Zio patch monitor was placed to better characterize palpitations.  He was inquiring about a stress test because he has not had one in a few years.  He is not having any chest pain.  Think this is something he can discuss with his primary provider.  Chart review shows that he called his primary clinic this morning to discuss his palpitations and was advised to go to the emergency department.    I have reviewed the nursing notes.         New Prescriptions    No medications on file       Final diagnoses:   Palpitations     Lico Vale MD    1/23/2023   [facility] EMERGENCY DEPT    Disclaimer: This note consists of words and symbols derived from keyboarding and dictation using voice recognition software.  As a result, there may be errors that have gone undetected.  Please consider this when interpreting information found in this note.             Lico Vale MD  01/23/23 1400

## 2023-01-23 NOTE — ED NOTES
Patient reported palpitations starting 3 weeks ago. Reports that palpitations worsen with activity. Patient has extensive heart history. Reports occasionally feelings dizzy. Denies chest pain, swelling, and shortness of breath.

## 2023-01-23 NOTE — TELEPHONE ENCOUNTER
"Recommended patient be seen in the emergency department now as has palpations, mild chest discomfort and history of 10 stints. Patient states his wife will bring him now.    Reason for Disposition    Chest pain    Chest pain lasting longer than 5 minutes and ANY of the following:* Over 44 years old* Over 30 years old and at least one cardiac risk factor (e.g., diabetes mellitus, high blood pressure, high cholesterol, smoker, or strong family history of heart disease)* History of heart disease (i.e., angina, heart attack, heart failure, bypass surgery, takes nitroglycerin)* Pain is crushing, pressure-like, or heavy    Additional Information    Negative: Passed out (i.e., lost consciousness, collapsed and was not responding)    Negative: Shock suspected (e.g., cold/pale/clammy skin, too weak to stand, low BP, rapid pulse)    Negative: Difficult to awaken or acting confused (e.g., disoriented, slurred speech)    Negative: Visible sweat on face or sweat dripping down face    Negative: Unable to walk, or can only walk with assistance (e.g., requires support)    Negative: Received SHOCK from implantable cardiac defibrillator and has persisting symptoms (i.e., palpitations, lightheadedness)    Negative: Dizziness, lightheadedness, or weakness and heart beating very rapidly (e.g., > 140 / minute)    Negative: Dizziness, lightheadedness, or weakness and heart beating very slowly (e.g., < 50 / minute)    Negative: Sounds like a life-threatening emergency to the triager    Negative: SEVERE difficulty breathing (e.g., struggling for each breath, speaks in single words)    Negative: Passed out (i.e., fainted, collapsed and was not responding)    Negative: Difficult to awaken or acting confused (e.g., disoriented, slurred speech)    Negative: Shock suspected (e.g., cold/pale/clammy skin, too weak to stand, low BP, rapid pulse)    Answer Assessment - Initial Assessment Questions  1. DESCRIPTION: \"Please describe your heart rate or " "heartbeat that you are having\" (e.g., fast/slow, regular/irregular, skipped or extra beats, \"palpitations\")      Just does not feel right. He has history of \"extra beats between in normal beats..   2. ONSET: \"When did it start?\" (Minutes, hours or days)       3 weeks ago, worse last night,   3. DURATION: \"How long does it last\" (e.g., seconds, minutes, hours)      Continuous during activity  4. PATTERN \"Does it come and go, or has it been constant since it started?\"  \"Does it get worse with exertion?\"   \"Are you feeling it now?\"      Has to sit down or lay down to get it to stop  5. TAP: \"Using your hand, can you tap out what you are feeling on a chair or table in front of you, so that I can hear?\" (Note: not all patients can do this)        Patient has \"cardia devise\" that monitors his heart rate when he wants it checked. He does not pay money to have the actual reading interpreted. States looking at it the rhythm between beats is not normal.  6. HEART RATE: \"Can you tell me your heart rate?\" \"How many beats in 15 seconds?\"  (Note: not all patients can do this)        71 per BP machine. /76 now. States blood pressure is higher than normal.  7. RECURRENT SYMPTOM: \"Have you ever had this before?\" If Yes, ask: \"When was the last time?\" and \"What happened that time?\"       No shortness of breath, no arm or jaw pain Patient does have left chest discomfort rated a 1. Feels like \"congestion\". Denies cough.    8. CAUSE: \"What do you think is causing the palpitations?\"      Has had in the past. Extra beats between normal beats  9. CARDIAC HISTORY: \"Do you have any history of heart disease?\" (e.g., heart attack, angina, bypass surgery, angioplasty, arrhythmia)       10 stints States he has not had a heart attack. Stints were placed before having a heart attack.  10. OTHER SYMPTOMS: \"Do you have any other symptoms?\" (e.g., dizziness, chest pain, sweating, difficulty breathing)        No difficulty breathing, chest " "discomfort rated a 1. Denies pressure.. Describes discomfort as congestion. No other symptoms  11. PREGNANCY: \"Is there any chance you are pregnant?\" \"When was your last menstrual period?\"        n/a    Protocols used: HEART RATE AND HEARTBEAT [id number], CHEST PAIN-A-OH    Sveta Yadav RN    "

## 2023-02-06 ENCOUNTER — HOSPITAL ENCOUNTER (OUTPATIENT)
Dept: CARDIOLOGY | Facility: CLINIC | Age: 78
Discharge: HOME | End: 2023-02-06
Attending: EMERGENCY MEDICINE | Admitting: EMERGENCY MEDICINE
Payer: MEDICARE

## 2023-02-06 DIAGNOSIS — R00.2 PALPITATIONS: ICD-10-CM

## 2023-02-06 PROCEDURE — 93244 EXT ECG>48HR<7D REV&INTERPJ: CPT | Performed by: INTERNAL MEDICINE

## 2023-02-06 PROCEDURE — 93242 EXT ECG>48HR<7D RECORDING: CPT

## 2023-02-12 ENCOUNTER — MYC MEDICAL ADVICE (OUTPATIENT)
Dept: FAMILY MEDICINE | Facility: CLINIC | Age: 78
End: 2023-02-12
Payer: COMMERCIAL

## 2023-02-20 ENCOUNTER — MYC MEDICAL ADVICE (OUTPATIENT)
Dept: FAMILY MEDICINE | Facility: CLINIC | Age: 78
End: 2023-02-20
Payer: COMMERCIAL

## 2023-02-21 RX ORDER — BISACODYL 5 MG
TABLET, DELAYED RELEASE (ENTERIC COATED) ORAL
Qty: 4 TABLET | Refills: 0 | Status: SHIPPED | OUTPATIENT
Start: 2023-02-21 | End: 2023-10-09

## 2023-02-27 ENCOUNTER — ANESTHESIA EVENT (OUTPATIENT)
Dept: GASTROENTEROLOGY | Facility: CLINIC | Age: 78
End: 2023-02-27
Payer: MEDICARE

## 2023-02-27 NOTE — ANESTHESIA PREPROCEDURE EVALUATION
"Anesthesia Pre-Procedure Evaluation    Patient: Derrick Lu   MRN: [id number] : 1945        Procedure : Procedure(s):  COLONOSCOPY          Past Medical History:   Diagnosis Date     Actinic keratosis      Basal cell carcinoma      BPH w urinary obs/LUTS 10/10/2011    flomax in past, \"quit working\".  Avodart in past.  Tapered off.        CAD (coronary artery disease) 10/10/2011    -2006 s/p GERALDINE to mid RCA, s/p GERALDINE to mid LAD in [place] -10/4/2007 s/p GERALDINE to pRCA and GERALDINE to dRCA in [place]  -11 Unstable angina, s/p GERALDINE to prox LAD (jailed small diagonal)       Hyperlipidaemia      Palpitations 2013    occasional, improved on low dose beta blocker      screening 10/10/2011    2007 colonoscopy \"all clean\" in [place].  He is sure about this.   AAA ultrasound screen 3/07 normal also.  (leg and neck done then too)      Skin cancer     had mohs on L temple      Past Surgical History:   Procedure Laterality Date     APPENDECTOMY      childhood     CARDIAC SURGERY  , ,     7 stents total     COLONOSCOPY      about 8-9 years ago     EYE SURGERY  ?    cataracts      No Known Allergies   Social History     Tobacco Use     Smoking status: Never     Smokeless tobacco: Never   Substance Use Topics     Alcohol use: Yes     Comment: wine      Wt Readings from Last 1 Encounters:   22 90.6 kg (199 lb 12.8 oz)        Anesthesia Evaluation   Pt has had prior anesthetic. Type: MAC and General.        ROS/MED HX  ENT/Pulmonary: Comment: Tinnitus  Hearing loss        Neurologic:     (+) Parkinson's disease,     Cardiovascular:     (+) Dyslipidemia hypertension--CAD -OKSB-aspjq-4/16/2006 . Drug Eluting Stent. Irregular Heartbeat/Palpitations, Previous cardiac testing   Echo: Date: Results:  [facility]  Echocardiography Laboratory  [address] [place] [address]        Name: DERRICK LU  MRN: [id number]  : 1945  Study Date: " 04/13/2018 01:48 PM  Age: 72 yrs  Gender: Male  Patient Location: [facility]  Reason For Study: CAD  Ordering Physician: RUT MCDONNELL  Referring Physician: Kaiden Zapata  Performed By: Birdie Schofield RDCS     BSA: 2.0 m2  Height: 68 in  Weight: 200 lb  HR: 60  BP: 127/70 mmHg  _____________________________________________________________________________  __        Procedure  Complete Echo Adult.  _____________________________________________________________________________  __        Interpretation Summary     1. Normal left ventricle size and systolic function. LV ejection fraction 60-  65%. Normal diastolic function.  2. No regional wall motion abnormalities.  3. Normal right ventricular size and systolic function.  4. No hemodynamically significant valve disease.     Compared to the prior study dated 3/8/2017, there have been no changes.  _____________________________________________________________________________  __        Left Ventricle  The left ventricle is normal in size. There is normal left ventricular wall  thickness. Left ventricular systolic function is normal. The visual ejection  fraction is estimated at 60-65%. Left ventricular diastolic function is  normal. No regional wall motion abnormalities noted.     Right Ventricle  The right ventricle is normal in size and function.     Atria  Normal left atrial size. Right atrial size is normal. There is no color  Doppler evidence of an atrial shunt.     Mitral Valve  The mitral valve leaflets appear normal. There is no evidence of stenosis,  fluttering, or prolapse. There is trace mitral regurgitation.        Tricuspid Valve  Normal tricuspid valve. There is trace tricuspid regurgitation. Right  ventricular systolic pressure could not be approximated due to inadequate  tricuspid regurgitation.     Aortic Valve  The aortic valve is trileaflet. There is trace aortic regurgitation. No  hemodynamically significant valvular aortic stenosis.     Pulmonic Valve  The  pulmonic valve is not well seen, but is grossly normal. There is trace  pulmonic valvular regurgitation.     Vessels  The aortic root is normal size. (3.2 centimeters). Normal size ascending  aorta. (3.3 centimeters). The IVC is normal in size and reactivity with  respiration, suggesting normal central venous pressure.     Pericardium  There is no pericardial effusion.        Rhythm  Sinus rhythm was noted.  Stress Test: Date: Results:    ECG Reviewed: Date: Results:  Sinus Bradycardia   WITHIN NORMAL LIMITS  Cath: Date: Results:   CHF: 8/16/2006 s/p GERALDINE to mid RCA, s/p GERALDINE to mid LAD in [place]  -10/4/2007 s/p GERALDINE to pRCA and GERALDINE to dRCA in [place]   -11/17/11 Unstable angina, s/p GERALDINE to prox LAD (jailed small diagonal)   He has remained on plavix long term due to multiple    METS/Exercise Tolerance:     Hematologic:  - neg hematologic  ROS     Musculoskeletal:   (+) arthritis,     GI/Hepatic:     (+) GERD,     Renal/Genitourinary:     (+) BPH,     Endo:  - neg endo ROS     Psychiatric/Substance Use:  - neg psychiatric ROS     Infectious Disease:  - neg infectious disease ROS     Malignancy:   (+) Malignancy, History of Skin.Skin CA Remission status post Surgery.        Other:  - neg other ROS          Physical Exam    Airway        Mallampati: II   TM distance: > 3 FB   Neck ROM: full   Mouth opening: > 3 cm    Respiratory Devices and Support         Dental       (+) Completely normal teeth      Cardiovascular   cardiovascular exam normal          Pulmonary   pulmonary exam normal                OUTSIDE LABS:  CBC:   Lab Results   Component Value Date    WBC 6.8 01/23/2023    WBC 7.5 10/26/2021    HGB 15.6 01/23/2023    HGB 15.7 10/26/2021    HCT 46.8 01/23/2023    HCT 47.6 10/26/2021     01/23/2023     10/26/2021     BMP:   Lab Results   Component Value Date     01/23/2023     10/06/2022    POTASSIUM 4.5 01/23/2023    POTASSIUM 4.4 10/06/2022    CHLORIDE 105 01/23/2023     CHLORIDE 103 10/06/2022    CO2 25 01/23/2023    CO2 27 10/06/2022    BUN 10.3 01/23/2023    BUN 9.5 10/06/2022    CR 0.85 01/23/2023    CR 0.94 10/06/2022    GLC 89 01/23/2023    GLC 98 10/06/2022     COAGS: No results found for: PTT, INR, FIBR  POC: No results found for: BGM, HCG, HCGS  HEPATIC:   Lab Results   Component Value Date    ALBUMIN 3.4 10/26/2021    PROTTOTAL 7.6 10/26/2021    ALT 49 10/26/2021    AST 28 10/26/2021    ALKPHOS 74 10/26/2021    BILITOTAL 0.4 10/26/2021     OTHER:   Lab Results   Component Value Date    BEBETO 9.4 01/23/2023    MAG 2.1 01/23/2023    LIPASE 152 10/26/2021    TSH 0.80 10/06/2022       Anesthesia Plan    ASA Status:  3      Anesthesia Type: General.   Induction: Propofol.   Maintenance: TIVA.        Consents    Anesthesia Plan(s) and associated risks, benefits, and realistic alternatives discussed. Questions answered and patient/representative(s) expressed understanding.     - Discussed: Risks, Benefits and Alternatives for BOTH SEDATION and the PROCEDURE were discussed     - Discussed with:  Patient         Postoperative Care    Pain management: IV analgesics, Oral pain medications, Multi-modal analgesia.   PONV prophylaxis: Ondansetron (or other 5HT-3), Dexamethasone or Solumedrol     Comments:                MARIA T Gonsalez CRNA

## 2023-02-28 ENCOUNTER — HOSPITAL ENCOUNTER (OUTPATIENT)
Facility: CLINIC | Age: 78
Discharge: HOME | End: 2023-02-28
Attending: SURGERY | Admitting: SURGERY
Payer: MEDICARE

## 2023-02-28 ENCOUNTER — ANESTHESIA (OUTPATIENT)
Dept: GASTROENTEROLOGY | Facility: CLINIC | Age: 78
End: 2023-02-28
Payer: MEDICARE

## 2023-02-28 VITALS
TEMPERATURE: 98.2 F | HEART RATE: 69 BPM | WEIGHT: 192 LBS | BODY MASS INDEX: 28.44 KG/M2 | SYSTOLIC BLOOD PRESSURE: 134 MMHG | OXYGEN SATURATION: 96 % | HEIGHT: 69 IN | DIASTOLIC BLOOD PRESSURE: 72 MMHG | RESPIRATION RATE: 14 BRPM

## 2023-02-28 DIAGNOSIS — Z12.11 SPECIAL SCREENING FOR MALIGNANT NEOPLASMS, COLON: Primary | ICD-10-CM

## 2023-02-28 LAB — COLONOSCOPY: NORMAL

## 2023-02-28 PROCEDURE — 250N000009 HC RX 250: Performed by: SURGERY

## 2023-02-28 PROCEDURE — 370N000017 HC ANESTHESIA TECHNICAL FEE, PER MIN: Performed by: SURGERY

## 2023-02-28 PROCEDURE — 45378 DIAGNOSTIC COLONOSCOPY: CPT | Performed by: SURGERY

## 2023-02-28 PROCEDURE — 258N000003 HC RX IP 258 OP 636: Performed by: SURGERY

## 2023-02-28 PROCEDURE — 250N000011 HC RX IP 250 OP 636: Performed by: NURSE ANESTHETIST, CERTIFIED REGISTERED

## 2023-02-28 RX ORDER — NALOXONE HYDROCHLORIDE 0.4 MG/ML
0.4 INJECTION, SOLUTION INTRAMUSCULAR; INTRAVENOUS; SUBCUTANEOUS
Status: DISCONTINUED | OUTPATIENT
Start: 2023-02-28 | End: 2023-02-28 | Stop reason: HOSPADM

## 2023-02-28 RX ORDER — ONDANSETRON 4 MG/1
4 TABLET, ORALLY DISINTEGRATING ORAL EVERY 6 HOURS PRN
Status: DISCONTINUED | OUTPATIENT
Start: 2023-02-28 | End: 2023-02-28 | Stop reason: HOSPADM

## 2023-02-28 RX ORDER — LIDOCAINE 40 MG/G
CREAM TOPICAL
Status: DISCONTINUED | OUTPATIENT
Start: 2023-02-28 | End: 2023-02-28 | Stop reason: HOSPADM

## 2023-02-28 RX ORDER — ONDANSETRON 2 MG/ML
4 INJECTION INTRAMUSCULAR; INTRAVENOUS EVERY 6 HOURS PRN
Status: DISCONTINUED | OUTPATIENT
Start: 2023-02-28 | End: 2023-02-28 | Stop reason: HOSPADM

## 2023-02-28 RX ORDER — NALOXONE HYDROCHLORIDE 0.4 MG/ML
0.2 INJECTION, SOLUTION INTRAMUSCULAR; INTRAVENOUS; SUBCUTANEOUS
Status: DISCONTINUED | OUTPATIENT
Start: 2023-02-28 | End: 2023-02-28 | Stop reason: HOSPADM

## 2023-02-28 RX ORDER — FLUMAZENIL 0.1 MG/ML
0.2 INJECTION, SOLUTION INTRAVENOUS
Status: DISCONTINUED | OUTPATIENT
Start: 2023-02-28 | End: 2023-02-28 | Stop reason: HOSPADM

## 2023-02-28 RX ORDER — SODIUM CHLORIDE, SODIUM LACTATE, POTASSIUM CHLORIDE, CALCIUM CHLORIDE 600; 310; 30; 20 MG/100ML; MG/100ML; MG/100ML; MG/100ML
INJECTION, SOLUTION INTRAVENOUS CONTINUOUS
Status: DISCONTINUED | OUTPATIENT
Start: 2023-02-28 | End: 2023-02-28 | Stop reason: HOSPADM

## 2023-02-28 RX ORDER — PROPOFOL 10 MG/ML
INJECTION, EMULSION INTRAVENOUS PRN
Status: DISCONTINUED | OUTPATIENT
Start: 2023-02-28 | End: 2023-02-28

## 2023-02-28 RX ORDER — PROCHLORPERAZINE MALEATE 5 MG
5 TABLET ORAL EVERY 6 HOURS PRN
Status: DISCONTINUED | OUTPATIENT
Start: 2023-02-28 | End: 2023-02-28 | Stop reason: HOSPADM

## 2023-02-28 RX ADMIN — PROPOFOL 100 MG: 10 INJECTION, EMULSION INTRAVENOUS at 11:58

## 2023-02-28 RX ADMIN — PROPOFOL 50 MG: 10 INJECTION, EMULSION INTRAVENOUS at 11:51

## 2023-02-28 RX ADMIN — PROPOFOL 50 MG: 10 INJECTION, EMULSION INTRAVENOUS at 11:50

## 2023-02-28 RX ADMIN — LIDOCAINE HYDROCHLORIDE 0.2 ML: 10 INJECTION, SOLUTION EPIDURAL; INFILTRATION; INTRACAUDAL; PERINEURAL at 11:33

## 2023-02-28 RX ADMIN — PROPOFOL 100 MG: 10 INJECTION, EMULSION INTRAVENOUS at 11:55

## 2023-02-28 RX ADMIN — PROPOFOL 100 MG: 10 INJECTION, EMULSION INTRAVENOUS at 12:01

## 2023-02-28 RX ADMIN — SODIUM CHLORIDE, POTASSIUM CHLORIDE, SODIUM LACTATE AND CALCIUM CHLORIDE: 600; 310; 30; 20 INJECTION, SOLUTION INTRAVENOUS at 11:35

## 2023-02-28 ASSESSMENT — ACTIVITIES OF DAILY LIVING (ADL)
ADLS_ACUITY_SCORE: 35
ADLS_ACUITY_SCORE: 35

## 2023-02-28 NOTE — ANESTHESIA CARE TRANSFER NOTE
Patient: Derrick Morrison    Procedure: Procedure(s):  COLONOSCOPY       Diagnosis: Special screening for malignant neoplasm of colon [Z12.11]  Diagnosis Additional Information: No value filed.    Anesthesia Type:   General     Note:    Oropharynx: spontaneously breathing and oropharynx clear of all foreign objects  Level of Consciousness: awake  Oxygen Supplementation: room air    Independent Airway: airway patency satisfactory and stable  Dentition: dentition unchanged  Vital Signs Stable: post-procedure vital signs reviewed and stable  Report to RN Given: handoff report given  Patient transferred to: Phase II    Handoff Report: Identifed the Patient, Identified the Reponsible Provider, Reviewed the pertinent medical history, Discussed the surgical course, Reviewed Intra-OP anesthesia mangement and issues during anesthesia, Set expectations for post-procedure period and Allowed opportunity for questions and acknowledgement of understanding      Vitals:  Vitals Value Taken Time   BP     Temp     Pulse     Resp     SpO2         Electronically Signed By: MARIA T Brice CRNA  February 28, 2023  12:21 PM

## 2023-02-28 NOTE — H&P
"ENDOSCOPY PRE-SEDATION H&P FOR OUTPATIENT PROCEDURES    Derrick Morrison  [id number]  1945    Procedure:   Colonoscopy possible biopsy, possible polypectomy, with MAC sedation.     Pre-procedure diagnosis: left lower quadrant pain    Past medical history:   Past Medical History:   Diagnosis Date     Actinic keratosis      Basal cell carcinoma      BPH w urinary obs/LUTS 10/10/2011    flomax in past, \"quit working\".  Avodart in past.  Tapered off.        CAD (coronary artery disease) 10/10/2011    -8/16/2006 s/p GERALDINE to mid RCA, s/p GERALDINE to mid LAD in [place] -10/4/2007 s/p GERALDINE to pRCA and GERALDINE to dRCA in [place]  -11/17/11 Unstable angina, s/p GERALDINE to prox LAD (jailed small diagonal)       Hyperlipidaemia      Palpitations 08/07/2013    occasional, improved on low dose beta blocker      screening 10/10/2011    2007 colonoscopy \"all clean\" in [place].  He is sure about this.   AAA ultrasound screen 3/07 normal also.  (leg and neck done then too)      Skin cancer     had mohs on [facility]       Past surgical history:   Past Surgical History:   Procedure Laterality Date     APPENDECTOMY      childhood     CARDIAC SURGERY  2006, 2007, 2011    7 stents total     COLONOSCOPY      about 8-9 years ago     EYE SURGERY  2020?    cataracts       Current Facility-Administered Medications   Medication     lidocaine (LMX4) kit     lidocaine 1 % 0.1-1 mL     sodium chloride (PF) 0.9% PF flush 3 mL     sodium chloride (PF) 0.9% PF flush 3 mL       No Known Allergies    History of Anesthesia/Sedation Problems: no    Physical Exam:    Mental status: alert  Heart: Normal  Lung: Normal  Assessment of patient's airway: Normal  Other as pertinent for procedure: None     ASA Score: See Provation note    Mallampati score:  II - Faucial pillars and soft palate may be seen, but uvula is masked by the base of the tongue    Assessment/Plan:     The patient is an appropriate candidate to receive sedation.    Informed " consent was discussed with the patient/family, including the risks, benefits, potential complications and any alternative options associated with sedation.    Patient assessment completed just prior to sedation and while under constant observation by the provider. Condition determined to be adequate for proceeding with sedation.    The specific risks for the procedure were discussed with the patient at the time of informed consent and include but are not limited to perforation which could require surgery, missing significant neoplasm or lesion, hemorrhage and adverse sedative complication.      aKiden Frausto MD

## 2023-02-28 NOTE — ANESTHESIA POSTPROCEDURE EVALUATION
Patient: Derrick Morrison    Procedure: Procedure(s):  COLONOSCOPY       Anesthesia Type:  General    Note:  Disposition: Outpatient   Postop Pain Control: Uneventful            Sign Out: Well controlled pain   PONV: No   Neuro/Psych: Uneventful            Sign Out: Acceptable/Baseline neuro status   Airway/Respiratory: Uneventful            Sign Out: Acceptable/Baseline resp. status   CV/Hemodynamics: Uneventful            Sign Out: Acceptable CV status; No obvious hypovolemia; No obvious fluid overload   Other NRE: NONE   DID A NON-ROUTINE EVENT OCCUR? No           Last vitals:  Vitals:    02/28/23 1119   BP: (!) 145/80   Pulse: 90   Resp: 16   Temp: 36.8  C (98.2  F)       Electronically Signed By: MARIA T Brice CRNA  February 28, 2023  12:21 PM

## 2023-03-02 NOTE — PROGRESS NOTES
Outpatient Physical Therapy Discharge Note     Patient: Derrick Morrison  : 1945    Beginning/End Dates of Reporting Period:  2022 to 10/11/2022    Referring Provider: Dr. Zapata    Therapy Diagnosis: LBP with mobility deficits    Patient did not return for follow up treatments as directed.  Goal status and current objective information is therefore unknown.  Discharge from PT services at this time for this episode of treatment. Please see attached documentation under this episode of care for further information including dates of service, start of care date, referring physician, Dx, treatment plan, treatments, etc.    Gertrude Benitez, PT, DPT, CLT  Physical Therapist & Certified Lymphedema Therapist  [facility]

## 2023-03-13 ENCOUNTER — HOSPITAL ENCOUNTER (OUTPATIENT)
Dept: NUCLEAR MEDICINE | Facility: CLINIC | Age: 78
Setting detail: NUCLEAR MEDICINE
Discharge: HOME | End: 2023-03-13
Attending: NURSE PRACTITIONER
Payer: MEDICARE

## 2023-03-13 ENCOUNTER — HOSPITAL ENCOUNTER (OUTPATIENT)
Dept: CARDIOLOGY | Facility: CLINIC | Age: 78
Discharge: HOME | End: 2023-03-13
Attending: NURSE PRACTITIONER
Payer: MEDICARE

## 2023-03-13 DIAGNOSIS — R00.2 PALPITATIONS: ICD-10-CM

## 2023-03-13 DIAGNOSIS — I25.10 CORONARY ARTERY DISEASE INVOLVING NATIVE CORONARY ARTERY OF NATIVE HEART WITHOUT ANGINA PECTORIS: ICD-10-CM

## 2023-03-13 DIAGNOSIS — I25.10 CORONARY ARTERY DISEASE INVOLVING NATIVE HEART WITHOUT ANGINA PECTORIS, UNSPECIFIED VESSEL OR LESION TYPE: ICD-10-CM

## 2023-03-13 DIAGNOSIS — I21.4 NSTEMI (NON-ST ELEVATED MYOCARDIAL INFARCTION) (H): ICD-10-CM

## 2023-03-13 DIAGNOSIS — Z95.5 S/P CORONARY ARTERY STENT PLACEMENT: ICD-10-CM

## 2023-03-13 LAB
CV BLOOD PRESSURE: 66 MMHG
CV STRESS MAX HR HE: 86
RATE PRESSURE PRODUCT: NORMAL
STRESS ECHO BASELINE DIASTOLIC HE: 64
STRESS ECHO BASELINE HR: 60 BPM
STRESS ECHO BASELINE SYSTOLIC BP: 120
STRESS ECHO CALCULATED PERCENT HR: 60 %
STRESS ECHO LAST STRESS DIASTOLIC BP: 66
STRESS ECHO LAST STRESS SYSTOLIC BP: 124
STRESS ECHO TARGET HR: 144

## 2023-03-13 PROCEDURE — A9502 TC99M TETROFOSMIN: HCPCS | Performed by: NURSE PRACTITIONER

## 2023-03-13 PROCEDURE — 343N000001 HC RX 343: Performed by: NURSE PRACTITIONER

## 2023-03-13 PROCEDURE — 78452 HT MUSCLE IMAGE SPECT MULT: CPT | Mod: MG

## 2023-03-13 PROCEDURE — G1010 CDSM STANSON: HCPCS | Performed by: INTERNAL MEDICINE

## 2023-03-13 PROCEDURE — G1010 CDSM STANSON: HCPCS

## 2023-03-13 PROCEDURE — 93018 CV STRESS TEST I&R ONLY: CPT | Performed by: INTERNAL MEDICINE

## 2023-03-13 PROCEDURE — 93017 CV STRESS TEST TRACING ONLY: CPT

## 2023-03-13 PROCEDURE — 250N000011 HC RX IP 250 OP 636: Performed by: NURSE PRACTITIONER

## 2023-03-13 PROCEDURE — 78452 HT MUSCLE IMAGE SPECT MULT: CPT | Mod: 26 | Performed by: INTERNAL MEDICINE

## 2023-03-13 PROCEDURE — 93016 CV STRESS TEST SUPVJ ONLY: CPT | Performed by: INTERNAL MEDICINE

## 2023-03-13 RX ORDER — REGADENOSON 0.08 MG/ML
0.4 INJECTION, SOLUTION INTRAVENOUS ONCE
Status: COMPLETED | OUTPATIENT
Start: 2023-03-13 | End: 2023-03-13

## 2023-03-13 RX ADMIN — TETROFOSMIN 10.2 MILLICURIE: 1.38 INJECTION, POWDER, LYOPHILIZED, FOR SOLUTION INTRAVENOUS at 12:50

## 2023-03-13 RX ADMIN — REGADENOSON 0.4 MG: 0.08 INJECTION, SOLUTION INTRAVENOUS at 14:06

## 2023-03-13 RX ADMIN — TETROFOSMIN 31.5 MILLICURIE: 1.38 INJECTION, POWDER, LYOPHILIZED, FOR SOLUTION INTRAVENOUS at 14:15

## 2023-03-20 ENCOUNTER — OFFICE VISIT (OUTPATIENT)
Dept: NEUROLOGY | Facility: CLINIC | Age: 78
End: 2023-03-20
Attending: FAMILY MEDICINE
Payer: COMMERCIAL

## 2023-03-20 VITALS
DIASTOLIC BLOOD PRESSURE: 68 MMHG | SYSTOLIC BLOOD PRESSURE: 130 MMHG | WEIGHT: 192 LBS | HEART RATE: 60 BPM | BODY MASS INDEX: 28.35 KG/M2

## 2023-03-20 DIAGNOSIS — R29.818 PARKINSONIAN FEATURES: Primary | ICD-10-CM

## 2023-03-20 DIAGNOSIS — E55.9 VITAMIN D DEFICIENCY: ICD-10-CM

## 2023-03-20 PROCEDURE — 99205 OFFICE O/P NEW HI 60 MIN: CPT | Performed by: PSYCHIATRY & NEUROLOGY

## 2023-03-20 RX ORDER — CARBIDOPA AND LEVODOPA 25; 100 MG/1; MG/1
1 TABLET ORAL 3 TIMES DAILY
Qty: 90 TABLET | Refills: 3 | Status: SHIPPED | OUTPATIENT
Start: 2023-03-20 | End: 2023-04-06

## 2023-03-20 NOTE — PROGRESS NOTES
INITIAL NEUROLOGY CONSULTATION    DATE OF VISIT: 3/20/2023  MRN: [id number]  PATIENT NAME: Derrick Morrison  YOB: 1945    REFERRING PROVIDER: Kaiden Zapata    Chief Complaint   Patient presents with     Parkinson       SUBJECTIVE:                                                      HPI:  Derrick Morrison is a 77 year old male whom I have been asked by Dr. Zapata to see in consultation for possible Parkinson's.  He noticed tremor in the Left hand which started in June 2019.  He initially attributed this to stress.  He was also noticing more constipation at that time.  He says that in October 2021 he was noticing more tremor involving the left arm.  At that time his primary, Dr. Zaapta, did mention possible Parkinson's.  He started to notice some hoarseness, decreased voice last year.  He was noting that writing and printing harder to do and his tremor was causing him to type extra letters with his left hand.  He is here with his wife and son.  Derrick's son says that he does seem quieter.  His wife mentions today that he shuffles.  They all agree that the tremor for the most part has not gone in the way of his function.  Derrick still does quite a bit of cooking, shopping.  No problems with driving.    No stiffness in the limbs.  He has not noticed any particular balance problems.  He and his wife have been attending a couple of support groups.  They find this quite helpful.  Son clarifies that there was some discussion about starting medication for Parkinson's in the past but Derrick was not interested at that time.  He is concerned about starting medication because it can be less effective over time.  He read this somewhere.    It sounds like he occasionally moves around in his sleep.  He does talk in his sleep.  He has a poor sense of smell.  He asks about various vitamins.  He also asks about any dietary recommendations.    History of paternal uncle with Parkinson's.       Past Medical History:   Diagnosis  "Date     Actinic keratosis      Basal cell carcinoma      BPH w urinary obs/LUTS 10/10/2011    flomax in past, \"quit working\".  Avodart in past.  Tapered off.        CAD (coronary artery disease) 10/10/2011    -8/16/2006 s/p GERALDINE to mid RCA, s/p GERALDINE to mid LAD in [place] -10/4/2007 s/p GERALDINE to pRCA and GERALDINE to dRCA in [place]  -11/17/11 Unstable angina, s/p GERALDINE to prox LAD (jailed small diagonal)       Hyperlipidaemia      Palpitations 08/07/2013    occasional, improved on low dose beta blocker      screening 10/10/2011    2007 colonoscopy \"all clean\" in [place].  He is sure about this.   AAA ultrasound screen 3/07 normal also.  (leg and neck done then too)      Skin cancer     [facility]     Past Surgical History:   Procedure Laterality Date     APPENDECTOMY      childhood     CARDIAC SURGERY  2006, 2007, 2011    7 stents total     COLONOSCOPY      about 8-9 years ago     COLONOSCOPY N/A 2/28/2023    Procedure: COLONOSCOPY;  Surgeon: Kaiden Frausto MD;  Location: [facility]     EYE SURGERY  2020?    cataracts       ASPIRIN NOT PRESCRIBED, INTENTIONAL,, 1 each continuous prn Antiplatelet medication not prescribed intentionally due to plavix  clopidogrel (PLAVIX) 75 MG tablet, Take 1 tablet (75 mg) by mouth daily  dutasteride (AVODART) 0.5 MG capsule, Take one pill every other day  metoprolol succinate ER (TOPROL XL) 25 MG 24 hr tablet, Take 1 tablet (25 mg) by mouth daily (Patient taking differently: Take 12.5 mg by mouth daily)  nitroGLYcerin (NITROSTAT) 0.4 MG sublingual tablet, PLACE 1 TABLET UNDER THE TONGUE AT THE ONSET OF CHEST PAIN. MAY REPEAT EVERY 5 MINUTES AS NEEDED FOR A TOTAL OF 3 DOSES.  rosuvastatin (CRESTOR) 40 MG tablet, Take 1 tablet (40 mg) by mouth every evening  bisacodyl (DULCOLAX) 5 MG EC tablet, Take 2 tablets at 3 pm the day before your procedure. If your procedure is before 11 am, take 2 additional tablets at 11 pm. If your procedure is after 11 am, take 2 " additional tablets at 6 am. For additional instructions refer to your colonoscopy prep instructions.  polyethylene glycol (GOLYTELY) 236 g suspension, The night before the exam at 6 pm drink an 8-ounce glass every 15 minutes until the jug is half empty. If you arrive before 11 AM: Drink the other half of the Golytely jug at 11 PM night before procedure. If you arrive after 11 AM: Drink the other half of the Golytely jug at 6 AM day of procedure. For additional instructions refer to your colonoscopy prep instructions.    No current facility-administered medications on file prior to visit.    No Known Allergies     Problem (# of Occurrences) Relation (Name,Age of Onset)    Coronary Artery Disease (1) Mother (Gen)       Negative family history of: Melanoma        Social History     Tobacco Use     Smoking status: Never     Smokeless tobacco: Never   Vaping Use     Vaping Use: Never used   Substance Use Topics     Alcohol use: Yes     Comment: wine     Drug use: No       REVIEW OF SYSTEMS:                                                      10-point review of systems is negative except as mentioned above in HPI.     EXAM:                                                      Physical Exam:   Vitals: /68   Pulse 60   Wt 87.1 kg (192 lb)   BMI 28.35 kg/m    BMI= Body mass index is 28.35 kg/m .  GENERAL: NAD.  HEENT: NC/AT.   CV: RRR. S1, S2.   NECK: No bruits.  PULM: Non-labored breathing.   Neurologic:  MENTAL STATUS: Alert, attentive. Speech is fluent. Normal comprehension.  Normal concentration. Adequate fund of knowledge.   CRANIAL NERVES: Discs flat. Visual fields intact to confrontation. Pupils equally, round and reactive to light. Facial sensation and movement normal. EOM full. Hearing intact to conversation. Trapezius strength intact. Palate moves symmetrically. Tongue midline.  MOTOR: 5/5 in proximal and distal muscle groups of upper and lower extremities. Tone and bulk normal.   DTRs: Intact and  "symmetric in biceps, BR, patellae.   SENSATION: Normal light touch and pinprick. Intact proprioception at great toes. Vibration: Slightly decreased at both ankles.   COORDINATION: Normal finger nose finger. Finger tapping normal. Knee heel shin normal.  STATION AND GAIT: Romberg negative. Good postural reflexes.  Step length looks good.  Minimal arm swing on the left.    TREMOR: Intermittent rolling tremor of the left hand.  No other abnormal movements observed.  Left hand-dominant.    ASSESSMENT and PLAN:                                                      Assessment:       ICD-10-CM    1. Parkinsonian features  R25.9 Adult Neurology  Referral     Vitamin E     Vitamin B12     Vitamin B6     Vitamin B1 whole blood     Methylmalonic Acid     Physical Therapy Referral     carbidopa-levodopa (SINEMET)  MG tablet      2. Vitamin D deficiency  E55.9 Vitamin D Deficiency    rule out        Mr. Morrison is a 78-year-old man with history of CAD, hyperlipidemia, BPH and basal cell carcinoma here for evaluation for possible Parkinson's.  Much of his history and today's exam do support the diagnosis.  We discussed treatment options, but Derrick would like to think about this.  I do think he would benefit from of the big and loud therapy program, specifically for his voice.  We discussed healthy diet and exercise, avoidance of inflammatory foods.  I encouraged Derrick to continue to stay active.  We will plan to follow-up in 6 months.    Derrick had several questions which were answered to the best of my ability today.    Plan:  -- Consider starting carbidopa/levodopa. Starting dose would be 1 tablet three times daily.   -- Referral to physical therapy for \"Big and Loud\" program.  -- Labs: Vitamins D, E, B1, B6 and B12.  -- Return to Neurology in 6 months.   -- Keep up the good work with staying active in the meantime!    Total Time: 60 minutes were spent with the patient and in chart review/documentation (as " described above in Assessment and Plan) /coordinating the care on date of service.    Izzy Juarez MD  Neurology    CC: MD Tressa Mueller software used in the dictation of this note.

## 2023-03-20 NOTE — LETTER
3/20/2023         RE: Derrick Morrison  [address] [id number]        Dear Colleague,    Thank you for referring your patient, Derrick Morrison, to the [facility] [place]. Please see a copy of my visit note below.    INITIAL NEUROLOGY CONSULTATION    DATE OF VISIT: 3/20/2023  MRN: [id number]  PATIENT NAME: Derrick Morrison  YOB: 1945    REFERRING PROVIDER: Kaiden Zapata    Chief Complaint   Patient presents with     Parkinson       SUBJECTIVE:                                                      HPI:  Derrick Morrison is a 77 year old male whom I have been asked by Dr. Zapata to see in consultation for possible Parkinson's.  He noticed tremor in the Left hand which started in June 2019.  He initially attributed this to stress.  He was also noticing more constipation at that time.  He says that in October 2021 he was noticing more tremor involving the left arm.  At that time his primary, Dr. Zapata, did mention possible Parkinson's.  He started to notice some hoarseness, decreased voice last year.  He was noting that writing and printing harder to do and his tremor was causing him to type extra letters with his left hand.  He is here with his wife and son.  Derrick's son says that he does seem quieter.  His wife mentions today that he shuffles.  They all agree that the tremor for the most part has not gone in the way of his function.  Derrick still does quite a bit of cooking, shopping.  No problems with driving.    No stiffness in the limbs.  He has not noticed any particular balance problems.  He and his wife have been attending a couple of support groups.  They find this quite helpful.  Son clarifies that there was some discussion about starting medication for Parkinson's in the past but Derrick was not interested at that time.  He is concerned about starting medication because it can be less effective over time.  He read this somewhere.    It sounds  "like he occasionally moves around in his sleep.  He does talk in his sleep.  He has a poor sense of smell.  He asks about various vitamins.  He also asks about any dietary recommendations.    History of paternal uncle with Parkinson's.       Past Medical History:   Diagnosis Date     Actinic keratosis      Basal cell carcinoma      BPH w urinary obs/LUTS 10/10/2011    flomax in past, \"quit working\".  Avodart in past.  Tapered off.        CAD (coronary artery disease) 10/10/2011    -8/16/2006 s/p GERALDINE to mid RCA, s/p GERALDINE to mid LAD in [place] -10/4/2007 s/p GERALDINE to pRCA and GERALDINE to dRCA in [place]  -11/17/11 Unstable angina, s/p GERALDINE to prox LAD (jailed small diagonal)       Hyperlipidaemia      Palpitations 08/07/2013    occasional, improved on low dose beta blocker      screening 10/10/2011    2007 colonoscopy \"all clean\" in [place].  He is sure about this.   AAA ultrasound screen 3/07 normal also.  (leg and neck done then too)      Skin cancer     had mohs on [facility]     Past Surgical History:   Procedure Laterality Date     APPENDECTOMY      childhood     CARDIAC SURGERY  2006, 2007, 2011    7 stents total     COLONOSCOPY      about 8-9 years ago     COLONOSCOPY N/A 2/28/2023    Procedure: COLONOSCOPY;  Surgeon: Kaiden Frausto MD;  Location: [facility]     EYE SURGERY  2020?    cataracts       ASPIRIN NOT PRESCRIBED, INTENTIONAL,, 1 each continuous prn Antiplatelet medication not prescribed intentionally due to plavix  clopidogrel (PLAVIX) 75 MG tablet, Take 1 tablet (75 mg) by mouth daily  dutasteride (AVODART) 0.5 MG capsule, Take one pill every other day  metoprolol succinate ER (TOPROL XL) 25 MG 24 hr tablet, Take 1 tablet (25 mg) by mouth daily (Patient taking differently: Take 12.5 mg by mouth daily)  nitroGLYcerin (NITROSTAT) 0.4 MG sublingual tablet, PLACE 1 TABLET UNDER THE TONGUE AT THE ONSET OF CHEST PAIN. MAY REPEAT EVERY 5 MINUTES AS NEEDED FOR A TOTAL OF 3 DOSES.  rosuvastatin " (CRESTOR) 40 MG tablet, Take 1 tablet (40 mg) by mouth every evening  bisacodyl (DULCOLAX) 5 MG EC tablet, Take 2 tablets at 3 pm the day before your procedure. If your procedure is before 11 am, take 2 additional tablets at 11 pm. If your procedure is after 11 am, take 2 additional tablets at 6 am. For additional instructions refer to your colonoscopy prep instructions.  polyethylene glycol (GOLYTELY) 236 g suspension, The night before the exam at 6 pm drink an 8-ounce glass every 15 minutes until the jug is half empty. If you arrive before 11 AM: Drink the other half of the Golytely jug at 11 PM night before procedure. If you arrive after 11 AM: Drink the other half of the Golytely jug at 6 AM day of procedure. For additional instructions refer to your colonoscopy prep instructions.    No current facility-administered medications on file prior to visit.    No Known Allergies     Problem (# of Occurrences) Relation (Name,Age of Onset)    Coronary Artery Disease (1) Mother (Gen)       Negative family history of: Melanoma        Social History     Tobacco Use     Smoking status: Never     Smokeless tobacco: Never   Vaping Use     Vaping Use: Never used   Substance Use Topics     Alcohol use: Yes     Comment: wine     Drug use: No       REVIEW OF SYSTEMS:                                                      10-point review of systems is negative except as mentioned above in HPI.     EXAM:                                                      Physical Exam:   Vitals: /68   Pulse 60   Wt 87.1 kg (192 lb)   BMI 28.35 kg/m    BMI= Body mass index is 28.35 kg/m .  GENERAL: NAD.  HEENT: NC/AT.   CV: RRR. S1, S2.   NECK: No bruits.  PULM: Non-labored breathing.   Neurologic:  MENTAL STATUS: Alert, attentive. Speech is fluent. Normal comprehension.  Normal concentration. Adequate fund of knowledge.   CRANIAL NERVES: Discs flat. Visual fields intact to confrontation. Pupils equally, round and reactive to light. Facial  sensation and movement normal. EOM full. Hearing intact to conversation. Trapezius strength intact. Palate moves symmetrically. Tongue midline.  MOTOR: 5/5 in proximal and distal muscle groups of upper and lower extremities. Tone and bulk normal.   DTRs: Intact and symmetric in biceps, BR, patellae.   SENSATION: Normal light touch and pinprick. Intact proprioception at great toes. Vibration: Slightly decreased at both ankles.   COORDINATION: Normal finger nose finger. Finger tapping normal. Knee heel shin normal.  STATION AND GAIT: Romberg negative. Good postural reflexes.  Step length looks good.  Minimal arm swing on the left.    TREMOR: Intermittent rolling tremor of the left hand.  No other abnormal movements observed.  Left hand-dominant.    ASSESSMENT and PLAN:                                                      Assessment:       ICD-10-CM    1. Parkinsonian features  R25.9 Adult Neurology  Referral     Vitamin E     Vitamin B12     Vitamin B6     Vitamin B1 whole blood     Methylmalonic Acid     Physical Therapy Referral     carbidopa-levodopa (SINEMET)  MG tablet      2. Vitamin D deficiency  E55.9 Vitamin D Deficiency    rule out        Mr. Morrison is a 78-year-old man with history of CAD, hyperlipidemia, BPH and basal cell carcinoma here for evaluation for possible Parkinson's.  Much of his history and today's exam do support the diagnosis.  We discussed treatment options, but Derrick would like to think about this.  I do think he would benefit from of the big and loud therapy program, specifically for his voice.  We discussed healthy diet and exercise, avoidance of inflammatory foods.  I encouraged Derrick to continue to stay active.  We will plan to follow-up in 6 months.    Derrick had several questions which were answered to the best of my ability today.    Plan:  -- Consider starting carbidopa/levodopa. Starting dose would be 1 tablet three times daily.   -- Referral to physical therapy for  "\"Big and Loud\" program.  -- Labs: Vitamins D, E, B1, B6 and B12.  -- Return to Neurology in 6 months.   -- Keep up the good work with staying active in the meantime!    Total Time: 60 minutes were spent with the patient and in chart review/documentation (as described above in Assessment and Plan) /coordinating the care on date of service.    Izzy Juarez MD  Neurology    CC: Kaiden Zapata MD    Dragon software used in the dictation of this note.        Again, thank you for allowing me to participate in the care of your patient.        Sincerely,        Izzy Juarez MD    "

## 2023-03-20 NOTE — PATIENT INSTRUCTIONS
"Plan:  -- Consider starting carbidopa/levodopa. Starting dose would be 1 tablet three times daily.   -- Referral to physical therapy for \"Big and Loud\" program.  -- Labs: Vitamins D, E, B1, B6 and B12.  -- Return to Neurology in 6 months.   -- Keep up the good work with staying active in the meantime!  "

## 2023-03-20 NOTE — NURSING NOTE
"Derrick Morrison is a 77 year old male who presents for:  Chief Complaint   Patient presents with     Parkinson        Initial Vitals:  /68   Pulse 60   Wt 87.1 kg (192 lb)   BMI 28.35 kg/m   Estimated body mass index is 28.35 kg/m  as calculated from the following:    Height as of 2/28/23: 1.753 m (5' 9\").    Weight as of this encounter: 87.1 kg (192 lb).. Body surface area is 2.06 meters squared. BP completed using cuff size: wrist cuff    Rodri Ramesh  "

## 2023-03-28 ENCOUNTER — HOSPITAL ENCOUNTER (OUTPATIENT)
Dept: PHYSICAL THERAPY | Facility: CLINIC | Age: 78
Setting detail: THERAPIES SERIES
Discharge: HOME | End: 2023-03-28
Attending: PSYCHIATRY & NEUROLOGY
Payer: MEDICARE

## 2023-03-28 ENCOUNTER — LAB (OUTPATIENT)
Dept: LAB | Facility: CLINIC | Age: 78
End: 2023-03-28
Payer: COMMERCIAL

## 2023-03-28 DIAGNOSIS — E55.9 VITAMIN D DEFICIENCY: ICD-10-CM

## 2023-03-28 DIAGNOSIS — R29.818 PARKINSONIAN FEATURES: ICD-10-CM

## 2023-03-28 LAB
DEPRECATED CALCIDIOL+CALCIFEROL SERPL-MC: 13 UG/L (ref 20–75)
VIT B12 SERPL-MCNC: 277 PG/ML (ref 232–1245)

## 2023-03-28 PROCEDURE — 84425 ASSAY OF VITAMIN B-1: CPT | Mod: 90

## 2023-03-28 PROCEDURE — 97162 PT EVAL MOD COMPLEX 30 MIN: CPT | Mod: GP | Performed by: PHYSICAL THERAPIST

## 2023-03-28 PROCEDURE — 99000 SPECIMEN HANDLING OFFICE-LAB: CPT

## 2023-03-28 PROCEDURE — 97112 NEUROMUSCULAR REEDUCATION: CPT | Mod: GP | Performed by: PHYSICAL THERAPIST

## 2023-03-28 PROCEDURE — 82306 VITAMIN D 25 HYDROXY: CPT

## 2023-03-28 PROCEDURE — 82607 VITAMIN B-12: CPT

## 2023-03-28 PROCEDURE — 36415 COLL VENOUS BLD VENIPUNCTURE: CPT

## 2023-03-28 PROCEDURE — 83921 ORGANIC ACID SINGLE QUANT: CPT

## 2023-03-28 PROCEDURE — 84446 ASSAY OF VITAMIN E: CPT | Mod: 90

## 2023-03-28 PROCEDURE — 84207 ASSAY OF VITAMIN B-6: CPT | Mod: 90

## 2023-03-28 NOTE — PROGRESS NOTES
[facility]                                                                                   OUTPATIENT PHYSICAL THERAPY FUNCTIONAL EVALUATION  PLAN OF TREATMENT FOR OUTPATIENT REHABILITATION  (COMPLETE FOR INITIAL CLAIMS ONLY)  Patient's Last Name, First Name, M.I.  YOB: 1945  Derrick Morrison     Provider's Name   [facility]   Medical Record No.  [id number]     Start of Care Date:  03/28/23   Onset Date:  03/20/23 (date of order, chronic diagnosis)   Type:     _X__PT   ____OT  ____SLP Medical Diagnosis:  Parkinsonian Features     PT Diagnosis:  impaired functional mobility and strength Visits from SOC:  1                              __________________________________________________________________________________  Plan of Treatment/Functional Goals:  (P) balance training, bed mobility training, gait training, neuromuscular re-education, ROM, strengthening, stretching, transfer training, manual therapy, joint mobilization           GOALS  1  (P) Patient will be able to improve 5 time sit to stand score to less than 10 seconds in order to be in age related norms and show improved functional mobility.  04/11/23    2  (P) Patient will demonstrate reciprocal  left arm swing with gait with no cues needed.  04/18/23    3  (P) Patient will report no difficutly with rolling in bed left or right.  05/09/23    4  (P) Patient will report being able to step confidently leading with his left leg when turning left with no feelings of LOB.  05/09/23             Therapy Frequency:  (P) 2 times/Week   Predicted Duration of Therapy Intervention:  (P) 6 weeks    Yovana Murry, PT                                    I CERTIFY THE NEED FOR THESE SERVICES FURNISHED UNDER        THIS PLAN OF TREATMENT AND WHILE UNDER MY CARE     (Physician co-signature of this document indicates  review and certification of the therapy plan).              Certification Date From:  03/28/23   Certification Date To:  05/09/23    Referring Provider:  Izzy Juarez    Initial Assessment  See Epic Evaluation- Start of Care Date: 03/28/23

## 2023-03-28 NOTE — PROGRESS NOTES
03/28/23 0800   Quick Adds   Quick Adds Certification   General Information   Start of Care Date 03/28/23   Referring Physician Izzy Juarez   Orders Evaluate and Treat as Indicated   Order Date 03/20/23   Medical Diagnosis Parkinsonian Features   Onset of illness/injury or Date of Surgery 03/20/23  (date of order, chronic diagnosis)   Precautions/Limitations no known precautions/limitations   Pertinent history of current problem (include personal factors and/or comorbidities that impact the POC) Mr. Morrison is a 78-year-old man with history of CAD, hyperlipidemia, BPH and basal cell carcinoma. His family has noticed some increased symptoms of tremors, shuffling gait, difficulty writing, and quieter/softer voice as of lately.   Biggest problem he has right now is the left hand tremor.  Tends to stoop his left foot. No fescination in gait. Has some difficulty getting out of soft chair (needs arms to push up).  Buttoning and tying his shoes are a bit harder due to the left hand dexterity.   Living environment [facility]   Home/Community Accessibility Comments Has 2 stairs to enter house   Assistive Devices Comments no aD yet   Fall Risk Screen   Fall screen completed by PT   Have you fallen 2 or more times in the past year? No   Have you fallen and had an injury in the past year? No   Timed Up and Go score (seconds) 9.2   Is patient a fall risk? No   Abuse Screen (yes response referral indicated)   Feels Unsafe at Home or Work/School no   Feels Threatened by Someone no   Does Anyone Try to Keep You From Having Contact with Others or Doing Things Outside Your Home? no   Physical Signs of Abuse Present no   Posture   Posture Comments forward shoulder posture, forward head posture,   Range of Motion (ROM)   ROM Comment AROM WNL B LE and UE but needs cues to get to full range in UE as patient stops 20% shy of full movement   Strength   Strength Comments hip flexion: R 4+  L 5-,   knee flexion 5-/5  B,  knee extension 5-/5 B,  ankle DF 5-/5 B   Bed Mobility   Bed Mobility Comments pt relates difficulty getting in/out of soft bed at home.   Transfer Skills   Transfer Comments sit <> stand: slow movement, doesn't stand fully upright before starting to walk, left hand shakes with cogntiive tasks   Gait   Gait Comments decreased step length on the left, decreased arm swing on the left, downward gaze   Gait Special Tests   Gait Special Tests FUNCTIONAL GAIT ASSESSMENT;25 FOOT TIMED WALK   Gait Special Tests 25 Foot Timed Walk   Comments 40 ft walk test: 13 seconds   Gait Special Tests Functional Gait Assessment Score out of 30   Score out of 30 25   Balance Special Tests   Balance Special Tests Single leg stance right;Single leg stance left;Sit to stand reps;Timed up and go;Zapata balance   Balance Special Tests Timed Up and Go   Comments TUG normal: 9.2   TUG manual: 8.7 seconds,  TUG cognitive 9.8 seconds (7 states) increased shuffling with TUG cognitive and trouble turning corner   Balance Special Tests Zapata Balance   Score out of 56 55   Balance Special Tests Single Leg Stance Right,   Right, seconds 10 Seconds   Balance Special Tests Single Leg Stance Left   Left, seconds 5 Seconds   Balance Special Tests Sit to Stand Reps in 30 Seconds   Reps in 30 seconds 14   Comments 5 time sit to stand: 12 seconds   Sensory Examination   Sensory Perception Comments in tact to light touch B LE   Coordination   Coordination Comments dof and don L shoe: 38 seconds, decreased agility and fine motor control in L hand.   Planned Therapy Interventions   Planned Therapy Interventions balance training;bed mobility training;gait training;neuromuscular re-education;ROM;strengthening;stretching;transfer training;manual therapy;joint mobilization   Clinical Impression   Criteria for Skilled Therapeutic Interventions Met yes, treatment indicated   PT Diagnosis impaired functional mobility and strength   Influenced by the following  impairments decreased LE strength, shuffling gait, impaired balance with turning, postural rigidity, decreased amplitude of movement   Functional limitations due to impairments turning, rolling in bed, gait speed   Clinical Presentation Stable/Uncomplicated   Clinical Presentation Rationale clinical decision making and chart review   Clinical Decision Making (Complexity) Low complexity   Therapy Frequency 2 times/Week   Predicted Duration of Therapy Intervention (days/wks) 6 weeks   Risk & Benefits of therapy have been explained Yes   Patient, Family & other staff in agreement with plan of care Yes   Clinical Impression Comments Derrick is a 77 year old male who presents today with Parkinsonian features. Pt would benefit from completing the PWR program at 2x/week for the next 6 weeks to improve functional mobility, rigidity, incoordination, bradykinesia and help with his transitions.  PT prognosis is very good due to good pt motivation and symptoms of PD still being mild.   Education Assessment   Preferred Learning Style Listening;Demonstration;Pictures/video   Barriers to Learning No barriers   GOALS   PT Eval Goals 1;2;3;4   Goal 1   Goal Identifier 1   Goal Description Patient will be able to improve 5 time sit to stand score to less than 10 seconds in order to be in age related norms and show improved functional mobility.   Target Date 04/11/23   Goal 2   Goal Identifier 2   Goal Description Patient will demonstrate reciprocal  left arm swing with gait with no cues needed.   Target Date 04/18/23   Goal 3   Goal Identifier 3   Goal Description Patient will report no difficutly with rolling in bed left or right.   Target Date 05/09/23   Goal 4   Goal Identifier 4   Goal Description Patient will report being able to step confidently leading with his left leg when turning left with no feelings of LOB.   Target Date 05/09/23   Total Evaluation Time   PT Loli, Moderate Complexity Minutes (82220) 38   Therapy  Certification   Certification date from 03/28/23   Certification date to 05/09/23   Medical Diagnosis Parkinsonian Features       Yovana Murry  PT, DPT       3/28/2023   [address] [address]   [email or web address]  Voicemail: [phone]

## 2023-03-30 ENCOUNTER — HOSPITAL ENCOUNTER (OUTPATIENT)
Dept: PHYSICAL THERAPY | Facility: CLINIC | Age: 78
Setting detail: THERAPIES SERIES
Discharge: HOME | End: 2023-03-30
Attending: PSYCHIATRY & NEUROLOGY
Payer: MEDICARE

## 2023-03-30 LAB — METHYLMALONATE SERPL-SCNC: 0.28 UMOL/L (ref 0–0.4)

## 2023-03-30 PROCEDURE — 97112 NEUROMUSCULAR REEDUCATION: CPT | Mod: GP | Performed by: PHYSICAL THERAPIST

## 2023-03-31 LAB
A-TOCOPHEROL VIT E SERPL-MCNC: 8.5 MG/L
BETA+GAMMA TOCOPHEROL SERPL-MCNC: 0.8 MG/L
PYRIDOXAL PHOS SERPL-SCNC: 51.9 NMOL/L
VIT B1 PYROPHOSHATE BLD-SCNC: 191 NMOL/L

## 2023-04-04 ENCOUNTER — HOSPITAL ENCOUNTER (OUTPATIENT)
Dept: PHYSICAL THERAPY | Facility: CLINIC | Age: 78
Setting detail: THERAPIES SERIES
Discharge: HOME | End: 2023-04-04
Attending: PSYCHIATRY & NEUROLOGY
Payer: MEDICARE

## 2023-04-04 PROCEDURE — 97112 NEUROMUSCULAR REEDUCATION: CPT | Mod: GP | Performed by: PHYSICAL THERAPIST

## 2023-04-04 PROCEDURE — 97116 GAIT TRAINING THERAPY: CPT | Mod: GP | Performed by: PHYSICAL THERAPIST

## 2023-04-10 ENCOUNTER — HOSPITAL ENCOUNTER (OUTPATIENT)
Dept: PHYSICAL THERAPY | Facility: CLINIC | Age: 78
Setting detail: THERAPIES SERIES
Discharge: HOME | End: 2023-04-10
Attending: PSYCHIATRY & NEUROLOGY
Payer: MEDICARE

## 2023-04-10 PROCEDURE — 97112 NEUROMUSCULAR REEDUCATION: CPT | Mod: GP | Performed by: PHYSICAL THERAPIST

## 2023-04-10 PROCEDURE — 97116 GAIT TRAINING THERAPY: CPT | Mod: GP | Performed by: PHYSICAL THERAPIST

## 2023-04-10 PROCEDURE — 97530 THERAPEUTIC ACTIVITIES: CPT | Mod: GP | Performed by: PHYSICAL THERAPIST

## 2023-04-10 NOTE — PROGRESS NOTES
Neuromuscular Re-Education:  (16 minutes)     Basic PWR!MOVES each used to facilitate bradykinesia, rigidity, whole body movements, large amplitude movements and posture.  PWR step in sitting  x 30 reps each direction with visual boosts of hitting target on floor and tapping hand to bolster on L and verbal boosts to count out loud and say colors out loud   PWR step in standing x 30 reps with hand boosts to keep left hand open while reaching     Gait Training (29 minutes)      PWR! Walking with punching forward into airex pad, PWR! Walking with large steps, PWR! Walking with large arm swings, PWR! Walking with multidirectional changes left and right leading off with PWR step, PWR! Walking with dynadisc in L hand, PWR! Walking with side shuffling around cones with hold dynadiscs in B hands with added verbal boost of saying boy/girl names    Therapeutic Activity:  (8 minutes)     Simulation of getting in/out of bed from side lying and pushing up through elbow. Cues to move faster and with more power.  Simulation of walking between 2 chair and leading off with large step left and right,  Simulation of getting in/out of car and taking large step to the left to lead off with.

## 2023-04-12 ENCOUNTER — HOSPITAL ENCOUNTER (OUTPATIENT)
Dept: PHYSICAL THERAPY | Facility: CLINIC | Age: 78
Setting detail: THERAPIES SERIES
Discharge: HOME | End: 2023-04-12
Attending: PSYCHIATRY & NEUROLOGY
Payer: MEDICARE

## 2023-04-12 PROCEDURE — 97116 GAIT TRAINING THERAPY: CPT | Mod: GP | Performed by: PHYSICAL THERAPIST

## 2023-04-12 PROCEDURE — 97530 THERAPEUTIC ACTIVITIES: CPT | Mod: GP | Performed by: PHYSICAL THERAPIST

## 2023-04-12 PROCEDURE — 97112 NEUROMUSCULAR REEDUCATION: CPT | Mod: GP | Performed by: PHYSICAL THERAPIST

## 2023-04-12 NOTE — PROGRESS NOTES
Gait Training (16 minutes)     Walking forward, walking forward power walking, walking punching into airex pad held by PT, walking with side shuffling with verbal boosts of listing off cars, modes of transportation, holidays, foods,  Weaving cones listing holidays.   While completing gait drills, cues to swing arms even, open hands up, extend elbows,       Neuromuscular Re-education (26 minutes)       Basic PWR!MOVES each used to facilitate bradykinesia, rigidity, whole body movements, large amplitude movements and posture.  PWR sit to stand with discs in hands;  PWR up in all 4s position x 20 with 5 sec pause and counting at top; PWR Twist in all 4's position x 5 each way with visual target of reaching for cone;  PWR Step in all 4's position attempted x 2 but too difficult, modified to leaning onto chair with PWR step x 10 B, then switched to PWR step with hands on 80  blocks x 6 each side with visual targets of cones; PWR Rock in all 4's starting with hands on 10  block x 8 then progressing to hands on the floor x 15; PWR up from elbow in sitting position x 10 on L with visual target to lift arm up too.       Therapeutic Activity  (11 minutes)     Completed kneeling to standing transition x 4 times with incorporating PWR step, PWR up and pushing up hard to stand.  Able to complete transition to standing with no UE by last 2 reps,  pinching fingers into towel to improve pinch strength,  10 Flicks with hands x 5 times while practicing buttoning after.

## 2023-04-19 ENCOUNTER — HOSPITAL ENCOUNTER (OUTPATIENT)
Dept: PHYSICAL THERAPY | Facility: CLINIC | Age: 78
Setting detail: THERAPIES SERIES
Discharge: HOME | End: 2023-04-19
Attending: PSYCHIATRY & NEUROLOGY
Payer: MEDICARE

## 2023-04-19 PROCEDURE — 97116 GAIT TRAINING THERAPY: CPT | Mod: GP | Performed by: PHYSICAL THERAPIST

## 2023-04-19 PROCEDURE — 97530 THERAPEUTIC ACTIVITIES: CPT | Mod: GP | Performed by: PHYSICAL THERAPIST

## 2023-04-19 PROCEDURE — 97112 NEUROMUSCULAR REEDUCATION: CPT | Mod: GP | Performed by: PHYSICAL THERAPIST

## 2023-04-19 NOTE — PROGRESS NOTES
Gait Training (20 minutes)      Walking forward, walking forward power walking, walking with throwing scarf in air and catching with L hand, walking with side shuffling with verbal boosts of listing off ice cream, places to eat, foods, walking and side shuffling around cones and stepping over airex pads, walking forward with walking sticks, While completing gait drills, cues to swing arms even, open hands up, extend elbows.       Neuromuscular Re-education (23 minutes)       Basic PWR!MOVES each used to facilitate bradykinesia, rigidity, whole body movements, large amplitude movements and posture.  PWR sit to stand with discs in hands;  PWR Rock in standing x 10 B, PWR Twist in standing x 10 B, PWR step in standing x 10,  PWR FLOW standing x 6 going very slowly to each position correct able to increase speed by 6th rep,  PWR step in tall kneeling x 10 B, PWR step in tall kneeling alternating sides x 5 each, PWR step in tall kneeling with therapist giving direction on which side to do x 8 each side        Therapeutic Activity  (10 minutes)      Completed kneeling to standing transition x 8 times with incorporating PWR step, PWR up and pushing up hard to stand.  Able to complete transition to standing with no UE and improved speed by end. Standing throwing scarf in air and catching with BIG hand on L to facilitate open hands,  Using dowel to whisk eggs in BIG bowl (hula hoop) cues to use large movements

## 2023-04-25 ENCOUNTER — HOSPITAL ENCOUNTER (OUTPATIENT)
Dept: PHYSICAL THERAPY | Facility: CLINIC | Age: 78
Setting detail: THERAPIES SERIES
Discharge: HOME | End: 2023-04-25
Attending: PSYCHIATRY & NEUROLOGY
Payer: MEDICARE

## 2023-04-25 PROCEDURE — 97112 NEUROMUSCULAR REEDUCATION: CPT | Mod: GP | Performed by: PHYSICAL THERAPIST

## 2023-04-25 PROCEDURE — 97116 GAIT TRAINING THERAPY: CPT | Mod: GP | Performed by: PHYSICAL THERAPIST

## 2023-04-25 NOTE — PROGRESS NOTES
Gait Training (23 minutes)   All walking completed to improve step length, arm swing, symmetrical step length  Walking forward, walk forward cues to swing arms more, walking with head turns left/right and up/down, walking stepping over  logs  and over steps cues to take more powerful step up and over, walking across gym finding different numbered discs and changing direction and leading off with big steps.      Neuromuscular Re-education (31 minutes)        Basic PWR!MOVES each used to facilitate bradykinesia, rigidity, whole body movements, large amplitude movements and posture.  PWR UP in standing x 10, PWR ROCK in standing x 10 B, PWR Twist in standing x 10 B, PWR Step in standing x 10 B,  PWR FLOW in standing x 5 reps in a row no break,  PWR up in all 4's x 10, PWR rock in all 4's x 10, PWR Step in all 4's x 5 B,  PWR Up in prone x 10, PWR ROCK in prone x 6 B,  PWR step in standing with stepping to numbered discs to facilate multidirectional changes quickly added in verbal boosts of saying number or color of disc and what foot was stepping

## 2023-05-08 ENCOUNTER — HOSPITAL ENCOUNTER (OUTPATIENT)
Dept: PHYSICAL THERAPY | Facility: CLINIC | Age: 78
Setting detail: THERAPIES SERIES
Discharge: HOME | End: 2023-05-08
Attending: PSYCHIATRY & NEUROLOGY
Payer: MEDICARE

## 2023-05-08 DIAGNOSIS — G20.A1 PARKINSON'S DISEASE (H): Primary | ICD-10-CM

## 2023-05-08 PROCEDURE — 97116 GAIT TRAINING THERAPY: CPT | Mod: GP | Performed by: PHYSICAL THERAPIST

## 2023-05-08 PROCEDURE — 97112 NEUROMUSCULAR REEDUCATION: CPT | Mod: GP | Performed by: PHYSICAL THERAPIST

## 2023-05-08 NOTE — PROGRESS NOTES
"Gait Training (23 minutes)   All walking completed to improve step length, arm swing, symmetrical step length  Walking forward, walk forward cues to swing arms more, walking across gym finding different numbered discs and changing direction and leading off with big steps with added cognitive verbal boost today of talking about his trip to [place].  Cues needed especially for taking off with a bigger step, marching to turn and swinging L arm.      Neuromuscular Re-education (30 minutes)        Basic PWR!MOVES each used to facilitate bradykinesia, rigidity, whole body movements, large amplitude movements and posture.  Completed PWR moves in front of wall with cues to get arms to touch wall each time to faciliate improved mobility and stretch across the spine and chest:  PWR UP in standing x 10, PWR ROCK in standing x 10 B, PWR Twist in standing x 10 B, PWR Step in standing x 10 B,  PWR FLOW in standing x 5 reps in a row no break,  PWR Step in all 4's x 5 B with hands on ground and POWR step in all 4's with hands on 10\" step x 5 B,  Step ups on stairs with cues to PWR up and lift leg higher with each step, step downs from bottom step 6\" height with cues to lift leg up high when stepping down.      "
Mallampati 2

## 2023-05-10 ENCOUNTER — HOSPITAL ENCOUNTER (OUTPATIENT)
Dept: PHYSICAL THERAPY | Facility: CLINIC | Age: 78
Setting detail: THERAPIES SERIES
Discharge: HOME | End: 2023-05-10
Attending: PSYCHIATRY & NEUROLOGY
Payer: MEDICARE

## 2023-05-10 PROCEDURE — 97750 PHYSICAL PERFORMANCE TEST: CPT | Mod: GP | Performed by: PHYSICAL THERAPIST

## 2023-05-10 PROCEDURE — 97116 GAIT TRAINING THERAPY: CPT | Mod: GP | Performed by: PHYSICAL THERAPIST

## 2023-05-10 PROCEDURE — 97112 NEUROMUSCULAR REEDUCATION: CPT | Mod: GP | Performed by: PHYSICAL THERAPIST

## 2023-05-10 NOTE — PROGRESS NOTES
"Gait Training (13 minutes)   All walking completed to improve step length, arm swing, symmetrical step length  walking across gym finding different numbered discs and changing direction and leading off with big steps with added cognitive verbal boost today listing off colors and candy bars.  Cues needed especially for taking off with a bigger step, marching to turn and swinging L arm.      Neuromuscular Re-education (30 minutes)        Basic PWR!MOVES each used to facilitate bradykinesia, rigidity, whole body movements, large amplitude movements and posture.  Completed PWR moves in front of wall with cues to get arms to touch wall each time to faciliate improved mobility and stretch across the spine and chest:  PWR UP in standing x 15, PWR ROCK in standing x 10 B, PWR Twist in standing x 15 B, PWR Step in standing x 10 B,  PWR Step in all 4's x 5 B with hands on bottom step and PWR step in all 4's with hands on 2nd step x 5 B,  Step ups on stairs with cues to PWR up and lift leg higher with each step, step downs from bottom step 6\" height with cues to lift leg up high when stepping down.      Functional Performance testing (10 minutes)   "

## 2023-05-10 NOTE — PROGRESS NOTES
[facility]    Outpatient Physical Therapy Discharge Note & Re- Certification   Patient: Derrick Morrison  : 1945    Beginning/End Dates of Reporting Period:  3/20/23 to 5/10/23    Referring Provider: Izzy Mayberry Diagnosis: impaired functional mobility and strength      Client Self Report: Went on his trip to [facility] and everything went really well.  His PD didn't seem to hold him back in anyway. Was able to get in/out of car, walk in crouds, go out to eat, and drive without any issues.  Wife states he is still mumbling though.    Objective Measurements:  Objective Measure: (P) TUG  Details: (P) tug 10.5 seconds, Tug cognitive (7 states) 10.5, Tug manual 9.9 seconds  Objective Measure: (P) sit to stand score  Details: (P) 5 time sit to stand: 8 seconds,    30 second sit to stand: 17 no hands  Objective Measure: (P) 40 ft walk speed  Details: (P) 9.8 seconds  Objective Measure: (P) FGA  Details: (P) 30/30     Goals:  Goal Identifier 1   Goal Description Patient will be able to improve 5 time sit to stand score to less than 10 seconds in order to be in age related norms and show improved functional mobility.   Target Date 23   Date Met  (P) 05/10/23   Progress (detail required for progress note):       Goal Identifier 2   Goal Description Patient will demonstrate reciprocal  left arm swing with gait with no cues needed.   Target Date 23   Date Met  (P) 05/10/23   Progress (detail required for progress note): (P) able to complete symmetrical arm swing during gait assessments today without cues     Goal Identifier 3   Goal Description Patient will report no difficutly with rolling in bed left or right.   Target Date 23   Date Met  23   Progress (detail required for progress note): reports no difficulty this past few weeks     Goal Identifier 4    Goal Description Patient will report being able to step confidently leading with his left leg when turning left with no feelings of LOB.   Target Date 05/09/23   Date Met  04/10/23   Progress (detail required for progress note): was able to complete by the end of today's session       Plan:  Discharge from therapy.  At this time patient has met all of his therapy. He has shown great improvements in physical therapy with improved gait speed demonstrated by the 40 ft walk test,  improved LE strength demonstrated by 5 time sit to stand and 30 sec sit to stand scores and improved balance from scoring perfect on the FGA.  Patient has demonstrated improved calibration, ability to step off with either foot, shuffling gait pattern and overal rigidity in his upper and lower body.  HE would benefit from another round of PWR in about 1 year to help minimize progressive decline of function due to his progressive disorder of parkinsons disease. Patient plans to continue with his PWR exercises given in PT and looking into online PWR courses.    Discharge:    Reason for Discharge: Patient has met all goals.    Equipment Issued: none    Discharge Plan: Patient to continue home program.    Yovana Murry  PT, DPT       5/10/2023   [place] [address]   [email or web address]  Voicemail: [phone]      RECERTIFICATION    Derrick Morrison  1945    Session Number: 9 since start of care.    Reasons for Continuing Treatment:   One final visit needed for final discharge recommendations and review of HEP     Frequency/Duration  1 times per week for 1 weeks for a total of 1 visits.    Recertification Period  5/9/23 - 5/10/23    Physician Signature:    Date:    X_______________________________________________________    Physician Name: Chadwick Magana     I certify the need for these services furnished under this plan of treatment  and while under my care. Physician co-signature of this document indicates review and certification of the therapy plan.  This signature may be written on paper, or electronically signed within EPIC.

## 2023-08-07 ENCOUNTER — MYC MEDICAL ADVICE (OUTPATIENT)
Dept: NEUROLOGY | Facility: CLINIC | Age: 78
End: 2023-08-07
Payer: COMMERCIAL

## 2023-08-07 DIAGNOSIS — R29.818 PARKINSONIAN FEATURES: ICD-10-CM

## 2023-08-07 NOTE — TELEPHONE ENCOUNTER
Refill request for: carbidopa-levodopa (SINEMET)  MG tablet    Directions: Take 1.5 tablets by mouth 3 times daily     LOV: 3/20/23  NOV: 10/3/23    30 day supply with 3 refills Medication T'd for review and signature  Lorna Navarro CMA on 8/7/2023 at 3:29 PM

## 2023-08-08 RX ORDER — CARBIDOPA AND LEVODOPA 25; 100 MG/1; MG/1
1.5 TABLET ORAL 3 TIMES DAILY
Qty: 135 TABLET | Refills: 3 | Status: SHIPPED | OUTPATIENT
Start: 2023-08-08 | End: 2023-12-14

## 2023-10-02 ENCOUNTER — TRANSFERRED RECORDS (OUTPATIENT)
Dept: HEALTH INFORMATION MANAGEMENT | Facility: CLINIC | Age: 78
End: 2023-10-02

## 2023-10-03 ENCOUNTER — OFFICE VISIT (OUTPATIENT)
Dept: NEUROLOGY | Facility: CLINIC | Age: 78
End: 2023-10-03
Payer: COMMERCIAL

## 2023-10-03 VITALS — SYSTOLIC BLOOD PRESSURE: 120 MMHG | HEART RATE: 56 BPM | DIASTOLIC BLOOD PRESSURE: 67 MMHG

## 2023-10-03 DIAGNOSIS — G20.A1 PARKINSON'S DISEASE WITHOUT DYSKINESIA OR FLUCTUATING MANIFESTATIONS (H): Primary | ICD-10-CM

## 2023-10-03 DIAGNOSIS — Z79.899 ENCOUNTER FOR LONG-TERM (CURRENT) USE OF MEDICATIONS: ICD-10-CM

## 2023-10-03 DIAGNOSIS — R13.10 DYSPHAGIA, UNSPECIFIED TYPE: ICD-10-CM

## 2023-10-03 DIAGNOSIS — E53.8 VITAMIN B12 DEFICIENCY (NON ANEMIC): ICD-10-CM

## 2023-10-03 DIAGNOSIS — R42 LIGHTHEADEDNESS: ICD-10-CM

## 2023-10-03 DIAGNOSIS — E55.9 VITAMIN D DEFICIENCY: ICD-10-CM

## 2023-10-03 PROCEDURE — 99215 OFFICE O/P EST HI 40 MIN: CPT | Performed by: PSYCHIATRY & NEUROLOGY

## 2023-10-03 ASSESSMENT — MONTREAL COGNITIVE ASSESSMENT (MOCA)
10. [FLUENCY] NAME WORDS STARTING WITH DESIGNATED LETTER: 0
VISUOSPATIAL/EXECUTIVE SUBSCORE: 4
9. REPEAT EACH SENTENCE: 1
8. SERIAL SUBTRACTION OF 7S: 2
WHAT IS THE TOTAL SCORE (OUT OF 30): 23
11. FOR EACH PAIR OF WORDS, WHAT CATEGORY DO THEY BELONG TO (OUT OF 2): 2
13. ORIENTATION SUBSCORE: 6
7. [VIGILENCE] TAP WHEN HEARING DESIGNATED LETTER: 1
12. MEMORY INDEX SCORE: 2
WHAT LEVEL OF EDUCATION WAS ATTAINED: 0
4. NAME EACH OF THE THREE ANIMALS SHOWN: 3
6. READ LIST OF DIGITS [FORWARD/BACKWARD]: 2

## 2023-10-03 NOTE — NURSING NOTE
"Derrick Morrison is a 77 year old male who presents for:  Chief Complaint   Patient presents with    Parkinson     6 mo follow-up           Initial Vitals:  /67   Pulse 56  Estimated body mass index is 28.35 kg/m  as calculated from the following:    Height as of 2/28/23: 1.753 m (5' 9\").    Weight as of 3/20/23: 87.1 kg (192 lb).. There is no height or weight on file to calculate BSA. BP completed using cuff size: wrist cuff    Rodri Ramesh  "

## 2023-10-03 NOTE — PATIENT INSTRUCTIONS
Plan:  -- Continue the carbidopa/levodopa: 1.5 tablets 3 times daily.  -- Let's recheck your vitamin D and B12 levels.  We will notify you of the results.  Continue current supplementation for now.  -- I have also put in an order to check your metabolic panel and thyroid function.  Labs can all be done when you see your primary provider later this month.  -- I have put in an extra referral for swallow study.  We can send this request to [facility] in [place] for you.  -- Return to neurology clinic in 6 months.

## 2023-10-03 NOTE — LETTER
10/3/2023         RE: Derrick Morrison  [address] [facility] [address]        Dear Colleague,    Thank you for referring your patient, Derrick Morrison, to the [facility] [place]. Please see a copy of my visit note below.    ESTABLISHED PATIENT NEUROLOGY NOTE    DATE OF VISIT: 10/3/2023  MRN: [id number]  PATIENT NAME: Derrick Morrison  YOB: 1945    Chief Complaint   Patient presents with     Parkinson     6 mo follow-up        SUBJECTIVE:                                                      HISTORY OF PRESENT ILLNESS:  Derrick is here for follow up regarding Parkinsonism.  I met him for initial consultation about 6 months ago.    History as previously documented by me (3.20.23):  He noticed tremor in the Left hand which started in June 2019.  He initially attributed this to stress.  He was also noticing more constipation at that time.  He says that in October 2021 he was noticing more tremor involving the left arm.  At that time his primary, Dr. Zapata, did mention possible Parkinson's.  He started to notice some hoarseness, decreased voice last year.  He was noting that writing and printing harder to do and his tremor was causing him to type extra letters with his left hand.  He is here with his wife and son.  Derrick's son says that he does seem quieter.  His wife mentions today that he shuffles.  They all agree that the tremor for the most part has not gone in the way of his function.  Derrick still does quite a bit of cooking, shopping.  No problems with driving.     No stiffness in the limbs.  He has not noticed any particular balance problems.  He and his wife have been attending a couple of support groups.  They find this quite helpful.  Son clarifies that there was some discussion about starting medication for Parkinson's in the past but Derrick was not interested at that time.  He is concerned about starting medication because it can be less effective  "over time.  He read this somewhere.     It sounds like he occasionally moves around in his sleep.  He does talk in his sleep.  He has a poor sense of smell.  He asks about various vitamins.  He also asks about any dietary recommendations.     History of paternal uncle with Parkinson's.      Because he did have features of Parkinson's on exam we did decide to do a trial of carbidopa/levodopa and have subsequently increased the doses to 1.5 mg 3 times daily.  I also sent him for \"big and loud\" therapy.  We did check some vitamin levels.  Vitamin D was quite low at 13, B12 in the low normal range at 277, slightly elevated B6.  According to the note he brings in today he is now taking a vitamin D supplement and B12 periodically.    He says that with the increased dose of the carbidopa/levodopa does seem to have improved his writing and dexterity. He does notice some breakthrough Left hand tremor and some muscle twitching.    He does notice a little stiffness in the mornings. He says he did a truncated therapy regimen. They did a little voice therapy and some cognitive therapy. He will be starting the \"loud\" portion of therapy.   He has a little bit of trouble with swallowing in the mornings, especially with dry/small foods. Liquids are okay.    He has not fallen at all per his wife.  Occasional lightheadedness, but this is not frequent. He drinks a lot of fluids (he likes tea, flavored water).    He has trouble with word-finding. He has to replace words at times. He has some short term memory issues as well, per wife but not any more-so than she. She asks about driving; he seems to do just fine with this.  No problems with orientation.  He does not even really need to use GPS.  No problems with leaving the stove on or water running.  He still does quite a bit of cooking.    Derrick says he does not really notice if he misses a dose of the carbidopa/levodopa.    He has been a part of some forms in support groups.  He asks " about whether he has to take his doses exactly on schedule.  He also has some sweating at night and asks if this is related to his Parkinson's.    They have a 10-year-old Mexican setter.    *We will scan his list of questions and concerns into the media tab.    CURRENT MEDICATIONS:   carbidopa-levodopa (SINEMET)  MG tablet, Take 1.5 tablets by mouth 3 times daily  clopidogrel (PLAVIX) 75 MG tablet, Take 1 tablet (75 mg) by mouth daily  dutasteride (AVODART) 0.5 MG capsule, Take one pill every other day  metoprolol succinate ER (TOPROL XL) 25 MG 24 hr tablet, Take 1 tablet (25 mg) by mouth daily (Patient taking differently: Take 12.5 mg by mouth daily)  nitroGLYcerin (NITROSTAT) 0.4 MG sublingual tablet, PLACE 1 TABLET UNDER THE TONGUE AT THE ONSET OF CHEST PAIN. MAY REPEAT EVERY 5 MINUTES AS NEEDED FOR A TOTAL OF 3 DOSES.  rosuvastatin (CRESTOR) 40 MG tablet, Take 1 tablet (40 mg) by mouth every evening  ASPIRIN NOT PRESCRIBED, INTENTIONAL,, 1 each continuous prn Antiplatelet medication not prescribed intentionally due to plavix (Patient not taking: Reported on 10/3/2023)  bisacodyl (DULCOLAX) 5 MG EC tablet, Take 2 tablets at 3 pm the day before your procedure. If your procedure is before 11 am, take 2 additional tablets at 11 pm. If your procedure is after 11 am, take 2 additional tablets at 6 am. For additional instructions refer to your colonoscopy prep instructions.  polyethylene glycol (GOLYTELY) 236 g suspension, The night before the exam at 6 pm drink an 8-ounce glass every 15 minutes until the jug is half empty. If you arrive before 11 AM: Drink the other half of the Golytely jug at 11 PM night before procedure. If you arrive after 11 AM: Drink the other half of the Golytely jug at 6 AM day of procedure. For additional instructions refer to your colonoscopy prep instructions.    No current facility-administered medications on file prior to visit.      RECENT DIAGNOSTIC STUDIES:   Labs: No results found  for any visits on 10/03/23.    REVIEW OF SYSTEMS:                                                      10-point review of systems is negative except as mentioned above in HPI.    EXAM:                                                      Physical Exam:   Vitals: /67   Pulse 56   BMI= There is no height or weight on file to calculate BMI.  GENERAL: NAD.  HEENT: NC/AT.  CV: RRR. S1, S2.   NECK: No bruits.  PULM: Non-labored breathing.   Neurologic:  MENTAL STATUS: Alert, attentive. Speech is fluent except for some occasional word finding difficulties. Normal comprehension.  Normal concentration. Adequate fund of knowledge.  MoCA: 23/20 (normal is 26 and above).  CRANIAL NERVES: Visual fields intact to confrontation. Pupils equally, round and reactive to light. Facial sensation and movement normal. EOM full. Hearing intact to conversation. Trapezius strength intact. Palate moves symmetrically. Tongue midline.  MOTOR: 5/5 in proximal and distal muscle groups of upper and lower extremities. Tone and bulk normal.   SENSATION: Normal light touch and pinprick. Intact proprioception at great toes. Vibration: Slightly decreased at both ankles.   COORDINATION: Normal finger nose finger. Finger tapping normal. Knee heel shin normal.  STATION AND GAIT: Romberg negative. Good postural reflexes.  Step length looks good.  Minimal arm swing on the left.  Multi-step turn.  TREMOR: Rare, brief rolling tremor of the left hand.  No other abnormal movements observed.  Left hand-dominant.    ASSESSMENT and PLAN:                                                      Assessment:    ICD-10-CM    1. Parkinson's disease without dyskinesia or fluctuating manifestations  G20.A1 Speech Therapy Referral      2. Vitamin D deficiency  E55.9 Vitamin D Deficiency      3. Vitamin B12 deficiency (non anemic)  E53.8 Vitamin B12      4. Encounter for long-term (current) use of medications  Z79.899 Comprehensive metabolic panel      5.  Lightheadedness  R42 Comprehensive metabolic panel     TSH with free T4 reflex     Magnesium      6. Dysphagia, unspecified type  R13.10 Speech Therapy Referral          Mr. Morrison is a pleasant 77-year-old man here for follow-up regarding Parkinson's.  He has noticed tremor improvement with carbidopa/levodopa, not wearing off on the current dosing.  He continues with physical therapy and will be starting speech therapy.  I have entered an order for a swallow evaluation additionally.  We will recheck a few of his labs given current symptoms.  MoCA score was 23/30 today.  We will monitor for now.  It does not sound like he is having any functional problems at home because of cognitive changes. Derrick had several questions for me today which were answered to the best of my ability.  I would like to see him back in 6 months.  He expressed understanding and agreement with the plan.      Plan:  -- Continue the carbidopa/levodopa: 1.5 tablets 3 times daily.  -- Let's recheck your vitamin D and B12 levels.  We will notify you of the results.  Continue current supplementation for now.  -- I have also put in an order to check your metabolic panel and thyroid function.  Labs can all be done when you see your primary provider later this month.  -- I have put in an extra referral for swallow study.  We can send this request to [facility] for you.  -- Return to neurology clinic in 6 months.    Total Time: 45 minutes were spent with the patient and in chart review/documentation (as described above in Assessment and Plan)/coordinating the care on date of service.    Izzy Juarez MD  Neurology    Dragon software used in the dictation of this note.                    Again, thank you for allowing me to participate in the care of your patient.        Sincerely,        Izzy Juarez MD

## 2023-10-03 NOTE — PROGRESS NOTES
ESTABLISHED PATIENT NEUROLOGY NOTE    DATE OF VISIT: 10/3/2023  MRN: [id number]  PATIENT NAME: Derrick Morrison  YOB: 1945    Chief Complaint   Patient presents with    Parkinson     6 mo follow-up        SUBJECTIVE:                                                      HISTORY OF PRESENT ILLNESS:  Derrick is here for follow up regarding Parkinsonism.  I met him for initial consultation about 6 months ago.    History as previously documented by me (3.20.23):  He noticed tremor in the Left hand which started in June 2019.  He initially attributed this to stress.  He was also noticing more constipation at that time.  He says that in October 2021 he was noticing more tremor involving the left arm.  At that time his primary, Dr. Zapata, did mention possible Parkinson's.  He started to notice some hoarseness, decreased voice last year.  He was noting that writing and printing harder to do and his tremor was causing him to type extra letters with his left hand.  He is here with his wife and son.  Derrick's son says that he does seem quieter.  His wife mentions today that he shuffles.  They all agree that the tremor for the most part has not gone in the way of his function.  Derrick still does quite a bit of cooking, shopping.  No problems with driving.     No stiffness in the limbs.  He has not noticed any particular balance problems.  He and his wife have been attending a couple of support groups.  They find this quite helpful.  Son clarifies that there was some discussion about starting medication for Parkinson's in the past but Derrick was not interested at that time.  He is concerned about starting medication because it can be less effective over time.  He read this somewhere.     It sounds like he occasionally moves around in his sleep.  He does talk in his sleep.  He has a poor sense of smell.  He asks about various vitamins.  He also asks about any dietary recommendations.     History of paternal uncle with  "Parkinson's.      Because he did have features of Parkinson's on exam we did decide to do a trial of carbidopa/levodopa and have subsequently increased the doses to 1.5 mg 3 times daily.  I also sent him for \"big and loud\" therapy.  We did check some vitamin levels.  Vitamin D was quite low at 13, B12 in the low normal range at 277, slightly elevated B6.  According to the note he brings in today he is now taking a vitamin D supplement and B12 periodically.    He says that with the increased dose of the carbidopa/levodopa does seem to have improved his writing and dexterity. He does notice some breakthrough Left hand tremor and some muscle twitching.    He does notice a little stiffness in the mornings. He says he did a truncated therapy regimen. They did a little voice therapy and some cognitive therapy. He will be starting the \"loud\" portion of therapy.   He has a little bit of trouble with swallowing in the mornings, especially with dry/small foods. Liquids are okay.    He has not fallen at all per his wife.  Occasional lightheadedness, but this is not frequent. He drinks a lot of fluids (he likes tea, flavored water).    He has trouble with word-finding. He has to replace words at times. He has some short term memory issues as well, per wife but not any more-so than she. She asks about driving; he seems to do just fine with this.  No problems with orientation.  He does not even really need to use GPS.  No problems with leaving the stove on or water running.  He still does quite a bit of cooking.    Derrick says he does not really notice if he misses a dose of the carbidopa/levodopa.    He has been a part of some forms in support groups.  He asks about whether he has to take his doses exactly on schedule.  He also has some sweating at night and asks if this is related to his Parkinson's.    They have a 10-year-old German setter.    *We will scan his list of questions and concerns into the media tab.    CURRENT " MEDICATIONS:   carbidopa-levodopa (SINEMET)  MG tablet, Take 1.5 tablets by mouth 3 times daily  clopidogrel (PLAVIX) 75 MG tablet, Take 1 tablet (75 mg) by mouth daily  dutasteride (AVODART) 0.5 MG capsule, Take one pill every other day  metoprolol succinate ER (TOPROL XL) 25 MG 24 hr tablet, Take 1 tablet (25 mg) by mouth daily (Patient taking differently: Take 12.5 mg by mouth daily)  nitroGLYcerin (NITROSTAT) 0.4 MG sublingual tablet, PLACE 1 TABLET UNDER THE TONGUE AT THE ONSET OF CHEST PAIN. MAY REPEAT EVERY 5 MINUTES AS NEEDED FOR A TOTAL OF 3 DOSES.  rosuvastatin (CRESTOR) 40 MG tablet, Take 1 tablet (40 mg) by mouth every evening  ASPIRIN NOT PRESCRIBED, INTENTIONAL,, 1 each continuous prn Antiplatelet medication not prescribed intentionally due to plavix (Patient not taking: Reported on 10/3/2023)  bisacodyl (DULCOLAX) 5 MG EC tablet, Take 2 tablets at 3 pm the day before your procedure. If your procedure is before 11 am, take 2 additional tablets at 11 pm. If your procedure is after 11 am, take 2 additional tablets at 6 am. For additional instructions refer to your colonoscopy prep instructions.  polyethylene glycol (GOLYTELY) 236 g suspension, The night before the exam at 6 pm drink an 8-ounce glass every 15 minutes until the jug is half empty. If you arrive before 11 AM: Drink the other half of the Golytely jug at 11 PM night before procedure. If you arrive after 11 AM: Drink the other half of the Golytely jug at 6 AM day of procedure. For additional instructions refer to your colonoscopy prep instructions.    No current facility-administered medications on file prior to visit.      RECENT DIAGNOSTIC STUDIES:   Labs: No results found for any visits on 10/03/23.    REVIEW OF SYSTEMS:                                                      10-point review of systems is negative except as mentioned above in HPI.    EXAM:                                                      Physical Exam:   Vitals: BP  120/67   Pulse 56   BMI= There is no height or weight on file to calculate BMI.  GENERAL: NAD.  HEENT: NC/AT.  CV: RRR. S1, S2.   NECK: No bruits.  PULM: Non-labored breathing.   Neurologic:  MENTAL STATUS: Alert, attentive. Speech is fluent except for some occasional word finding difficulties. Normal comprehension.  Normal concentration. Adequate fund of knowledge.  MoCA: 23/20 (normal is 26 and above).  CRANIAL NERVES: Visual fields intact to confrontation. Pupils equally, round and reactive to light. Facial sensation and movement normal. EOM full. Hearing intact to conversation. Trapezius strength intact. Palate moves symmetrically. Tongue midline.  MOTOR: 5/5 in proximal and distal muscle groups of upper and lower extremities. Tone and bulk normal.   SENSATION: Normal light touch and pinprick. Intact proprioception at great toes. Vibration: Slightly decreased at both ankles.   COORDINATION: Normal finger nose finger. Finger tapping normal. Knee heel shin normal.  STATION AND GAIT: Romberg negative. Good postural reflexes.  Step length looks good.  Minimal arm swing on the left.  Multi-step turn.  TREMOR: Rare, brief rolling tremor of the left hand.  No other abnormal movements observed.  Left hand-dominant.    ASSESSMENT and PLAN:                                                      Assessment:    ICD-10-CM    1. Parkinson's disease without dyskinesia or fluctuating manifestations  G20.A1 Speech Therapy Referral      2. Vitamin D deficiency  E55.9 Vitamin D Deficiency      3. Vitamin B12 deficiency (non anemic)  E53.8 Vitamin B12      4. Encounter for long-term (current) use of medications  Z79.899 Comprehensive metabolic panel      5. Lightheadedness  R42 Comprehensive metabolic panel     TSH with free T4 reflex     Magnesium      6. Dysphagia, unspecified type  R13.10 Speech Therapy Referral          Mr. Morrison is a pleasant 77-year-old man here for follow-up regarding Parkinson's.  He has noticed tremor  improvement with carbidopa/levodopa, not wearing off on the current dosing.  He continues with physical therapy and will be starting speech therapy.  I have entered an order for a swallow evaluation additionally.  We will recheck a few of his labs given current symptoms.  MoCA score was 23/30 today.  We will monitor for now.  It does not sound like he is having any functional problems at home because of cognitive changes. Derrick had several questions for me today which were answered to the best of my ability.  I would like to see him back in 6 months.  He expressed understanding and agreement with the plan.      Plan:  -- Continue the carbidopa/levodopa: 1.5 tablets 3 times daily.  -- Let's recheck your vitamin D and B12 levels.  We will notify you of the results.  Continue current supplementation for now.  -- I have also put in an order to check your metabolic panel and thyroid function.  Labs can all be done when you see your primary provider later this month.  -- I have put in an extra referral for swallow study.  We can send this request to [facility] in [place] for you.  -- Return to neurology clinic in 6 months.    Total Time: 45 minutes were spent with the patient and in chart review/documentation (as described above in Assessment and Plan)/coordinating the care on date of service.    Izzy Juarez MD  Neurology    Dragon software used in the dictation of this note.

## 2023-10-04 ASSESSMENT — ENCOUNTER SYMPTOMS
FEVER: 0
COUGH: 0
PARESTHESIAS: 0
HEMATURIA: 0
CONSTIPATION: 1
SORE THROAT: 0
MYALGIAS: 1
NAUSEA: 0
FREQUENCY: 1
JOINT SWELLING: 0
HEARTBURN: 0
PALPITATIONS: 0
DIZZINESS: 1
HEADACHES: 0
SHORTNESS OF BREATH: 0
DIARRHEA: 0
CHILLS: 0
ABDOMINAL PAIN: 0
NERVOUS/ANXIOUS: 0
HEMATOCHEZIA: 0
WEAKNESS: 0
DYSURIA: 0
ARTHRALGIAS: 1
EYE PAIN: 0

## 2023-10-04 ASSESSMENT — ACTIVITIES OF DAILY LIVING (ADL): CURRENT_FUNCTION: NO ASSISTANCE NEEDED

## 2023-10-06 NOTE — NURSING NOTE
MARINA COGNITIVE ASSESSMENT (MOCA)  Version 7.1 Original Version  VISUOSPATIAL/EXECUTIVE               COPY CUBE      [    ]                                [    ] DRAW CLOCK (Ten past eleven)  (3 points)    [    ]                    [    ]               [    ]       Contour            Numbers     Hands POINTS               4    / 5   NAMING    [   ]                                                                        [    ]                                             [    ]  Liclifford Turpin                                Camel                    3 / 3   MEMORY Read list of words, subject must repeat them. Do 2 trials, even if 1st trial is successful. Do a recall after 5 minutes  FACE VELVET [employer] JOHN RED No Points    1st          2nd         ATTENTION Read list of digits (1 digit/sec) Subject has to repeat in the forward order       [    ]   2  1  8  5  4                                [    ] 7 4 2                       2   /2   Read list of letters. The subject must tap with his hand at each letter A. No points if > 2 errors.  [    ] F B A C M N A A J K L B A F A K D E A A A J A M O F A A B          1    /1   Serial 7 subtraction starting at 100          [    ] 93         [    ] 86          [    ] 79          [    ] 72         [    ] 65   4 or 5 correct subtractions: 3 points,  2 or 3 correct: 2 points,  1correct: 1 point,   0 correct: 0 points        3    /3   LANGUAGE Repeat: I only know that Tae is the one to help today. [     ]                                      The cat always hid under the couch when dogs were in the room. [   ]           1    /2   Fluency: Name maximum number of words in one minute that begin with the letter F                                                                                                                    [    ] ___ (N > 11 words)            0   /1   ABSTRACTION Similarity between e.g.  banana-orange=fruit                                                                   [    ] train-bicycle                      [    ] watch-ruler           2   /2   DELAYED  RECALL Has to recall words  WITH NO CUE FACE  [    ] VELVET  [    ] Yarsanism  [    ]  JOHN  [    ] RED  [    ] Points for UNCUED recall only        2    /5           OPTIONAL Category cue           Multiple choice cue          ORIENTATION  [    ] Date     [    ] Month       [    ] Year      [    ] Day      [    ] Place        [    ] City        6 /6   TOTAL  Normal > 26/30 Add 1 point if < 12 years education     23 /30

## 2023-10-09 ENCOUNTER — OFFICE VISIT (OUTPATIENT)
Dept: FAMILY MEDICINE | Facility: CLINIC | Age: 78
End: 2023-10-09
Payer: COMMERCIAL

## 2023-10-09 VITALS
OXYGEN SATURATION: 98 % | HEART RATE: 66 BPM | BODY MASS INDEX: 29.1 KG/M2 | DIASTOLIC BLOOD PRESSURE: 62 MMHG | TEMPERATURE: 97.3 F | HEIGHT: 68 IN | RESPIRATION RATE: 20 BRPM | SYSTOLIC BLOOD PRESSURE: 110 MMHG | WEIGHT: 192 LBS

## 2023-10-09 DIAGNOSIS — I25.10 CORONARY ARTERY DISEASE INVOLVING NATIVE CORONARY ARTERY OF NATIVE HEART WITHOUT ANGINA PECTORIS: ICD-10-CM

## 2023-10-09 DIAGNOSIS — E53.8 VITAMIN B12 DEFICIENCY (NON ANEMIC): ICD-10-CM

## 2023-10-09 DIAGNOSIS — I25.10 CORONARY ARTERY DISEASE INVOLVING NATIVE HEART WITHOUT ANGINA PECTORIS, UNSPECIFIED VESSEL OR LESION TYPE: ICD-10-CM

## 2023-10-09 DIAGNOSIS — N40.1 HYPERTROPHY OF PROSTATE WITH URINARY OBSTRUCTION: ICD-10-CM

## 2023-10-09 DIAGNOSIS — R42 LIGHTHEADEDNESS: ICD-10-CM

## 2023-10-09 DIAGNOSIS — N13.8 HYPERTROPHY OF PROSTATE WITH URINARY OBSTRUCTION: ICD-10-CM

## 2023-10-09 DIAGNOSIS — E55.9 VITAMIN D DEFICIENCY: ICD-10-CM

## 2023-10-09 DIAGNOSIS — Z79.899 ENCOUNTER FOR LONG-TERM (CURRENT) USE OF MEDICATIONS: ICD-10-CM

## 2023-10-09 DIAGNOSIS — Z00.00 MEDICARE ANNUAL WELLNESS VISIT, SUBSEQUENT: Primary | ICD-10-CM

## 2023-10-09 DIAGNOSIS — Z00.00 ENCOUNTER FOR MEDICARE ANNUAL WELLNESS EXAM: ICD-10-CM

## 2023-10-09 DIAGNOSIS — E78.5 HYPERLIPIDEMIA LDL GOAL <70: ICD-10-CM

## 2023-10-09 LAB
ALBUMIN SERPL BCG-MCNC: 4.5 G/DL (ref 3.5–5.2)
ALP SERPL-CCNC: 75 U/L (ref 40–129)
ALT SERPL W P-5'-P-CCNC: 10 U/L (ref 0–70)
ANION GAP SERPL CALCULATED.3IONS-SCNC: 10 MMOL/L (ref 7–15)
AST SERPL W P-5'-P-CCNC: 35 U/L (ref 0–45)
BILIRUB SERPL-MCNC: 0.4 MG/DL
BUN SERPL-MCNC: 10.4 MG/DL (ref 8–23)
CALCIUM SERPL-MCNC: 10.1 MG/DL (ref 8.8–10.2)
CHLORIDE SERPL-SCNC: 103 MMOL/L (ref 98–107)
CREAT SERPL-MCNC: 0.88 MG/DL (ref 0.67–1.17)
DEPRECATED HCO3 PLAS-SCNC: 27 MMOL/L (ref 22–29)
EGFRCR SERPLBLD CKD-EPI 2021: 89 ML/MIN/1.73M2
GLUCOSE SERPL-MCNC: 93 MG/DL (ref 70–99)
MAGNESIUM SERPL-MCNC: 2 MG/DL (ref 1.7–2.3)
POTASSIUM SERPL-SCNC: 4.3 MMOL/L (ref 3.4–5.3)
PROT SERPL-MCNC: 7.8 G/DL (ref 6.4–8.3)
SODIUM SERPL-SCNC: 140 MMOL/L (ref 135–145)
TSH SERPL DL<=0.005 MIU/L-ACNC: 0.56 UIU/ML (ref 0.3–4.2)
VIT B12 SERPL-MCNC: 427 PG/ML (ref 232–1245)
VIT D+METAB SERPL-MCNC: 33 NG/ML (ref 20–50)

## 2023-10-09 PROCEDURE — 84443 ASSAY THYROID STIM HORMONE: CPT | Performed by: FAMILY MEDICINE

## 2023-10-09 PROCEDURE — G0439 PPPS, SUBSEQ VISIT: HCPCS | Mod: 25 | Performed by: FAMILY MEDICINE

## 2023-10-09 PROCEDURE — 82607 VITAMIN B-12: CPT | Performed by: FAMILY MEDICINE

## 2023-10-09 PROCEDURE — 90480 ADMN SARSCOV2 VAC 1/ONLY CMP: CPT | Performed by: FAMILY MEDICINE

## 2023-10-09 PROCEDURE — 91320 SARSCV2 VAC 30MCG TRS-SUC IM: CPT | Performed by: FAMILY MEDICINE

## 2023-10-09 PROCEDURE — 82306 VITAMIN D 25 HYDROXY: CPT | Performed by: FAMILY MEDICINE

## 2023-10-09 PROCEDURE — 80053 COMPREHEN METABOLIC PANEL: CPT | Performed by: FAMILY MEDICINE

## 2023-10-09 PROCEDURE — 83735 ASSAY OF MAGNESIUM: CPT | Performed by: FAMILY MEDICINE

## 2023-10-09 PROCEDURE — 99214 OFFICE O/P EST MOD 30 MIN: CPT | Mod: 25 | Performed by: FAMILY MEDICINE

## 2023-10-09 PROCEDURE — G0008 ADMIN INFLUENZA VIRUS VAC: HCPCS | Performed by: FAMILY MEDICINE

## 2023-10-09 PROCEDURE — 90662 IIV NO PRSV INCREASED AG IM: CPT | Performed by: FAMILY MEDICINE

## 2023-10-09 PROCEDURE — 36415 COLL VENOUS BLD VENIPUNCTURE: CPT | Performed by: FAMILY MEDICINE

## 2023-10-09 RX ORDER — ROSUVASTATIN CALCIUM 40 MG/1
40 TABLET, COATED ORAL EVERY EVENING
Qty: 90 TABLET | Refills: 3 | Status: SHIPPED | OUTPATIENT
Start: 2023-10-09

## 2023-10-09 RX ORDER — CLOPIDOGREL BISULFATE 75 MG/1
75 TABLET ORAL DAILY
Qty: 90 TABLET | Refills: 3 | Status: SHIPPED | OUTPATIENT
Start: 2023-10-09

## 2023-10-09 RX ORDER — DUTASTERIDE 0.5 MG/1
CAPSULE, LIQUID FILLED ORAL
Qty: 45 CAPSULE | Refills: 3 | Status: SHIPPED | OUTPATIENT
Start: 2023-10-09

## 2023-10-09 RX ORDER — METOPROLOL SUCCINATE 25 MG/1
25 TABLET, EXTENDED RELEASE ORAL DAILY
Qty: 90 TABLET | Refills: 3 | Status: SHIPPED | OUTPATIENT
Start: 2023-10-09

## 2023-10-09 ASSESSMENT — ENCOUNTER SYMPTOMS
HEARTBURN: 0
NAUSEA: 0
DIZZINESS: 1
HEMATOCHEZIA: 0
HEADACHES: 0
EYE PAIN: 0
COUGH: 0
SORE THROAT: 0
ARTHRALGIAS: 1
WEAKNESS: 0
JOINT SWELLING: 0
CHILLS: 0
FEVER: 0
MYALGIAS: 1
ABDOMINAL PAIN: 0
DYSURIA: 0
PALPITATIONS: 0
PARESTHESIAS: 0
DIARRHEA: 0
FREQUENCY: 1
SHORTNESS OF BREATH: 0
HEMATURIA: 0
NERVOUS/ANXIOUS: 0
CONSTIPATION: 1

## 2023-10-09 ASSESSMENT — PAIN SCALES - GENERAL: PAINLEVEL: NO PAIN (0)

## 2023-10-09 ASSESSMENT — ACTIVITIES OF DAILY LIVING (ADL): CURRENT_FUNCTION: NO ASSISTANCE NEEDED

## 2023-10-09 NOTE — PATIENT INSTRUCTIONS
"Patient Education   Personalized Prevention Plan  You are due for the preventive services outlined below.  Your care team is available to assist you in scheduling these services.  If you have already completed any of these items, please share that information with your care team to update in your medical record.  Health Maintenance Due   Topic Date Due     ANNUAL REVIEW OF HM ORDERS  10/10/2023     Learning About Being Physically Active  What is physical activity?     Being physically active means doing any kind of activity that gets your body moving.  The types of physical activity that can help you get fit and stay healthy include:  Aerobic or \"cardio\" activities. These make your heart beat faster and make you breathe harder, such as brisk walking, riding a bike, or running. They strengthen your heart and lungs and build up your endurance.  Strength training activities. These make your muscles work against, or \"resist,\" something. Examples include lifting weights or doing push-ups. These activities help tone and strengthen your muscles and bones.  Stretches. These let you move your joints and muscles through their full range of motion. Stretching helps you be more flexible.  Reaching a balance between these three types of physical activity is important because each one contributes to your overall fitness.  What are the benefits of being active?  Being active is one of the best things you can do for your health. It helps you to:  Feel stronger and have more energy to do all the things you like to do.  Focus better at school or work.  Feel, think, and sleep better.  Reach and stay at a healthy weight.  Lose fat and build lean muscle.  Lower your risk for serious health problems, including diabetes, heart attack, high blood pressure, and some cancers.  Keep your heart, lungs, bones, muscles, and joints strong and healthy.  How can you make being active part of your life?  Start slowly. Make it your long-term goal to " "get at least 30 minutes of exercise on most days of the week. Walking is a good choice. You also may want to do other activities, such as running, swimming, cycling, or playing tennis or team sports.  Pick activities that you like--ones that make your heart beat faster, your muscles stronger, and your muscles and joints more flexible. If you find more than one thing you like doing, do them all. You don't have to do the same thing every day.  Get your heart pumping every day. Any activity that makes your heart beat faster and keeps it at that rate for a while counts.  Here are some great ways to get your heart beating faster:  Go for a brisk walk, run, or bike ride.  Go for a hike or swim.  Go in-line skating.  Play a game of touch football, basketball, or soccer.  Ride a bike.  Play tennis or racquetball.  Climb stairs.  Even some household chores can be aerobic--just do them at a faster pace. Vacuuming, raking or mowing the lawn, sweeping the garage, and washing and waxing the car all can help get your heart rate up.  Strengthen your muscles during the week. You don't have to lift heavy weights or grow big, bulky muscles to get stronger. Doing a few simple activities that make your muscles work against, or \"resist,\" something can help you get stronger.  For example, you can:  Do push-ups or sit-ups, which use your own body weight as resistance.  Lift weights or dumbbells or use stretch bands at home or in a gym or community center.  Stretch your muscles often. Stretching will help you as you become more active. It can help you stay flexible, loosen tight muscles, and avoid injury. It can also help improve your balance and posture and can be a great way to relax.  Be sure to stretch the muscles you'll be using when you work out. It's best to warm your muscles slightly before you stretch them. Walk or do some other light aerobic activity for a few minutes, and then start stretching.  When you stretch your " "muscles:  Do it slowly. Stretching is not about going fast or making sudden movements.  Don't push or bounce during a stretch.  Hold each stretch for at least 15 to 30 seconds, if you can. You should feel a stretch in the muscle, but not pain.  Breathe out as you do the stretch. Then breathe in as you hold the stretch. Don't hold your breath.  If you're worried about how more activity might affect your health, have a checkup before you start. Follow any special advice your doctor gives you for getting a smart start.  Where can you learn more?  Go to https://www.Bloomz.net/patiented  Enter W332 in the search box to learn more about \"Learning About Being Physically Active.\"  Current as of: October 10, 2022               Content Version: 13.7    9316-0936 [employer].   Care instructions adapted under license by your healthcare professional. If you have questions about a medical condition or this instruction, always ask your healthcare professional. [employer] disclaims any warranty or liability for your use of this information.      Hearing Loss: Care Instructions  Overview     Hearing loss is a sudden or slow decrease in how well you hear. It can range from slight to profound. Permanent hearing loss can occur with aging. It also can happen when you are exposed long-term to loud noise. Examples include listening to loud music, riding motorcycles, or being around other loud machines.  Hearing loss can affect your work and home life. It can make you feel lonely or depressed. You may feel that you have lost your independence. But hearing aids and other devices can help you hear better and feel connected to others.  Follow-up care is a key part of your treatment and safety. Be sure to make and go to all appointments, and call your doctor if you are having problems. It's also a good idea to know your test results and keep a list of the medicines you take.  How can you care for yourself at " home?  Avoid loud noises whenever possible. This helps keep your hearing from getting worse.  Always wear hearing protection around loud noises.  Wear a hearing aid as directed.  A professional can help you pick a hearing aid that will work best for you.  You can also get hearing aids over the counter for mild to moderate hearing loss.  Have hearing tests as your doctor suggests. They can show whether your hearing has changed. Your hearing aid may need to be adjusted.  Use other devices as needed. These may include:  Telephone amplifiers and hearing aids that can connect to a television, stereo, radio, or microphone.  Devices that use lights or vibrations. These alert you to the doorbell, a ringing telephone, or a baby monitor.  Television closed-captioning. This shows the words at the bottom of the screen. Most new TVs can do this.  TTY (text telephone). This lets you type messages back and forth on the telephone instead of talking or listening. These devices are also called TDD. When messages are typed on the keyboard, they are sent over the phone line to a receiving TTY. The message is shown on a monitor.  Use text messaging, social media, and email if it is hard for you to communicate by telephone.  Try to learn a listening technique called speechreading. It is not lipreading. You pay attention to people's gestures, expressions, posture, and tone of voice. These clues can help you understand what a person is saying. Face the person you are talking to, and have them face you. Make sure the lighting is good. You need to see the other person's face clearly.  Think about counseling if you need help to adjust to your hearing loss.  When should you call for help?  Watch closely for changes in your health, and be sure to contact your doctor if:    You think your hearing is getting worse.     You have new symptoms, such as dizziness or nausea.   Where can you learn more?  Go to  "https://www.Pictela.net/patiented  Enter R798 in the search box to learn more about \"Hearing Loss: Care Instructions.\"  Current as of: March 1, 2023               Content Version: 13.7 2006-2023 [employer].   Care instructions adapted under license by your healthcare professional. If you have questions about a medical condition or this instruction, always ask your healthcare professional. [employer], [employer] disclaims any warranty or liability for your use of this information.         "

## 2023-10-09 NOTE — NURSING NOTE
"Chief Complaint   Patient presents with    Physical       Initial /62   Pulse 66   Temp 97.3  F (36.3  C) (Tympanic)   Resp 20   Ht 1.727 m (5' 8\")   Wt 87.1 kg (192 lb)   SpO2 98%   BMI 29.19 kg/m   Estimated body mass index is 29.19 kg/m  as calculated from the following:    Height as of this encounter: 1.727 m (5' 8\").    Weight as of this encounter: 87.1 kg (192 lb).    Patient presents to the clinic using No DME    Is there anyone who you would like to be able to receive your results? No  If yes have patient fill out MARIANNE      "

## 2023-10-09 NOTE — PROGRESS NOTES
"SUBJECTIVE:   Derrick is a 77 year old who presents for Preventive Visit.      10/9/2023     8:03 AM   Additional Questions   Roomed by Asha MEDINA   Accompanied by self       Are you in the first 12 months of your Medicare coverage?  No    Healthy Habits:     In general, how would you rate your overall health?  Good    Frequency of exercise:  1 day/week    Duration of exercise:  Less than 15 minutes    Do you usually eat at least 4 servings of fruit and vegetables a day, include whole grains    & fiber and avoid regularly eating high fat or \"junk\" foods?  Yes    Taking medications regularly:  Yes    Medication side effects:  None    Ability to successfully perform activities of daily living:  No assistance needed    Home Safety:  No safety concerns identified    Hearing Impairment:  Difficulty following a conversation in a noisy restaurant or crowded room and find that men's voices are easier to understand than woman's    In the past 6 months, have you been bothered by leaking of urine?  No    In general, how would you rate your overall mental or emotional health?  Good    Additional concerns today:  No      Today's PHQ-2 Score:       10/9/2023     7:58 AM   PHQ-2 ( 1999 Pfizer)   Q1: Little interest or pleasure in doing things 0   Q2: Feeling down, depressed or hopeless 0   PHQ-2 Score 0   Q1: Little interest or pleasure in doing things Not at all   Q2: Feeling down, depressed or hopeless Not at all   PHQ-2 Score 0           Have you ever done Advance Care Planning? (For example, a Health Directive, POLST, or a discussion with a medical provider or your loved ones about your wishes): Yes, advance care planning is on file.      Fall risk  Fallen 2 or more times in the past year?: No  Any fall with injury in the past year?: No    Cognitive Screening   1) Repeat 3 items (Leader, Season, Table)    2) Clock draw: NORMAL  3) 3 item recall: Recalls 3 objects  Results: 3 items recalled: COGNITIVE IMPAIRMENT LESS " LIKELY    Mini-CogTM Copyright NORAH Renner. Licensed by the author for use in [facility]; reprinted with permission (snehal@[facility]). All rights reserved.      Do you have sleep apnea, excessive snoring or daytime drowsiness? : no    Reviewed and updated as needed this visit by clinical staff   Tobacco  Allergies  Meds              Reviewed and updated as needed this visit by Provider                 Social History     Tobacco Use     Smoking status: Never     Smokeless tobacco: Never   Substance Use Topics     Alcohol use: Yes     Comment: wine             10/4/2023    10:50 AM   Alcohol Use   Prescreen: >3 drinks/day or >7 drinks/week? No          No data to display                Do you have a current opioid prescription? No  Do you use any other controlled substances or medications that are not prescribed by a provider? None        Current providers sharing in care for this patient include:   Patient Care Team:  Kaiden Zapata MD as PCP - General (Family Practice)  Kaiden Zapata MD as Assigned PCP  Janeth Campos APRN CNP as Assigned Heart and Vascular Provider  Vandana Frausto PA-C as Physician Assistant (Dermatology)  Izzy Olivares MD as Assigned Neuroscience Provider    The following health maintenance items are reviewed in Epic and correct as of today:  Health Maintenance   Topic Date Due     ANNUAL REVIEW OF HM ORDERS  10/10/2023     MEDICARE ANNUAL WELLNESS VISIT  10/09/2024     FALL RISK ASSESSMENT  10/09/2024     DTAP/TDAP/TD IMMUNIZATION (3 - Td or Tdap) 08/24/2025     LIPID  10/06/2027     ADVANCE CARE PLANNING  10/09/2028     COLORECTAL CANCER SCREENING  02/28/2033     HEPATITIS C SCREENING  Completed     PHQ-2 (once per calendar year)  Completed     INFLUENZA VACCINE  Completed     Pneumococcal Vaccine: 65+ Years  Completed     ZOSTER IMMUNIZATION  Completed     COVID-19 Vaccine  Completed     IPV IMMUNIZATION  Aged Out     HPV IMMUNIZATION  Aged Out  "    MENINGITIS IMMUNIZATION  Aged Out     CAD: stable.  Long term plavix.  No cp or sob  Hyperlipidemia: statin, stable.  Fills  Parkinsons: liking the sinemet, helping his function.  Follows with neurology  Htn: stable on meds  BPh ;stable on avodart.  Didn't like flomax        Review of Systems   Constitutional:  Negative for chills and fever.   HENT:  Positive for hearing loss. Negative for congestion, ear pain and sore throat.    Eyes:  Negative for pain and visual disturbance.   Respiratory:  Negative for cough and shortness of breath.    Cardiovascular:  Negative for chest pain and palpitations.   Gastrointestinal:  Positive for constipation. Negative for abdominal pain, diarrhea, heartburn, hematochezia and nausea.   Genitourinary:  Positive for frequency. Negative for dysuria, genital sores, hematuria, impotence, penile discharge and urgency.   Musculoskeletal:  Positive for arthralgias and myalgias. Negative for joint swelling.   Skin:  Negative for rash.   Neurological:  Positive for dizziness. Negative for weakness, headaches and paresthesias.   Psychiatric/Behavioral:  Negative for mood changes. The patient is not nervous/anxious.      Current Outpatient Medications   Medication     carbidopa-levodopa (SINEMET)  MG tablet     clopidogrel (PLAVIX) 75 MG tablet     dutasteride (AVODART) 0.5 MG capsule     metoprolol succinate ER (TOPROL XL) 25 MG 24 hr tablet     nitroGLYcerin (NITROSTAT) 0.4 MG sublingual tablet     rosuvastatin (CRESTOR) 40 MG tablet     ASPIRIN NOT PRESCRIBED, INTENTIONAL,     No current facility-administered medications for this visit.        OBJECTIVE:   /62   Pulse 66   Temp 97.3  F (36.3  C) (Tympanic)   Resp 20   Ht 1.727 m (5' 8\")   Wt 87.1 kg (192 lb)   SpO2 98%   BMI 29.19 kg/m   Estimated body mass index is 29.19 kg/m  as calculated from the following:    Height as of this encounter: 1.727 m (5' 8\").    Weight as of this encounter: 87.1 kg (192 lb).  Gen: " "alert and oriented, in no acute distress, affect within normal limits  Neck: supple with no masses or nodes  Throat: oropharynx clear, no exudate or tonsillar/palate asymmetry.    CV: RRR, no murmur  Lungs: clear bilaterally with good effort  Abd: nontender, no mass  Ext: no edema or lesions   Neuro: moving all extremities, gait normal, no focal deficts noted.  Has some stiffness, no significant tremor seen          ASSESSMENT / PLAN:   wellness  CAD: stable.  Long term plavix.  No cp or sob  Hyperlipidemia: statin, stable.  Fills  Parkinsons: liking the sinemet, helping his function.    Htn: stable on meds  BPh ;stable on avodart.  Didn't like flomax    fills on meds for chronic issues as above in med list and orders.   Labs ordered as appropriate for monitoring the listed medical conditions.    Back in a year, continue to follow with neurology.  Doing well        COUNSELING:  Reviewed preventive health counseling, as reflected in patient instructions       Regular exercise       Healthy diet/nutrition      BMI:   Estimated body mass index is 29.19 kg/m  as calculated from the following:    Height as of this encounter: 1.727 m (5' 8\").    Weight as of this encounter: 87.1 kg (192 lb).         He reports that he has never smoked. He has never used smokeless tobacco.      Appropriate preventive services were discussed with this patient, including applicable screening as appropriate for fall prevention, nutrition, physical activity, Tobacco-use cessation, weight loss and cognition.  Checklist reviewing preventive services available has been given to the patient.    Reviewed patients plan of care and provided an AVS. The Basic Care Plan (routine screening as documented in Health Maintenance) for Derrick meets the Care Plan requirement. This Care Plan has been established and reviewed with the Patient.      Kaiden Zapata MD  [facility]    Identified Health Risks:  I have reviewed Opioid Use " Disorder and Substance Use Disorder risk factors and made any needed referrals.   He is at risk for lack of exercise and has been provided with information to increase physical activity for the benefit of his well-being.  The patient was provided with written information regarding signs of hearing loss.

## 2023-10-16 ENCOUNTER — MEDICAL CORRESPONDENCE (OUTPATIENT)
Dept: HEALTH INFORMATION MANAGEMENT | Facility: CLINIC | Age: 78
End: 2023-10-16

## 2023-10-17 ENCOUNTER — MYC MEDICAL ADVICE (OUTPATIENT)
Dept: FAMILY MEDICINE | Facility: CLINIC | Age: 78
End: 2023-10-17
Payer: COMMERCIAL

## 2023-10-17 DIAGNOSIS — I25.10 CORONARY ARTERY DISEASE INVOLVING NATIVE HEART WITHOUT ANGINA PECTORIS, UNSPECIFIED VESSEL OR LESION TYPE: ICD-10-CM

## 2023-10-17 RX ORDER — NITROGLYCERIN 0.4 MG/1
TABLET SUBLINGUAL
Qty: 25 TABLET | Refills: 1 | Status: SHIPPED | OUTPATIENT
Start: 2023-10-17

## 2023-10-24 ENCOUNTER — MYC MEDICAL ADVICE (OUTPATIENT)
Dept: NEUROLOGY | Facility: CLINIC | Age: 78
End: 2023-10-24
Payer: COMMERCIAL

## 2023-10-24 DIAGNOSIS — R13.10 DYSPHAGIA, UNSPECIFIED TYPE: ICD-10-CM

## 2023-10-24 DIAGNOSIS — G20.A1 PARKINSON'S DISEASE WITHOUT DYSKINESIA OR FLUCTUATING MANIFESTATIONS (H): Primary | ICD-10-CM

## 2023-10-25 NOTE — TELEPHONE ENCOUNTER
"Dr. Juarez- Per last OV 10/3/23:    \"-- I have put in an extra referral for swallow study.  We can send this request to [facility] in [place] for you.\"    The order does not mention swallow study. I have T'd up a new order for review and signature. Please review and send back to us once complete    Lorna Navarro CMA on 10/25/2023 at 1:39 PM  [facility]     "

## 2023-11-02 ENCOUNTER — OFFICE VISIT (OUTPATIENT)
Dept: CARDIOLOGY | Facility: CLINIC | Age: 78
End: 2023-11-02
Payer: COMMERCIAL

## 2023-11-02 VITALS
SYSTOLIC BLOOD PRESSURE: 133 MMHG | OXYGEN SATURATION: 96 % | WEIGHT: 195 LBS | HEART RATE: 57 BPM | BODY MASS INDEX: 29.65 KG/M2 | RESPIRATION RATE: 12 BRPM | DIASTOLIC BLOOD PRESSURE: 76 MMHG

## 2023-11-02 DIAGNOSIS — I10 BENIGN ESSENTIAL HYPERTENSION: ICD-10-CM

## 2023-11-02 DIAGNOSIS — E78.5 HYPERLIPIDEMIA LDL GOAL <70: ICD-10-CM

## 2023-11-02 DIAGNOSIS — Z13.6 SCREENING FOR AAA (ABDOMINAL AORTIC ANEURYSM): Primary | ICD-10-CM

## 2023-11-02 DIAGNOSIS — I25.10 CORONARY ARTERY DISEASE INVOLVING NATIVE CORONARY ARTERY OF NATIVE HEART WITHOUT ANGINA PECTORIS: ICD-10-CM

## 2023-11-02 PROCEDURE — 99214 OFFICE O/P EST MOD 30 MIN: CPT | Performed by: NURSE PRACTITIONER

## 2023-11-02 NOTE — PATIENT INSTRUCTIONS
Medication Changes:  None     Recommendations:  Check blood pressure at least 1 hour after medications. Call the clinic if your blood pressure is consistently greater than 130/80.   Call if blood pressure is less than 90 on top or less than 100 with lightheadedness.     Call if heart rate is less than 50 or less than 55 and lightheaded.     Follow-up:  Abdominal aortic ultrasound for screening  Annual fasting lab in 2 months (lipid/ALT)  Cardiology follow up at [facility]: Dr. Lan in 6 months  Abdominal aortic ultrasound    Cardiology Scheduling~[phone]  Cardiology Clinic RN~[phone] (Conchita RN, Nelsy RN, Evy RN)

## 2023-11-02 NOTE — LETTER
"11/2/2023    Kaiden Zapata MD  [address] [place] [id number]    RE: Derrick Morrison       Dear Colleague,     I had the pleasure of seeing Derrick Morrison in the [facility].  Cardiology Clinic Progress Note  Derrick Morrison MRN# [id number]   YOB: 1945 Age: 78 year old      Primary Cardiologist:   Dr. Lan      Patient presents today for annual follow-up          History of Presenting Illness:      Derrick Morrison is a pleasant 78 year old patient with a past cardiac history significant for   CAD  previous stents to the LAD and RCA  2011 GERALDINE to the proximal LAD for ISR   NSTEMI 10/2021 GERALDINE x2 to the mid LAD and GERALDINE x1 to LCx Abbott   Imdur caused fogginess  HTN   palpitations   sharp chest pain twinges which correlated with PVCs  improved after starting beta blocker  hyperlipidemia  Past medical history significant for Parkinson's.  He is .       Patient was seen by me in November 2022 for routine follow-up.  Fatigue resolved after decreasing metoprolol to 12 and half milligrams.  Plavix was continued long-term as mono antiplatelet therapy.  Patient was inquiring about AAA screening which was previously negative in 2018.    Most recent lipid profile, BMP, TSH, ALT, hemoglobin reviewed today. He was seen in the ED for palpitations in January 2023.  Zio patch February 2023 showed 4% PVCs and no other significant findings.  Lexiscan nuclear stress test March 2023 was negative for ischemia or infarction, normal EF.  Results reviewed today.    Blood pressure today is controlled.  He tells me that the palpitations in January began after PCP recommended decreasing his metoprolol due to lower blood pressure.  After he began having the palpitations he decided to increase the metoprolol back to the full tablet and palpitations resolved.  Blood pressures have not been hypotensive per his report.  However, he does mention some \"wooziness\" after standing up recently and this " lasted for some minutes.  Blood pressure at that time was reportedly normal and has not had any further issues with this.  He is not able to explain if the woozy feeling was lightheaded versus dizziness.  We will continue to monitor this.  He denies any anginal symptoms with activity.  He does not get any routine exercise.  He has only been taking rosuvastatin 20 mg daily.  I asked him to return to the full 40 mg daily given his history of LDL at 70. Patient reports no chest pain, shortness of breath, PND, orthopnea, presyncope, syncope, edema, heart racing, or palpitations.                         Assessment and Plan:       Plan  Patient Instructions   Medication Changes:  None     Recommendations:  Check blood pressure at least 1 hour after medications. Call the clinic if your blood pressure is consistently greater than 130/80.   Call if blood pressure is less than 90 on top or less than 100 with lightheadedness.     Call if heart rate is less than 50 or less than 55 and lightheaded.     Follow-up:  Abdominal aortic ultrasound for screening  Annual fasting lab in 2 months (lipid/ALT)  Card[facility] follow up at [facility]: Dr. Lan in 6 months  Abdominal aortic ultrasound    Cardiology Scheduling~[phone]  Cardiology Clinic RN~[phone] (Conchita RN, Nelsy RN, Evy RN)              Screening for AAA (abdominal aortic aneurysm)  Coronary artery disease involving native coronary artery of native heart without angina pectoris  Benign essential hypertension  Hyperlipidemia LDL goal <70      CAD  No angina (prior angina jaw pain)   Continue statin, beta blocker, plavix indefinitely  Imdur caused fogginess        Palpitations   H/o Intermittent palpitations with sharp chest pain twinges which correlated with PVCs on telemetry   improved after starting beta blocker  Continue metoprolol        Hyperlipidemia  Last LDL 70 on 10/2022  Continue statin  Consider adding Zetia if needed        Hypertension   Controlled     continue current medications          Respiratory:  clear to auscultation; normal symmetry        Cardiac: regular rate and rhythm     GI:  nondistended     Extremities and Muscular Skeletal:   no edema            Thank you for allowing me to participate in this delightful patient's care.      This note was completed in part using Dragon voice recognition software. Although reviewed after completion, some word and grammatical errors may occur.    MARIA T Vyas [facility]  cc:   No referring provider defined for this encounter.

## 2023-11-02 NOTE — PROGRESS NOTES
"Cardiology Clinic Progress Note  Derrick Morrison MRN# [id number]   YOB: 1945 Age: 78 year old      Primary Cardiologist:   Dr. Lan      Patient presents today for annual follow-up          History of Presenting Illness:      Derrick Morrison is a pleasant 78 year old patient with a past cardiac history significant for   CAD  previous stents to the LAD and RCA  2011 GERALDINE to the proximal LAD for ISR   NSTEMI 10/2021 GERALDINE x2 to the mid LAD and GERALDINE x1 to LCx Abbott   Imdur caused fogginess  HTN   palpitations   sharp chest pain twinges which correlated with PVCs  improved after starting beta blocker  hyperlipidemia  Past medical history significant for Parkinson's.  He is .       Patient was seen by me in November 2022 for routine follow-up.  Fatigue resolved after decreasing metoprolol to 12 and half milligrams.  Plavix was continued long-term as mono antiplatelet therapy.  Patient was inquiring about AAA screening which was previously negative in 2018.    Most recent lipid profile, BMP, TSH, ALT, hemoglobin reviewed today. He was seen in the ED for palpitations in January 2023.  Zio patch February 2023 showed 4% PVCs and no other significant findings.  Lexiscan nuclear stress test March 2023 was negative for ischemia or infarction, normal EF.  Results reviewed today.    Blood pressure today is controlled.  He tells me that the palpitations in January began after PCP recommended decreasing his metoprolol due to lower blood pressure.  After he began having the palpitations he decided to increase the metoprolol back to the full tablet and palpitations resolved.  Blood pressures have not been hypotensive per his report.  However, he does mention some \"wooziness\" after standing up recently and this lasted for some minutes.  Blood pressure at that time was reportedly normal and has not had any further issues with this.  He is not able to explain if the woozy feeling was lightheaded versus dizziness.  " We will continue to monitor this.  He denies any anginal symptoms with activity.  He does not get any routine exercise.  He has only been taking rosuvastatin 20 mg daily.  I asked him to return to the full 40 mg daily given his history of LDL at 70. Patient reports no chest pain, shortness of breath, PND, orthopnea, presyncope, syncope, edema, heart racing, or palpitations.                         Assessment and Plan:       Plan  Patient Instructions   Medication Changes:  None     Recommendations:  Check blood pressure at least 1 hour after medications. Call the clinic if your blood pressure is consistently greater than 130/80.   Call if blood pressure is less than 90 on top or less than 100 with lightheadedness.     Call if heart rate is less than 50 or less than 55 and lightheaded.     Follow-up:  Abdominal aortic ultrasound for screening  Annual fasting lab in 2 months (lipid/ALT)  Cardiology follow up at [facility]: Dr. Lan in 6 months  Abdominal aortic ultrasound    Cardiology Scheduling~[phone]  Cardiology Clinic RN~[phone] (Conchita RN, Nelsy RN, Evy RN)              Screening for AAA (abdominal aortic aneurysm)  Coronary artery disease involving native coronary artery of native heart without angina pectoris  Benign essential hypertension  Hyperlipidemia LDL goal <70      CAD  No angina (prior angina jaw pain)   Continue statin, beta blocker, plavix indefinitely  Imdur caused fogginess        Palpitations   H/o Intermittent palpitations with sharp chest pain twinges which correlated with PVCs on telemetry   improved after starting beta blocker  Continue metoprolol        Hyperlipidemia  Last LDL 70 on 10/2022  Continue statin  Consider adding Zetia if needed        Hypertension   Controlled    continue current medications          Respiratory:  clear to auscultation; normal symmetry        Cardiac: regular rate and rhythm     GI:  nondistended     Extremities and Muscular Skeletal:   no edema             Thank you for allowing me to participate in this delightful patient's care.      This note was completed in part using Dragon voice recognition software. Although reviewed after completion, some word and grammatical errors may occur.    MARIA T Vyas CNP

## 2023-11-08 ENCOUNTER — HOSPITAL ENCOUNTER (OUTPATIENT)
Dept: CARDIOLOGY | Facility: CLINIC | Age: 78
Discharge: HOME | End: 2023-11-08
Attending: NURSE PRACTITIONER | Admitting: NURSE PRACTITIONER
Payer: MEDICARE

## 2023-11-08 DIAGNOSIS — Z13.6 SCREENING FOR AAA (ABDOMINAL AORTIC ANEURYSM): ICD-10-CM

## 2023-11-08 PROCEDURE — 76775 US EXAM ABDO BACK WALL LIM: CPT

## 2023-11-08 PROCEDURE — 76706 US ABDL AORTA SCREEN AAA: CPT | Mod: 26 | Performed by: INTERNAL MEDICINE

## 2023-11-09 NOTE — RESULT ENCOUNTER NOTE
No evidence AAA; diffuse mild calcified atherosclerosis involving aorto iliac arteries. Pt notified via [employer] [facility]

## 2023-11-14 ENCOUNTER — OFFICE VISIT (OUTPATIENT)
Dept: DERMATOLOGY | Facility: CLINIC | Age: 78
End: 2023-11-14
Payer: COMMERCIAL

## 2023-11-14 DIAGNOSIS — D18.01 CHERRY ANGIOMA: ICD-10-CM

## 2023-11-14 DIAGNOSIS — D22.9 MULTIPLE BENIGN NEVI: ICD-10-CM

## 2023-11-14 DIAGNOSIS — Z85.828 HISTORY OF BASAL CELL CANCER: ICD-10-CM

## 2023-11-14 DIAGNOSIS — L82.1 SEBORRHEIC KERATOSIS: Primary | ICD-10-CM

## 2023-11-14 DIAGNOSIS — L81.4 LENTIGO: ICD-10-CM

## 2023-11-14 DIAGNOSIS — L57.0 ACTINIC KERATOSIS: ICD-10-CM

## 2023-11-14 PROCEDURE — 99213 OFFICE O/P EST LOW 20 MIN: CPT | Mod: 25 | Performed by: PHYSICIAN ASSISTANT

## 2023-11-14 PROCEDURE — 17000 DESTRUCT PREMALG LESION: CPT | Performed by: PHYSICIAN ASSISTANT

## 2023-11-14 ASSESSMENT — PAIN SCALES - GENERAL: PAINLEVEL: NO PAIN (0)

## 2023-11-14 NOTE — LETTER
"    11/14/2023         RE: Derrick Morrison  [address] [id number]        Dear Colleague,    Thank you for referring your patient, Derrick Morrison, to the [facility]. Please see a copy of my visit note below.    Derrick Morrison is an extremely pleasant 78 year old year old male patient here today for rough area on face.  No pain or bleeding.  Patient has no other skin complaints today.  Remainder of the HPI, Meds, PMH, Allergies, FH, and SH was reviewed in chart.    Pertinent Hx:  History of BCC  Past Medical History:   Diagnosis Date     Actinic keratosis      Basal cell carcinoma      BPH w urinary obs/LUTS 10/10/2011    flomax in past, \"quit working\".  Avodart in past.  Tapered off.        CAD (coronary artery disease) 10/10/2011    -8/16/2006 s/p GERALDINE to mid RCA, s/p GERALDINE to mid LAD in [place] -10/4/2007 s/p GERALDINE to pRCA and GERALDINE to dRCA in [place]  -11/17/11 Unstable angina, s/p GERALDINE to prox LAD (jailed small diagonal)       Hyperlipidaemia      Palpitations 08/07/2013    occasional, improved on low dose beta blocker      screening 10/10/2011    2007 colonoscopy \"all clean\" in [place].  He is sure about this.   AAA ultrasound screen 3/07 normal also.  (leg and neck done then too)      Skin cancer     had mohs on L temple       Past Surgical History:   Procedure Laterality Date     APPENDECTOMY      childhood     CARDIAC SURGERY  2006, 2007, 2011    7 stents total     COLONOSCOPY      about 8-9 years ago     COLONOSCOPY N/A 2/28/2023    Procedure: COLONOSCOPY;  Surgeon: Kaiden Frausto MD;  Location: [facility]     EYE SURGERY  2020?    cataracts        Family History   Problem Relation Age of Onset     Coronary Artery Disease Mother      Melanoma No family hx of        Social History     Socioeconomic History     Marital status:      Spouse name: Not on file     Number of children: Not on file     Years of education: Not on file     " Highest education level: Not on file   Occupational History     Employer: RETIRED   Tobacco Use     Smoking status: Never     Smokeless tobacco: Never   Vaping Use     Vaping Use: Never used   Substance and Sexual Activity     Alcohol use: Yes     Comment: wine     Drug use: No     Sexual activity: Yes     Partners: Female   Other Topics Concern     Parent/sibling w/ CABG, MI or angioplasty before 65F 55M? Yes     Comment: mother   Social History Narrative     Not on file     Social Determinants of Health     Financial Resource Strain: Low Risk  (10/4/2023)    Financial Resource Strain      Within the past 12 months, have you or your family members you live with been unable to get utilities (heat, electricity) when it was really needed?: No   Food Insecurity: Low Risk  (10/4/2023)    Food Insecurity      Within the past 12 months, did you worry that your food would run out before you got money to buy more?: No      Within the past 12 months, did the food you bought just not last and you didn t have money to get more?: No   Transportation Needs: Low Risk  (10/4/2023)    Transportation Needs      Within the past 12 months, has lack of transportation kept you from medical appointments, getting your medicines, non-medical meetings or appointments, work, or from getting things that you need?: No   Physical Activity: Not on file   Stress: Not on file   Social Connections: Not on file   Interpersonal Safety: Low Risk  (10/9/2023)    Interpersonal Safety      Do you feel physically and emotionally safe where you currently live?: Yes      Within the past 12 months, have you been hit, slapped, kicked or otherwise physically hurt by someone?: No      Within the past 12 months, have you been humiliated or emotionally abused in other ways by your partner or ex-partner?: No   Housing Stability: Low Risk  (10/4/2023)    Housing Stability      Do you have housing? : Yes      Are you worried about losing your housing?: No        Outpatient Encounter Medications as of 11/14/2023   Medication Sig Dispense Refill     ASPIRIN NOT PRESCRIBED, INTENTIONAL, 1 each continuous prn Antiplatelet medication not prescribed intentionally due to plavix 0 each 0     carbidopa-levodopa (SINEMET)  MG tablet Take 1.5 tablets by mouth 3 times daily 135 tablet 3     clopidogrel (PLAVIX) 75 MG tablet Take 1 tablet (75 mg) by mouth daily 90 tablet 3     dutasteride (AVODART) 0.5 MG capsule Take one pill every other day 45 capsule 3     metoprolol succinate ER (TOPROL XL) 25 MG 24 hr tablet Take 1 tablet (25 mg) by mouth daily 90 tablet 3     nitroGLYcerin (NITROSTAT) 0.4 MG sublingual tablet PLACE 1 TABLET UNDER THE TONGUE AT THE ONSET OF CHEST PAIN. MAY REPEAT EVERY 5 MINUTES AS NEEDED FOR A TOTAL OF 3 DOSES. 25 tablet 1     rosuvastatin (CRESTOR) 40 MG tablet Take 1 tablet (40 mg) by mouth every evening 90 tablet 3     No facility-administered encounter medications on file as of 11/14/2023.             O:   NAD, WDWN, Alert & Oriented, Mood & Affect wnl, Vitals stable   Here today alone   There were no vitals taken for this visit.   General appearance normal   Vitals stable   Alert, oriented and in no acute distress     Gritty papule on right cheek x 1   Stuck on papules and brown macules on trunk and ext   Red papules on trunk  Brown papules and macules with regular pigment network and borders on torso and extremities     The remainder of skin exam is normal       Eyes: Conjunctivae/lids:Normal     ENT: Lips: normal    MSK:Normal    Cardiovascular: peripheral edema none    Pulm: Breathing Normal    Neuro/Psych: Orientation:Alert and Orientedx3 ; Mood/Affect:normal   A/P:  1. Actinic keratosis on right cheek x 1  LN2:  Treated with LN2 for 5s for 1-2 cycles. Warned risks of blistering, pain, pigment change, scarring, and incomplete resolution.  Advised patient to return if lesions do not completely resolve.  Wound care sheet given  2. Seborrheic  keratosis, lentigo, angioma, benign nevi, History of BCC  It was a pleasure speaking to Derrick Morrison today.  BENIGN LESIONS DISCUSSED WITH PATIENT:  I discussed the specifics of tumor, prognosis, and genetics of benign lesions.  I explained that treatment of these lesions would be purely cosmetic and not medically neccessary.  I discussed with patient different removal options including excision, cautery and /or laser.      Nature and genetics of benign skin lesions dicussed with patient.  Signs and Symptoms of skin cancer discussed with patient.  ABCDEs of melanoma reviewed with patient.  Patient encouraged to perform monthly skin exams.  UV precautions reviewed with patient.  Risks of non-melanoma skin cancer discussed with patient   Return to clinic in one year or sooner if needed.       Again, thank you for allowing me to participate in the care of your patient.        Sincerely,        Vandana Reid PA-C

## 2023-11-14 NOTE — PROGRESS NOTES
"Derrick Morrison is an extremely pleasant 78 year old year old male patient here today for rough area on face.  No pain or bleeding.  Patient has no other skin complaints today.  Remainder of the HPI, Meds, PMH, Allergies, FH, and SH was reviewed in chart.    Pertinent Hx:  History of BCC  Past Medical History:   Diagnosis Date    Actinic keratosis     Basal cell carcinoma     BPH w urinary obs/LUTS 10/10/2011    flomax in past, \"quit working\".  Avodart in past.  Tapered off.       CAD (coronary artery disease) 10/10/2011    -8/16/2006 s/p GERALDINE to mid RCA, s/p GERALDINE to mid LAD in [place] -10/4/2007 s/p GERALDINE to pRCA and GERALDINE to dRCA in [place]  -11/17/11 Unstable angina, s/p GERALDINE to prox LAD (jailed small diagonal)      Hyperlipidaemia     Palpitations 08/07/2013    occasional, improved on low dose beta blocker     screening 10/10/2011    2007 colonoscopy \"all clean\" in [place].  He is sure about this.   AAA ultrasound screen 3/07 normal also.  (leg and neck done then too)     Skin cancer     had mohs on L temple       Past Surgical History:   Procedure Laterality Date    APPENDECTOMY      childhood    CARDIAC SURGERY  2006, 2007, 2011    7 stents total    COLONOSCOPY      about 8-9 years ago    COLONOSCOPY N/A 2/28/2023    Procedure: COLONOSCOPY;  Surgeon: Kaiden Frausto MD;  Location: [facility]    EYE SURGERY  2020?    cataracts        Family History   Problem Relation Age of Onset    Coronary Artery Disease Mother     Melanoma No family hx of        Social History     Socioeconomic History    Marital status:      Spouse name: Not on file    Number of children: Not on file    Years of education: Not on file    Highest education level: Not on file   Occupational History     Employer: RETIRED   Tobacco Use    Smoking status: Never    Smokeless tobacco: Never   Vaping Use    Vaping Use: Never used   Substance and Sexual Activity    Alcohol use: Yes     Comment: wine    Drug use: No    Sexual " activity: Yes     Partners: Female   Other Topics Concern    Parent/sibling w/ CABG, MI or angioplasty before 65F 55M? Yes     Comment: mother   Social History Narrative    Not on file     Social Determinants of Health     Financial Resource Strain: Low Risk  (10/4/2023)    Financial Resource Strain     Within the past 12 months, have you or your family members you live with been unable to get utilities (heat, electricity) when it was really needed?: No   Food Insecurity: Low Risk  (10/4/2023)    Food Insecurity     Within the past 12 months, did you worry that your food would run out before you got money to buy more?: No     Within the past 12 months, did the food you bought just not last and you didn t have money to get more?: No   Transportation Needs: Low Risk  (10/4/2023)    Transportation Needs     Within the past 12 months, has lack of transportation kept you from medical appointments, getting your medicines, non-medical meetings or appointments, work, or from getting things that you need?: No   Physical Activity: Not on file   Stress: Not on file   Social Connections: Not on file   Interpersonal Safety: Low Risk  (10/9/2023)    Interpersonal Safety     Do you feel physically and emotionally safe where you currently live?: Yes     Within the past 12 months, have you been hit, slapped, kicked or otherwise physically hurt by someone?: No     Within the past 12 months, have you been humiliated or emotionally abused in other ways by your partner or ex-partner?: No   Housing Stability: Low Risk  (10/4/2023)    Housing Stability     Do you have housing? : Yes     Are you worried about losing your housing?: No       Outpatient Encounter Medications as of 11/14/2023   Medication Sig Dispense Refill    ASPIRIN NOT PRESCRIBED, INTENTIONAL, 1 each continuous prn Antiplatelet medication not prescribed intentionally due to plavix 0 each 0    carbidopa-levodopa (SINEMET)  MG tablet Take 1.5 tablets by mouth 3 times  daily 135 tablet 3    clopidogrel (PLAVIX) 75 MG tablet Take 1 tablet (75 mg) by mouth daily 90 tablet 3    dutasteride (AVODART) 0.5 MG capsule Take one pill every other day 45 capsule 3    metoprolol succinate ER (TOPROL XL) 25 MG 24 hr tablet Take 1 tablet (25 mg) by mouth daily 90 tablet 3    nitroGLYcerin (NITROSTAT) 0.4 MG sublingual tablet PLACE 1 TABLET UNDER THE TONGUE AT THE ONSET OF CHEST PAIN. MAY REPEAT EVERY 5 MINUTES AS NEEDED FOR A TOTAL OF 3 DOSES. 25 tablet 1    rosuvastatin (CRESTOR) 40 MG tablet Take 1 tablet (40 mg) by mouth every evening 90 tablet 3     No facility-administered encounter medications on file as of 11/14/2023.             O:   NAD, WDWN, Alert & Oriented, Mood & Affect wnl, Vitals stable   Here today alone   There were no vitals taken for this visit.   General appearance normal   Vitals stable   Alert, oriented and in no acute distress     Gritty papule on right cheek x 1   Stuck on papules and brown macules on trunk and ext   Red papules on trunk  Brown papules and macules with regular pigment network and borders on torso and extremities     The remainder of skin exam is normal       Eyes: Conjunctivae/lids:Normal     ENT: Lips: normal    MSK:Normal    Cardiovascular: peripheral edema none    Pulm: Breathing Normal    Neuro/Psych: Orientation:Alert and Orientedx3 ; Mood/Affect:normal   A/P:  1. Actinic keratosis on right cheek x 1  LN2:  Treated with LN2 for 5s for 1-2 cycles. Warned risks of blistering, pain, pigment change, scarring, and incomplete resolution.  Advised patient to return if lesions do not completely resolve.  Wound care sheet given  2. Seborrheic keratosis, lentigo, angioma, benign nevi, History of BCC  It was a pleasure speaking to Derrick Morrison today.  BENIGN LESIONS DISCUSSED WITH PATIENT:  I discussed the specifics of tumor, prognosis, and genetics of benign lesions.  I explained that treatment of these lesions would be purely cosmetic and not medically  neccessary.  I discussed with patient different removal options including excision, cautery and /or laser.      Nature and genetics of benign skin lesions dicussed with patient.  Signs and Symptoms of skin cancer discussed with patient.  ABCDEs of melanoma reviewed with patient.  Patient encouraged to perform monthly skin exams.  UV precautions reviewed with patient.  Risks of non-melanoma skin cancer discussed with patient   Return to clinic in one year or sooner if needed.

## 2023-11-14 NOTE — NURSING NOTE
Chief Complaint   Patient presents with    Skin Check     Bumps along bilateral temples along hairline        There were no vitals filed for this visit.  Wt Readings from Last 1 Encounters:   11/02/23 88.5 kg (195 lb)       Madie Deutsch LPN .................11/14/2023

## 2023-12-14 DIAGNOSIS — R29.818 PARKINSONIAN FEATURES: ICD-10-CM

## 2023-12-14 NOTE — TELEPHONE ENCOUNTER
Refill request for: carbidopa-levodopa 25-100mg   Directions: Take 1.5 tablets by mouth 3 times daily     LOV: 10/3/23  NOV: 05/01/24    30 day supply with 5 refills Medication T'd for review and signature    Rosa Maria Michelle LPN on 12/14/2023 at 3:24 PM

## 2023-12-15 RX ORDER — CARBIDOPA AND LEVODOPA 25; 100 MG/1; MG/1
1.5 TABLET ORAL 3 TIMES DAILY
Qty: 135 TABLET | Refills: 5 | Status: SHIPPED | OUTPATIENT
Start: 2023-12-15 | End: 2024-05-01

## 2024-01-02 ENCOUNTER — LAB (OUTPATIENT)
Dept: LAB | Facility: CLINIC | Age: 79
End: 2024-01-02
Payer: COMMERCIAL

## 2024-01-02 DIAGNOSIS — E78.5 HYPERLIPIDEMIA LDL GOAL <70: ICD-10-CM

## 2024-01-02 LAB
ALT SERPL W P-5'-P-CCNC: 9 U/L (ref 0–70)
CHOLEST SERPL-MCNC: 125 MG/DL
FASTING STATUS PATIENT QL REPORTED: YES
HDLC SERPL-MCNC: 36 MG/DL
LDLC SERPL CALC-MCNC: 70 MG/DL
NONHDLC SERPL-MCNC: 89 MG/DL
TRIGL SERPL-MCNC: 95 MG/DL

## 2024-01-02 PROCEDURE — 84460 ALANINE AMINO (ALT) (SGPT): CPT

## 2024-01-02 PROCEDURE — 80061 LIPID PANEL: CPT

## 2024-01-02 PROCEDURE — 36415 COLL VENOUS BLD VENIPUNCTURE: CPT

## 2024-01-04 DIAGNOSIS — E78.5 HYPERLIPIDEMIA LDL GOAL <70: ICD-10-CM

## 2024-01-04 DIAGNOSIS — I25.10 CORONARY ARTERY DISEASE INVOLVING NATIVE CORONARY ARTERY OF NATIVE HEART WITHOUT ANGINA PECTORIS: Primary | ICD-10-CM

## 2024-01-04 RX ORDER — EZETIMIBE 10 MG/1
10 TABLET ORAL DAILY
Qty: 30 TABLET | Refills: 11 | Status: SHIPPED | OUTPATIENT
Start: 2024-01-04 | End: 2024-01-05

## 2024-01-05 ENCOUNTER — MYC MEDICAL ADVICE (OUTPATIENT)
Dept: CARDIOLOGY | Facility: CLINIC | Age: 79
End: 2024-01-05
Payer: COMMERCIAL

## 2024-01-05 NOTE — TELEPHONE ENCOUNTER
Pt declines starting Zetia. Removed from med list and orders cancelled. Routed to Janeth Campos NP for her information. Conchita Bhakta RN Cardiology January 5, 2024, 11:11 AM'

## 2024-05-01 ENCOUNTER — OFFICE VISIT (OUTPATIENT)
Dept: NEUROLOGY | Facility: CLINIC | Age: 79
End: 2024-05-01
Payer: COMMERCIAL

## 2024-05-01 VITALS
HEART RATE: 59 BPM | SYSTOLIC BLOOD PRESSURE: 127 MMHG | BODY MASS INDEX: 26.61 KG/M2 | DIASTOLIC BLOOD PRESSURE: 78 MMHG | WEIGHT: 175 LBS

## 2024-05-01 DIAGNOSIS — R29.818 PARKINSONIAN FEATURES: ICD-10-CM

## 2024-05-01 DIAGNOSIS — G20.A1 PARKINSON'S DISEASE WITHOUT DYSKINESIA OR FLUCTUATING MANIFESTATIONS (H): Primary | ICD-10-CM

## 2024-05-01 PROCEDURE — 99215 OFFICE O/P EST HI 40 MIN: CPT | Performed by: PSYCHIATRY & NEUROLOGY

## 2024-05-01 RX ORDER — CARBIDOPA AND LEVODOPA 25; 100 MG/1; MG/1
1.5 TABLET ORAL 3 TIMES DAILY
Qty: 150 TABLET | Refills: 5 | Status: SHIPPED | OUTPATIENT
Start: 2024-05-01 | End: 2024-08-29

## 2024-05-01 ASSESSMENT — MONTREAL COGNITIVE ASSESSMENT (MOCA)
WHAT IS THE TOTAL SCORE (OUT OF 30): 25
WHAT LEVEL OF EDUCATION WAS ATTAINED: 0
9. REPEAT EACH SENTENCE: 1
11. FOR EACH PAIR OF WORDS, WHAT CATEGORY DO THEY BELONG TO (OUT OF 2): 2
10. [FLUENCY] NAME WORDS STARTING WITH DESIGNATED LETTER: 1
12. MEMORY INDEX SCORE: 1
4. NAME EACH OF THE THREE ANIMALS SHOWN: 3
7. [VIGILENCE] TAP WHEN HEARING DESIGNATED LETTER: 1
VISUOSPATIAL/EXECUTIVE SUBSCORE: 5
8. SERIAL SUBTRACTION OF 7S: 3
6. READ LIST OF DIGITS [FORWARD/BACKWARD]: 2
13. ORIENTATION SUBSCORE: 6

## 2024-05-01 NOTE — LETTER
5/1/2024         RE: Derrick Morrison  [address] [facility] [id number]        Dear Colleague,    Thank you for referring your patient, Derrick Morrison, to the [facility] [place]. Please see a copy of my visit note below.    ESTABLISHED PATIENT NEUROLOGY NOTE    DATE OF VISIT: 5/1/2024  MRN: [id number]  PATIENT NAME: Derrick Morrison  YOB: 1945    Chief Complaint   Patient presents with     Parkinson     Slight progression but patient feels fine.     SUBJECTIVE:                                                      HISTORY OF PRESENT ILLNESS:  Derrick is here for follow up regarding Parkinsonism.    Plan after previous visit was to continue the carbidopa: 1.5 tablets 3 times daily.  I did check some labs: B12 was adequate, vitamin D was adequate, TSH and magnesium normal.  CMP normal.  I also referred Derrick for a swallow study which he plan to have completed at [facility].  MoCA score was 23/30 at his last visit.  He he was reporting some word finding difficulties at that time wife reported short-term memory issues.  No problems with driving.    Derrick is here with his wife and son again today.  Derrick feels that things are relatively stable.  He does notice some tightening up of his left leg and sometimes some pain with this at night at which time he will take an additional half tablet of the carbidopa/levodopa with improvement.  He reports minor tremor in the right leg at times.  He has also noticed some muscle jerks at night, and stiffness after sitting for extended periods of time.  He does still have some word finding difficulties, tends to lose the noun in sentences.  He again says he noted improvement with his begin mild therapies.  He wonders if maybe he should do a refresher of PT at some point.  He has kept busy with reading, 31 blocks of 1 author and 14 of another over the winter.  He is also into support groups and went to a symposium just last week.   He asks about his motor score.  I do not see this explicitly written out in his most recent PT note.  He denies wearing off between doses of Sinemet.    He has not had any falls.  Driving continues to go okay.  He did have the swallow study completed but did not feel that it was very useful.  He is choking less though.  He noticed that it was a particular cereal that made him choke so he stopped buying this.    CURRENT MEDICATIONS:   Current Outpatient Medications   Medication Sig Dispense Refill     ASPIRIN NOT PRESCRIBED, INTENTIONAL, 1 each continuous prn Antiplatelet medication not prescribed intentionally due to plavix 0 each 0     carbidopa-levodopa (SINEMET)  MG tablet Take 1.5 tablets by mouth 3 times daily Plus an extra half tablet at night if needed 150 tablet 5     clopidogrel (PLAVIX) 75 MG tablet Take 1 tablet (75 mg) by mouth daily 90 tablet 3     dutasteride (AVODART) 0.5 MG capsule Take one pill every other day 45 capsule 3     metoprolol succinate ER (TOPROL XL) 25 MG 24 hr tablet Take 1 tablet (25 mg) by mouth daily 90 tablet 3     nitroGLYcerin (NITROSTAT) 0.4 MG sublingual tablet PLACE 1 TABLET UNDER THE TONGUE AT THE ONSET OF CHEST PAIN. MAY REPEAT EVERY 5 MINUTES AS NEEDED FOR A TOTAL OF 3 DOSES. 25 tablet 1     rosuvastatin (CRESTOR) 40 MG tablet Take 1 tablet (40 mg) by mouth every evening 90 tablet 3     No current facility-administered medications for this visit.       RECENT DIAGNOSTIC STUDIES:   Labs: No results found for any visits on 05/01/24.    REVIEW OF SYSTEMS:                                                      10-point review of systems is negative except as mentioned above in HPI.    EXAM:                                                      Physical Exam:   Vitals: /78   Pulse 59   Wt 79.4 kg (175 lb)   BMI 26.61 kg/m    BMI= Body mass index is 26.61 kg/m .  GENERAL: NAD.  HEENT: NC/AT.  PULM: Non-labored breathing.   Neurologic:  MENTAL STATUS: Alert, attentive.  Speech is fluent except for some occasional word finding difficulties. Normal comprehension.  Normal concentration. Adequate fund of knowledge.  MoCA: 25/30 (Previous score was 23. Normal is 26 and above).  CRANIAL NERVES: Visual fields intact to confrontation. Pupils equally, round and reactive to light. Facial sensation and movement normal. EOM full. Hearing intact to conversation. Trapezius strength intact. Palate moves symmetrically. Tongue midline.  MOTOR: 5/5 in proximal and distal muscle groups of upper and lower extremities. Tone and bulk normal.   SENSATION: Normal light touch and pinprick. Intact proprioception at great toes. Vibration: Slightly decreased at both ankles.   COORDINATION: Normal finger nose finger.  He has trouble with full amplitude of hand opening/closing.  STATION AND GAIT: Step length looks good.  Minimal arm swing on the left.  Multi-step turn.  TREMOR: Rare, brief rolling tremor of the left hand.  No other abnormal movements observed.  Left hand-dominant.    ASSESSMENT and PLAN:                                                      Assessment:    ICD-10-CM    1. Parkinson's disease without dyskinesia or fluctuating manifestations (H)  G20.A1 Physical Therapy  Referral      2. Parkinsonian features  R29.818 carbidopa-levodopa (SINEMET)  MG tablet          Mr. Morrison is a pleasant 78-year-old man here for follow-up regarding Parkinson's.  He feels that his motor symptoms are stable.  He did request that I put in a referral for PT just in case he wants to use this before our next follow-up.  We will not make any changes in his carbidopa/levodopa regimen at this time.  MoCA score was up a couple of points so I do not think any additional testing is needed.  We will continue to monitor.    Plan:  Continue the carbidopa/levodopa: 1.5 tablets 3 times daily with an additional half tablet at night as needed.  We will request the results of your swallow study from [facility].  I have  also put in a referral for additional physical therapy if you decide you would like to do this.  Return to neurology clinic in 6 months.  Let us know if any new concerns arise in the meantime.    Total Time: 42 minutes were spent with the patient and in chart review/documentation (as described above in Assessment and Plan)/coordinating the care on date of service.    Izzy Juarez MD  Neurology    Dragon software used in the dictation of this note.                    Again, thank you for allowing me to participate in the care of your patient.        Sincerely,        Izzy Juarez MD

## 2024-05-01 NOTE — PROGRESS NOTES
ESTABLISHED PATIENT NEUROLOGY NOTE    DATE OF VISIT: 5/1/2024  MRN: [id number]  PATIENT NAME: Derrick Morrison  YOB: 1945    Chief Complaint   Patient presents with    Parkinson     Slight progression but patient feels fine.     SUBJECTIVE:                                                      HISTORY OF PRESENT ILLNESS:  Derrick is here for follow up regarding Parkinsonism.    Plan after previous visit was to continue the carbidopa: 1.5 tablets 3 times daily.  I did check some labs: B12 was adequate, vitamin D was adequate, TSH and magnesium normal.  CMP normal.  I also referred Derrick for a swallow study which he plan to have completed at [facility].  MoCA score was 23/30 at his last visit.  He he was reporting some word finding difficulties at that time wife reported short-term memory issues.  No problems with driving.    Derrick is here with his wife and son again today.  Derrick feels that things are relatively stable.  He does notice some tightening up of his left leg and sometimes some pain with this at night at which time he will take an additional half tablet of the carbidopa/levodopa with improvement.  He reports minor tremor in the right leg at times.  He has also noticed some muscle jerks at night, and stiffness after sitting for extended periods of time.  He does still have some word finding difficulties, tends to lose the noun in sentences.  He again says he noted improvement with his begin mild therapies.  He wonders if maybe he should do a refresher of PT at some point.  He has kept busy with reading, 31 blocks of 1 author and 14 of another over the winter.  He is also into support groups and went to a symposium just last week.  He asks about his motor score.  I do not see this explicitly written out in his most recent PT note.  He denies wearing off between doses of Sinemet.    He has not had any falls.  Driving continues to go okay.  He did have the swallow study completed but did not feel that  it was very useful.  He is choking less though.  He noticed that it was a particular cereal that made him choke so he stopped buying this.    CURRENT MEDICATIONS:   Current Outpatient Medications   Medication Sig Dispense Refill    ASPIRIN NOT PRESCRIBED, INTENTIONAL, 1 each continuous prn Antiplatelet medication not prescribed intentionally due to plavix 0 each 0    carbidopa-levodopa (SINEMET)  MG tablet Take 1.5 tablets by mouth 3 times daily Plus an extra half tablet at night if needed 150 tablet 5    clopidogrel (PLAVIX) 75 MG tablet Take 1 tablet (75 mg) by mouth daily 90 tablet 3    dutasteride (AVODART) 0.5 MG capsule Take one pill every other day 45 capsule 3    metoprolol succinate ER (TOPROL XL) 25 MG 24 hr tablet Take 1 tablet (25 mg) by mouth daily 90 tablet 3    nitroGLYcerin (NITROSTAT) 0.4 MG sublingual tablet PLACE 1 TABLET UNDER THE TONGUE AT THE ONSET OF CHEST PAIN. MAY REPEAT EVERY 5 MINUTES AS NEEDED FOR A TOTAL OF 3 DOSES. 25 tablet 1    rosuvastatin (CRESTOR) 40 MG tablet Take 1 tablet (40 mg) by mouth every evening 90 tablet 3     No current facility-administered medications for this visit.       RECENT DIAGNOSTIC STUDIES:   Labs: No results found for any visits on 05/01/24.    REVIEW OF SYSTEMS:                                                      10-point review of systems is negative except as mentioned above in HPI.    EXAM:                                                      Physical Exam:   Vitals: /78   Pulse 59   Wt 79.4 kg (175 lb)   BMI 26.61 kg/m    BMI= Body mass index is 26.61 kg/m .  GENERAL: NAD.  HEENT: NC/AT.  PULM: Non-labored breathing.   Neurologic:  MENTAL STATUS: Alert, attentive. Speech is fluent except for some occasional word finding difficulties. Normal comprehension.  Normal concentration. Adequate fund of knowledge.  MoCA: 25/30 (Previous score was 23. Normal is 26 and above).  CRANIAL NERVES: Visual fields intact to confrontation. Pupils equally,  round and reactive to light. Facial sensation and movement normal. EOM full. Hearing intact to conversation. Trapezius strength intact. Palate moves symmetrically. Tongue midline.  MOTOR: 5/5 in proximal and distal muscle groups of upper and lower extremities. Tone and bulk normal.   SENSATION: Normal light touch and pinprick. Intact proprioception at great toes. Vibration: Slightly decreased at both ankles.   COORDINATION: Normal finger nose finger.  He has trouble with full amplitude of hand opening/closing.  STATION AND GAIT: Step length looks good.  Minimal arm swing on the left.  Multi-step turn.  TREMOR: Rare, brief rolling tremor of the left hand.  No other abnormal movements observed.  Left hand-dominant.    ASSESSMENT and PLAN:                                                      Assessment:    ICD-10-CM    1. Parkinson's disease without dyskinesia or fluctuating manifestations (H)  G20.A1 Physical Therapy  Referral      2. Parkinsonian features  R29.818 carbidopa-levodopa (SINEMET)  MG tablet          Mr. Morrison is a pleasant 78-year-old man here for follow-up regarding Parkinson's.  He feels that his motor symptoms are stable.  He did request that I put in a referral for PT just in case he wants to use this before our next follow-up.  We will not make any changes in his carbidopa/levodopa regimen at this time.  MoCA score was up a couple of points so I do not think any additional testing is needed.  We will continue to monitor.    Plan:  Continue the carbidopa/levodopa: 1.5 tablets 3 times daily with an additional half tablet at night as needed.  We will request the results of your swallow study from [facility].  I have also put in a referral for additional physical therapy if you decide you would like to do this.  Return to neurology clinic in 6 months.  Let us know if any new concerns arise in the meantime.    Total Time: 42 minutes were spent with the patient and in chart  review/documentation (as described above in Assessment and Plan)/coordinating the care on date of service.    Izzy Juarez MD  Neurology    Dragon software used in the dictation of this note.

## 2024-05-01 NOTE — NURSING NOTE
"Derrick Morrison is a 78 year old male who presents for:  Chief Complaint   Patient presents with    Parkinson     Slight progression but patient feels fine.        Initial Vitals:  /78   Pulse 59   Wt 79.4 kg (175 lb)   BMI 26.61 kg/m   Estimated body mass index is 26.61 kg/m  as calculated from the following:    Height as of 10/9/23: 1.727 m (5' 8\").    Weight as of this encounter: 79.4 kg (175 lb).. Body surface area is 1.95 meters squared. BP completed using cuff size: wrist cuff    Rodri Ramesh  "

## 2024-05-01 NOTE — PATIENT INSTRUCTIONS
Plan:  Continue the carbidopa/levodopa: 1.5 tablets 3 times daily with an additional half tablet at night as needed.  We will request the results of your swallow study from [facility].  I have also put in a referral for additional physical therapy if you decide you would like to do this.  Return to neurology clinic in 6 months.  Let us know if any new concerns arise in the meantime.

## 2024-05-05 NOTE — PROGRESS NOTES
HPI and Plan:     I had a pleasure seeing Mr. Morrison in followup at the [facility] today.  He is a very pleasant 78-year-old gentleman who I last saw in 11/2021.  He has a history of coronary artery disease and is status post drug-eluting stent placement to the LAD for in-stent restenosis in 2011.        In August 2021 he was hospitalized at [facility] with a non-ST elevation myocardial infarction.  He presented to the emergency department at [facility] with Tae chest discomfort and was noted to have elevated troponins.  Subsequent coronary angiography on 10/27/2021 resulted in GERALDINE x2 to area of restenosis involving the LAD as well as drug-eluting stent placement to a significant circumflex lesion.  Left ventricular systolic function was within normal limits on echocardiogram obtained prior to discharge.    Today he feels well overall from a cardiovascular standpoint.  He denies any exertional chest discomfort, palpitations, syncope or presyncope.  He denies any PND or orthopnea.  He has been taking all his medications as prescribed.    From an activity standpoint he has been working in the yard but the current weather is hampering these efforts.    A Lexiscan stress perfusion study on 3/13/2023 demonstrated no evidence of ischemia and normal left ventricular systolic function.      PHYSICAL EXAMINATION:  Physical exam is dictated below.     Labs on 1/2/2024 demonstrated total cholesterol 125, HDL 36, LDL of 70 and triglycerides of 95.       IMPRESSION:     1.  Coronary artery disease, status post multiple PCIs in the past, the most recent being drug-eluting placement x2 to the LAD and x1 to the circumflex in the context of a non-ST elevation myocardial infarction on 10/27/2021.  2.  Dyslipidemia.  LDL under excellent control on 40 mg of rosuvastatin daily.  3.  Hypertension.   4.  Frequent PVCs.     PLAN    Mr. Morrison is doing well overall from a cardiovascular  "standpoint.  There is no evidence of angina or congestive heart failure.  His medications are appropriate and I will make no changes today.    I would like to obtain a follow-up echocardiogram for reassessment of left ventricular systolic function.  Assuming this is within normal limits, I will plan on follow-up in approximately 1 year.    CURRENT MEDICATIONS:  Current Outpatient Medications   Medication Sig Dispense Refill    ASPIRIN NOT PRESCRIBED, INTENTIONAL, 1 each continuous prn Antiplatelet medication not prescribed intentionally due to plavix 0 each 0    carbidopa-levodopa (SINEMET)  MG tablet Take 1.5 tablets by mouth 3 times daily Plus an extra half tablet at night if needed 150 tablet 5    clopidogrel (PLAVIX) 75 MG tablet Take 1 tablet (75 mg) by mouth daily 90 tablet 3    dutasteride (AVODART) 0.5 MG capsule Take one pill every other day 45 capsule 3    metoprolol succinate ER (TOPROL XL) 25 MG 24 hr tablet Take 1 tablet (25 mg) by mouth daily 90 tablet 3    nitroGLYcerin (NITROSTAT) 0.4 MG sublingual tablet PLACE 1 TABLET UNDER THE TONGUE AT THE ONSET OF CHEST PAIN. MAY REPEAT EVERY 5 MINUTES AS NEEDED FOR A TOTAL OF 3 DOSES. 25 tablet 1    rosuvastatin (CRESTOR) 40 MG tablet Take 1 tablet (40 mg) by mouth every evening 90 tablet 3       ALLERGIES   No Known Allergies    PAST MEDICAL HISTORY:  Past Medical History:   Diagnosis Date    Actinic keratosis     Basal cell carcinoma     BPH w urinary obs/LUTS 10/10/2011    flomax in past, \"quit working\".  Avodart in past.  Tapered off.       CAD (coronary artery disease) 10/10/2011    -8/16/2006 s/p GERALDINE to mid RCA, s/p GERALDINE to mid LAD in [place] -10/4/2007 s/p GERALDINE to pRCA and GERALDINE to dRCA in [place]  -11/17/11 Unstable angina, s/p GERALDINE to prox LAD (jailed small diagonal)      Hyperlipidaemia     Palpitations 08/07/2013    occasional, improved on low dose beta blocker     screening 10/10/2011    2007 colonoscopy \"all clean\" in [place]. "  He is sure about this.   AAA ultrasound screen 3/07 normal also.  (leg and neck done then too)     Skin cancer     had mohs on L temple       PAST SURGICAL HISTORY:  Past Surgical History:   Procedure Laterality Date    APPENDECTOMY      childhood    CARDIAC SURGERY  2006, 2007, 2011    7 stents total    COLONOSCOPY      about 8-9 years ago    COLONOSCOPY N/A 2/28/2023    Procedure: COLONOSCOPY;  Surgeon: Kaiden Frausto MD;  Location: [facility]    EYE SURGERY  2020?    cataracts       FAMILY HISTORY:  Family History   Problem Relation Age of Onset    Coronary Artery Disease Mother     Melanoma No family hx of        SOCIAL HISTORY:  Social History     Socioeconomic History    Marital status:    Occupational History     Employer: RETIRED   Tobacco Use    Smoking status: Never    Smokeless tobacco: Never   Vaping Use    Vaping status: Never Used   Substance and Sexual Activity    Alcohol use: Yes     Comment: wine    Drug use: No    Sexual activity: Yes     Partners: Female   Other Topics Concern    Parent/sibling w/ CABG, MI or angioplasty before 65F 55M? Yes     Comment: mother     Social Determinants of Health     Financial Resource Strain: Low Risk  (10/4/2023)    Financial Resource Strain     Within the past 12 months, have you or your family members you live with been unable to get utilities (heat, electricity) when it was really needed?: No   Food Insecurity: Low Risk  (10/4/2023)    Food Insecurity     Within the past 12 months, did you worry that your food would run out before you got money to buy more?: No     Within the past 12 months, did the food you bought just not last and you didn t have money to get more?: No   Transportation Needs: Low Risk  (10/4/2023)    Transportation Needs     Within the past 12 months, has lack of transportation kept you from medical appointments, getting your medicines, non-medical meetings or appointments, work, or from getting things that you need?: No    Interpersonal Safety: Low Risk  (10/9/2023)    Interpersonal Safety     Do you feel physically and emotionally safe where you currently live?: Yes     Within the past 12 months, have you been hit, slapped, kicked or otherwise physically hurt by someone?: No     Within the past 12 months, have you been humiliated or emotionally abused in other ways by your partner or ex-partner?: No   Housing Stability: Low Risk  (10/4/2023)    Housing Stability     Do you have housing? : Yes     Are you worried about losing your housing?: No       Review of Systems:  Skin:          Eyes:         ENT:         Respiratory:          Cardiovascular:         Gastroenterology:        Genitourinary:         Musculoskeletal:         Neurologic:         Psychiatric:         Heme/Lymph/Imm:         Endocrine:           Physical Exam:  Vitals: There were no vitals taken for this visit.    Constitutional:  cooperative        Skin:  warm and dry to the touch          Head:  normocephalic        Eyes:           Lymph:      ENT:           Neck:           Respiratory:            Cardiac: regular rhythm                                                         GI:           Extremities and Muscular Skeletal:  no edema              Neurological:  affect appropriate        Psych:  Alert and Oriented x 3        CC  MARIA T Workman CNP  [address] [place] [address]

## 2024-05-07 ENCOUNTER — OFFICE VISIT (OUTPATIENT)
Dept: CARDIOLOGY | Facility: CLINIC | Age: 79
End: 2024-05-07
Attending: NURSE PRACTITIONER
Payer: COMMERCIAL

## 2024-05-07 VITALS
RESPIRATION RATE: 16 BRPM | SYSTOLIC BLOOD PRESSURE: 109 MMHG | HEIGHT: 68 IN | HEART RATE: 60 BPM | OXYGEN SATURATION: 95 % | BODY MASS INDEX: 29.98 KG/M2 | DIASTOLIC BLOOD PRESSURE: 66 MMHG | WEIGHT: 197.8 LBS

## 2024-05-07 DIAGNOSIS — E78.5 HYPERLIPIDEMIA LDL GOAL <70: ICD-10-CM

## 2024-05-07 DIAGNOSIS — I25.10 CORONARY ARTERY DISEASE INVOLVING NATIVE CORONARY ARTERY OF NATIVE HEART WITHOUT ANGINA PECTORIS: ICD-10-CM

## 2024-05-07 DIAGNOSIS — I10 BENIGN ESSENTIAL HYPERTENSION: ICD-10-CM

## 2024-05-07 DIAGNOSIS — Z13.6 SCREENING FOR AAA (ABDOMINAL AORTIC ANEURYSM): ICD-10-CM

## 2024-05-07 PROCEDURE — 99213 OFFICE O/P EST LOW 20 MIN: CPT | Performed by: INTERNAL MEDICINE

## 2024-05-07 NOTE — LETTER
5/7/2024    Kaiden Zapata MD  [address] [place] [id number]    RE: Derrick Morrison       Dear Colleague,     I had the pleasure of seeing Derrick Morrison in the [facility].  HPI and Plan:     I had a pleasure seeing Mr. Morrison in followup at the [facility] today.  He is a very pleasant 78-year-old gentleman who I last saw in 11/2021.  He has a history of coronary artery disease and is status post drug-eluting stent placement to the LAD for in-stent restenosis in 2011.        In August 2021 he was hospitalized at [facility] with a non-ST elevation myocardial infarction.  He presented to the emergency department at [facility] with Tae chest discomfort and was noted to have elevated troponins.  Subsequent coronary angiography on 10/27/2021 resulted in GERALDINE x2 to area of restenosis involving the LAD as well as drug-eluting stent placement to a significant circumflex lesion.  Left ventricular systolic function was within normal limits on echocardiogram obtained prior to discharge.    Today he feels well overall from a cardiovascular standpoint.  He denies any exertional chest discomfort, palpitations, syncope or presyncope.  He denies any PND or orthopnea.  He has been taking all his medications as prescribed.    From an activity standpoint he has been working in the yard but the current weather is hampering these efforts.    A Lexiscan stress perfusion study on 3/13/2023 demonstrated no evidence of ischemia and normal left ventricular systolic function.      PHYSICAL EXAMINATION:  Physical exam is dictated below.     Labs on 1/2/2024 demonstrated total cholesterol 125, HDL 36, LDL of 70 and triglycerides of 95.       IMPRESSION:     1.  Coronary artery disease, status post multiple PCIs in the past, the most recent being drug-eluting placement x2 to the LAD and x1 to the circumflex in the context of a non-ST elevation myocardial infarction on  "10/27/2021.  2.  Dyslipidemia.  LDL under excellent control on 40 mg of rosuvastatin daily.  3.  Hypertension.   4.  Frequent PVCs.     PLAN    Mr. Morrison is doing well overall from a cardiovascular standpoint.  There is no evidence of angina or congestive heart failure.  His medications are appropriate and I will make no changes today.    I would like to obtain a follow-up echocardiogram for reassessment of left ventricular systolic function.  Assuming this is within normal limits, I will plan on follow-up in approximately 1 year.    CURRENT MEDICATIONS:  Current Outpatient Medications   Medication Sig Dispense Refill    ASPIRIN NOT PRESCRIBED, INTENTIONAL, 1 each continuous prn Antiplatelet medication not prescribed intentionally due to plavix 0 each 0    carbidopa-levodopa (SINEMET)  MG tablet Take 1.5 tablets by mouth 3 times daily Plus an extra half tablet at night if needed 150 tablet 5    clopidogrel (PLAVIX) 75 MG tablet Take 1 tablet (75 mg) by mouth daily 90 tablet 3    dutasteride (AVODART) 0.5 MG capsule Take one pill every other day 45 capsule 3    metoprolol succinate ER (TOPROL XL) 25 MG 24 hr tablet Take 1 tablet (25 mg) by mouth daily 90 tablet 3    nitroGLYcerin (NITROSTAT) 0.4 MG sublingual tablet PLACE 1 TABLET UNDER THE TONGUE AT THE ONSET OF CHEST PAIN. MAY REPEAT EVERY 5 MINUTES AS NEEDED FOR A TOTAL OF 3 DOSES. 25 tablet 1    rosuvastatin (CRESTOR) 40 MG tablet Take 1 tablet (40 mg) by mouth every evening 90 tablet 3       ALLERGIES   No Known Allergies    PAST MEDICAL HISTORY:  Past Medical History:   Diagnosis Date    Actinic keratosis     Basal cell carcinoma     BPH w urinary obs/LUTS 10/10/2011    flomax in past, \"quit working\".  Avodart in past.  Tapered off.       CAD (coronary artery disease) 10/10/2011    -8/16/2006 s/p GERALDINE to mid RCA, s/p GERALDINE to mid LAD in [place] -10/4/2007 s/p GERALIDNE to pRCA and GERALDINE to dRCA in [place]  -11/17/11 Unstable angina, s/p GERALDINE to prox " "LAD (jailed small diagonal)      Hyperlipidaemia     Palpitations 08/07/2013    occasional, improved on low dose beta blocker     screening 10/10/2011    2007 colonoscopy \"all clean\" in [place].  He is sure about this.   AAA ultrasound screen 3/07 normal also.  (leg and neck done then too)     Skin cancer     had mohs on L temple       PAST SURGICAL HISTORY:  Past Surgical History:   Procedure Laterality Date    APPENDECTOMY      childhood    CARDIAC SURGERY  2006, 2007, 2011    7 stents total    COLONOSCOPY      about 8-9 years ago    COLONOSCOPY N/A 2/28/2023    Procedure: COLONOSCOPY;  Surgeon: Kaiden Frausto MD;  Location: [facility]    EYE SURGERY  2020?    cataracts       FAMILY HISTORY:  Family History   Problem Relation Age of Onset    Coronary Artery Disease Mother     Melanoma No family hx of        SOCIAL HISTORY:  Social History     Socioeconomic History    Marital status:    Occupational History     Employer: RETIRED   Tobacco Use    Smoking status: Never    Smokeless tobacco: Never   Vaping Use    Vaping status: Never Used   Substance and Sexual Activity    Alcohol use: Yes     Comment: wine    Drug use: No    Sexual activity: Yes     Partners: Female   Other Topics Concern    Parent/sibling w/ CABG, MI or angioplasty before 65F 55M? Yes     Comment: mother     Social Determinants of Health     Financial Resource Strain: Low Risk  (10/4/2023)    Financial Resource Strain     Within the past 12 months, have you or your family members you live with been unable to get utilities (heat, electricity) when it was really needed?: No   Food Insecurity: Low Risk  (10/4/2023)    Food Insecurity     Within the past 12 months, did you worry that your food would run out before you got money to buy more?: No     Within the past 12 months, did the food you bought just not last and you didn t have money to get more?: No   Transportation Needs: Low Risk  (10/4/2023)    Transportation Needs     Within " the past 12 months, has lack of transportation kept you from medical appointments, getting your medicines, non-medical meetings or appointments, work, or from getting things that you need?: No   Interpersonal Safety: Low Risk  (10/9/2023)    Interpersonal Safety     Do you feel physically and emotionally safe where you currently live?: Yes     Within the past 12 months, have you been hit, slapped, kicked or otherwise physically hurt by someone?: No     Within the past 12 months, have you been humiliated or emotionally abused in other ways by your partner or ex-partner?: No   Housing Stability: Low Risk  (10/4/2023)    Housing Stability     Do you have housing? : Yes     Are you worried about losing your housing?: No       Review of Systems:  Skin:          Eyes:         ENT:         Respiratory:          Cardiovascular:         Gastroenterology:        Genitourinary:         Musculoskeletal:         Neurologic:         Psychiatric:         Heme/Lymph/Imm:         Endocrine:           Physical Exam:  Vitals: There were no vitals taken for this visit.    Constitutional:  cooperative        Skin:  warm and dry to the touch          Head:  normocephalic        Eyes:           Lymph:      ENT:           Neck:           Respiratory:            Cardiac: regular rhythm                                                         GI:           Extremities and Muscular Skeletal:  no edema              Neurological:  affect appropriate        Psych:  Alert and Oriented x 3        CC  MARIA T Workman CNP  [address] [place] [address]      Thank you for allowing me to participate in the care of your patient.      Sincerely,     Chidi Lan MD     [facility]

## 2024-05-24 ENCOUNTER — OFFICE VISIT (OUTPATIENT)
Dept: URGENT CARE | Facility: URGENT CARE | Age: 79
End: 2024-05-24
Payer: COMMERCIAL

## 2024-05-24 VITALS
SYSTOLIC BLOOD PRESSURE: 132 MMHG | HEART RATE: 61 BPM | OXYGEN SATURATION: 98 % | BODY MASS INDEX: 29.35 KG/M2 | WEIGHT: 193 LBS | RESPIRATION RATE: 14 BRPM | TEMPERATURE: 98.3 F | DIASTOLIC BLOOD PRESSURE: 72 MMHG

## 2024-05-24 DIAGNOSIS — M54.2 CERVICALGIA: Primary | ICD-10-CM

## 2024-05-24 PROCEDURE — 99213 OFFICE O/P EST LOW 20 MIN: CPT | Performed by: FAMILY MEDICINE

## 2024-05-24 RX ORDER — TIZANIDINE 2 MG/1
2-4 TABLET ORAL 3 TIMES DAILY
Qty: 30 TABLET | Refills: 1 | Status: SHIPPED | OUTPATIENT
Start: 2024-05-24

## 2024-05-24 NOTE — PROGRESS NOTES
"SUBJECTIVE:  Chief Complaint   Patient presents with    Neck Pain     Pt has neck pain, \"pins and needles\" x 5 days.     Derrick Morrison is a 78 year old male who presents with a chief complaint of right neck pain and decreased range of motion.  Symptoms began 5 day(s) ago, are severe and gradual onset  Context:   Pain exacerbated by movement Relieved by nothing.  He treated it initially with Ibuprofen. This is the first time this type of injury has occurred to this patient.     Past Medical History:   Diagnosis Date    Actinic keratosis     Basal cell carcinoma     BPH w urinary obs/LUTS 10/10/2011    flomax in past, \"quit working\".  Avodart in past.  Tapered off.       CAD (coronary artery disease) 10/10/2011    -8/16/2006 s/p GERALDINE to mid RCA, s/p GERALDINE to mid LAD in [place] -10/4/2007 s/p GERALDINE to pRCA and GERALDINE to dRCA in [place]  -11/17/11 Unstable angina, s/p GERALDINE to prox LAD (jailed small diagonal)      Hyperlipidaemia     Palpitations 08/07/2013    occasional, improved on low dose beta blocker     screening 10/10/2011    2007 colonoscopy \"all clean\" in [place].  He is sure about this.   AAA ultrasound screen 3/07 normal also.  (leg and neck done then too)     Skin cancer     had mohs on L [place]     Current Outpatient Medications   Medication Sig Dispense Refill    ASPIRIN NOT PRESCRIBED, INTENTIONAL, 1 each continuous prn Antiplatelet medication not prescribed intentionally due to plavix 0 each 0    carbidopa-levodopa (SINEMET)  MG tablet Take 1.5 tablets by mouth 3 times daily Plus an extra half tablet at night if needed 150 tablet 5    clopidogrel (PLAVIX) 75 MG tablet Take 1 tablet (75 mg) by mouth daily 90 tablet 3    dutasteride (AVODART) 0.5 MG capsule Take one pill every other day 45 capsule 3    metoprolol succinate ER (TOPROL XL) 25 MG 24 hr tablet Take 1 tablet (25 mg) by mouth daily 90 tablet 3    nitroGLYcerin (NITROSTAT) 0.4 MG sublingual tablet PLACE 1 TABLET UNDER THE " TONGUE AT THE ONSET OF CHEST PAIN. MAY REPEAT EVERY 5 MINUTES AS NEEDED FOR A TOTAL OF 3 DOSES. 25 tablet 1    rosuvastatin (CRESTOR) 40 MG tablet Take 1 tablet (40 mg) by mouth every evening 90 tablet 3     Social History     Tobacco Use    Smoking status: Never    Smokeless tobacco: Never   Substance Use Topics    Alcohol use: Yes     Comment: wine     He also has parkinsons and   ROS:  Review of systems negative except as stated below    EXAM:   /72   Pulse 61   Temp 98.3  F (36.8  C) (Tympanic)   Resp 14   Wt 87.5 kg (193 lb)   SpO2 98%   BMI 29.35 kg/m    M/S Exam:necktenderness to palpation and decreased ROM   GENERAL APPEARANCE: healthy, alert and no distress  EXTREMITIES: peripheral pulses normal  SKIN: no suspicious lesions or rashes  NEURO: Normal strength and tone, sensory exam grossly normal, mentation intact and speech normal    X-RAY was not    ASSESSMENT:    1. Cervicalgia  I also recommended lidocaine patches  - Physical Therapy  Referral; Future  - tiZANidine (ZANAFLEX) 2 MG tablet; Take 1-2 tablets (2-4 mg) by mouth 3 times daily  Dispense: 30 tablet; Refill: 1  Return if worsening or not improving.  Yamini Tripathi M.D.

## 2024-05-29 ENCOUNTER — HOSPITAL ENCOUNTER (OUTPATIENT)
Dept: CARDIOLOGY | Facility: CLINIC | Age: 79
Discharge: HOME | End: 2024-05-29
Attending: INTERNAL MEDICINE | Admitting: INTERNAL MEDICINE
Payer: MEDICARE

## 2024-05-29 DIAGNOSIS — I25.10 CORONARY ARTERY DISEASE INVOLVING NATIVE CORONARY ARTERY OF NATIVE HEART WITHOUT ANGINA PECTORIS: ICD-10-CM

## 2024-05-29 LAB — LVEF ECHO: NORMAL

## 2024-05-29 PROCEDURE — 93306 TTE W/DOPPLER COMPLETE: CPT | Mod: 26 | Performed by: INTERNAL MEDICINE

## 2024-05-29 PROCEDURE — 93306 TTE W/DOPPLER COMPLETE: CPT

## 2024-05-29 NOTE — RESULT ENCOUNTER NOTE
EF 55-60%; no WMAs; mild MR, AI; no significant changes from previous. Per Dr Lan's note of 5/7/24--follow up in 1 year if EF stable. Pt notified via [facility].

## 2024-06-10 ENCOUNTER — OFFICE VISIT (OUTPATIENT)
Dept: FAMILY MEDICINE | Facility: CLINIC | Age: 79
End: 2024-06-10
Payer: COMMERCIAL

## 2024-06-10 VITALS
WEIGHT: 191 LBS | HEIGHT: 68 IN | BODY MASS INDEX: 28.95 KG/M2 | SYSTOLIC BLOOD PRESSURE: 110 MMHG | TEMPERATURE: 97.4 F | HEART RATE: 74 BPM | DIASTOLIC BLOOD PRESSURE: 70 MMHG | OXYGEN SATURATION: 98 % | RESPIRATION RATE: 16 BRPM

## 2024-06-10 DIAGNOSIS — M54.2 CERVICALGIA: Primary | ICD-10-CM

## 2024-06-10 PROCEDURE — G2211 COMPLEX E/M VISIT ADD ON: HCPCS | Performed by: FAMILY MEDICINE

## 2024-06-10 PROCEDURE — 99212 OFFICE O/P EST SF 10 MIN: CPT | Performed by: FAMILY MEDICINE

## 2024-06-10 RX ORDER — RESPIRATORY SYNCYTIAL VIRUS VACCINE 120MCG/0.5
0.5 KIT INTRAMUSCULAR ONCE
Qty: 1 EACH | Refills: 0 | Status: CANCELLED | OUTPATIENT
Start: 2024-06-10 | End: 2024-06-10

## 2024-06-10 ASSESSMENT — PAIN SCALES - GENERAL: PAINLEVEL: MILD PAIN (2)

## 2024-06-10 NOTE — PROGRESS NOTES
"S : Derrick Morrison is a 78 year old male with some neck pain off/on.  Feels like it has progressed some.  Mostly just notices some \"clicks\" when turning, moving at times.      No sx into arms/legs.  No injury or acute change.      O:/70   Pulse 74   Temp 97.4  F (36.3  C) (Tympanic)   Resp 16   Ht 1.727 m (5' 8\")   Wt 86.6 kg (191 lb)   SpO2 98%   BMI 29.04 kg/m    GEN: Alert and oriented, in no acute distress  Decent ROM of neck, no pain over spine, no crepitus felt or heard.  \"Of course it's not happening today!\"    A; neck pain, intermittent    P: I reassured Derrick today.  Would expect some DDD over time given 79 y/o.  Reassured him that crepitus is OK at times if no other sx.      Will follow for now.     The longitudinal plan of care for the diagnosis(es)/condition(s) as documented were addressed during this visit. Due to the added complexity in care, I will continue to support Derrick in the subsequent management and with ongoing continuity of care.    "

## 2024-06-12 ENCOUNTER — THERAPY VISIT (OUTPATIENT)
Dept: PHYSICAL THERAPY | Facility: CLINIC | Age: 79
End: 2024-06-12
Attending: FAMILY MEDICINE
Payer: MEDICARE

## 2024-06-12 DIAGNOSIS — M54.2 CERVICALGIA: ICD-10-CM

## 2024-06-12 PROCEDURE — 97161 PT EVAL LOW COMPLEX 20 MIN: CPT | Mod: GP | Performed by: PHYSICAL MEDICINE & REHABILITATION

## 2024-06-12 PROCEDURE — 97110 THERAPEUTIC EXERCISES: CPT | Mod: GP | Performed by: PHYSICAL MEDICINE & REHABILITATION

## 2024-06-12 NOTE — PROGRESS NOTES
"PHYSICAL THERAPY EVALUATION  Type of Visit: Evaluation    See electronic medical record for Abuse and Falls Screening details.    Subjective   Pt arrived to PT today for chronic neck pain in acute flare up. Notes he was at a concert and turned head 90* through full concert. Notes he started to get some pain and tingling the next day. Went to  and was given muscle relaxors and exercises and notes he's 95% better after taking 2.   Presenting condition or subjective complaint: muscle spasm due to 3hour holding head to right  Date of onset: 05/20/24 (\"started 3-4 days before visit to \")   Relevant medical history: Arthritis; Hearing problems; Parkinson s Disease   Dates & types of surgery: on record    Prior diagnostic imaging/testing results:     no  Prior therapy history for the same diagnosis, illness or injury: No      Prior Level of Function  Transfers: Independent  Ambulation: Independent  ADL: Independent    Living Environment  Social support: With a significant other or spouse   Type of home: House   Stairs to enter the home:         Ramp: No   Stairs inside the home:         Help at home:    Equipment owned:       Employment: Not Applicable    Hobbies/Interests:  gardening    Patient goals for therapy: no pain/ache in right  neck muscle    Pain assessment: See objective evaluation for additional pain details     Objective   CERVICAL SPINE EVALUATION  INTEGUMENTARY (edema, incisions):  no palpable edema  POSTURE: Sitting Posture: Rounded shoulders, Forward head  GAIT:   Weightbearing Status: WBAT  Assistive Device(s): None  Gait Deviations: Stride length decreased  Brenda decreased  WEIGHTBEARING ALIGNMENT: WNL  ROM: flexion: chin to chest, ext: 59* (slight pain), SB R: 38* (slight pain), SB L: 35*, Rotation R: 65* (slight pain), Rotation L: 63*  MYOTOMES: shoulder elevation: 5/5 B, shoulder flexion: 4/5 B, shoulder abd: 5/5 B, shoulder IR/ER: 5/5 each B, elbow flex/ext: 5/5 each B, wrist flex/ext: 5/5 B, " finger abd: 5/5 B, thumb: 5/5 B  CORD SIGNS: WNL  DERMATOMES: normal dull touch sensation amongst B UE in dermatomal pattern  NEURAL TENSION: Cervical WNL  FLEXIBILITY:  limited upper traps, levator scap and suboccpitals B (R>L)    SPECIAL TESTS: neg spurlings   PALPATION: no increase in pain with mod palpation  SPINAL SEGMENTAL CONCLUSIONS:  hypomobility throughout cervical spine B    Assessment & Plan   CLINICAL IMPRESSIONS  Medical Diagnosis: Cervicalgia    Treatment Diagnosis: Chronic neck pain  Impression/Assessment: Patient is a 78 year old male with chronic neck pain in acute flare up complaints.  The following significant findings have been identified: Pain, Decreased ROM/flexibility, Decreased joint mobility, Decreased strength, Impaired muscle performance, Decreased activity tolerance, and Impaired posture. These impairments interfere with their ability to perform self care tasks, recreational activities, and household chores as compared to previous level of function.     Clinical Decision Making (Complexity):  Clinical Presentation: Stable/Uncomplicated  Clinical Presentation Rationale: based on medical and personal factors listed in PT evaluation  Clinical Decision Making (Complexity): Low complexity    PLAN OF CARE  Treatment Interventions:  Modalities: Cryotherapy, E-stim, Hot Pack, Mechanical Traction, Ultrasound, TENS  Interventions: Manual Therapy, Neuromuscular Re-education, Therapeutic Activity, Therapeutic Exercise, Self-Care/Home Management    Long Term Goals     PT Goal 1  Goal Identifier: 1  Goal Description: Pt will be able to perform neck mobility without increase in sx in order to decrease difficulty with ADLs  Target Date: 07/03/24  PT Goal 2  Goal Identifier: 2  Goal Description: Pt will be independent with HEP in order to self manage symptoms  Target Date: 07/24/24      Frequency of Treatment: 1x/week  Duration of Treatment: 6 weeks    Recommended Referrals to Other Professionals:   "none  Education Assessment:   Learner/Method: Patient;Listening;Reading;Demonstration;Pictures/Video;No Barriers to Learning  Education Comments: pt demonstrated and verbalized good understanding    Risks and benefits of evaluation/treatment have been explained.   Patient/Family/caregiver agrees with Plan of Care.     Evaluation Time:     PT Eval, Low Complexity Minutes (03431): 19   Present: Not applicable     Signing Clinician: Ana Paula Jerry PT    Please contact me with any questions or concerns.    Thank you for your referral,     Ana Paula Jerry PT, DPT  Physical Therapist  [facility]  [address] [place] [address]  [phone]      [facility]                                                                                   OUTPATIENT PHYSICAL THERAPY      PLAN OF TREATMENT FOR OUTPATIENT REHABILITATION   Patient's Last Name, First Name, Derrick Peterson YOB: 1945   Provider's Name   [facility]   Medical Record No.  [id number]     Onset Date: 05/20/24 (\"started 3-4 days before visit to \")  Start of Care Date: 06/12/24     Medical Diagnosis:  Cervicalgia      PT Treatment Diagnosis:  Chronic neck pain Plan of Treatment  Frequency/Duration: 1x/week/ 6 weeks    Certification date from 06/12/24 to 07/24/24         See note for plan of treatment details and functional goals     Ana Paula Jerry PT                         I CERTIFY THE NEED FOR THESE SERVICES FURNISHED UNDER        THIS PLAN OF TREATMENT AND WHILE UNDER MY CARE     (Physician attestation of this document indicates review and certification of the therapy plan).              Referring Provider:  Yamini Tripathi (urgent care)  Kaiden Zapata MD (primary care)    Initial Assessment  See Epic Evaluation- Start of Care Date: 06/12/24                "

## 2024-08-28 ENCOUNTER — OFFICE VISIT (OUTPATIENT)
Dept: FAMILY MEDICINE | Facility: CLINIC | Age: 79
End: 2024-08-28
Payer: COMMERCIAL

## 2024-08-28 VITALS
RESPIRATION RATE: 16 BRPM | BODY MASS INDEX: 28.19 KG/M2 | HEIGHT: 68 IN | SYSTOLIC BLOOD PRESSURE: 110 MMHG | OXYGEN SATURATION: 96 % | WEIGHT: 186 LBS | DIASTOLIC BLOOD PRESSURE: 70 MMHG | HEART RATE: 68 BPM | TEMPERATURE: 97.4 F

## 2024-08-28 DIAGNOSIS — N50.3 CYST OF EPIDIDYMIS: Primary | ICD-10-CM

## 2024-08-28 DIAGNOSIS — I25.719 CORONARY ARTERY DISEASE INVOLVING AUTOLOGOUS VEIN CORONARY BYPASS GRAFT WITH ANGINA PECTORIS (H): ICD-10-CM

## 2024-08-28 PROCEDURE — 99213 OFFICE O/P EST LOW 20 MIN: CPT | Performed by: FAMILY MEDICINE

## 2024-08-28 ASSESSMENT — PAIN SCALES - GENERAL: PAINLEVEL: NO PAIN (0)

## 2024-08-28 NOTE — PROGRESS NOTES
"S: Derrick Morrison is a 78 year old male with lump L testicle.  Not painful.  Noticed randomly a few weeks ago, not changing.  No other sx of concer    CAD: no cp or sob, extensive stent hx    O:/70   Pulse 68   Temp 97.4  F (36.3  C) (Tympanic)   Resp 16   Ht 1.727 m (5' 8\")   Wt 84.4 kg (186 lb)   SpO2 96%   BMI 28.28 kg/m    GEN: Alert and oriented, in no acute distress  Has pea sized epididymal cyst L epididymis    A: epididymal cyst, benign      CAD, stable    P: reassured him.  Continue to manage CAD risks.  Doing well.    "

## 2024-08-29 NOTE — PROGRESS NOTES
Outpatient Physical Therapy Discharge Note     Patient: Derrick Morrison  : 1945    Beginning/End Dates of Reporting Period:  24 to 24    Referring Provider: Kaiden Zapata MD     Therapy Diagnosis: Chronic neck pain     Patient did not return for follow up treatments as directed.  Goal status and current objective information is therefore unknown.  Discharge from PT services at this time for this episode of treatment. Please see attached documentation under this episode of care for further information including dates of service, start of care date, referring physician, Dx, treatment plan, treatments, etc.    Please contact me with any questions or concerns.    Thank you for your referral.    Ana Paula Jerry, PT, DPT  Physical Therapist  [address] [address] [phone]

## 2024-10-10 SDOH — HEALTH STABILITY: PHYSICAL HEALTH: ON AVERAGE, HOW MANY MINUTES DO YOU ENGAGE IN EXERCISE AT THIS LEVEL?: 30 MIN

## 2024-10-10 SDOH — HEALTH STABILITY: PHYSICAL HEALTH: ON AVERAGE, HOW MANY DAYS PER WEEK DO YOU ENGAGE IN MODERATE TO STRENUOUS EXERCISE (LIKE A BRISK WALK)?: 3 DAYS

## 2024-10-10 ASSESSMENT — SOCIAL DETERMINANTS OF HEALTH (SDOH): HOW OFTEN DO YOU GET TOGETHER WITH FRIENDS OR RELATIVES?: ONCE A WEEK

## 2024-10-14 ENCOUNTER — OFFICE VISIT (OUTPATIENT)
Dept: FAMILY MEDICINE | Facility: CLINIC | Age: 79
End: 2024-10-14
Payer: COMMERCIAL

## 2024-10-14 VITALS
SYSTOLIC BLOOD PRESSURE: 118 MMHG | DIASTOLIC BLOOD PRESSURE: 76 MMHG | OXYGEN SATURATION: 97 % | BODY MASS INDEX: 28.28 KG/M2 | HEIGHT: 68 IN | HEART RATE: 62 BPM | TEMPERATURE: 97.8 F | RESPIRATION RATE: 16 BRPM

## 2024-10-14 DIAGNOSIS — I25.10 CORONARY ARTERY DISEASE INVOLVING NATIVE HEART WITHOUT ANGINA PECTORIS, UNSPECIFIED VESSEL OR LESION TYPE: ICD-10-CM

## 2024-10-14 DIAGNOSIS — I10 BENIGN ESSENTIAL HYPERTENSION: ICD-10-CM

## 2024-10-14 DIAGNOSIS — E78.5 HYPERLIPIDEMIA LDL GOAL <70: ICD-10-CM

## 2024-10-14 DIAGNOSIS — N40.1 HYPERTROPHY OF PROSTATE WITH URINARY OBSTRUCTION: ICD-10-CM

## 2024-10-14 DIAGNOSIS — Z00.00 MEDICARE ANNUAL WELLNESS VISIT, SUBSEQUENT: Primary | ICD-10-CM

## 2024-10-14 DIAGNOSIS — N13.8 HYPERTROPHY OF PROSTATE WITH URINARY OBSTRUCTION: ICD-10-CM

## 2024-10-14 DIAGNOSIS — G20.A2 PARKINSON'S DISEASE WITHOUT DYSKINESIA, WITH FLUCTUATING MANIFESTATIONS (H): ICD-10-CM

## 2024-10-14 PROBLEM — I25.719 CORONARY ARTERY DISEASE INVOLVING AUTOLOGOUS VEIN CORONARY BYPASS GRAFT WITH ANGINA PECTORIS (H): Status: RESOLVED | Noted: 2024-08-28 | Resolved: 2024-10-14

## 2024-10-14 LAB
ANION GAP SERPL CALCULATED.3IONS-SCNC: 11 MMOL/L (ref 7–15)
BUN SERPL-MCNC: 11.7 MG/DL (ref 8–23)
CALCIUM SERPL-MCNC: 9.8 MG/DL (ref 8.8–10.4)
CHLORIDE SERPL-SCNC: 103 MMOL/L (ref 98–107)
CREAT SERPL-MCNC: 0.95 MG/DL (ref 0.67–1.17)
EGFRCR SERPLBLD CKD-EPI 2021: 82 ML/MIN/1.73M2
GLUCOSE SERPL-MCNC: 94 MG/DL (ref 70–99)
HCO3 SERPL-SCNC: 27 MMOL/L (ref 22–29)
POTASSIUM SERPL-SCNC: 4.6 MMOL/L (ref 3.4–5.3)
SODIUM SERPL-SCNC: 141 MMOL/L (ref 135–145)

## 2024-10-14 PROCEDURE — 90480 ADMN SARSCOV2 VAC 1/ONLY CMP: CPT | Performed by: FAMILY MEDICINE

## 2024-10-14 PROCEDURE — 91320 SARSCV2 VAC 30MCG TRS-SUC IM: CPT | Performed by: FAMILY MEDICINE

## 2024-10-14 PROCEDURE — 80048 BASIC METABOLIC PNL TOTAL CA: CPT | Performed by: FAMILY MEDICINE

## 2024-10-14 PROCEDURE — 99214 OFFICE O/P EST MOD 30 MIN: CPT | Mod: 25 | Performed by: FAMILY MEDICINE

## 2024-10-14 PROCEDURE — 90662 IIV NO PRSV INCREASED AG IM: CPT | Performed by: FAMILY MEDICINE

## 2024-10-14 PROCEDURE — G0439 PPPS, SUBSEQ VISIT: HCPCS | Performed by: FAMILY MEDICINE

## 2024-10-14 PROCEDURE — 36415 COLL VENOUS BLD VENIPUNCTURE: CPT | Performed by: FAMILY MEDICINE

## 2024-10-14 PROCEDURE — G0008 ADMIN INFLUENZA VIRUS VAC: HCPCS | Performed by: FAMILY MEDICINE

## 2024-10-14 RX ORDER — ROSUVASTATIN CALCIUM 40 MG/1
40 TABLET, COATED ORAL EVERY EVENING
Qty: 90 TABLET | Refills: 3 | Status: SHIPPED | OUTPATIENT
Start: 2024-10-14

## 2024-10-14 RX ORDER — DUTASTERIDE 0.5 MG/1
CAPSULE, LIQUID FILLED ORAL
Qty: 45 CAPSULE | Refills: 3 | Status: SHIPPED | OUTPATIENT
Start: 2024-10-14

## 2024-10-14 RX ORDER — METOPROLOL SUCCINATE 25 MG/1
25 TABLET, EXTENDED RELEASE ORAL DAILY
Qty: 90 TABLET | Refills: 3 | Status: SHIPPED | OUTPATIENT
Start: 2024-10-14

## 2024-10-14 RX ORDER — CLOPIDOGREL BISULFATE 75 MG/1
75 TABLET ORAL DAILY
Qty: 90 TABLET | Refills: 3 | Status: SHIPPED | OUTPATIENT
Start: 2024-10-14

## 2024-10-14 ASSESSMENT — PAIN SCALES - GENERAL: PAINLEVEL: NO PAIN (0)

## 2024-10-14 NOTE — PROGRESS NOTES
"Preventive Care Visit  [facility]  Kaiden Zapata MD, Family Medicine  Oct 14, 2024      Assessment & Plan   Wellness visit  Cad, stable  BPH, stable  Hyperlipidemia, stable  Htn, stable  Parkinson's, progressive    Has moved up to 2 tabs a time on his sinemet.  Happy with that  Continue meds for CAD, htn, liipids, BPH.  Fills today    Update met panel    Echo normal earlier this year.     Follow up 1 year, earlier prn            BMI  Estimated body mass index is 28.28 kg/m  as calculated from the following:    Height as of this encounter: 1.727 m (5' 8\").    Weight as of 8/28/24: 84.4 kg (186 lb).       Counseling  Appropriate preventive services were addressed with this patient via screening, questionnaire, or discussion as appropriate for fall prevention, nutrition, physical activity, Tobacco-use cessation, social engagement, weight loss and cognition.  Checklist reviewing preventive services available has been given to the patient.  Reviewed patient's diet, addressing concerns and/or questions.   He is at risk for lack of exercise and has been provided with information to increase physical activity for the benefit of his well-being.   He is at risk for psychosocial distress and has been provided with information to reduce risk.   Discussed possible causes of fatigue.         Subjective   Derrick is a 78 year old, presenting for the following:    Cad, stable, palpitations off/on.  Sometimes takes extra metoprolol.    BPH, stable on medication, fills   Hyperlipidemia, stable, fills   Htn, stable, fills on meds   Parkinson's, progressive.  Follows with neurology.  Happy with sinemet, helps him.  \"I can write, type now, wasn't able to before\"           10/14/2024     8:15 AM   Additional Questions   Roomed by daniel   Accompanied by self       Health Care Directive  Patient has a Health Care Directive on file  Advance care planning document is on file and is current.          10/10/2024 "   General Health   How would you rate your overall physical health? Good   Feel stress (tense, anxious, or unable to sleep) Only a little      (!) STRESS CONCERN      10/10/2024   Nutrition   Diet: Low fat/cholesterol            10/10/2024   Exercise   Days per week of moderate/strenous exercise 3 days   Average minutes spent exercising at this level 30 min            10/10/2024   Social Factors   Frequency of gathering with friends or relatives Once a week   Worry food won't last until get money to buy more No   Food not last or not have enough money for food? No   Do you have housing? (Housing is defined as stable permanent housing and does not include staying ouside in a car, in a tent, in an abandoned building, in an overnight shelter, or couch-surfing.) Yes   Are you worried about losing your housing? No   Lack of transportation? No   Unable to get utilities (heat,electricity)? No            10/10/2024   Fall Risk   Fallen 2 or more times in the past year? No    No   Trouble with walking or balance? No    No       Multiple values from one day are sorted in reverse-chronological order          10/10/2024   Activities of Daily Living- Home Safety   Needs help with the following daily activites None of the above   Safety concerns in the home None of the above            10/10/2024   Dental   Dentist two times every year? Yes            10/10/2024   Hearing Screening   Hearing concerns? None of the above            10/10/2024   Driving Risk Screening   Patient/family members have concerns about driving No            10/10/2024   General Alertness/Fatigue Screening   Have you been more tired than usual lately? (!) YES            10/10/2024   Urinary Incontinence Screening   Bothered by leaking urine in past 6 months No            10/10/2024   TB Screening   Were you born outside of the [place]? No              10/10/2024   Substance Use   Alcohol more than 3/day or more than 7/wk No   Do you have a current opioid  prescription? No   How severe/bad is pain from 1 to 10? 0/10 (No Pain)   Do you use any other substances recreationally? No        Social History     Tobacco Use    Smoking status: Never    Smokeless tobacco: Never   Vaping Use    Vaping status: Never Used   Substance Use Topics    Alcohol use: Yes     Comment: wine    Drug use: No       ASCVD Risk   The ASCVD Risk score (Emily KELLY, et al., 2019) failed to calculate for the following reasons:    The patient has a prior MI or stroke diagnosis        Reviewed and updated as needed this visit by Provider                 Current providers sharing in care for this patient include:  Patient Care Team:  Kaidne Zapata MD as PCP - General (Family Medicine)  Kaiden Zapata MD as Assigned PCP  Vandana Frausto PA-C as Physician Assistant (Dermatology)  Izzy Olivares MD as Assigned Neuroscience Provider  Vandana Frausto PA-C as Assigned Surgical Provider  Chidi Lan MD as Assigned Heart and Vascular Provider    The following health maintenance items are reviewed in Epic and correct as of today:  Health Maintenance   Topic Date Due    RSV VACCINE (1 - 1-dose 75+ series) Never done    ANNUAL REVIEW OF HM ORDERS  10/10/2023    INFLUENZA VACCINE (1) 09/01/2024    COVID-19 Vaccine (7 - 2024-25 season) 09/01/2024    BMP  10/09/2024    MEDICARE ANNUAL WELLNESS VISIT  10/09/2024    LIPID  01/02/2025    DTAP/TDAP/TD IMMUNIZATION (3 - Td or Tdap) 08/24/2025    FALL RISK ASSESSMENT  10/14/2025    GLUCOSE  10/09/2026    ADVANCE CARE PLANNING  10/09/2028    COLORECTAL CANCER SCREENING  02/28/2033    HEPATITIS C SCREENING  Completed    PHQ-2 (once per calendar year)  Completed    Pneumococcal Vaccine: 65+ Years  Completed    ZOSTER IMMUNIZATION  Completed    HPV IMMUNIZATION  Aged Out    MENINGITIS IMMUNIZATION  Aged Out    RSV MONOCLONAL ANTIBODY  Aged Out         Objective    Exam  /76   Pulse 62   Temp 97.8  F (36.6  C) (Tympanic)  "  Resp 16   Ht 1.727 m (5' 8\")   SpO2 97%   BMI 28.28 kg/m     Estimated body mass index is 28.28 kg/m  as calculated from the following:    Height as of this encounter: 1.727 m (5' 8\").    Weight as of 8/28/24: 84.4 kg (186 lb).    GEN: Alert and oriented, in no acute distress  CV: RRR, no murmur  RESP: lungs clear bilaterally, good effort  EXT: no edema or lesions noted in lower extremities  Neck: neck supple without mass or lymphadenopathy           10/14/2024   Mini Cog   Clock Draw Score 2 Normal   3 Item Recall 3 objects recalled   Mini Cog Total Score 5               Signed Electronically by: Kaiden Zapata MD    "

## 2024-10-15 ENCOUNTER — OFFICE VISIT (OUTPATIENT)
Dept: DERMATOLOGY | Facility: CLINIC | Age: 79
End: 2024-10-15
Payer: COMMERCIAL

## 2024-10-15 DIAGNOSIS — Z85.828 HISTORY OF BASAL CELL CANCER: ICD-10-CM

## 2024-10-15 DIAGNOSIS — D22.9 MULTIPLE BENIGN NEVI: ICD-10-CM

## 2024-10-15 DIAGNOSIS — L81.4 LENTIGO: ICD-10-CM

## 2024-10-15 DIAGNOSIS — L82.1 SEBORRHEIC KERATOSIS: Primary | ICD-10-CM

## 2024-10-15 DIAGNOSIS — D18.01 CHERRY ANGIOMA: ICD-10-CM

## 2024-10-15 DIAGNOSIS — L82.0 INFLAMED SEBORRHEIC KERATOSIS: ICD-10-CM

## 2024-10-15 PROCEDURE — 17110 DESTRUCTION B9 LES UP TO 14: CPT | Performed by: PHYSICIAN ASSISTANT

## 2024-10-15 PROCEDURE — 99213 OFFICE O/P EST LOW 20 MIN: CPT | Mod: 25 | Performed by: PHYSICIAN ASSISTANT

## 2024-10-15 NOTE — LETTER
"10/15/2024      Derrick Morrison  [address] [address]      Dear Colleague,    Thank you for referring your patient, Derrick Morrison, to the [facility]. Please see a copy of my visit note below.    Derrick Morrison is a pleasant 79 year old year old male patient here today for rough area on face.  No pain or bleeding or bleeding areas. He notes he picks rough areas on temple/sideburns.  Patient has no other skin complaints today.  Remainder of the HPI, Meds, PMH, Allergies, FH, and SH was reviewed in chart.    Pertinent Hx:  History of BCC  Past Medical History:   Diagnosis Date     Actinic keratosis      Basal cell carcinoma      BPH w urinary obs/LUTS 10/10/2011    flomax in past, \"quit working\".  Avodart in past.  Tapered off.        CAD (coronary artery disease) 10/10/2011    -8/16/2006 s/p GERALDINE to mid RCA, s/p GERALDINE to mid LAD in [place] -10/4/2007 s/p GERALDINE to pRCA and GERALDINE to dRCA in [place]  -11/17/11 Unstable angina, s/p GERALDINE to prox LAD (jailed small diagonal)       Hyperlipidaemia      Palpitations 08/07/2013    occasional, improved on low dose beta blocker      screening 10/10/2011    2007 colonoscopy \"all clean\" in [place].  He is sure about this.   AAA ultrasound screen 3/07 normal also.  (leg and neck done then too)      Skin cancer     had mohs on L temple       Past Surgical History:   Procedure Laterality Date     APPENDECTOMY      childhood     CARDIAC SURGERY  2006, 2007, 2011    7 stents total     COLONOSCOPY      about 8-9 years ago     COLONOSCOPY N/A 2/28/2023    Procedure: COLONOSCOPY;  Surgeon: Kaiden Frausto MD;  Location: [facility]     EYE SURGERY  2020?    cataracts        Family History   Problem Relation Age of Onset     Coronary Artery Disease Mother      Melanoma No family hx of        Social History     Socioeconomic History     Marital status:      Spouse name: Not on file     Number of children: Not on " file     Years of education: Not on file     Highest education level: Not on file   Occupational History     Employer: RETIRED   Tobacco Use     Smoking status: Never     Smokeless tobacco: Never   Vaping Use     Vaping status: Never Used   Substance and Sexual Activity     Alcohol use: Yes     Comment: wine     Drug use: No     Sexual activity: Yes     Partners: Female   Other Topics Concern     Parent/sibling w/ CABG, MI or angioplasty before 65F 55M? Yes     Comment: mother   Social History Narrative     Not on file     Social Determinants of Health     Financial Resource Strain: Low Risk  (10/10/2024)    Financial Resource Strain      Within the past 12 months, have you or your family members you live with been unable to get utilities (heat, electricity) when it was really needed?: No   Food Insecurity: Low Risk  (10/10/2024)    Food Insecurity      Within the past 12 months, did you worry that your food would run out before you got money to buy more?: No      Within the past 12 months, did the food you bought just not last and you didn t have money to get more?: No   Transportation Needs: Low Risk  (10/10/2024)    Transportation Needs      Within the past 12 months, has lack of transportation kept you from medical appointments, getting your medicines, non-medical meetings or appointments, work, or from getting things that you need?: No   Physical Activity: Insufficiently Active (10/10/2024)    Exercise Vital Sign      Days of Exercise per Week: 3 days      Minutes of Exercise per Session: 30 min   Stress: No Stress Concern Present (10/10/2024)    [place] [employer] - Occupational Stress Questionnaire      Feeling of Stress : Only a little   Social Connections: Unknown (10/10/2024)    Social Connection and Isolation Panel [NHANES]      Frequency of Communication with Friends and Family: Not on file      Frequency of Social Gatherings with Friends and Family: Once a week      Attends  Druze Services: Not on file      Active Member of Clubs or Organizations: Not on file      Attends Club or Organization Meetings: Not on file      Marital Status: Not on file   Interpersonal Safety: Low Risk  (10/14/2024)    Interpersonal Safety      Do you feel physically and emotionally safe where you currently live?: Yes      Within the past 12 months, have you been hit, slapped, kicked or otherwise physically hurt by someone?: No      Within the past 12 months, have you been humiliated or emotionally abused in other ways by your partner or ex-partner?: No   Housing Stability: Low Risk  (10/10/2024)    Housing Stability      Do you have housing? : Yes      Are you worried about losing your housing?: No       Outpatient Encounter Medications as of 10/15/2024   Medication Sig Dispense Refill     ASPIRIN NOT PRESCRIBED, INTENTIONAL, 1 each continuous prn Antiplatelet medication not prescribed intentionally due to plavix (Patient not taking: Reported on 10/14/2024) 0 each 0     carbidopa-levodopa (SINEMET)  MG tablet Take 2 tablets by mouth 4 times daily. Plus an extra half tablet at night if needed 240 tablet 5     clopidogrel (PLAVIX) 75 MG tablet Take 1 tablet (75 mg) by mouth daily. 90 tablet 3     dutasteride (AVODART) 0.5 MG capsule Take one pill every other day 45 capsule 3     metoprolol succinate ER (TOPROL XL) 25 MG 24 hr tablet Take 1 tablet (25 mg) by mouth daily. 90 tablet 3     nitroGLYcerin (NITROSTAT) 0.4 MG sublingual tablet PLACE 1 TABLET UNDER THE TONGUE AT THE ONSET OF CHEST PAIN. MAY REPEAT EVERY 5 MINUTES AS NEEDED FOR A TOTAL OF 3 DOSES. (Patient not taking: Reported on 6/10/2024) 25 tablet 1     rosuvastatin (CRESTOR) 40 MG tablet Take 1 tablet (40 mg) by mouth every evening. 90 tablet 3     tiZANidine (ZANAFLEX) 2 MG tablet Take 1-2 tablets (2-4 mg) by mouth 3 times daily (Patient not taking: Reported on 6/10/2024) 30 tablet 1     No facility-administered encounter medications on  file as of 10/15/2024.             O:   NAD, WDWN, Alert & Oriented, Mood & Affect wnl, Vitals stable   Here today alone   There were no vitals taken for this visit.   General appearance normal   Vitals stable   Alert, oriented and in no acute distress     Brown stuck on papules on face   Stuck on papules and brown macules on trunk and ext   Red papules on trunk  Brown papules and macules with regular pigment network and borders on torso and extremities     The remainder of skin exam is normal       Eyes: Conjunctivae/lids:Normal     ENT: Lips: normal    MSK:Normal    Cardiovascular: peripheral edema none    Pulm: Breathing Normal    Neuro/Psych: Orientation:Alert and Orientedx3 ; Mood/Affect:normal   A/P:  1. Inflamed seborrheic keratoses on temples/sideburns x 8   LN2:  Treated with LN2 for 5s for 1-2 cycles. Warned risks of blistering, pain, pigment change, scarring, and incomplete resolution.  Advised patient to return if lesions do not completely resolve.  Wound care sheet given  2. Seborrheic keratosis, lentigo, angioma, benign nevi, History of BCC  It was a pleasure speaking to Derrick Morrison today.  BENIGN LESIONS DISCUSSED WITH PATIENT:  I discussed the specifics of tumor, prognosis, and genetics of benign lesions.  I explained that treatment of these lesions would be purely cosmetic and not medically neccessary.  I discussed with patient different removal options including excision, cautery and /or laser.      Nature and genetics of benign skin lesions dicussed with patient.  Signs and Symptoms of skin cancer discussed with patient.  ABCDEs of melanoma reviewed with patient.  Patient encouraged to perform monthly skin exams.  UV precautions reviewed with patient.  Risks of non-melanoma skin cancer discussed with patient   Return to clinic in one year or sooner if needed.       Again, thank you for allowing me to participate in the care of your patient.        Sincerely,        Vandana Reid,  AILIN

## 2024-10-15 NOTE — PROGRESS NOTES
"Derrick Morrison is a pleasant 79 year old year old male patient here today for rough area on face.  No pain or bleeding or bleeding areas. He notes he picks rough areas on temple/sideburns.  Patient has no other skin complaints today.  Remainder of the HPI, Meds, PMH, Allergies, FH, and SH was reviewed in chart.    Pertinent Hx:  History of BCC  Past Medical History:   Diagnosis Date    Actinic keratosis     Basal cell carcinoma     BPH w urinary obs/LUTS 10/10/2011    flomax in past, \"quit working\".  Avodart in past.  Tapered off.       CAD (coronary artery disease) 10/10/2011    -8/16/2006 s/p GERALDINE to mid RCA, s/p GERALDINE to mid LAD in [place] -10/4/2007 s/p GERALDINE to pRCA and GERALDINE to dRCA in [place]  -11/17/11 Unstable angina, s/p GERALDINE to prox LAD (jailed small diagonal)      Hyperlipidaemia     Palpitations 08/07/2013    occasional, improved on low dose beta blocker     screening 10/10/2011    2007 colonoscopy \"all clean\" in [place].  He is sure about this.   AAA ultrasound screen 3/07 normal also.  (leg and neck done then too)     Skin cancer     had mohs on L temple       Past Surgical History:   Procedure Laterality Date    APPENDECTOMY      childhood    CARDIAC SURGERY  2006, 2007, 2011    7 stents total    COLONOSCOPY      about 8-9 years ago    COLONOSCOPY N/A 2/28/2023    Procedure: COLONOSCOPY;  Surgeon: Kaiden Frausto MD;  Location: [facility]    EYE SURGERY  2020?    cataracts        Family History   Problem Relation Age of Onset    Coronary Artery Disease Mother     Melanoma No family hx of        Social History     Socioeconomic History    Marital status:      Spouse name: Not on file    Number of children: Not on file    Years of education: Not on file    Highest education level: Not on file   Occupational History     Employer: RETIRED   Tobacco Use    Smoking status: Never    Smokeless tobacco: Never   Vaping Use    Vaping status: Never Used   Substance and Sexual Activity    " Alcohol use: Yes     Comment: wine    Drug use: No    Sexual activity: Yes     Partners: Female   Other Topics Concern    Parent/sibling w/ CABG, MI or angioplasty before 65F 55M? Yes     Comment: mother   Social History Narrative    Not on file     Social Determinants of Health     Financial Resource Strain: Low Risk  (10/10/2024)    Financial Resource Strain     Within the past 12 months, have you or your family members you live with been unable to get utilities (heat, electricity) when it was really needed?: No   Food Insecurity: Low Risk  (10/10/2024)    Food Insecurity     Within the past 12 months, did you worry that your food would run out before you got money to buy more?: No     Within the past 12 months, did the food you bought just not last and you didn t have money to get more?: No   Transportation Needs: Low Risk  (10/10/2024)    Transportation Needs     Within the past 12 months, has lack of transportation kept you from medical appointments, getting your medicines, non-medical meetings or appointments, work, or from getting things that you need?: No   Physical Activity: Insufficiently Active (10/10/2024)    Exercise Vital Sign     Days of Exercise per Week: 3 days     Minutes of Exercise per Session: 30 min   Stress: No Stress Concern Present (10/10/2024)    [employer] [place] [employer] - Occupational Stress Questionnaire     Feeling of Stress : Only a little   Social Connections: Unknown (10/10/2024)    Social Connection and Isolation Panel [NHANES]     Frequency of Communication with Friends and Family: Not on file     Frequency of Social Gatherings with Friends and Family: Once a week     Attends Jain Services: Not on file     Active Member of Clubs or Organizations: Not on file     Attends Club or Organization Meetings: Not on file     Marital Status: Not on file   Interpersonal Safety: Low Risk  (10/14/2024)    Interpersonal Safety     Do you feel physically and emotionally safe  where you currently live?: Yes     Within the past 12 months, have you been hit, slapped, kicked or otherwise physically hurt by someone?: No     Within the past 12 months, have you been humiliated or emotionally abused in other ways by your partner or ex-partner?: No   Housing Stability: Low Risk  (10/10/2024)    Housing Stability     Do you have housing? : Yes     Are you worried about losing your housing?: No       Outpatient Encounter Medications as of 10/15/2024   Medication Sig Dispense Refill    ASPIRIN NOT PRESCRIBED, INTENTIONAL, 1 each continuous prn Antiplatelet medication not prescribed intentionally due to plavix (Patient not taking: Reported on 10/14/2024) 0 each 0    carbidopa-levodopa (SINEMET)  MG tablet Take 2 tablets by mouth 4 times daily. Plus an extra half tablet at night if needed 240 tablet 5    clopidogrel (PLAVIX) 75 MG tablet Take 1 tablet (75 mg) by mouth daily. 90 tablet 3    dutasteride (AVODART) 0.5 MG capsule Take one pill every other day 45 capsule 3    metoprolol succinate ER (TOPROL XL) 25 MG 24 hr tablet Take 1 tablet (25 mg) by mouth daily. 90 tablet 3    nitroGLYcerin (NITROSTAT) 0.4 MG sublingual tablet PLACE 1 TABLET UNDER THE TONGUE AT THE ONSET OF CHEST PAIN. MAY REPEAT EVERY 5 MINUTES AS NEEDED FOR A TOTAL OF 3 DOSES. (Patient not taking: Reported on 6/10/2024) 25 tablet 1    rosuvastatin (CRESTOR) 40 MG tablet Take 1 tablet (40 mg) by mouth every evening. 90 tablet 3    tiZANidine (ZANAFLEX) 2 MG tablet Take 1-2 tablets (2-4 mg) by mouth 3 times daily (Patient not taking: Reported on 6/10/2024) 30 tablet 1     No facility-administered encounter medications on file as of 10/15/2024.             O:   NAD, WDWN, Alert & Oriented, Mood & Affect wnl, Vitals stable   Here today alone   There were no vitals taken for this visit.   General appearance normal   Vitals stable   Alert, oriented and in no acute distress     Brown stuck on papules on face   Stuck on papules and  brown macules on trunk and ext   Red papules on trunk  Brown papules and macules with regular pigment network and borders on torso and extremities     The remainder of skin exam is normal       Eyes: Conjunctivae/lids:Normal     ENT: Lips: normal    MSK:Normal    Cardiovascular: peripheral edema none    Pulm: Breathing Normal    Neuro/Psych: Orientation:Alert and Orientedx3 ; Mood/Affect:normal   A/P:  1. Inflamed seborrheic keratoses on temples/sideburns x 8   LN2:  Treated with LN2 for 5s for 1-2 cycles. Warned risks of blistering, pain, pigment change, scarring, and incomplete resolution.  Advised patient to return if lesions do not completely resolve.  Wound care sheet given  2. Seborrheic keratosis, lentigo, angioma, benign nevi, History of BCC  It was a pleasure speaking to Derrick Morrison today.  BENIGN LESIONS DISCUSSED WITH PATIENT:  I discussed the specifics of tumor, prognosis, and genetics of benign lesions.  I explained that treatment of these lesions would be purely cosmetic and not medically neccessary.  I discussed with patient different removal options including excision, cautery and /or laser.      Nature and genetics of benign skin lesions dicussed with patient.  Signs and Symptoms of skin cancer discussed with patient.  ABCDEs of melanoma reviewed with patient.  Patient encouraged to perform monthly skin exams.  UV precautions reviewed with patient.  Risks of non-melanoma skin cancer discussed with patient   Return to clinic in one year or sooner if needed.

## 2024-10-24 NOTE — PROGRESS NOTES
AUDIOLOGY REPORT    SUBJECTIVE: Derrick Morrison is a 76 year old male was seen in the Audiology Clinic at  [facility] on 4/04/22 to discuss concerns with hearing and functional communication difficulties.Derrick notes difficulty with communication in a variety of listening situations. He is a current hearing aid user.     Discussed hearing aid benefits with patient.     Patient elects to check on his insurance benefits prior to continuing with his hearing aid consultation today.     OBJECTIVE:      ASSESSMENT:     ICD-10-CM    1. Sensorineural hearing loss, bilateral  H90.3            PLAN: Derrick is scheduled to return for a hearing aid consultation if he elects to forgo his insurance discount.  Please contact this clinic with any questions or concerns.      Blanca Santos M.A. -[facility], #[id number]           Please share these instructions with your physicians if you are seen by a physician between treatment room visits.

## 2024-11-21 ENCOUNTER — OFFICE VISIT (OUTPATIENT)
Dept: NEUROLOGY | Facility: CLINIC | Age: 79
End: 2024-11-21
Payer: COMMERCIAL

## 2024-11-21 ENCOUNTER — MEDICAL CORRESPONDENCE (OUTPATIENT)
Dept: HEALTH INFORMATION MANAGEMENT | Facility: CLINIC | Age: 79
End: 2024-11-21

## 2024-11-21 VITALS
SYSTOLIC BLOOD PRESSURE: 129 MMHG | WEIGHT: 189.8 LBS | DIASTOLIC BLOOD PRESSURE: 70 MMHG | HEART RATE: 55 BPM | BODY MASS INDEX: 28.86 KG/M2

## 2024-11-21 DIAGNOSIS — G20.A1 PARKINSON'S DISEASE WITHOUT DYSKINESIA OR FLUCTUATING MANIFESTATIONS (H): Primary | ICD-10-CM

## 2024-11-21 DIAGNOSIS — Z79.899 ENCOUNTER FOR LONG-TERM (CURRENT) USE OF MEDICATIONS: ICD-10-CM

## 2024-11-21 DIAGNOSIS — R29.818 PARKINSONIAN FEATURES: ICD-10-CM

## 2024-11-21 RX ORDER — CARBIDOPA AND LEVODOPA 25; 100 MG/1; MG/1
2 TABLET ORAL 4 TIMES DAILY
Qty: 240 TABLET | Refills: 5 | Status: SHIPPED | OUTPATIENT
Start: 2024-11-21

## 2024-11-21 ASSESSMENT — MONTREAL COGNITIVE ASSESSMENT (MOCA)
WHAT LEVEL OF EDUCATION WAS ATTAINED: 0
8. SERIAL SUBTRACTION OF 7S: 3
WHAT IS THE TOTAL SCORE (OUT OF 30): 26
12. MEMORY INDEX SCORE: 3
VISUOSPATIAL/EXECUTIVE SUBSCORE: 5
10. [FLUENCY] NAME WORDS STARTING WITH DESIGNATED LETTER: 0
6. READ LIST OF DIGITS [FORWARD/BACKWARD]: 2
9. REPEAT EACH SENTENCE: 1
11. FOR EACH PAIR OF WORDS, WHAT CATEGORY DO THEY BELONG TO (OUT OF 2): 2
7. [VIGILENCE] TAP WHEN HEARING DESIGNATED LETTER: 1
13. ORIENTATION SUBSCORE: 6
4. NAME EACH OF THE THREE ANIMALS SHOWN: 3

## 2024-11-21 NOTE — LETTER
11/21/2024      Derrick Morrison  [address] [address]      Dear Colleague,    Thank you for referring your patient, Derrick Morrison, to the [facility] [place]. Please see a copy of my visit note below.    ESTABLISHED PATIENT NEUROLOGY NOTE    DATE OF VISIT: 11/21/2024  MRN: [id number]  PATIENT NAME: Derrick Morrison  YOB: 1945    Chief Complaint   Patient presents with     Parkinson     Memory has been stable.   Patient has been having Lightening in the left toes or foot occasional? Patient has been going to a couple support groups for Parkinson.      SUBJECTIVE:                                                      HISTORY OF PRESENT ILLNESS:  Derrick is here for follow up regarding  Parkinsonism.     History as previously documented by me (5.1.24):  Plan after previous visit was to continue the carbidopa: 1.5 tablets 3 times daily.  I did check some labs: B12 was adequate, vitamin D was adequate, TSH and magnesium normal.  CMP normal.  I also referred Derrick for a swallow study which he plan to have completed at [facility].  MoCA score was 23/30 at his last visit.  He he was reporting some word finding difficulties at that time wife reported short-term memory issues.  No problems with driving.     Derrick is here with his wife and son again today.  Derrick feels that things are relatively stable.  He does notice some tightening up of his left leg and sometimes some pain with this at night at which time he will take an additional half tablet of the carbidopa/levodopa with improvement.  He reports minor tremor in the right leg at times.  He has also noticed some muscle jerks at night, and stiffness after sitting for extended periods of time.  He does still have some word finding difficulties, tends to lose the noun in sentences.  He again says he noted improvement with his begin mild therapies.  He wonders if maybe he should do a refresher of PT at some  point.  He has kept busy with reading, 31 blocks of 1 author and 14 of another over the winter.  He is also into support groups and went to a symposium just last week.  He asks about his motor score.  I do not see this explicitly written out in his most recent PT note.  He denies wearing off between doses of Sinemet.     He has not had any falls.  Driving continues to go okay.  He did have the swallow study completed but did not feel that it was very useful.  He is choking less though.  He noticed that it was a particular cereal that made him choke so he stopped buying this.    MoCA score was 25/30 at that visit, up from 23.  Plan was to continue the carbidopa/levodopa: 1.5 tablets 3 times daily.  I did request his swallow study from [facility].     I also referred him to physical therapy, given the benefit with big and loud therapy in the past.  This was also recommended by the speech therapist who saw him in November of last year.    Derrick is here with his wife, Marilyn.   He says that his worst time of day is 9PM. He has been taking the evening dose earlier because of breakthrough tremor. Sometimes he misses his 3PM dose. Walking helps if he feels like he is off.   He says this is occasional.  He is taking 2 tablets with each dose.   He occasionally takes an extra 1/2 tablet at night.     He is in some support groups.   He does have some increased urinary frequency.     He did go to the [facility] to have some Parkinson's gene testing completed.  He is waiting for those results.  He brought a list of current symptoms and interventions.  I will have the scanned into his chart under media.    CURRENT MEDICATIONS:   Current Outpatient Medications   Medication Sig Dispense Refill     carbidopa-levodopa (SINEMET)  MG tablet Take 2 tablets by mouth 4 times daily. Plus an extra half tablet at night if needed 240 tablet 5     clopidogrel (PLAVIX) 75 MG tablet Take 1 tablet (75 mg) by mouth daily. 90 tablet 3      dutasteride (AVODART) 0.5 MG capsule Take one pill every other day 45 capsule 3     metoprolol succinate ER (TOPROL XL) 25 MG 24 hr tablet Take 1 tablet (25 mg) by mouth daily. 90 tablet 3     nitroGLYcerin (NITROSTAT) 0.4 MG sublingual tablet PLACE 1 TABLET UNDER THE TONGUE AT THE ONSET OF CHEST PAIN. MAY REPEAT EVERY 5 MINUTES AS NEEDED FOR A TOTAL OF 3 DOSES. 25 tablet 1     rosuvastatin (CRESTOR) 40 MG tablet Take 1 tablet (40 mg) by mouth every evening. 90 tablet 3     ASPIRIN NOT PRESCRIBED, INTENTIONAL, 1 each continuous prn Antiplatelet medication not prescribed intentionally due to plavix (Patient not taking: Reported on 10/14/2024) 0 each 0     tiZANidine (ZANAFLEX) 2 MG tablet Take 1-2 tablets (2-4 mg) by mouth 3 times daily (Patient not taking: Reported on 6/10/2024) 30 tablet 1     No current facility-administered medications for this visit.       RECENT DIAGNOSTIC STUDIES:   Labs: No results found for any visits on 11/21/24.    REVIEW OF SYSTEMS:                                                      10-point review of systems is negative except as mentioned above in HPI.    EXAM:                                                      Physical Exam:   Vitals: /70   Pulse 55   Wt 86.1 kg (189 lb 12.8 oz)   BMI 28.86 kg/m    BMI= Body mass index is 28.86 kg/m .  GENERAL: NAD.  HEENT: NC/AT.  CV: RRR. S1, S2.   NECK: No bruits.  PULM: Non-labored breathing.   Neurologic:  MENTAL STATUS: Alert, attentive. Speech is fluent except for some occasional word finding difficulties. Normal comprehension.  Normal concentration. Adequate fund of knowledge.  MoCA: 26/30 (Previous score was 25. Normal is 26 and above).  CRANIAL NERVES: Visual fields intact to confrontation. Pupils equally, round and reactive to light. Facial sensation and movement normal. EOM full. Hearing intact to conversation. Trapezius strength intact. Palate moves symmetrically. Tongue midline.  MOTOR: 5/5 in proximal and distal muscle  groups of upper and lower extremities. Tone and bulk normal.   SENSATION: Normal light touch and pinprick. Intact proprioception at great toes. Vibration: Slightly decreased at both ankles.   COORDINATION: Normal finger nose finger.  He has trouble with full amplitude of hand opening/closing.  STATION AND GAIT: Step length looks good.  Minimal arm swing on the left.  Multi-step turn.  TREMOR: Rare, brief rolling tremor of the left hand.  No other abnormal movements observed.  Left hand-dominant.    ASSESSMENT and PLAN:                                                      Assessment:    ICD-10-CM    1. Parkinson's disease without dyskinesia or fluctuating manifestations (H)  G20.A1 Physical Therapy  Referral      2. Parkinsonian features  R29.818 carbidopa-levodopa (SINEMET)  MG tablet      3. Encounter for long-term (current) use of medications  Z79.899           Mr. Gross is a pleasant 79-year-old man here for follow-up regarding Parkinson's.  He has relatively stable motor symptoms.  Balance is 1 thing that we really are not treating currently so I encouraged him to start up some physical therapy, new order is in.  No changes in the medications at this time.  We will follow-up again in 6 months.    Plan:  Continue current dosing of carbidopa levodopa: 2 tablets 4 times daily with an additional half tablet as needed at night.  Referral to physical therapy for balance/strengthening.  Talk to Dr. Zapata about the urinary changes.  I am not convinced these are necessarily related to your Parkinson's but either way you may benefit from seeing a urologist.  Return to neurology clinic in 6 months.    Total Time: 22 minutes were spent with the patient and in chart review/documentation (as described above in Assessment and Plan)/coordinating the care on date of service.    Izzy Juarez MD  Neurology    Dragon software used in the dictation of this note.                    Again, thank you  for allowing me to participate in the care of your patient.        Sincerely,        Izzy Juarez MD

## 2024-11-21 NOTE — PROGRESS NOTES
ESTABLISHED PATIENT NEUROLOGY NOTE    DATE OF VISIT: 11/21/2024  MRN: [id number]  PATIENT NAME: Derrick Morrison  YOB: 1945    Chief Complaint   Patient presents with    Parkinson     Memory has been stable.   Patient has been having Lightening in the left toes or foot occasional? Patient has been going to a couple support groups for Parkinson.      SUBJECTIVE:                                                      HISTORY OF PRESENT ILLNESS:  Derrikc is here for follow up regarding  Parkinsonism.     History as previously documented by me (5.1.24):  Plan after previous visit was to continue the carbidopa: 1.5 tablets 3 times daily.  I did check some labs: B12 was adequate, vitamin D was adequate, TSH and magnesium normal.  CMP normal.  I also referred Derrick for a swallow study which he plan to have completed at [facility].  MoCA score was 23/30 at his last visit.  He he was reporting some word finding difficulties at that time wife reported short-term memory issues.  No problems with driving.     Derrick is here with his wife and son again today.  Derrick feels that things are relatively stable.  He does notice some tightening up of his left leg and sometimes some pain with this at night at which time he will take an additional half tablet of the carbidopa/levodopa with improvement.  He reports minor tremor in the right leg at times.  He has also noticed some muscle jerks at night, and stiffness after sitting for extended periods of time.  He does still have some word finding difficulties, tends to lose the noun in sentences.  He again says he noted improvement with his begin mild therapies.  He wonders if maybe he should do a refresher of PT at some point.  He has kept busy with reading, 31 blocks of 1 author and 14 of another over the winter.  He is also into support groups and went to a symposium just last week.  He asks about his motor score.  I do not see this explicitly written out in his most recent PT  note.  He denies wearing off between doses of Sinemet.     He has not had any falls.  Driving continues to go okay.  He did have the swallow study completed but did not feel that it was very useful.  He is choking less though.  He noticed that it was a particular cereal that made him choke so he stopped buying this.    MoCA score was 25/30 at that visit, up from 23.  Plan was to continue the carbidopa/levodopa: 1.5 tablets 3 times daily.  I did request his swallow study from [facility].     I also referred him to physical therapy, given the benefit with big and loud therapy in the past.  This was also recommended by the speech therapist who saw him in November of last year.    Derrick is here with his wife, Marilyn.   He says that his worst time of day is 9PM. He has been taking the evening dose earlier because of breakthrough tremor. Sometimes he misses his 3PM dose. Walking helps if he feels like he is off.   He says this is occasional.  He is taking 2 tablets with each dose.   He occasionally takes an extra 1/2 tablet at night.     He is in some support groups.   He does have some increased urinary frequency.     He did go to the [facility] to have some Parkinson's gene testing completed.  He is waiting for those results.  He brought a list of current symptoms and interventions.  I will have the scanned into his chart under media.    CURRENT MEDICATIONS:   Current Outpatient Medications   Medication Sig Dispense Refill    carbidopa-levodopa (SINEMET)  MG tablet Take 2 tablets by mouth 4 times daily. Plus an extra half tablet at night if needed 240 tablet 5    clopidogrel (PLAVIX) 75 MG tablet Take 1 tablet (75 mg) by mouth daily. 90 tablet 3    dutasteride (AVODART) 0.5 MG capsule Take one pill every other day 45 capsule 3    metoprolol succinate ER (TOPROL XL) 25 MG 24 hr tablet Take 1 tablet (25 mg) by mouth daily. 90 tablet 3    nitroGLYcerin (NITROSTAT) 0.4 MG sublingual tablet PLACE 1 TABLET UNDER THE  TONGUE AT THE ONSET OF CHEST PAIN. MAY REPEAT EVERY 5 MINUTES AS NEEDED FOR A TOTAL OF 3 DOSES. 25 tablet 1    rosuvastatin (CRESTOR) 40 MG tablet Take 1 tablet (40 mg) by mouth every evening. 90 tablet 3    ASPIRIN NOT PRESCRIBED, INTENTIONAL, 1 each continuous prn Antiplatelet medication not prescribed intentionally due to plavix (Patient not taking: Reported on 10/14/2024) 0 each 0    tiZANidine (ZANAFLEX) 2 MG tablet Take 1-2 tablets (2-4 mg) by mouth 3 times daily (Patient not taking: Reported on 6/10/2024) 30 tablet 1     No current facility-administered medications for this visit.       RECENT DIAGNOSTIC STUDIES:   Labs: No results found for any visits on 11/21/24.    REVIEW OF SYSTEMS:                                                      10-point review of systems is negative except as mentioned above in HPI.    EXAM:                                                      Physical Exam:   Vitals: /70   Pulse 55   Wt 86.1 kg (189 lb 12.8 oz)   BMI 28.86 kg/m    BMI= Body mass index is 28.86 kg/m .  GENERAL: NAD.  HEENT: NC/AT.  CV: RRR. S1, S2.   NECK: No bruits.  PULM: Non-labored breathing.   Neurologic:  MENTAL STATUS: Alert, attentive. Speech is fluent except for some occasional word finding difficulties. Normal comprehension.  Normal concentration. Adequate fund of knowledge.  MoCA: 26/30 (Previous score was 25. Normal is 26 and above).  CRANIAL NERVES: Visual fields intact to confrontation. Pupils equally, round and reactive to light. Facial sensation and movement normal. EOM full. Hearing intact to conversation. Trapezius strength intact. Palate moves symmetrically. Tongue midline.  MOTOR: 5/5 in proximal and distal muscle groups of upper and lower extremities. Tone and bulk normal.   SENSATION: Normal light touch and pinprick. Intact proprioception at great toes. Vibration: Slightly decreased at both ankles.   COORDINATION: Normal finger nose finger.  He has trouble with full amplitude of hand  opening/closing.  STATION AND GAIT: Step length looks good.  Minimal arm swing on the left.  Multi-step turn.  TREMOR: Rare, brief rolling tremor of the left hand.  No other abnormal movements observed.  Left hand-dominant.    ASSESSMENT and PLAN:                                                      Assessment:    ICD-10-CM    1. Parkinson's disease without dyskinesia or fluctuating manifestations (H)  G20.A1 Physical Therapy  Referral      2. Parkinsonian features  R29.818 carbidopa-levodopa (SINEMET)  MG tablet      3. Encounter for long-term (current) use of medications  Z79.899           Mr. Gross is a pleasant 79-year-old man here for follow-up regarding Parkinson's.  He has relatively stable motor symptoms.  Balance is 1 thing that we really are not treating currently so I encouraged him to start up some physical therapy, new order is in.  No changes in the medications at this time.  We will follow-up again in 6 months.    Plan:  Continue current dosing of carbidopa levodopa: 2 tablets 4 times daily with an additional half tablet as needed at night.  Referral to physical therapy for balance/strengthening.  Talk to Dr. Zapata about the urinary changes.  I am not convinced these are necessarily related to your Parkinson's but either way you may benefit from seeing a urologist.  Return to neurology clinic in 6 months.    Total Time: 22 minutes were spent with the patient and in chart review/documentation (as described above in Assessment and Plan)/coordinating the care on date of service.    Izzy Juarez MD  Neurology    Dragon software used in the dictation of this note.

## 2024-11-21 NOTE — NURSING NOTE
"Derrick Morrison is a 79 year old male who presents for:  Chief Complaint   Patient presents with    Parkinson     Memory has been stable.   Patient has been having Lightening in the left toes or foot occasional? Patient has been going to a couple support groups for Parkinson.         Initial Vitals:  /70   Pulse 55   Wt 86.1 kg (189 lb 12.8 oz)   BMI 28.86 kg/m   Estimated body mass index is 28.86 kg/m  as calculated from the following:    Height as of 10/14/24: 1.727 m (5' 8\").    Weight as of this encounter: 86.1 kg (189 lb 12.8 oz).. Body surface area is 2.03 meters squared. BP completed using cuff size: wrist cuff    Rodri Ramesh  "

## 2024-11-21 NOTE — NURSING NOTE
MARINA COGNITIVE ASSESSMENT (MOCA)  Version 7.1 Original Version  VISUOSPATIAL/EXECUTIVE               COPY CUBE      [ 1   ]                                [  1  ] DRAW CLOCK (Ten past eleven)  (3 points)    [  1  ]                    [ 1   ]               [   1 ]       Contour            Numbers     Hands POINTS              5     / 5   NAMING    [   1]                                                                        [ 1   ]                                             [1    ]  Liclifford Turpin                                Camel                  3   / 3   MEMORY Read list of words, subject must repeat them. Do 2 trials, even if 1st trial is successful. Do a recall after 5 minutes  FACE VELVET Alevism JOHN RED No Points    1st          2nd         ATTENTION Read list of digits (1 digit/sec) Subject has to repeat in the forward order       [  1  ]   2  1  8  5  4                                [   1 ] 7 4 2                      2    /2   Read list of letters. The subject must tap with his hand at each letter A. No points if > 2 errors.  [   1 ] F B A C M N A A J K L B A F A K D E A A A J A M O F A A B             1 /1   Serial 7 subtraction starting at 100          [   1 ] 93         [  1  ] 86          [  1  ] 79          [  1  ] 72         [   1 ] 65   4 or 5 correct subtractions: 3 points,  2 or 3 correct: 2 points,  1correct: 1 point,   0 correct: 0 points       3     /3   LANGUAGE Repeat: I only know that Tae is the one to help today. [   0  ]                                      The cat always hid under the couch when dogs were in the room. [ 1  ]            1   /2   Fluency: Name maximum number of words in one minute that begin with the letter F                                                                                                                    [  0  ] ___ (N > 11 words)              0 /1   ABSTRACTION Similarity  between e.g. banana-orange=fruit                                                                   [ 1   ] train-bicycle                      [  1  ] watch-ruler             2 /2   DELAYED  RECALL Has to recall words  WITH NO CUE FACE  [    ] VELVET  [ 1   ] Mormon  [  1  ]  JOHN  [    ] RED  [ 1   ] Points for UNCUED recall only          3  /5           OPTIONAL Category cue           Multiple choice cue          ORIENTATION  [ 1   ] Date     [  1  ] Month       [ 1   ] Year      [   1 ] Day      [   1 ] Place        [  1  ] City      6 /6   TOTAL  Normal > 26/30 Add 1 point if < 12 years education      26 /30

## 2024-11-21 NOTE — PATIENT INSTRUCTIONS
Plan:  Continue current dosing of carbidopa levodopa: 2 tablets 4 times daily with an additional half tablet as needed at night.  Referral to physical therapy for balance/strengthening.  Talk to Dr. Zapata about the urinary changes.  I am not convinced these are necessarily related to your Parkinson's but either way you may benefit from seeing a urologist.  Return to neurology clinic in 6 months.

## 2024-12-10 ENCOUNTER — THERAPY VISIT (OUTPATIENT)
Dept: PHYSICAL THERAPY | Facility: CLINIC | Age: 79
End: 2024-12-10
Attending: PSYCHIATRY & NEUROLOGY
Payer: MEDICARE

## 2024-12-10 DIAGNOSIS — G20.A1 PARKINSON'S DISEASE WITHOUT DYSKINESIA OR FLUCTUATING MANIFESTATIONS (H): Primary | ICD-10-CM

## 2024-12-10 PROCEDURE — 97112 NEUROMUSCULAR REEDUCATION: CPT | Mod: GP | Performed by: PHYSICAL MEDICINE & REHABILITATION

## 2024-12-10 PROCEDURE — 97161 PT EVAL LOW COMPLEX 20 MIN: CPT | Mod: GP | Performed by: PHYSICAL MEDICINE & REHABILITATION

## 2024-12-10 PROCEDURE — 97110 THERAPEUTIC EXERCISES: CPT | Mod: GP | Performed by: PHYSICAL MEDICINE & REHABILITATION

## 2024-12-26 ENCOUNTER — THERAPY VISIT (OUTPATIENT)
Dept: PHYSICAL THERAPY | Facility: CLINIC | Age: 79
End: 2024-12-26
Attending: PSYCHIATRY & NEUROLOGY
Payer: MEDICARE

## 2024-12-26 DIAGNOSIS — G20.A1 PARKINSON'S DISEASE (H): Primary | ICD-10-CM

## 2024-12-26 PROCEDURE — 97112 NEUROMUSCULAR REEDUCATION: CPT | Mod: GP | Performed by: PHYSICAL MEDICINE & REHABILITATION

## 2024-12-26 PROCEDURE — 97110 THERAPEUTIC EXERCISES: CPT | Mod: GP | Performed by: PHYSICAL MEDICINE & REHABILITATION

## 2025-01-13 ENCOUNTER — THERAPY VISIT (OUTPATIENT)
Dept: PHYSICAL THERAPY | Facility: CLINIC | Age: 80
End: 2025-01-13
Attending: PSYCHIATRY & NEUROLOGY
Payer: MEDICARE

## 2025-01-13 DIAGNOSIS — G20.A1 PARKINSON'S DISEASE (H): Primary | ICD-10-CM

## 2025-01-13 PROCEDURE — 97110 THERAPEUTIC EXERCISES: CPT | Mod: GP | Performed by: PHYSICAL MEDICINE & REHABILITATION

## 2025-01-13 PROCEDURE — 97112 NEUROMUSCULAR REEDUCATION: CPT | Mod: GP | Performed by: PHYSICAL MEDICINE & REHABILITATION

## 2025-01-13 NOTE — PROGRESS NOTES
01/13/25 0500   Appointment Info   Signing clinician's name / credentials Ben Betancourt, PT, DPT   Visits Used 3   Medical Diagnosis Parkinson's disease without dyskinesia or fluctuating manifestations (H) (G20.A1)  - Primary   PT Tx Diagnosis imbalance   Progress Note/Certification   Start of Care Date 12/10/24   Onset of illness/injury or Date of Surgery 12/04/24  (order date)   Therapy Frequency 1x/week   Predicted Duration 10 weeks   Certification date from 12/10/24   Certification date to 02/18/25   Progress Note Completed Date 12/10/24   GOALS   PT Goals 2;3;4   PT Goal 1   Goal Identifier STG   Goal Description Patient to hold over 20 seconds SLS BLE to improve static balance.   Rationale to maximize safety and independence with performance of ADLs and functional tasks   Target Date 01/07/25   Date Met 01/13/25   PT Goal 2   Goal Identifier STG   Goal Description Patient to hold tandem stance for 1 min each side to improve balance with offset tasks.   Rationale to maximize safety and independence with performance of ADLs and functional tasks   Target Date 02/04/25   Date Met 01/13/25   PT Goal 3   Goal Identifier LTG   Goal Description Patient to score 25/28 on Mini-BESTest to improve funcitonal balance.   Rationale to maximize safety and independence with performance of ADLs and functional tasks   Target Date 02/04/25   PT Goal 4   Goal Identifier LTG   Goal Description Patient to be IND with HEP to aid functional recovery and reduce future occurence of pain and/or disability.   Rationale to maximize safety and independence with performance of ADLs and functional tasks   Target Date 02/04/25   Subjective Report   Subjective Report Doing well.   Objective Measures   Objective Measures Objective Measure 1;Objective Measure 2;Objective Measure 3   Treatment Interventions (PT)   Interventions Therapeutic Procedure/Exercise;Neuromuscular Re-education   Therapeutic Procedure/Exercise   Therapeutic Procedures:  "strength, endurance, ROM, flexibility minutes (76393) 19   Therapeutic Procedures Ther Proc 2;Ther Proc 3   Ther Proc 1 HEP initial   Ther Proc 1 - Details performed in clinic with cues   Ther Proc 3 bike level 5   Ther Proc 3 - Details x5 min   PTRx Ther Proc 1 Sit to Stand   PTRx Ther Proc 1 - Details 2x10 with 10# weights   PTRx Ther Proc 2 cone tap drills fwd and lateral stepping   PTRx Ther Proc 2 - Details x1 min each   PTRx Ther Proc 3 One Leg Stands 30 Seconds   PTRx Ther Proc 3 - Details x3 trials each leg   Skilled Intervention cues on pacing/form maximizing exercise benefit, to return to full ADL participation   Patient Response/Progress tolerates well with muscle fatigue   Neuromuscular Re-education   Neuromuscular re-ed of mvmt, balance, coord, kinesthetic sense, posture, proprioception minutes (87538) 10   PTRx Neuro Re-ed 1 Tandem Stance Static With Chair   PTRx Neuro Re-ed 1 - Details 2x30:   PTRx Neuro Re-ed 2 Romberg Eyes Closed Pillow   PTRx Neuro Re-ed 2 - Details 2x30\"   PTRx Neuro Re-ed 3 Tandem Stance with Head Turns   PTRx Neuro Re-ed 3 - Details x10 each LE   Skilled Intervention to improve functional balance strategies   Patient Response/Progress tolerates well with LOB with EC using foam   Education   Learner/Method Patient;No Barriers to Learning   Plan   Home program ptrx sheets   Plan for next session DC   Total Session Time   Timed Code Treatment Minutes 29   Total Treatment Time (sum of timed and untimed services) 29         DISCHARGE  Reason for Discharge: Patient has met all goals.  No further expectation of progress.    Equipment Issued: HEP    Discharge Plan: Patient to continue home program.    Referring Provider:  Izzy Juarez"

## 2025-04-08 ENCOUNTER — MYC REFILL (OUTPATIENT)
Dept: FAMILY MEDICINE | Facility: CLINIC | Age: 80
End: 2025-04-08
Payer: COMMERCIAL

## 2025-04-08 DIAGNOSIS — I25.10 CORONARY ARTERY DISEASE INVOLVING NATIVE HEART WITHOUT ANGINA PECTORIS, UNSPECIFIED VESSEL OR LESION TYPE: ICD-10-CM

## 2025-04-08 RX ORDER — NITROGLYCERIN 0.4 MG/1
TABLET SUBLINGUAL
Qty: 25 TABLET | Refills: 0 | Status: SHIPPED | OUTPATIENT
Start: 2025-04-08

## 2025-04-30 ENCOUNTER — MYC MEDICAL ADVICE (OUTPATIENT)
Dept: NEUROLOGY | Facility: CLINIC | Age: 80
End: 2025-04-30
Payer: COMMERCIAL

## 2025-05-01 ENCOUNTER — OFFICE VISIT (OUTPATIENT)
Dept: NEUROLOGY | Facility: CLINIC | Age: 80
End: 2025-05-01
Payer: COMMERCIAL

## 2025-05-01 VITALS
HEART RATE: 61 BPM | SYSTOLIC BLOOD PRESSURE: 117 MMHG | WEIGHT: 190 LBS | DIASTOLIC BLOOD PRESSURE: 67 MMHG | BODY MASS INDEX: 28.89 KG/M2

## 2025-05-01 DIAGNOSIS — G20.A2 PARKINSON'S DISEASE WITHOUT DYSKINESIA, WITH FLUCTUATING MANIFESTATIONS (H): Primary | ICD-10-CM

## 2025-05-01 DIAGNOSIS — Z79.899 ENCOUNTER FOR LONG-TERM (CURRENT) USE OF MEDICATIONS: ICD-10-CM

## 2025-05-01 DIAGNOSIS — R29.818 PARKINSONIAN FEATURES: ICD-10-CM

## 2025-05-01 RX ORDER — CARBIDOPA AND LEVODOPA 25; 100 MG/1; MG/1
2 TABLET ORAL 4 TIMES DAILY
Qty: 300 TABLET | Refills: 5 | Status: SHIPPED | OUTPATIENT
Start: 2025-05-01

## 2025-05-01 NOTE — PATIENT INSTRUCTIONS
PLAN:  Continue current dosing of carbidopa/levodopa.  I fixed your prescription so that you have some additional tablets for when you take extra.  Referral to pharmacy for medication therapy management review.  They will call you to set this up.  Referral for neuropsych.  You can decide whether or not you want to have this done.  Either way, follow-up in neurology clinic in 6 months.

## 2025-05-01 NOTE — NURSING NOTE
"Derrick Morrison is a 79 year old male who presents for:  Chief Complaint   Patient presents with    Parkinson        Initial Vitals:  /67   Pulse 61   Wt 86.2 kg (190 lb)   BMI 28.89 kg/m   Estimated body mass index is 28.89 kg/m  as calculated from the following:    Height as of 10/14/24: 1.727 m (5' 8\").    Weight as of this encounter: 86.2 kg (190 lb).. Body surface area is 2.03 meters squared. BP completed using cuff size: wrist cuff    Rodri CHANO Ramesh  "

## 2025-05-01 NOTE — PROGRESS NOTES
ESTABLISHED PATIENT NEUROLOGY NOTE    DATE OF VISIT: 5/1/2025  MRN: [id number]  PATIENT NAME: Derrick Morrison  YOB: 1945    Chief Complaint   Patient presents with    Parkinson     SUBJECTIVE:                                                      HISTORY OF PRESENT ILLNESS:  Derrick is here for follow up regarding Parkinson's disease.    Derrick has been seen at [facility] and enrolled in a research program as well. Negative for genetic mutations:GBA, LRRK2, SNCA, cy, PARK7, PINK1, or VPS35. Study participation completed.     Sometimes he has the Left thigh tension and jumping. He has taking an additional 1/2 tablet about 10 times in the past 2 months.   He notices that he holds his Left arm and has to think about this.   Memory is good per Marilyn, his wife. She says his memory is better than hers.     Derrick says he takes his carbidopa/levodopa around 9 AM then 3 PM then about 820 and then 3 AM.  He feels that overall this is working pretty well.  Additional half tablet as needed, as above.     He is noticing more stiffness.  Also has some issues with dry eye.  Writing fluctuates.  Occasional twitching of the left arm as well.  This has happened for 5 times.  He also mentions some constipation.  He asks about new medications, any changes that might be helpful for him from the standpoint of reducing side effects.  That being said he is happy with his current regimen.  No falls.    CURRENT MEDICATIONS:   Current Outpatient Medications   Medication Sig Dispense Refill    carbidopa-levodopa (SINEMET)  MG tablet Take 2 tablets by mouth 4 times daily. Plus an extra half tablet at night if needed 300 tablet 5    clopidogrel (PLAVIX) 75 MG tablet Take 1 tablet (75 mg) by mouth daily. 90 tablet 3    dutasteride (AVODART) 0.5 MG capsule Take one pill every other day 45 capsule 3    metoprolol succinate ER (TOPROL XL) 25 MG 24 hr tablet Take 1 tablet (25 mg) by mouth daily. 90 tablet 3    nitroGLYcerin  (NITROSTAT) 0.4 MG sublingual tablet PLACE 1 TABLET UNDER THE TONGUE AT THE ONSET OF CHEST PAIN. MAY REPEAT EVERY 5 MINUTES AS NEEDED FOR A TOTAL OF 3 DOSES. 25 tablet 0    rosuvastatin (CRESTOR) 40 MG tablet Take 1 tablet (40 mg) by mouth every evening. 90 tablet 3    ASPIRIN NOT PRESCRIBED, INTENTIONAL, 1 each continuous prn Antiplatelet medication not prescribed intentionally due to plavix (Patient not taking: Reported on 10/14/2024) 0 each 0    tiZANidine (ZANAFLEX) 2 MG tablet Take 1-2 tablets (2-4 mg) by mouth 3 times daily (Patient not taking: Reported on 6/10/2024) 30 tablet 1     No current facility-administered medications for this visit.       RECENT DIAGNOSTIC STUDIES:   Labs: No results found for any visits on 05/01/25.    REVIEW OF SYSTEMS:                                                      10-point review of systems is negative except as mentioned above in HPI.    EXAM:                                                      Physical Exam:   Vitals: /67   Pulse 61   Wt 86.2 kg (190 lb)   BMI 28.89 kg/m    BMI= Body mass index is 28.89 kg/m .  GENERAL: NAD.  HEENT: NC/AT.  PULM: Non-labored breathing.   Most recent dose of carbidopa/levodopa was about 2 hours before exam  Neurologic:  MENTAL STATUS: Alert, attentive. Speech is fluent except for some occasional word finding difficulties. Normal comprehension.  Normal concentration. Adequate fund of knowledge.  No MoCA today (Previous score was 26. Normal is 26 and above).  CRANIAL NERVES: Visual fields intact to confrontation. Pupils equally, round and reactive to light. Facial sensation and movement normal. EOM full. Hearing intact to conversation. Trapezius strength intact. Palate moves symmetrically. Tongue midline.  MOTOR: 5/5 in proximal and distal muscle groups of upper and lower extremities. Tone and bulk normal.   SENSATION: Normal light touch throughout.  COORDINATION: Normal finger nose finger.  He has trouble with full amplitude of  hand opening/closing.  STATION AND GAIT: Step length looks good.  Minimal arm swing on the left.  Multi-step turn.  TREMOR: Rare, brief rolling tremor of the left hand.  No other abnormal movements observed.  Left hand-dominant.    ASSESSMENT and PLAN:                                                      Assessment:    ICD-10-CM    1. Parkinson's disease without dyskinesia, with fluctuating manifestations (H)  G20.A2 Med Therapy Management Referral     Adult Neuropsychology  Referral      2. Parkinsonian features  R29.818 carbidopa-levodopa (SINEMET)  MG tablet      3. Encounter for long-term (current) use of medications  Z79.899           Mr. Morrison is a pleasant 79-year-old man here for follow-up regarding Parkinson's.  Generally his symptoms are stable.  He opted out of doing the MoCA today and has tested normal in the past.  He does have some questions about whether his memory will get worse.  I suggest neuropsych testing.  Derrick will think about this.  From a motor standpoint, he does not feel like we need to make any changes in medications right now but he is open to changes in the future.  Given the various symptoms that could be possible side effects of his medications, I think it would be helpful to have a medication therapy management visit as well.  We will plan to follow-up again in 6 months.    Plan:  Continue current dosing of carbidopa/levodopa.  I fixed your prescription so that you have some additional tablets for when you take extra.  Referral to pharmacy for medication therapy management review.  They will call you to set this up.  Referral for neuropsych.  You can decide whether or not you want to have this done.  Either way, follow-up in neurology clinic in 6 months.    Total Time: 35 minutes were spent with the patient and in chart review/documentation (as described above in Assessment and Plan)/coordinating the care on date of service.    Izzy Juarez,  MD  Neurology    Dragon software used in the dictation of this note.

## 2025-05-01 NOTE — LETTER
5/1/2025      Derrick Morrison  [address] [address]      Dear Colleague,    Thank you for referring your patient, Derrick Morrison, to the [facility] [place]. Please see a copy of my visit note below.    ESTABLISHED PATIENT NEUROLOGY NOTE    DATE OF VISIT: 5/1/2025  MRN: [id number]  PATIENT NAME: Derrick Morrison  YOB: 1945    Chief Complaint   Patient presents with     Parkinson     SUBJECTIVE:                                                      HISTORY OF PRESENT ILLNESS:  Derrick is here for follow up regarding Parkinson's disease.    Derrick has been seen at [facility] and enrolled in a research program as well. Negative for genetic mutations:GBA, LRRK2, SNCA, cy, PARK7, PINK1, or VPS35. Study participation completed.     Sometimes he has the Left thigh tension and jumping. He has taking an additional 1/2 tablet about 10 times in the past 2 months.   He notices that he holds his Left arm and has to think about this.   Memory is good per Marilyn, his wife. She says his memory is better than hers.     Derrick says he takes his carbidopa/levodopa around 9 AM then 3 PM then about 820 and then 3 AM.  He feels that overall this is working pretty well.  Additional half tablet as needed, as above.     He is noticing more stiffness.  Also has some issues with dry eye.  Writing fluctuates.  Occasional twitching of the left arm as well.  This has happened for 5 times.  He also mentions some constipation.  He asks about new medications, any changes that might be helpful for him from the standpoint of reducing side effects.  That being said he is happy with his current regimen.  No falls.    CURRENT MEDICATIONS:   Current Outpatient Medications   Medication Sig Dispense Refill     carbidopa-levodopa (SINEMET)  MG tablet Take 2 tablets by mouth 4 times daily. Plus an extra half tablet at night if needed 300 tablet 5     clopidogrel (PLAVIX) 75 MG tablet  Take 1 tablet (75 mg) by mouth daily. 90 tablet 3     dutasteride (AVODART) 0.5 MG capsule Take one pill every other day 45 capsule 3     metoprolol succinate ER (TOPROL XL) 25 MG 24 hr tablet Take 1 tablet (25 mg) by mouth daily. 90 tablet 3     nitroGLYcerin (NITROSTAT) 0.4 MG sublingual tablet PLACE 1 TABLET UNDER THE TONGUE AT THE ONSET OF CHEST PAIN. MAY REPEAT EVERY 5 MINUTES AS NEEDED FOR A TOTAL OF 3 DOSES. 25 tablet 0     rosuvastatin (CRESTOR) 40 MG tablet Take 1 tablet (40 mg) by mouth every evening. 90 tablet 3     ASPIRIN NOT PRESCRIBED, INTENTIONAL, 1 each continuous prn Antiplatelet medication not prescribed intentionally due to plavix (Patient not taking: Reported on 10/14/2024) 0 each 0     tiZANidine (ZANAFLEX) 2 MG tablet Take 1-2 tablets (2-4 mg) by mouth 3 times daily (Patient not taking: Reported on 6/10/2024) 30 tablet 1     No current facility-administered medications for this visit.       RECENT DIAGNOSTIC STUDIES:   Labs: No results found for any visits on 05/01/25.    REVIEW OF SYSTEMS:                                                      10-point review of systems is negative except as mentioned above in HPI.    EXAM:                                                      Physical Exam:   Vitals: /67   Pulse 61   Wt 86.2 kg (190 lb)   BMI 28.89 kg/m    BMI= Body mass index is 28.89 kg/m .  GENERAL: NAD.  HEENT: NC/AT.  PULM: Non-labored breathing.   Most recent dose of carbidopa/levodopa was about 2 hours before exam  Neurologic:  MENTAL STATUS: Alert, attentive. Speech is fluent except for some occasional word finding difficulties. Normal comprehension.  Normal concentration. Adequate fund of knowledge.  No MoCA today (Previous score was 26. Normal is 26 and above).  CRANIAL NERVES: Visual fields intact to confrontation. Pupils equally, round and reactive to light. Facial sensation and movement normal. EOM full. Hearing intact to conversation. Trapezius strength intact. Palate  moves symmetrically. Tongue midline.  MOTOR: 5/5 in proximal and distal muscle groups of upper and lower extremities. Tone and bulk normal.   SENSATION: Normal light touch throughout.  COORDINATION: Normal finger nose finger.  He has trouble with full amplitude of hand opening/closing.  STATION AND GAIT: Step length looks good.  Minimal arm swing on the left.  Multi-step turn.  TREMOR: Rare, brief rolling tremor of the left hand.  No other abnormal movements observed.  Left hand-dominant.    ASSESSMENT and PLAN:                                                      Assessment:    ICD-10-CM    1. Parkinson's disease without dyskinesia, with fluctuating manifestations (H)  G20.A2 Med Therapy Management Referral     Adult Neuropsychology  Referral      2. Parkinsonian features  R29.818 carbidopa-levodopa (SINEMET)  MG tablet      3. Encounter for long-term (current) use of medications  Z79.899           Mr. Morrison is a pleasant 79-year-old man here for follow-up regarding Parkinson's.  Generally his symptoms are stable.  He opted out of doing the MoCA today and has tested normal in the past.  He does have some questions about whether his memory will get worse.  I suggest neuropsych testing.  Derrick will think about this.  From a motor standpoint, he does not feel like we need to make any changes in medications right now but he is open to changes in the future.  Given the various symptoms that could be possible side effects of his medications, I think it would be helpful to have a medication therapy management visit as well.  We will plan to follow-up again in 6 months.    Plan:  Continue current dosing of carbidopa/levodopa.  I fixed your prescription so that you have some additional tablets for when you take extra.  Referral to pharmacy for medication therapy management review.  They will call you to set this up.  Referral for neuropsych.  You can decide whether or not you want to have this  done.  Either way, follow-up in neurology clinic in 6 months.    Total Time: 35 minutes were spent with the patient and in chart review/documentation (as described above in Assessment and Plan)/coordinating the care on date of service.    Izzy Juarez MD  Neurology    Dragon software used in the dictation of this note.                    Again, thank you for allowing me to participate in the care of your patient.        Sincerely,        Izzy Juarez MD    Electronically signed

## 2025-05-05 ENCOUNTER — TELEPHONE (OUTPATIENT)
Dept: NEUROLOGY | Facility: CLINIC | Age: 80
End: 2025-05-05
Payer: COMMERCIAL

## 2025-05-05 NOTE — TELEPHONE ENCOUNTER
MTM referral from: [facility] visit (referral by provider)    MTM referral outreach attempt #2 on May 5, 2025 at 2:45 PM      Outcome: Left Message    MyChart Message Sent    Jyoti Lora  [facility]

## 2025-07-15 ENCOUNTER — MYC MEDICAL ADVICE (OUTPATIENT)
Dept: NEUROLOGY | Facility: CLINIC | Age: 80
End: 2025-07-15
Payer: COMMERCIAL

## 2025-07-23 ENCOUNTER — OFFICE VISIT (OUTPATIENT)
Dept: FAMILY MEDICINE | Facility: CLINIC | Age: 80
End: 2025-07-23
Payer: COMMERCIAL

## 2025-07-23 VITALS
HEART RATE: 64 BPM | BODY MASS INDEX: 27.13 KG/M2 | WEIGHT: 179 LBS | DIASTOLIC BLOOD PRESSURE: 68 MMHG | TEMPERATURE: 97.5 F | RESPIRATION RATE: 16 BRPM | OXYGEN SATURATION: 99 % | HEIGHT: 68 IN | SYSTOLIC BLOOD PRESSURE: 110 MMHG

## 2025-07-23 DIAGNOSIS — R42 DIZZINESS: Primary | ICD-10-CM

## 2025-07-23 PROCEDURE — 3078F DIAST BP <80 MM HG: CPT | Performed by: FAMILY MEDICINE

## 2025-07-23 PROCEDURE — 3074F SYST BP LT 130 MM HG: CPT | Performed by: FAMILY MEDICINE

## 2025-07-23 PROCEDURE — 99213 OFFICE O/P EST LOW 20 MIN: CPT | Performed by: FAMILY MEDICINE

## 2025-07-23 PROCEDURE — 1126F AMNT PAIN NOTED NONE PRSNT: CPT | Performed by: FAMILY MEDICINE

## 2025-07-23 ASSESSMENT — PAIN SCALES - GENERAL: PAINLEVEL_OUTOF10: NO PAIN (0)

## 2025-07-23 NOTE — PROGRESS NOTES
"S: Derrick Morrison is a 79 year old male with some dizziness.  Improving.  After breakfast in AM, seems to improve during day.  Not as bad last few days.  Made appt when worse.        On parkinson's trx, doesn't seem to be related to taking or not taking his meds for that    Says it's lightheaded.  No sense of motion    No palpitations    O:/68   Pulse 64   Temp 97.5  F (36.4  C) (Tympanic)   Resp 16   Ht 1.727 m (5' 8\")   Wt 81.2 kg (179 lb)   SpO2 99%   BMI 27.22 kg/m    GEN: Alert and oriented, in no acute distress  CV: RRR, no murmur  RESP: lungs clear bilaterally, good effort  Ears look good, normal TM bilaterally  Balance and gait slowed, but OK, normal for him    A: dizziness, improving    P: he knows that balance, \"dizziness\" are common with parkinson's.  Reassured him, may come and go.  If not worsening, and no other sx, will observe for now.  Has annual in a couple months, will reassess then, labs, fills, etc.       "

## (undated) RX ORDER — PROPOFOL 10 MG/ML
INJECTION, EMULSION INTRAVENOUS
Status: DISPENSED
Start: 2023-02-28

## (undated) RX ORDER — REGADENOSON 0.08 MG/ML
INJECTION, SOLUTION INTRAVENOUS
Status: DISPENSED
Start: 2023-03-13